# Patient Record
Sex: FEMALE | Race: WHITE | NOT HISPANIC OR LATINO | Employment: OTHER | URBAN - METROPOLITAN AREA
[De-identification: names, ages, dates, MRNs, and addresses within clinical notes are randomized per-mention and may not be internally consistent; named-entity substitution may affect disease eponyms.]

---

## 2023-08-09 ENCOUNTER — HOSPITAL ENCOUNTER (OUTPATIENT)
Dept: DATA CONVERSION | Facility: HOSPITAL | Age: 78
Discharge: HOME | End: 2023-08-09

## 2023-08-09 DIAGNOSIS — M35.3 POLYMYALGIA RHEUMATICA (MULTI): ICD-10-CM

## 2023-08-09 LAB
ALBUMIN SERPL-MCNC: 3.8 GM/DL (ref 3.5–5)
ALBUMIN/GLOB SERPL: 1.4 RATIO (ref 1.5–3)
ALP BLD-CCNC: 53 U/L (ref 35–125)
ALT SERPL-CCNC: 10 U/L (ref 5–40)
ANION GAP SERPL CALCULATED.3IONS-SCNC: 13 MMOL/L (ref 0–19)
AST SERPL-CCNC: 14 U/L (ref 5–40)
BASOPHILS # BLD AUTO: 0.05 K/UL (ref 0–0.22)
BASOPHILS NFR BLD AUTO: 0.7 % (ref 0–1)
BILIRUB SERPL-MCNC: 0.6 MG/DL (ref 0.1–1.2)
BUN SERPL-MCNC: 47 MG/DL (ref 8–25)
BUN/CREAT SERPL: 33.6 RATIO (ref 8–21)
CALCIUM SERPL-MCNC: 9.5 MG/DL (ref 8.5–10.4)
CHLORIDE SERPL-SCNC: 103 MMOL/L (ref 97–107)
CO2 SERPL-SCNC: 22 MMOL/L (ref 24–31)
CREAT SERPL-MCNC: 1.4 MG/DL (ref 0.4–1.6)
CRP SERPL-MCNC: 0.3 MG/DL (ref 0–2)
DEPRECATED RDW RBC AUTO: 50.5 FL (ref 37–54)
DIFFERENTIAL METHOD BLD: ABNORMAL
EOSINOPHIL # BLD AUTO: 0.13 K/UL (ref 0–0.45)
EOSINOPHIL NFR BLD: 1.8 % (ref 0–3)
ERYTHROCYTE [DISTWIDTH] IN BLOOD BY AUTOMATED COUNT: 13.6 % (ref 11.7–15)
ERYTHROCYTE [SEDIMENTATION RATE] IN BLOOD BY WESTERGREN METHOD: 21 MM/HR (ref 0–30)
GFR SERPL CREATININE-BSD FRML MDRD: 38 ML/MIN/1.73 M2
GLOBULIN SER-MCNC: 2.8 G/DL (ref 1.9–3.7)
GLUCOSE SERPL-MCNC: 104 MG/DL (ref 65–99)
HCT VFR BLD AUTO: 39.5 % (ref 36–44)
HGB BLD-MCNC: 12.9 GM/DL (ref 12–15)
IMM GRANULOCYTES # BLD AUTO: 0.02 K/UL (ref 0–0.1)
LYMPHOCYTES # BLD AUTO: 1.59 K/UL (ref 1.2–3.2)
LYMPHOCYTES NFR BLD MANUAL: 22.5 % (ref 20–40)
MCH RBC QN AUTO: 32.7 PG (ref 26–34)
MCHC RBC AUTO-ENTMCNC: 32.7 % (ref 31–37)
MCV RBC AUTO: 100 FL (ref 80–100)
MONOCYTES # BLD AUTO: 0.6 K/UL (ref 0–0.8)
MONOCYTES NFR BLD MANUAL: 8.5 % (ref 0–8)
NEUTROPHILS # BLD AUTO: 4.68 K/UL
NEUTROPHILS # BLD AUTO: 4.68 K/UL (ref 1.8–7.7)
NEUTROPHILS.IMMATURE NFR BLD: 0.3 % (ref 0–1)
NEUTS SEG NFR BLD: 66.2 % (ref 50–70)
NRBC BLD-RTO: 0 /100 WBC
PLATELET # BLD AUTO: 147 K/UL (ref 150–450)
PMV BLD AUTO: 10.6 CU (ref 7–12.6)
POTASSIUM SERPL-SCNC: 5.1 MMOL/L (ref 3.4–5.1)
PROT SERPL-MCNC: 6.6 G/DL (ref 5.9–7.9)
RBC # BLD AUTO: 3.95 M/UL (ref 4–4.9)
SODIUM SERPL-SCNC: 138 MMOL/L (ref 133–145)
WBC # BLD AUTO: 7.1 K/UL (ref 4.5–11)

## 2023-08-21 ENCOUNTER — HOSPITAL ENCOUNTER (OUTPATIENT)
Dept: DATA CONVERSION | Facility: HOSPITAL | Age: 78
End: 2023-08-21

## 2023-08-21 DIAGNOSIS — R91.1 SOLITARY PULMONARY NODULE: ICD-10-CM

## 2023-09-09 PROBLEM — N25.81 SECONDARY HYPERPARATHYROIDISM (MULTI): Status: ACTIVE | Noted: 2023-09-09

## 2023-09-09 PROBLEM — M40.204 UNSPECIFIED KYPHOSIS, THORACIC REGION: Status: ACTIVE | Noted: 2023-09-09

## 2023-09-09 PROBLEM — N18.30 STAGE 3 CHRONIC KIDNEY DISEASE (MULTI): Status: ACTIVE | Noted: 2023-09-09

## 2023-09-09 PROBLEM — I10 ESSENTIAL HYPERTENSION: Status: ACTIVE | Noted: 2020-11-04

## 2023-09-09 PROBLEM — D75.89 MACROCYTOSIS WITHOUT ANEMIA: Status: ACTIVE | Noted: 2023-09-09

## 2023-09-09 PROBLEM — E66.01 MORBID (SEVERE) OBESITY DUE TO EXCESS CALORIES (MULTI): Status: ACTIVE | Noted: 2023-09-09

## 2023-09-09 PROBLEM — F43.23 ADJUSTMENT REACTION WITH ANXIETY AND DEPRESSION: Status: ACTIVE | Noted: 2020-11-04

## 2023-09-09 PROBLEM — E53.8 VITAMIN B 12 DEFICIENCY: Status: ACTIVE | Noted: 2022-10-07

## 2023-09-09 PROBLEM — R01.1 HEART MURMUR: Status: ACTIVE | Noted: 2023-04-17

## 2023-09-09 PROBLEM — F32.A DEPRESSION: Status: ACTIVE | Noted: 2022-04-15

## 2023-09-09 PROBLEM — E87.5 HYPERKALEMIA: Status: ACTIVE | Noted: 2023-09-09

## 2023-09-09 PROBLEM — R60.0 EDEMA OF EXTREMITIES: Status: ACTIVE | Noted: 2020-05-18

## 2023-09-09 PROBLEM — M17.0 PRIMARY OSTEOARTHRITIS OF BOTH KNEES: Status: ACTIVE | Noted: 2023-09-09

## 2023-09-09 PROBLEM — M10.9 ACUTE GOUT OF MULTIPLE SITES: Status: ACTIVE | Noted: 2023-09-09

## 2023-09-09 PROBLEM — M18.0 PRIMARY OSTEOARTHRITIS OF BOTH FIRST CARPOMETACARPAL JOINTS: Status: ACTIVE | Noted: 2023-09-09

## 2023-09-09 PROBLEM — R26.2 DISABILITY OF WALKING: Status: ACTIVE | Noted: 2023-09-09

## 2023-09-09 PROBLEM — M85.80 OSTEOPENIA: Status: ACTIVE | Noted: 2023-09-09

## 2023-09-09 PROBLEM — D69.6 THROMBOCYTOPENIA (CMS-HCC): Status: ACTIVE | Noted: 2023-09-09

## 2023-09-09 PROBLEM — K21.9 CHRONIC GERD: Status: ACTIVE | Noted: 2019-10-17

## 2023-09-09 PROBLEM — F41.9 ANXIETY: Status: ACTIVE | Noted: 2023-04-17

## 2023-09-09 PROBLEM — D70.2 DRUG-INDUCED LEUKOPENIA (CMS-HCC): Status: ACTIVE | Noted: 2023-09-09

## 2023-09-09 PROBLEM — E78.2 HYPERLIPEMIA, MIXED: Status: ACTIVE | Noted: 2020-11-04

## 2023-09-09 PROBLEM — E03.9 HYPOTHYROID: Status: ACTIVE | Noted: 2022-04-15

## 2023-09-09 PROBLEM — E55.9 VITAMIN D DEFICIENCY: Status: ACTIVE | Noted: 2022-10-07

## 2023-09-09 RX ORDER — ESCITALOPRAM OXALATE 20 MG/1
20 TABLET ORAL DAILY
COMMUNITY
Start: 2023-04-17 | End: 2023-10-16 | Stop reason: SDUPTHER

## 2023-09-09 RX ORDER — ASCORBIC ACID 500 MG
TABLET ORAL DAILY
COMMUNITY

## 2023-09-09 RX ORDER — VITS A,C,E/LUTEIN/MINERALS 300MCG-200
TABLET ORAL
COMMUNITY
Start: 2021-10-22 | End: 2024-04-02 | Stop reason: WASHOUT

## 2023-09-09 RX ORDER — HYDROCODONE/ACETAMINOPHEN 5 MG-500MG
1 TABLET ORAL DAILY
COMMUNITY

## 2023-09-09 RX ORDER — OXYBUTYNIN CHLORIDE 15 MG/1
15 TABLET, EXTENDED RELEASE ORAL DAILY
COMMUNITY

## 2023-09-09 RX ORDER — LANOLIN ALCOHOL/MO/W.PET/CERES
100 CREAM (GRAM) TOPICAL DAILY
COMMUNITY
Start: 2020-09-08

## 2023-09-09 RX ORDER — THYROID 30 MG/1
30 TABLET ORAL
COMMUNITY
End: 2023-10-16 | Stop reason: SDUPTHER

## 2023-09-09 RX ORDER — ACETAMINOPHEN 500 MG
1 TABLET ORAL DAILY
COMMUNITY

## 2023-09-09 RX ORDER — TOCILIZUMAB 20 MG/ML
INJECTION, SOLUTION, CONCENTRATE INTRAVENOUS
COMMUNITY
Start: 2020-11-04 | End: 2024-04-02 | Stop reason: WASHOUT

## 2023-09-09 RX ORDER — VERAPAMIL HYDROCHLORIDE 240 MG/1
240 TABLET, FILM COATED, EXTENDED RELEASE ORAL DAILY
COMMUNITY
End: 2023-10-16 | Stop reason: SDUPTHER

## 2023-09-09 RX ORDER — GLUCOSAM/CHONDRO/HERB 149/HYAL 750-100 MG
1 TABLET ORAL DAILY
COMMUNITY

## 2023-09-09 RX ORDER — CAPTOPRIL 50 MG/1
50 TABLET ORAL DAILY
COMMUNITY
End: 2023-10-16 | Stop reason: SDUPTHER

## 2023-09-09 RX ORDER — TOCILIZUMAB 20 MG/ML
INJECTION, SOLUTION, CONCENTRATE INTRAVENOUS
COMMUNITY
Start: 2021-09-09

## 2023-09-09 RX ORDER — SIMVASTATIN 20 MG/1
20 TABLET, FILM COATED ORAL NIGHTLY
COMMUNITY
End: 2023-10-16 | Stop reason: SDUPTHER

## 2023-09-09 RX ORDER — LEVOTHYROXINE SODIUM 100 UG/1
100 TABLET ORAL DAILY
COMMUNITY
Start: 2023-04-17 | End: 2023-10-16 | Stop reason: SDUPTHER

## 2023-09-27 ENCOUNTER — HOSPITAL ENCOUNTER (OUTPATIENT)
Dept: DATA CONVERSION | Facility: HOSPITAL | Age: 78
Discharge: HOME | End: 2023-09-27
Payer: MEDICARE

## 2023-09-27 DIAGNOSIS — R91.1 SOLITARY PULMONARY NODULE: ICD-10-CM

## 2023-10-03 RX ORDER — CAPTOPRIL 50 MG/1
50 TABLET ORAL DAILY
Qty: 90 TABLET | Refills: 3 | OUTPATIENT
Start: 2023-10-03

## 2023-10-03 RX ORDER — SIMVASTATIN 20 MG/1
20 TABLET, FILM COATED ORAL NIGHTLY
Qty: 90 TABLET | Refills: 3 | OUTPATIENT
Start: 2023-10-03

## 2023-10-12 ENCOUNTER — OFFICE VISIT (OUTPATIENT)
Dept: RHEUMATOLOGY | Facility: CLINIC | Age: 78
End: 2023-10-12
Payer: MEDICARE

## 2023-10-12 VITALS
HEIGHT: 62 IN | BODY MASS INDEX: 34.04 KG/M2 | DIASTOLIC BLOOD PRESSURE: 66 MMHG | HEART RATE: 59 BPM | SYSTOLIC BLOOD PRESSURE: 136 MMHG | RESPIRATION RATE: 18 BRPM | OXYGEN SATURATION: 100 % | WEIGHT: 185 LBS | TEMPERATURE: 98.4 F

## 2023-10-12 DIAGNOSIS — M35.3 POLYMYALGIA RHEUMATICA (MULTI): Primary | ICD-10-CM

## 2023-10-12 PROBLEM — M15.0 PRIMARY GENERALIZED (OSTEO)ARTHRITIS: Status: ACTIVE | Noted: 2023-10-12

## 2023-10-12 PROBLEM — M54.50 LOW BACK PAIN, UNSPECIFIED: Status: ACTIVE | Noted: 2023-10-12

## 2023-10-12 PROCEDURE — 3078F DIAST BP <80 MM HG: CPT | Performed by: INTERNAL MEDICINE

## 2023-10-12 PROCEDURE — 99214 OFFICE O/P EST MOD 30 MIN: CPT | Performed by: INTERNAL MEDICINE

## 2023-10-12 PROCEDURE — 96413 CHEMO IV INFUSION 1 HR: CPT | Performed by: INTERNAL MEDICINE

## 2023-10-12 PROCEDURE — 1126F AMNT PAIN NOTED NONE PRSNT: CPT | Performed by: INTERNAL MEDICINE

## 2023-10-12 PROCEDURE — 3074F SYST BP LT 130 MM HG: CPT | Performed by: INTERNAL MEDICINE

## 2023-10-12 PROCEDURE — 2500000004 HC RX 250 GENERAL PHARMACY W/ HCPCS (ALT 636 FOR OP/ED): Performed by: INTERNAL MEDICINE

## 2023-10-12 PROCEDURE — 1159F MED LIST DOCD IN RCRD: CPT | Performed by: INTERNAL MEDICINE

## 2023-10-12 PROCEDURE — 3075F SYST BP GE 130 - 139MM HG: CPT | Performed by: INTERNAL MEDICINE

## 2023-10-12 RX ORDER — DIPHENHYDRAMINE HYDROCHLORIDE 50 MG/ML
50 INJECTION INTRAMUSCULAR; INTRAVENOUS AS NEEDED
Status: CANCELLED | OUTPATIENT
Start: 2023-11-09

## 2023-10-12 RX ORDER — CLOBETASOL PROPIONATE 0.5 MG/G
CREAM TOPICAL
COMMUNITY
Start: 2023-09-13 | End: 2024-04-02 | Stop reason: WASHOUT

## 2023-10-12 RX ORDER — METHYLPREDNISOLONE SODIUM SUCCINATE 40 MG/ML
40 INJECTION INTRAMUSCULAR; INTRAVENOUS AS NEEDED
Status: CANCELLED | OUTPATIENT
Start: 2023-11-09

## 2023-10-12 RX ORDER — METHYLPREDNISOLONE SODIUM SUCCINATE 40 MG/ML
40 INJECTION INTRAMUSCULAR; INTRAVENOUS AS NEEDED
Status: CANCELLED | OUTPATIENT
Start: 2023-10-12

## 2023-10-12 RX ORDER — EPINEPHRINE 0.3 MG/.3ML
0.3 INJECTION SUBCUTANEOUS EVERY 5 MIN PRN
Status: CANCELLED | OUTPATIENT
Start: 2023-11-09

## 2023-10-12 RX ORDER — ALBUTEROL SULFATE 0.83 MG/ML
3 SOLUTION RESPIRATORY (INHALATION) AS NEEDED
Status: CANCELLED | OUTPATIENT
Start: 2023-11-09

## 2023-10-12 RX ORDER — ALBUTEROL SULFATE 0.83 MG/ML
3 SOLUTION RESPIRATORY (INHALATION) AS NEEDED
Status: CANCELLED | OUTPATIENT
Start: 2023-10-12

## 2023-10-12 RX ORDER — FAMOTIDINE 10 MG/ML
20 INJECTION INTRAVENOUS ONCE AS NEEDED
Status: CANCELLED | OUTPATIENT
Start: 2023-11-09

## 2023-10-12 RX ORDER — FAMOTIDINE 10 MG/ML
20 INJECTION INTRAVENOUS ONCE AS NEEDED
Status: CANCELLED | OUTPATIENT
Start: 2023-10-12

## 2023-10-12 RX ORDER — EPINEPHRINE 0.3 MG/.3ML
0.3 INJECTION SUBCUTANEOUS EVERY 5 MIN PRN
Status: CANCELLED | OUTPATIENT
Start: 2023-10-12

## 2023-10-12 RX ORDER — DIPHENHYDRAMINE HYDROCHLORIDE 50 MG/ML
50 INJECTION INTRAMUSCULAR; INTRAVENOUS AS NEEDED
Status: CANCELLED | OUTPATIENT
Start: 2023-10-12

## 2023-10-12 RX ADMIN — TOCILIZUMAB 334 MG: 20 INJECTION, SOLUTION, CONCENTRATE INTRAVENOUS at 10:40

## 2023-10-12 ASSESSMENT — ENCOUNTER SYMPTOMS
SHORTNESS OF BREATH: 0
SLEEP DISTURBANCE: 0
NUMBNESS: 1
DEPRESSION: 0
EYES NEGATIVE: 1
FATIGUE: 1
NERVOUS/ANXIOUS: 0
LOSS OF SENSATION IN FEET: 0
DIFFICULTY URINATING: 0
ENDOCRINE NEGATIVE: 1
BACK PAIN: 1
OCCASIONAL FEELINGS OF UNSTEADINESS: 0
GASTROINTESTINAL NEGATIVE: 1

## 2023-10-12 ASSESSMENT — PATIENT HEALTH QUESTIONNAIRE - PHQ9
SUM OF ALL RESPONSES TO PHQ9 QUESTIONS 1 AND 2: 0
1. LITTLE INTEREST OR PLEASURE IN DOING THINGS: NOT AT ALL
2. FEELING DOWN, DEPRESSED OR HOPELESS: NOT AT ALL

## 2023-10-12 ASSESSMENT — PAIN SCALES - GENERAL: PAINLEVEL: 0-NO PAIN

## 2023-10-12 ASSESSMENT — JOINT PAIN
TOTAL NUMBER OF SWOLLEN JOINTS: 1
TOTAL NUMBER OF TENDER JOINTS: 1

## 2023-10-12 NOTE — PROGRESS NOTES
Anny Tesfaye     Chief Complaint:  This 78 y.o. year old female with polymyalgia rheumatica (PMR) presents for treatment with ACTEMRA    HPI  Subjective:  Prob. #1: polymyalgia rheumatica        The patient returns today for her 54th treatment of PMR with ACTEMRA.       The patient reports that she is feeling well, and has had no interval infections or illnesses requiring treatment.        The patient reports being diagnosed with early psoriasis at [facility]. She was prescribed CLOBETASOL 0.05% cream, and is no longer applying the treatment due to resolution of the plaques.         The patient denies any PMR-related symptoms, in particular bitemporal headache or pain, scalp tenderness, blurred vision, amaurosis, diplopia, jaw claudication, or polyarthritis.          The patient has previously determined that she will continue ACTEMRA treatment going forward in order to maintain her well-being and to prevent a recurrence of PMR. She is particularly satisfied with this treatment because it has resolved the PMR and has not caused any adverse drug reactions.    Review of Systems   Constitutional:  Positive for fatigue.   HENT: Negative.     Eyes: Negative.    Respiratory:  Negative for shortness of breath.    Cardiovascular:         Easy bruising   Gastrointestinal: Negative.    Endocrine: Negative.    Genitourinary:  Positive for urgency. Negative for difficulty urinating.        Bladder control difficulty w/urgency; nocturia x 1   Musculoskeletal:  Positive for back pain and gait problem.   Neurological:  Positive for numbness.        Variable numbness of fingertips   Psychiatric/Behavioral:  Negative for sleep disturbance. The patient is not nervous/anxious.        Objective:  Vital signs:  Vitals:    10/12/23 0929   BP: 122/68   Pulse: 59       Physical Exam  Vitals and nursing note reviewed.   Constitutional:       General: She is not in acute distress.     Appearance: Normal appearance. She is obese.  She is not ill-appearing.   HENT:      Head: Normocephalic and atraumatic.      Mouth/Throat:      Mouth: Mucous membranes are moist.   Eyes:      Extraocular Movements: Extraocular movements intact.      Conjunctiva/sclera: Conjunctivae normal.      Pupils: Pupils are equal, round, and reactive to light.   Cardiovascular:      Rate and Rhythm: Normal rate and regular rhythm.      Comments: Soft gr 1/6 ejection systolic murmur; normal S1 & S2; no gallop; Right carotid bruit is stable and unchanged. No indudration of temporal arteries; left pulse not appreciated where bx was performed. Right carotic bruit.dd  Pulmonary:      Effort: Pulmonary effort is normal.      Breath sounds: Normal breath sounds.      Comments: Lipoma of left posterior chest wall unchanged.   Musculoskeletal:         General: Normal range of motion.      Comments: Station: intact; gait: normal base, stable, steady; muscle testing: deferred; mild thoracic kyphosis of aging; Knees: right shows mild boggy synovitis w/o effusion; mild mediolateral instability; no Baker's cyst;   Skin:     General: Skin is warm.      Comments: Healing psoriatic lesions of legs   Neurological:      Mental Status: She is alert.   Psychiatric:         Mood and Affect: Mood normal.         Behavior: Behavior normal.     There is currently no information documented on the homunculus. Go to the Rheumatology activity and complete the homunculus joint exam.     Joint Exam 10/12/2023     No joint exam has been documented for this visit        Assessment:  Polymyalgia rheumatica - M35.3  Primary generalized osteoarthritis - M15.0  Long term drug therapy - Z79.899  Plan:  The patient can be treated with ACTEMRA today. Actemra 336 mg IV over 1 hour.  The rationale, objectives, risk/reward and potential side effects have been explained to the patient and the patient understands these facts.  The patient further understands that periodic laboratory studies must be performed to  monitor this drug and the patient will have these studies performed when the requisition is provided.  The patient is again educated to report to the office adverse effects including allergic reactions or infections.  The patient agreed to this plan. Review of prior laboratory data prior to infusion.      Milton Dubose MD

## 2023-10-12 NOTE — PROGRESS NOTES
Patient tolerated infusion well. IV removed intact, dressed with 2x2 gauze and bandage. Provided patient with print out of next appointments.

## 2023-10-16 ENCOUNTER — OFFICE VISIT (OUTPATIENT)
Dept: PRIMARY CARE | Facility: CLINIC | Age: 78
End: 2023-10-16
Payer: MEDICARE

## 2023-10-16 VITALS
HEIGHT: 66 IN | BODY MASS INDEX: 29.89 KG/M2 | DIASTOLIC BLOOD PRESSURE: 78 MMHG | WEIGHT: 186 LBS | SYSTOLIC BLOOD PRESSURE: 132 MMHG

## 2023-10-16 DIAGNOSIS — R73.09 ELEVATED GLUCOSE: ICD-10-CM

## 2023-10-16 DIAGNOSIS — I10 BENIGN ESSENTIAL HYPERTENSION: ICD-10-CM

## 2023-10-16 DIAGNOSIS — F41.9 ANXIETY: ICD-10-CM

## 2023-10-16 DIAGNOSIS — E78.2 HYPERLIPEMIA, MIXED: ICD-10-CM

## 2023-10-16 DIAGNOSIS — I10 ESSENTIAL HYPERTENSION: ICD-10-CM

## 2023-10-16 DIAGNOSIS — E03.9 ACQUIRED HYPOTHYROIDISM: ICD-10-CM

## 2023-10-16 DIAGNOSIS — E78.2 MIXED HYPERLIPIDEMIA: Primary | ICD-10-CM

## 2023-10-16 PROCEDURE — 3075F SYST BP GE 130 - 139MM HG: CPT | Performed by: FAMILY MEDICINE

## 2023-10-16 PROCEDURE — 1159F MED LIST DOCD IN RCRD: CPT | Performed by: FAMILY MEDICINE

## 2023-10-16 PROCEDURE — 1126F AMNT PAIN NOTED NONE PRSNT: CPT | Performed by: FAMILY MEDICINE

## 2023-10-16 PROCEDURE — 99214 OFFICE O/P EST MOD 30 MIN: CPT | Performed by: FAMILY MEDICINE

## 2023-10-16 PROCEDURE — 3078F DIAST BP <80 MM HG: CPT | Performed by: FAMILY MEDICINE

## 2023-10-16 RX ORDER — SIMVASTATIN 20 MG/1
20 TABLET, FILM COATED ORAL NIGHTLY
Qty: 90 TABLET | Refills: 1 | Status: SHIPPED | OUTPATIENT
Start: 2023-10-16 | End: 2024-05-14 | Stop reason: SDUPTHER

## 2023-10-16 RX ORDER — VERAPAMIL HYDROCHLORIDE 240 MG/1
240 TABLET, FILM COATED, EXTENDED RELEASE ORAL DAILY
Qty: 90 TABLET | Refills: 1 | Status: SHIPPED | OUTPATIENT
Start: 2023-10-16 | End: 2024-04-02 | Stop reason: SDUPTHER

## 2023-10-16 RX ORDER — LEVOTHYROXINE SODIUM 100 UG/1
100 TABLET ORAL DAILY
Qty: 90 TABLET | Refills: 1 | Status: SHIPPED | OUTPATIENT
Start: 2023-10-16 | End: 2024-05-14 | Stop reason: SDUPTHER

## 2023-10-16 RX ORDER — CAPTOPRIL 50 MG/1
50 TABLET ORAL DAILY
Qty: 90 TABLET | Refills: 1 | Status: SHIPPED | OUTPATIENT
Start: 2023-10-16 | End: 2024-04-02 | Stop reason: SDUPTHER

## 2023-10-16 RX ORDER — ESCITALOPRAM OXALATE 20 MG/1
20 TABLET ORAL DAILY
Qty: 90 TABLET | Refills: 1 | Status: SHIPPED | OUTPATIENT
Start: 2023-10-16 | End: 2024-04-02 | Stop reason: SDUPTHER

## 2023-10-16 RX ORDER — THYROID 30 MG/1
30 TABLET ORAL
Qty: 90 TABLET | Refills: 1 | Status: SHIPPED | OUTPATIENT
Start: 2023-10-16 | End: 2024-04-02 | Stop reason: SDUPTHER

## 2023-10-16 ASSESSMENT — ENCOUNTER SYMPTOMS
COUGH: 0
DEPRESSION: 0
AGITATION: 0
HYPERTENSION: 1
OCCASIONAL FEELINGS OF UNSTEADINESS: 1
ABDOMINAL PAIN: 0
HEADACHES: 0
LOSS OF SENSATION IN FEET: 0
UNEXPECTED WEIGHT CHANGE: 0
SHORTNESS OF BREATH: 0
CHEST TIGHTNESS: 0
ARTHRALGIAS: 0
WEAKNESS: 0

## 2023-10-16 ASSESSMENT — PATIENT HEALTH QUESTIONNAIRE - PHQ9
1. LITTLE INTEREST OR PLEASURE IN DOING THINGS: NOT AT ALL
2. FEELING DOWN, DEPRESSED OR HOPELESS: NOT AT ALL
SUM OF ALL RESPONSES TO PHQ9 QUESTIONS 1 AND 2: 0

## 2023-10-16 NOTE — PROGRESS NOTES
78 year old patient is here today for thyroid, cholesterol, anxiety and elevated glucose recheck.    She is taking Levothyroxine 100mcg daily six days per week, armour thyroid 30mg daily, Lexapro 20mg daily, captopril 50mg daily, Simvastatin 20mg daily and Verapamil 240mg daily. She needs them all refilled today.    She is seeing Dr. Dubose for osteoporosis, Dr. Lovell urology for urology management and Dr. Sandhu for pulmonary management.    Mammogram-  declined today  Pneumonia vaccine x 2 documented in the past  Tdap  11/2015  Colonoscopy-  declined  Flu vaccine-  declined today-

## 2023-10-16 NOTE — PROGRESS NOTES
Subjective   Patient ID: Anny Tesfaye is a 78 y.o. female who presents for Hypothyroidism, Hypertension, and Hyperlipidemia.  Hypertension  Pertinent negatives include no chest pain, headaches or shortness of breath.   Hyperlipidemia  Pertinent negatives include no chest pain or shortness of breath.     78 year old patient is here today for thyroid, cholesterol, anxiety and elevated glucose recheck.    She is taking Levothyroxine 100mcg daily six days per week, armour thyroid 30mg daily, Lexapro 20mg daily, captopril 50mg daily, Simvastatin 20mg daily and Verapamil 240mg daily. She needs them all refilled today.    She is seeing Dr. Dubose for osteoporosis, Dr. Lovell urology for urology management and Dr. Sandhu for pulmonary management.    Mammogram-  declined today  Pneumonia vaccine x 2 documented in the past  Tdap  11/2015  Colonoscopy-  declined  Flu vaccine-  declined today-     HPI reviewed/agree  Pt generally feels ok   Meds seem ok     no questions     has a new PCP  Review of Systems   Constitutional:  Negative for unexpected weight change.   Respiratory:  Negative for cough, chest tightness and shortness of breath.    Cardiovascular:  Negative for chest pain.   Gastrointestinal:  Negative for abdominal pain.   Musculoskeletal:  Negative for arthralgias.   Skin:  Negative for rash.   Neurological:  Negative for weakness and headaches.   Psychiatric/Behavioral:  Negative for agitation.        Objective   Physical Exam  Constitutional:       Appearance: Normal appearance.   HENT:      Head: Normocephalic and atraumatic.      Nose: Nose normal.      Mouth/Throat:      Pharynx: Oropharynx is clear.   Eyes:      Extraocular Movements: Extraocular movements intact.      Pupils: Pupils are equal, round, and reactive to light.   Cardiovascular:      Rate and Rhythm: Normal rate and regular rhythm.      Pulses: Normal pulses.      Heart sounds: Normal heart sounds. No murmur heard.  Pulmonary:      Effort:  Pulmonary effort is normal.      Breath sounds: Normal breath sounds.   Abdominal:      General: Abdomen is flat. Bowel sounds are normal.      Palpations: Abdomen is soft.      Tenderness: There is no abdominal tenderness.   Musculoskeletal:         General: Normal range of motion.   Skin:     General: Skin is warm and dry.   Neurological:      General: No focal deficit present.      Mental Status: She is alert and oriented to person, place, and time.   Psychiatric:         Mood and Affect: Mood normal.         Behavior: Behavior normal.         Assessment/Plan   Diagnoses and all orders for this visit:  Mixed hyperlipidemia  -     Comprehensive Metabolic Panel; Future  -     Lipid Panel; Future  Hyperlipemia, mixed  -     simvastatin (Zocor) 20 mg tablet; Take 1 tablet (20 mg) by mouth once daily at bedtime.  Acquired hypothyroidism  -     thyroid, pork, ([place] Thyroid) 30 mg tablet; Take 1 tablet (30 mg) by mouth once daily in the morning. Take before meals.  -     levothyroxine (Synthroid, Levoxyl) 100 mcg tablet; Take 1 tablet (100 mcg) by mouth once daily. Six days per week  -     Thyroid Stimulating Hormone; Future  -     Thyroxine, Free; Future  -     Triiodothyronine, Free; Future  Essential hypertension  -     captopril (Capoten) 50 mg tablet; Take 1 tablet (50 mg) by mouth once daily.  -     verapamil SR (Calan-SR) 240 mg ER tablet; Take 1 tablet (240 mg) by mouth once daily.  Anxiety  -     escitalopram (Lexapro) 20 mg tablet; Take 1 tablet (20 mg) by mouth once daily.  Benign essential hypertension  -     Comprehensive Metabolic Panel; Future  -     Magnesium; Future  Elevated glucose  -     Hemoglobin A1C; Future

## 2023-10-23 ENCOUNTER — LAB (OUTPATIENT)
Dept: LAB | Facility: LAB | Age: 78
End: 2023-10-23
Payer: MEDICARE

## 2023-10-23 DIAGNOSIS — I10 BENIGN ESSENTIAL HYPERTENSION: ICD-10-CM

## 2023-10-23 DIAGNOSIS — E03.9 ACQUIRED HYPOTHYROIDISM: ICD-10-CM

## 2023-10-23 DIAGNOSIS — M35.3 POLYMYALGIA RHEUMATICA (MULTI): ICD-10-CM

## 2023-10-23 DIAGNOSIS — R73.09 ELEVATED GLUCOSE: ICD-10-CM

## 2023-10-23 DIAGNOSIS — E78.2 MIXED HYPERLIPIDEMIA: ICD-10-CM

## 2023-10-23 LAB
ALBUMIN SERPL-MCNC: 4.2 G/DL (ref 3.5–5)
ALP BLD-CCNC: 50 U/L (ref 35–125)
ALT SERPL-CCNC: 15 U/L (ref 5–40)
ANION GAP SERPL CALC-SCNC: 12 MMOL/L
AST SERPL-CCNC: 17 U/L (ref 5–40)
BASOPHILS # BLD AUTO: 0.05 X10*3/UL (ref 0–0.1)
BASOPHILS NFR BLD AUTO: 0.9 %
BILIRUB SERPL-MCNC: 0.8 MG/DL (ref 0.1–1.2)
BUN SERPL-MCNC: 35 MG/DL (ref 8–25)
CALCIUM SERPL-MCNC: 9.7 MG/DL (ref 8.5–10.4)
CHLORIDE SERPL-SCNC: 105 MMOL/L (ref 97–107)
CHOLEST SERPL-MCNC: 180 MG/DL (ref 133–200)
CHOLEST/HDLC SERPL: 2 {RATIO}
CO2 SERPL-SCNC: 24 MMOL/L (ref 24–31)
CREAT SERPL-MCNC: 1.3 MG/DL (ref 0.4–1.6)
EOSINOPHIL # BLD AUTO: 0.15 X10*3/UL (ref 0–0.4)
EOSINOPHIL NFR BLD AUTO: 2.7 %
ERYTHROCYTE [DISTWIDTH] IN BLOOD BY AUTOMATED COUNT: 13.7 % (ref 11.5–14.5)
ERYTHROCYTE [DISTWIDTH] IN BLOOD BY AUTOMATED COUNT: 13.7 % (ref 11.5–14.5)
EST. AVERAGE GLUCOSE BLD GHB EST-MCNC: 94 MG/DL
GFR SERPL CREATININE-BSD FRML MDRD: 42 ML/MIN/1.73M*2
GLUCOSE SERPL-MCNC: 93 MG/DL (ref 65–99)
HAV IGM SER QL: NONREACTIVE
HBA1C MFR BLD: 4.9 %
HBV CORE IGM SER QL: NONREACTIVE
HBV SURFACE AG SERPL QL IA: NONREACTIVE
HCT VFR BLD AUTO: 39.7 % (ref 36–46)
HCT VFR BLD AUTO: 39.7 % (ref 36–46)
HCV AB SER QL: NONREACTIVE
HDLC SERPL-MCNC: 88 MG/DL
HGB BLD-MCNC: 12.9 G/DL (ref 12–16)
HGB BLD-MCNC: 12.9 G/DL (ref 12–16)
IMM GRANULOCYTES # BLD AUTO: 0.01 X10*3/UL (ref 0–0.5)
IMM GRANULOCYTES NFR BLD AUTO: 0.2 % (ref 0–0.9)
LDLC SERPL CALC-MCNC: 74 MG/DL (ref 65–130)
LYMPHOCYTES # BLD AUTO: 1.83 X10*3/UL (ref 0.8–3)
LYMPHOCYTES NFR BLD AUTO: 33.1 %
MAGNESIUM SERPL-MCNC: 2.1 MG/DL (ref 1.6–3.1)
MCH RBC QN AUTO: 32.3 PG (ref 26–34)
MCH RBC QN AUTO: 32.3 PG (ref 26–34)
MCHC RBC AUTO-ENTMCNC: 32.5 G/DL (ref 32–36)
MCHC RBC AUTO-ENTMCNC: 32.5 G/DL (ref 32–36)
MCV RBC AUTO: 99 FL (ref 80–100)
MCV RBC AUTO: 99 FL (ref 80–100)
MONOCYTES # BLD AUTO: 0.48 X10*3/UL (ref 0.05–0.8)
MONOCYTES NFR BLD AUTO: 8.7 %
NEUTROPHILS # BLD AUTO: 3.01 X10*3/UL (ref 1.6–5.5)
NEUTROPHILS NFR BLD AUTO: 54.4 %
NRBC BLD-RTO: 0 /100 WBCS (ref 0–0)
NRBC BLD-RTO: 0 /100 WBCS (ref 0–0)
PLATELET # BLD AUTO: 208 X10*3/UL (ref 150–450)
PLATELET # BLD AUTO: 208 X10*3/UL (ref 150–450)
PMV BLD AUTO: 10.2 FL (ref 7.5–11.5)
PMV BLD AUTO: 10.2 FL (ref 7.5–11.5)
POTASSIUM SERPL-SCNC: 5.6 MMOL/L (ref 3.4–5.1)
PROT SERPL-MCNC: 6.5 G/DL (ref 5.9–7.9)
RBC # BLD AUTO: 4 X10*6/UL (ref 4–5.2)
RBC # BLD AUTO: 4 X10*6/UL (ref 4–5.2)
SODIUM SERPL-SCNC: 141 MMOL/L (ref 133–145)
T3FREE SERPL-MCNC: 3.3 PG/ML (ref 2.3–4.2)
T4 FREE SERPL-MCNC: 1.3 NG/DL (ref 0.9–1.7)
TRIGL SERPL-MCNC: 90 MG/DL (ref 40–150)
TSH SERPL DL<=0.05 MIU/L-ACNC: 0.19 MIU/L (ref 0.27–4.2)
WBC # BLD AUTO: 5.5 X10*3/UL (ref 4.4–11.3)
WBC # BLD AUTO: 5.5 X10*3/UL (ref 4.4–11.3)

## 2023-10-23 PROCEDURE — 84439 ASSAY OF FREE THYROXINE: CPT

## 2023-10-23 PROCEDURE — 86481 TB AG RESPONSE T-CELL SUSP: CPT

## 2023-10-23 PROCEDURE — 80061 LIPID PANEL: CPT

## 2023-10-23 PROCEDURE — 80074 ACUTE HEPATITIS PANEL: CPT

## 2023-10-23 PROCEDURE — 84443 ASSAY THYROID STIM HORMONE: CPT

## 2023-10-23 PROCEDURE — 85025 COMPLETE CBC W/AUTO DIFF WBC: CPT

## 2023-10-23 PROCEDURE — 83036 HEMOGLOBIN GLYCOSYLATED A1C: CPT

## 2023-10-23 PROCEDURE — 84481 FREE ASSAY (FT-3): CPT

## 2023-10-23 PROCEDURE — 36415 COLL VENOUS BLD VENIPUNCTURE: CPT

## 2023-10-23 PROCEDURE — 80053 COMPREHEN METABOLIC PANEL: CPT

## 2023-10-23 PROCEDURE — 83735 ASSAY OF MAGNESIUM: CPT

## 2023-10-24 DIAGNOSIS — E87.5 HYPERKALEMIA: Primary | ICD-10-CM

## 2023-10-25 LAB
NIL(NEG) CONTROL SPOT COUNT: NORMAL
PANEL A SPOT COUNT: 1
PANEL B SPOT COUNT: 2
POS CONTROL SPOT COUNT: NORMAL
T-SPOT. TB INTERPRETATION: NEGATIVE

## 2023-10-30 ENCOUNTER — LAB (OUTPATIENT)
Dept: LAB | Facility: LAB | Age: 78
End: 2023-10-30
Payer: MEDICARE

## 2023-10-30 DIAGNOSIS — E87.5 HYPERKALEMIA: ICD-10-CM

## 2023-10-30 LAB — POTASSIUM SERPL-SCNC: 5.1 MMOL/L (ref 3.4–5.1)

## 2023-10-30 PROCEDURE — 84132 ASSAY OF SERUM POTASSIUM: CPT

## 2023-10-30 PROCEDURE — 36415 COLL VENOUS BLD VENIPUNCTURE: CPT

## 2023-11-09 ENCOUNTER — OFFICE VISIT (OUTPATIENT)
Dept: RHEUMATOLOGY | Facility: CLINIC | Age: 78
End: 2023-11-09
Payer: MEDICARE

## 2023-11-09 VITALS
WEIGHT: 187 LBS | BODY MASS INDEX: 34.41 KG/M2 | SYSTOLIC BLOOD PRESSURE: 138 MMHG | OXYGEN SATURATION: 93 % | HEIGHT: 62 IN | HEART RATE: 60 BPM | DIASTOLIC BLOOD PRESSURE: 70 MMHG

## 2023-11-09 DIAGNOSIS — M35.3 POLYMYALGIA RHEUMATICA (MULTI): Primary | ICD-10-CM

## 2023-11-09 DIAGNOSIS — M35.3 POLYMYALGIA RHEUMATICA (MULTI): ICD-10-CM

## 2023-11-09 DIAGNOSIS — M15.0 PRIMARY GENERALIZED (OSTEO)ARTHRITIS: ICD-10-CM

## 2023-11-09 PROCEDURE — 3078F DIAST BP <80 MM HG: CPT | Performed by: INTERNAL MEDICINE

## 2023-11-09 PROCEDURE — 1159F MED LIST DOCD IN RCRD: CPT | Performed by: INTERNAL MEDICINE

## 2023-11-09 PROCEDURE — 99213 OFFICE O/P EST LOW 20 MIN: CPT | Performed by: INTERNAL MEDICINE

## 2023-11-09 PROCEDURE — 96413 CHEMO IV INFUSION 1 HR: CPT | Performed by: INTERNAL MEDICINE

## 2023-11-09 PROCEDURE — 2500000004 HC RX 250 GENERAL PHARMACY W/ HCPCS (ALT 636 FOR OP/ED): Performed by: INTERNAL MEDICINE

## 2023-11-09 PROCEDURE — 3075F SYST BP GE 130 - 139MM HG: CPT | Performed by: INTERNAL MEDICINE

## 2023-11-09 PROCEDURE — 1125F AMNT PAIN NOTED PAIN PRSNT: CPT | Performed by: INTERNAL MEDICINE

## 2023-11-09 RX ORDER — FAMOTIDINE 10 MG/ML
20 INJECTION INTRAVENOUS ONCE AS NEEDED
Status: CANCELLED | OUTPATIENT
Start: 2023-12-07

## 2023-11-09 RX ORDER — DIPHENHYDRAMINE HYDROCHLORIDE 50 MG/ML
50 INJECTION INTRAMUSCULAR; INTRAVENOUS AS NEEDED
Status: CANCELLED | OUTPATIENT
Start: 2023-12-07

## 2023-11-09 RX ORDER — ALBUTEROL SULFATE 0.83 MG/ML
3 SOLUTION RESPIRATORY (INHALATION) AS NEEDED
Status: CANCELLED | OUTPATIENT
Start: 2023-12-07

## 2023-11-09 RX ORDER — EPINEPHRINE 0.3 MG/.3ML
0.3 INJECTION SUBCUTANEOUS EVERY 5 MIN PRN
Status: CANCELLED | OUTPATIENT
Start: 2023-12-07

## 2023-11-09 RX ADMIN — TOCILIZUMAB 336 MG: 20 INJECTION, SOLUTION, CONCENTRATE INTRAVENOUS at 10:59

## 2023-11-09 ASSESSMENT — ROUTINE ASSESSMENT OF PATIENT INDEX DATA (RAPID3)
FN_SCORE: 2.7
ON A SCALE OF ONE TO TEN, CONSIDERING ALL THE WAYS IN WHICH ILLNESS AND HEALTH CONDITIONS MAY AFFECT YOU AT THIS TIME, PLEASE INDICATE BELOW HOW YOU ARE DOING:: 2.5
GOOD_NIGHTS_SLEEP: WITHOUT ANY DIFFICULTY
IN_OUT_TRANSPORT: WITHOUT ANY DIFFICULTY
ON A SCALE OF ONE TO TEN, HOW MUCH PAIN HAVE YOU HAD BECAUSE OF YOUR CONDITION OVER THE PAST WEEK?: 0
IN_OUT_BED: WITHOUT ANY DIFFICULTY
DRESS_YOURSELF: WITHOUT ANY DIFFICULTY
TOTAL RAPID3 SCORE: 5.2
FEELINGS_ANXIETY_NERVOUS: WITH SOME DIFFICULTY
FEELINGS_DEPRESSION: WITH SOME DIFFICULTY
LIFT_CUP_TO_MOUTH: WITHOUT ANY DIFFICULTY
ON A SCALE OF ONE TO TEN, CONSIDERING ALL THE WAYS IN WHICH ILLNESS AND HEALTH CONDITIONS MAY AFFECT YOU AT THIS TIME, PLEASE INDICATE BELOW HOW YOU ARE DOING:: 2.5
WEIGHTED_TOTAL_SCORE: 1.73
SEVERITY_SCORE: 0
WASH_DRY_BODY: WITHOUT ANY DIFFICULTY
PICK_CLOTHES_OFF_FLOOR: WITHOUT ANY DIFFICULTY
ON A SCALE OF ONE TO TEN, HOW MUCH PAIN HAVE YOU HAD BECAUSE OF YOUR CONDITION OVER THE PAST WEEK?: 0
PARTIPATE_RECREATIONAL_ACTIVITIES: UNABLE TO DO
TURN_FAUCETS_OFF: WITHOUT ANY DIFFICULTY
WALK_KILOMETERS: UNABLE TO DO
SUM OF QUESTIONS A TO J: 8
WALK_FLAT_GROUND: WITH MUCH DIFFICULTY

## 2023-11-09 ASSESSMENT — ENCOUNTER SYMPTOMS
OCCASIONAL FEELINGS OF UNSTEADINESS: 0
LOSS OF SENSATION IN FEET: 0
DEPRESSION: 0

## 2023-11-09 ASSESSMENT — PAIN SCALES - GENERAL: PAINLEVEL: 2

## 2023-11-09 ASSESSMENT — JOINT PAIN
TOTAL NUMBER OF TENDER JOINTS: 1
TOTAL NUMBER OF SWOLLEN JOINTS: 0

## 2023-11-09 ASSESSMENT — PATIENT HEALTH QUESTIONNAIRE - PHQ9
1. LITTLE INTEREST OR PLEASURE IN DOING THINGS: NOT AT ALL
SUM OF ALL RESPONSES TO PHQ9 QUESTIONS 1 AND 2: 0
2. FEELING DOWN, DEPRESSED OR HOPELESS: NOT AT ALL

## 2023-11-09 NOTE — PROGRESS NOTES
"Anny Tesfaye     Chief Complaint:  This 78 y.o. year old female with polymyalgia rheumatica (PMR) presents for treatment with ACTEMRA.  HPI:  Subjective:  Prob. #1: PMR       The patient returns today for her 55th treatment of PMR with ACTEMRA.        The patient reports that she continues to feel well, and denies any interval infections or illnesses requiring treatment. In addition, the patient denies any recurrence of PMR symptoms, including headaches, scalp tenderness, amaurosis, visual blurring, jaw claudication, or polyarthritis.        The patient remarks that she has had no adverse drug effects whatsoever with ACTEMRA infusions.         As previously noted, the patient has considered our prior discussions regarding the potential for PMR exacerbation should the ACTEMRA be discontinued. That the patient has done so well on ACTEMRA treatment, she has decided not to change dosing and not to consider discontinuing this treatment regimen. She is \"very satisfied\" with her outcome, her well-being, and the absence of any adverse drug effects.      Review of Systems   All other systems reviewed and are negative.    Objective:  Vital signs:  Vitals:    11/09/23 1004   BP: 138/70   Pulse: 60   SpO2: 93%   Weight: 84.8 kg (187 lb)   Height: 1.575 m (5' 2\")   PainSc:   2   PainLoc: Knee     Physical Exam  Vitals and nursing note reviewed.   Constitutional:       General: She is not in acute distress.     Appearance: Normal appearance. She is obese.   HENT:      Head: Normocephalic.   Eyes:      Extraocular Movements: Extraocular movements intact.      Conjunctiva/sclera: Conjunctivae normal.      Pupils: Pupils are equal, round, and reactive to light.   Cardiovascular:      Rate and Rhythm: Normal rate and regular rhythm.      Pulses: Normal pulses.      Heart sounds: Normal heart sounds. No murmur heard.     No gallop.   Pulmonary:      Effort: Pulmonary effort is normal.      Breath sounds: Normal breath sounds. No " wheezing, rhonchi or rales.   Abdominal:      General: Abdomen is flat. Bowel sounds are normal.      Palpations: Abdomen is soft.   Musculoskeletal:         General: No swelling or tenderness.   Skin:     General: Skin is warm.      Capillary Refill: Capillary refill takes less than 2 seconds.      Findings: No rash.   Neurological:      Mental Status: She is alert.      No active synovitis    Assessment:  Polymyalgia rheumatica (PMR) - M35.3  Long term drug therapy - Z79.899  Plan:  PMR: Patient can be treated today with ACTEMRA. Actemra 336 mg (4 mg/kg) IV over 1 hour.  The rationale, objectives, risk/reward and potential side effects have been explained to the patient and the patient understands these facts.  The patient further understands that periodic laboratory studies must be performed to monitor this drug and the patient will have these studies performed when the requisition is provided.  The patient is again educated to report to the office adverse effects including allergic reactions or infections.  The patient agreed to this plan.     Milton Dubose MD

## 2023-11-30 ENCOUNTER — LAB (OUTPATIENT)
Dept: LAB | Facility: LAB | Age: 78
End: 2023-11-30
Payer: MEDICARE

## 2023-11-30 DIAGNOSIS — M35.3 POLYMYALGIA RHEUMATICA (MULTI): ICD-10-CM

## 2023-11-30 LAB
ALBUMIN SERPL-MCNC: 4.1 G/DL (ref 3.5–5)
ALP BLD-CCNC: 47 U/L (ref 35–125)
ALT SERPL-CCNC: 12 U/L (ref 5–40)
ANION GAP SERPL CALC-SCNC: 12 MMOL/L
AST SERPL-CCNC: 13 U/L (ref 5–40)
BASOPHILS # BLD AUTO: 0.05 X10*3/UL (ref 0–0.1)
BASOPHILS NFR BLD AUTO: 0.9 %
BILIRUB SERPL-MCNC: 0.5 MG/DL (ref 0.1–1.2)
BUN SERPL-MCNC: 31 MG/DL (ref 8–25)
CALCIUM SERPL-MCNC: 9.3 MG/DL (ref 8.5–10.4)
CHLORIDE SERPL-SCNC: 103 MMOL/L (ref 97–107)
CO2 SERPL-SCNC: 25 MMOL/L (ref 24–31)
CREAT SERPL-MCNC: 1.3 MG/DL (ref 0.4–1.6)
CRP SERPL-MCNC: <0.3 MG/DL (ref 0–2)
EOSINOPHIL # BLD AUTO: 0.16 X10*3/UL (ref 0–0.4)
EOSINOPHIL NFR BLD AUTO: 2.8 %
ERYTHROCYTE [DISTWIDTH] IN BLOOD BY AUTOMATED COUNT: 13.3 % (ref 11.5–14.5)
ERYTHROCYTE [SEDIMENTATION RATE] IN BLOOD BY WESTERGREN METHOD: 2 MM/H (ref 0–30)
GFR SERPL CREATININE-BSD FRML MDRD: 42 ML/MIN/1.73M*2
GLUCOSE SERPL-MCNC: 95 MG/DL (ref 65–99)
HCT VFR BLD AUTO: 40.4 % (ref 36–46)
HGB BLD-MCNC: 12.6 G/DL (ref 12–16)
IMM GRANULOCYTES # BLD AUTO: 0.01 X10*3/UL (ref 0–0.5)
IMM GRANULOCYTES NFR BLD AUTO: 0.2 % (ref 0–0.9)
LYMPHOCYTES # BLD AUTO: 1.74 X10*3/UL (ref 0.8–3)
LYMPHOCYTES NFR BLD AUTO: 29.9 %
MCH RBC QN AUTO: 31.9 PG (ref 26–34)
MCHC RBC AUTO-ENTMCNC: 31.2 G/DL (ref 32–36)
MCV RBC AUTO: 102 FL (ref 80–100)
MONOCYTES # BLD AUTO: 0.45 X10*3/UL (ref 0.05–0.8)
MONOCYTES NFR BLD AUTO: 7.7 %
NEUTROPHILS # BLD AUTO: 3.4 X10*3/UL (ref 1.6–5.5)
NEUTROPHILS NFR BLD AUTO: 58.5 %
NRBC BLD-RTO: 0 /100 WBCS (ref 0–0)
PLATELET # BLD AUTO: 152 X10*3/UL (ref 150–450)
POTASSIUM SERPL-SCNC: 4.6 MMOL/L (ref 3.4–5.1)
PROT SERPL-MCNC: 6.2 G/DL (ref 5.9–7.9)
RBC # BLD AUTO: 3.95 X10*6/UL (ref 4–5.2)
SODIUM SERPL-SCNC: 140 MMOL/L (ref 133–145)
WBC # BLD AUTO: 5.8 X10*3/UL (ref 4.4–11.3)

## 2023-11-30 PROCEDURE — 86140 C-REACTIVE PROTEIN: CPT

## 2023-11-30 PROCEDURE — 80053 COMPREHEN METABOLIC PANEL: CPT

## 2023-11-30 PROCEDURE — 36415 COLL VENOUS BLD VENIPUNCTURE: CPT

## 2023-11-30 PROCEDURE — 85652 RBC SED RATE AUTOMATED: CPT

## 2023-11-30 PROCEDURE — 85025 COMPLETE CBC W/AUTO DIFF WBC: CPT

## 2023-12-07 ENCOUNTER — OFFICE VISIT (OUTPATIENT)
Dept: RHEUMATOLOGY | Facility: CLINIC | Age: 78
End: 2023-12-07
Payer: MEDICARE

## 2023-12-07 VITALS
OXYGEN SATURATION: 94 % | HEART RATE: 58 BPM | WEIGHT: 188 LBS | TEMPERATURE: 98.4 F | DIASTOLIC BLOOD PRESSURE: 79 MMHG | HEIGHT: 62 IN | SYSTOLIC BLOOD PRESSURE: 149 MMHG | BODY MASS INDEX: 34.6 KG/M2

## 2023-12-07 DIAGNOSIS — M35.3 POLYMYALGIA RHEUMATICA SYNDROME (MULTI): Primary | ICD-10-CM

## 2023-12-07 DIAGNOSIS — M35.3 POLYMYALGIA RHEUMATICA (MULTI): ICD-10-CM

## 2023-12-07 PROCEDURE — 3074F SYST BP LT 130 MM HG: CPT | Performed by: INTERNAL MEDICINE

## 2023-12-07 PROCEDURE — 1159F MED LIST DOCD IN RCRD: CPT | Performed by: INTERNAL MEDICINE

## 2023-12-07 PROCEDURE — 99212 OFFICE O/P EST SF 10 MIN: CPT | Performed by: INTERNAL MEDICINE

## 2023-12-07 PROCEDURE — 3078F DIAST BP <80 MM HG: CPT | Performed by: INTERNAL MEDICINE

## 2023-12-07 PROCEDURE — 2500000004 HC RX 250 GENERAL PHARMACY W/ HCPCS (ALT 636 FOR OP/ED): Mod: JZ | Performed by: INTERNAL MEDICINE

## 2023-12-07 PROCEDURE — 1125F AMNT PAIN NOTED PAIN PRSNT: CPT | Performed by: INTERNAL MEDICINE

## 2023-12-07 RX ORDER — DIPHENHYDRAMINE HYDROCHLORIDE 50 MG/ML
50 INJECTION INTRAMUSCULAR; INTRAVENOUS AS NEEDED
Status: CANCELLED | OUTPATIENT
Start: 2024-01-04

## 2023-12-07 RX ORDER — EPINEPHRINE 0.3 MG/.3ML
0.3 INJECTION SUBCUTANEOUS EVERY 5 MIN PRN
Status: CANCELLED | OUTPATIENT
Start: 2024-01-04

## 2023-12-07 RX ORDER — ALBUTEROL SULFATE 0.83 MG/ML
3 SOLUTION RESPIRATORY (INHALATION) AS NEEDED
Status: CANCELLED | OUTPATIENT
Start: 2024-01-04

## 2023-12-07 RX ORDER — FAMOTIDINE 10 MG/ML
20 INJECTION INTRAVENOUS ONCE AS NEEDED
Status: CANCELLED | OUTPATIENT
Start: 2024-01-04

## 2023-12-07 RX ADMIN — TOCILIZUMAB 334 MG: 20 INJECTION, SOLUTION, CONCENTRATE INTRAVENOUS at 11:25

## 2023-12-07 ASSESSMENT — ROUTINE ASSESSMENT OF PATIENT INDEX DATA (RAPID3)
FEELINGS_ANXIETY_NERVOUS: WITH SOME DIFFICULTY
TURN_FAUCETS_OFF: WITHOUT ANY DIFFICULTY
DRESS_YOURSELF: WITHOUT ANY DIFFICULTY
PICK_CLOTHES_OFF_FLOOR: WITHOUT ANY DIFFICULTY
LIFT_CUP_TO_MOUTH: WITHOUT ANY DIFFICULTY
WASH_DRY_BODY: WITHOUT ANY DIFFICULTY
FN_SCORE: 3
WEIGHTED_TOTAL_SCORE: 1.83
ON A SCALE OF ONE TO TEN, CONSIDERING ALL THE WAYS IN WHICH ILLNESS AND HEALTH CONDITIONS MAY AFFECT YOU AT THIS TIME, PLEASE INDICATE BELOW HOW YOU ARE DOING:: 2.5
IN_OUT_BED: WITHOUT ANY DIFFICULTY
PARTIPATE_RECREATIONAL_ACTIVITIES: UNABLE TO DO
SUM OF QUESTIONS A TO J: 9
GOOD_NIGHTS_SLEEP: WITHOUT ANY DIFFICULTY
ON A SCALE OF ONE TO TEN, HOW MUCH PAIN HAVE YOU HAD BECAUSE OF YOUR CONDITION OVER THE PAST WEEK?: 0
WALK_KILOMETERS: UNABLE TO DO
TOTAL RAPID3 SCORE: 5.5
ON A SCALE OF ONE TO TEN, HOW MUCH PAIN HAVE YOU HAD BECAUSE OF YOUR CONDITION OVER THE PAST WEEK?: 0
IN_OUT_TRANSPORT: WITH SOME DIFFICULTY
WALK_FLAT_GROUND: WITH MUCH DIFFICULTY
ON A SCALE OF ONE TO TEN, CONSIDERING ALL THE WAYS IN WHICH ILLNESS AND HEALTH CONDITIONS MAY AFFECT YOU AT THIS TIME, PLEASE INDICATE BELOW HOW YOU ARE DOING:: 2.5
FEELINGS_DEPRESSION: WITH SOME DIFFICULTY
SEVERITY_SCORE: 0

## 2023-12-07 ASSESSMENT — PATIENT HEALTH QUESTIONNAIRE - PHQ9
SUM OF ALL RESPONSES TO PHQ9 QUESTIONS 1 AND 2: 0
2. FEELING DOWN, DEPRESSED OR HOPELESS: NOT AT ALL
1. LITTLE INTEREST OR PLEASURE IN DOING THINGS: NOT AT ALL

## 2023-12-07 ASSESSMENT — PAIN SCALES - GENERAL: PAINLEVEL: 2

## 2023-12-07 ASSESSMENT — ENCOUNTER SYMPTOMS
DEPRESSION: 0
OCCASIONAL FEELINGS OF UNSTEADINESS: 0
LOSS OF SENSATION IN FEET: 0

## 2023-12-07 NOTE — PROGRESS NOTES
"Anny Tesfaye     Chief Complaint:  This 78 y.o. year old female with polymyalgia rheumatica (PMR) presents for treatment with ACTEMRA.   HPI  Subjective:  Prob. #1:  PMR       The patient returns for ongoing evaluation and management of PMR.       The patient feels \"very well\", has had no interval infections or illnesses requiring treatment, and she offers no concerns or problems today.    Review of Systems   All other systems reviewed and are negative.    Objective:  Vital signs:  Vitals:    12/07/23 0952   BP: 128/78   Pulse: 59   SpO2: 94%   Weight: 85.3 kg (188 lb)   Height: 1.575 m (5' 2\")   PainSc:   2   PainLoc: Generalized       Physical Exam  Deferred.  Assessment:  Polymyalgia rheumatica - M35.3  Long term treatment with medication - Z79.899  Plan:  PMR: Patient can be treated with ACTEMRA today. All laboratory results have been reviewed and explained to patient today. Actemra 340 mg [4 mg/kg] IV over 1 hour.  The rationale, objectives, risk/reward and potential side effects have been explained to the patient and the patient understands these facts.  The patient further understands that periodic laboratory studies must be performed to monitor this drug and the patient will have these studies performed when the requisition is provided.  The patient is again educated to report to the office adverse effects including allergic reactions or infections.  The patient agreed to this plan.     Milton Dubose MD   "

## 2024-01-04 ENCOUNTER — OFFICE VISIT (OUTPATIENT)
Dept: RHEUMATOLOGY | Facility: CLINIC | Age: 79
End: 2024-01-04
Payer: MEDICARE

## 2024-01-04 VITALS
HEART RATE: 52 BPM | SYSTOLIC BLOOD PRESSURE: 128 MMHG | WEIGHT: 189 LBS | HEIGHT: 62 IN | BODY MASS INDEX: 34.78 KG/M2 | OXYGEN SATURATION: 92 % | DIASTOLIC BLOOD PRESSURE: 72 MMHG

## 2024-01-04 DIAGNOSIS — M35.3 POLYMYALGIA RHEUMATICA (MULTI): Primary | ICD-10-CM

## 2024-01-04 PROCEDURE — 3074F SYST BP LT 130 MM HG: CPT | Performed by: INTERNAL MEDICINE

## 2024-01-04 PROCEDURE — 99213 OFFICE O/P EST LOW 20 MIN: CPT | Performed by: INTERNAL MEDICINE

## 2024-01-04 PROCEDURE — 1159F MED LIST DOCD IN RCRD: CPT | Performed by: INTERNAL MEDICINE

## 2024-01-04 PROCEDURE — 1125F AMNT PAIN NOTED PAIN PRSNT: CPT | Performed by: INTERNAL MEDICINE

## 2024-01-04 PROCEDURE — 96413 CHEMO IV INFUSION 1 HR: CPT | Performed by: INTERNAL MEDICINE

## 2024-01-04 PROCEDURE — 3078F DIAST BP <80 MM HG: CPT | Performed by: INTERNAL MEDICINE

## 2024-01-04 PROCEDURE — 2500000004 HC RX 250 GENERAL PHARMACY W/ HCPCS (ALT 636 FOR OP/ED): Mod: JZ | Performed by: INTERNAL MEDICINE

## 2024-01-04 RX ORDER — OXYBUTYNIN CHLORIDE 5 MG/1
5 TABLET ORAL EVERY OTHER DAY
COMMUNITY

## 2024-01-04 RX ORDER — FAMOTIDINE 10 MG/ML
20 INJECTION INTRAVENOUS ONCE AS NEEDED
Status: CANCELLED | OUTPATIENT
Start: 2024-01-12

## 2024-01-04 RX ORDER — EPINEPHRINE 0.3 MG/.3ML
0.3 INJECTION SUBCUTANEOUS EVERY 5 MIN PRN
Status: CANCELLED | OUTPATIENT
Start: 2024-01-12

## 2024-01-04 RX ORDER — DIPHENHYDRAMINE HYDROCHLORIDE 50 MG/ML
50 INJECTION INTRAMUSCULAR; INTRAVENOUS AS NEEDED
Status: CANCELLED | OUTPATIENT
Start: 2024-01-12

## 2024-01-04 RX ORDER — ALBUTEROL SULFATE 0.83 MG/ML
3 SOLUTION RESPIRATORY (INHALATION) AS NEEDED
Status: CANCELLED | OUTPATIENT
Start: 2024-01-12

## 2024-01-04 RX ADMIN — TOCILIZUMAB 334 MG: 20 INJECTION, SOLUTION, CONCENTRATE INTRAVENOUS at 11:45

## 2024-01-04 ASSESSMENT — ENCOUNTER SYMPTOMS
OCCASIONAL FEELINGS OF UNSTEADINESS: 0
LOSS OF SENSATION IN FEET: 0
DEPRESSION: 0

## 2024-01-04 ASSESSMENT — ROUTINE ASSESSMENT OF PATIENT INDEX DATA (RAPID3)
WEIGHTED_TOTAL_SCORE: 2.57
IN_OUT_TRANSPORT: WITHOUT ANY DIFFICULTY
ON A SCALE OF ONE TO TEN, HOW MUCH PAIN HAVE YOU HAD BECAUSE OF YOUR CONDITION OVER THE PAST WEEK?: 2
SUM OF QUESTIONS A TO J: 8
WASH_DRY_BODY: WITHOUT ANY DIFFICULTY
LIFT_CUP_TO_MOUTH: WITHOUT ANY DIFFICULTY
GOOD_NIGHTS_SLEEP: WITHOUT ANY DIFFICULTY
ON A SCALE OF ONE TO TEN, HOW MUCH PAIN HAVE YOU HAD BECAUSE OF YOUR CONDITION OVER THE PAST WEEK?: 2
TOTAL RAPID3 SCORE: 7.7
PICK_CLOTHES_OFF_FLOOR: WITHOUT ANY DIFFICULTY
TURN_FAUCETS_OFF: WITHOUT ANY DIFFICULTY
PARTIPATE_RECREATIONAL_ACTIVITIES: UNABLE TO DO
WALK_KILOMETERS: UNABLE TO DO
DRESS_YOURSELF: WITHOUT ANY DIFFICULTY
IN_OUT_BED: WITHOUT ANY DIFFICULTY
FEELINGS_DEPRESSION: WITH SOME DIFFICULTY
ON A SCALE OF ONE TO TEN, CONSIDERING ALL THE WAYS IN WHICH ILLNESS AND HEALTH CONDITIONS MAY AFFECT YOU AT THIS TIME, PLEASE INDICATE BELOW HOW YOU ARE DOING:: 3
FN_SCORE: 2.7
WALK_FLAT_GROUND: WITH MUCH DIFFICULTY
SEVERITY_SCORE: 0
ON A SCALE OF ONE TO TEN, CONSIDERING ALL THE WAYS IN WHICH ILLNESS AND HEALTH CONDITIONS MAY AFFECT YOU AT THIS TIME, PLEASE INDICATE BELOW HOW YOU ARE DOING:: 3
FEELINGS_ANXIETY_NERVOUS: WITH SOME DIFFICULTY

## 2024-01-04 ASSESSMENT — JOINT PAIN
TOTAL NUMBER OF TENDER JOINTS: 0
TOTAL NUMBER OF SWOLLEN JOINTS: 0

## 2024-01-04 ASSESSMENT — PAIN SCALES - GENERAL: PAINLEVEL: 5

## 2024-01-04 ASSESSMENT — PATIENT HEALTH QUESTIONNAIRE - PHQ9
2. FEELING DOWN, DEPRESSED OR HOPELESS: NOT AT ALL
1. LITTLE INTEREST OR PLEASURE IN DOING THINGS: NOT AT ALL
SUM OF ALL RESPONSES TO PHQ9 QUESTIONS 1 AND 2: 0

## 2024-01-04 NOTE — PROGRESS NOTES
The patient completed her Actemra infusion without any adverse reaction.  IV site was flushed with 10ml NS, IV cath removed and site dressed with 2x2 gauze and bandaid.

## 2024-01-04 NOTE — PROGRESS NOTES
"Chief complaint:  This 78 year old female with polymyalgia rheumatica (PMR) presents for treatment with ACTEMRA.    Subjective   HPI  Prob. #1: PMR      The patient returns for her ongoing treatment of PMR with ACTEMRA.      The patient reiterates that she is very pleased with her treatment, her outcome, and her ability to have her laboratory tests done regularly to check on her PMR control as well as her kidney function.        The patient has said in the past--and reiterates today-- that she intends to continue ACTEMRA going forward because it has controlled the PMR and she is concerned that the PMR could recur should she go off treatment. And, not only has she had success with this treatment, but she feels \"in control\" and \"cared for\" because she is being seen regularly. The patient is not interested in home administration of KEVZARA.         The patient denies any interval infections or illnesses since her previous visit on 12/7/2023.     Review of Systems   All other systems reviewed and are negative.    /72 (BP Location: Left arm, Patient Position: Sitting)   Pulse 52   Ht 1.575 m (5' 2\")   Wt 85.7 kg (189 lb)   SpO2 92%   BMI 34.57 kg/m²    Objective   Physical Exam  Vitals and nursing note reviewed.   Constitutional:       Appearance: Normal appearance. She is normal weight.   HENT:      Head: Normocephalic.   Eyes:      Extraocular Movements: Extraocular movements intact.      Conjunctiva/sclera: Conjunctivae normal.      Pupils: Pupils are equal, round, and reactive to light.   Cardiovascular:      Rate and Rhythm: Normal rate and regular rhythm.      Pulses: Normal pulses.      Heart sounds: Murmur heard.      Comments: Gr2/6 ejection systolic murmur is unchanged. No clicks, rubs or diastolic murmurs. No peripheral edema  Pulmonary:      Effort: Pulmonary effort is normal.      Breath sounds: Normal breath sounds.   Abdominal:      General: There is no distension.      Palpations: Abdomen is " soft. There is no mass.      Tenderness: There is no abdominal tenderness. There is no guarding.      Hernia: No hernia is present.      Comments: Mild obesity   Neurological:      Mental Status: She is alert.        No active synovitis. Joints stable & unchanged.    Assessment:  Polymyalgia rheumatica - M35.3  Long term treatment with medication (ACTEMRA) - Z79.899    Plan:   Patient can be treated today. Actemra 340 mg [4 mg/kg] IV over 1 hour.  The rationale, objectives, risk/reward and potential side effects have been explained to the patient and the patient understands these facts.  The patient further understands that periodic laboratory studies must be performed to monitor this drug and the patient will have these studies performed when the requisition is provided.  The patient is again educated to report to the office adverse effects including allergic reactions or infections.  The patient agreed to this plan.   Return for infusion in 1 month.  All queries addressed.   Milton Dubose MD

## 2024-02-01 ENCOUNTER — OFFICE VISIT (OUTPATIENT)
Dept: RHEUMATOLOGY | Facility: CLINIC | Age: 79
End: 2024-02-01
Payer: MEDICARE

## 2024-02-01 VITALS
WEIGHT: 184 LBS | DIASTOLIC BLOOD PRESSURE: 68 MMHG | BODY MASS INDEX: 33.86 KG/M2 | HEART RATE: 58 BPM | OXYGEN SATURATION: 96 % | HEIGHT: 62 IN | SYSTOLIC BLOOD PRESSURE: 125 MMHG

## 2024-02-01 DIAGNOSIS — M35.3 POLYMYALGIA RHEUMATICA (MULTI): Primary | ICD-10-CM

## 2024-02-01 DIAGNOSIS — M81.0 OSTEOPOROSIS, UNSPECIFIED OSTEOPOROSIS TYPE, UNSPECIFIED PATHOLOGICAL FRACTURE PRESENCE: ICD-10-CM

## 2024-02-01 PROCEDURE — 3078F DIAST BP <80 MM HG: CPT | Performed by: INTERNAL MEDICINE

## 2024-02-01 PROCEDURE — 1157F ADVNC CARE PLAN IN RCRD: CPT | Performed by: INTERNAL MEDICINE

## 2024-02-01 PROCEDURE — 1125F AMNT PAIN NOTED PAIN PRSNT: CPT | Performed by: INTERNAL MEDICINE

## 2024-02-01 PROCEDURE — 96413 CHEMO IV INFUSION 1 HR: CPT | Performed by: INTERNAL MEDICINE

## 2024-02-01 PROCEDURE — 1159F MED LIST DOCD IN RCRD: CPT | Performed by: INTERNAL MEDICINE

## 2024-02-01 PROCEDURE — 99212 OFFICE O/P EST SF 10 MIN: CPT | Performed by: INTERNAL MEDICINE

## 2024-02-01 PROCEDURE — 3074F SYST BP LT 130 MM HG: CPT | Performed by: INTERNAL MEDICINE

## 2024-02-01 PROCEDURE — 2500000004 HC RX 250 GENERAL PHARMACY W/ HCPCS (ALT 636 FOR OP/ED): Mod: JZ | Performed by: INTERNAL MEDICINE

## 2024-02-01 PROCEDURE — 99212 OFFICE O/P EST SF 10 MIN: CPT | Mod: 27 | Performed by: INTERNAL MEDICINE

## 2024-02-01 RX ORDER — FAMOTIDINE 10 MG/ML
20 INJECTION INTRAVENOUS ONCE AS NEEDED
Status: CANCELLED | OUTPATIENT
Start: 2024-02-29

## 2024-02-01 RX ORDER — DIPHENHYDRAMINE HYDROCHLORIDE 50 MG/ML
50 INJECTION INTRAMUSCULAR; INTRAVENOUS AS NEEDED
Status: CANCELLED | OUTPATIENT
Start: 2024-02-29

## 2024-02-01 RX ORDER — ALBUTEROL SULFATE 0.83 MG/ML
3 SOLUTION RESPIRATORY (INHALATION) AS NEEDED
Status: CANCELLED | OUTPATIENT
Start: 2024-02-29

## 2024-02-01 RX ORDER — EPINEPHRINE 0.3 MG/.3ML
0.3 INJECTION SUBCUTANEOUS EVERY 5 MIN PRN
Status: CANCELLED | OUTPATIENT
Start: 2024-02-29

## 2024-02-01 RX ADMIN — TOCILIZUMAB 334 MG: 20 INJECTION, SOLUTION, CONCENTRATE INTRAVENOUS at 10:07

## 2024-02-01 ASSESSMENT — ROUTINE ASSESSMENT OF PATIENT INDEX DATA (RAPID3)
PICK_CLOTHES_OFF_FLOOR: WITHOUT ANY DIFFICULTY
IN_OUT_TRANSPORT: WITH SOME DIFFICULTY
WEIGHTED_TOTAL_SCORE: 2
IN_OUT_BED: WITHOUT ANY DIFFICULTY
WALK_FLAT_GROUND: WITH MUCH DIFFICULTY
FEELINGS_ANXIETY_NERVOUS: WITH SOME DIFFICULTY
WASH_DRY_BODY: WITHOUT ANY DIFFICULTY
ON A SCALE OF ONE TO TEN, HOW MUCH PAIN HAVE YOU HAD BECAUSE OF YOUR CONDITION OVER THE PAST WEEK?: 0
ON A SCALE OF ONE TO TEN, CONSIDERING ALL THE WAYS IN WHICH ILLNESS AND HEALTH CONDITIONS MAY AFFECT YOU AT THIS TIME, PLEASE INDICATE BELOW HOW YOU ARE DOING:: 3
SEVERITY_SCORE: 0
FN_SCORE: 3
DRESS_YOURSELF: WITHOUT ANY DIFFICULTY
TOTAL RAPID3 SCORE: 6
LIFT_CUP_TO_MOUTH: WITHOUT ANY DIFFICULTY
PARTIPATE_RECREATIONAL_ACTIVITIES: UNABLE TO DO
SUM OF QUESTIONS A TO J: 9
FEELINGS_DEPRESSION: WITH SOME DIFFICULTY
ON A SCALE OF ONE TO TEN, HOW MUCH PAIN HAVE YOU HAD BECAUSE OF YOUR CONDITION OVER THE PAST WEEK?: 0
GOOD_NIGHTS_SLEEP: WITHOUT ANY DIFFICULTY
ON A SCALE OF ONE TO TEN, CONSIDERING ALL THE WAYS IN WHICH ILLNESS AND HEALTH CONDITIONS MAY AFFECT YOU AT THIS TIME, PLEASE INDICATE BELOW HOW YOU ARE DOING:: 3
TURN_FAUCETS_OFF: WITHOUT ANY DIFFICULTY
WALK_KILOMETERS: UNABLE TO DO

## 2024-02-01 ASSESSMENT — ENCOUNTER SYMPTOMS
DEPRESSION: 0
LOSS OF SENSATION IN FEET: 0
OCCASIONAL FEELINGS OF UNSTEADINESS: 0

## 2024-02-01 ASSESSMENT — PAIN SCALES - GENERAL: PAINLEVEL: 3

## 2024-02-01 NOTE — PROGRESS NOTES
"Chief Complaint:  This 78 y.o. female with polymyhalgia rheumatica (PMR) presents for ongoing treatment with ACTEMRA.    Subjective:   HPI   Problem:  1: PMR   The patient returns for her monthly ACTEMRA treatment.   This month the patient has been well. She denies any interval infections or illnesses requiring treatment.   The patient reports that she is faring well on ACTEMRA to date. She denies any adverse drug effects.    Objective:   /78 (BP Location: Left arm, Patient Position: Sitting)   Pulse 68   Ht 1.575 m (5' 2\")   Wt 83.5 kg (184 lb)   SpO2 96%   BMI 33.65 kg/m²      Physical Exam     Examination is not performed today.   Assessment:   Polymyalgia rheumatica (PMR) - M35.3  Long term  treatment with medication - Z79.899  Osteopenia - M85.89    Plan:    Procedures : Actemra 334 mg [4 MG/KG] IV over 1 hour.  The rationale, objectives, risk/reward and potential side effects have been explained to the patient and the patient understands these facts.  The patient further understands that periodic laboratory studies must be performed to monitor this drug and the patient will have these studies performed when the requisition is provided.  The patient is again educated to report to the office adverse effects including allergic reactions or infections.  The patient agreed to this plan.   I recommend patient have a DXA scan. Patient in agreement.    Milton Dubose MD   "

## 2024-02-01 NOTE — PROGRESS NOTES
Patient denies currently taking antibiotics and has not received any vaccinations in last two weeks. Patient tolerated infusion without reaction, vitals stable. Next appointment made and provided to patient.

## 2024-02-23 ENCOUNTER — LAB (OUTPATIENT)
Dept: LAB | Facility: LAB | Age: 79
End: 2024-02-23
Payer: MEDICARE

## 2024-02-23 DIAGNOSIS — M35.3 POLYMYALGIA RHEUMATICA (MULTI): ICD-10-CM

## 2024-02-23 LAB
ALBUMIN SERPL-MCNC: 4.2 G/DL (ref 3.5–5)
ALP BLD-CCNC: 41 U/L (ref 35–125)
ALT SERPL-CCNC: 16 U/L (ref 5–40)
ANION GAP SERPL CALC-SCNC: 13 MMOL/L
AST SERPL-CCNC: 15 U/L (ref 5–40)
BASOPHILS # BLD AUTO: 0.05 X10*3/UL (ref 0–0.1)
BASOPHILS NFR BLD AUTO: 0.7 %
BILIRUB SERPL-MCNC: 0.8 MG/DL (ref 0.1–1.2)
BUN SERPL-MCNC: 45 MG/DL (ref 8–25)
CALCIUM SERPL-MCNC: 9.8 MG/DL (ref 8.5–10.4)
CHLORIDE SERPL-SCNC: 103 MMOL/L (ref 97–107)
CO2 SERPL-SCNC: 24 MMOL/L (ref 24–31)
CREAT SERPL-MCNC: 1.5 MG/DL (ref 0.4–1.6)
CRP SERPL-MCNC: <0.3 MG/DL (ref 0–2)
EGFRCR SERPLBLD CKD-EPI 2021: 35 ML/MIN/1.73M*2
EOSINOPHIL # BLD AUTO: 0.15 X10*3/UL (ref 0–0.4)
EOSINOPHIL NFR BLD AUTO: 2.1 %
ERYTHROCYTE [DISTWIDTH] IN BLOOD BY AUTOMATED COUNT: 13.1 % (ref 11.5–14.5)
ERYTHROCYTE [SEDIMENTATION RATE] IN BLOOD BY WESTERGREN METHOD: 5 MM/H (ref 0–30)
GLUCOSE SERPL-MCNC: 98 MG/DL (ref 65–99)
HCT VFR BLD AUTO: 40.9 % (ref 36–46)
HGB BLD-MCNC: 13.2 G/DL (ref 12–16)
IMM GRANULOCYTES # BLD AUTO: 0.02 X10*3/UL (ref 0–0.5)
IMM GRANULOCYTES NFR BLD AUTO: 0.3 % (ref 0–0.9)
LYMPHOCYTES # BLD AUTO: 1.86 X10*3/UL (ref 0.8–3)
LYMPHOCYTES NFR BLD AUTO: 26.4 %
MCH RBC QN AUTO: 32 PG (ref 26–34)
MCHC RBC AUTO-ENTMCNC: 32.3 G/DL (ref 32–36)
MCV RBC AUTO: 99 FL (ref 80–100)
MONOCYTES # BLD AUTO: 0.57 X10*3/UL (ref 0.05–0.8)
MONOCYTES NFR BLD AUTO: 8.1 %
NEUTROPHILS # BLD AUTO: 4.39 X10*3/UL (ref 1.6–5.5)
NEUTROPHILS NFR BLD AUTO: 62.4 %
NRBC BLD-RTO: 0 /100 WBCS (ref 0–0)
PLATELET # BLD AUTO: 148 X10*3/UL (ref 150–450)
POTASSIUM SERPL-SCNC: 4.6 MMOL/L (ref 3.4–5.1)
PROT SERPL-MCNC: 6.4 G/DL (ref 5.9–7.9)
RBC # BLD AUTO: 4.12 X10*6/UL (ref 4–5.2)
SODIUM SERPL-SCNC: 140 MMOL/L (ref 133–145)
WBC # BLD AUTO: 7 X10*3/UL (ref 4.4–11.3)

## 2024-02-23 PROCEDURE — 80053 COMPREHEN METABOLIC PANEL: CPT

## 2024-02-23 PROCEDURE — 36415 COLL VENOUS BLD VENIPUNCTURE: CPT

## 2024-02-23 PROCEDURE — 85025 COMPLETE CBC W/AUTO DIFF WBC: CPT

## 2024-02-23 PROCEDURE — 85652 RBC SED RATE AUTOMATED: CPT

## 2024-02-23 PROCEDURE — 86140 C-REACTIVE PROTEIN: CPT

## 2024-02-23 NOTE — RESULT ENCOUNTER NOTE
A result(s) is abnormal. Please call patient and inform that result is abnormal and further testing may be necessary. Check when next appointment & let me know.  Have you called patient yet?

## 2024-02-28 ENCOUNTER — DOCUMENTATION (OUTPATIENT)
Dept: INFUSION THERAPY | Facility: CLINIC | Age: 79
End: 2024-02-28
Payer: MEDICARE

## 2024-02-28 NOTE — PROGRESS NOTES
CLINICAL CLEARANCE:    Patient to be scheduled for Continuation of Actemra infusions.     For Diagnosis: Rheumatoid Arthritis     Dosing is weight based at: 4 mg / kg     Using Dosing weight of: 79.8 kg    For a Total dose of: 320 mg every 4 weeks.    Creat Clearance: 37.62 ml/min    Labs..  T Spot Drawn/Results:   Lab Results   Component Value Date    TBSIN Negative 10/23/2023    TBGRES NEGATIVE 05/25/2022    TSPOTR  04/11/2023     Negative  Reference range: Normal Value: Negative    A negative test result does not exclude the possibility of exposure  to   or infection with Mycobacterium tuberculosis (M. tuberculosis).    Patients with recent exposure to TB infected individuals exhibiting a   negative T-SPOT.TB result should be considered for retesting within 6   weeks or if other relevant clinical symptoms indicate.  Results from   T-SPOT.TB testing must be used in conjunction with each individual's   epidemiological history, current medical status, and results of other   diagnostic evaluations.  The T-SPOT.TB test is qualitative and  results   are reported as positive, borderline or negative, given that the test   controls perform as expected. In line with the Centers for Disease   Control and Prevention's 2010 recommendation to report quantitative   measurements alongside the qualitative result, the laboratory  provides   spot counts for informational purposes only.  The T-SPOT.TB test  should   not be interpreted as a quantitative test.        Hep B Drawn/Results:  Lab Results   Component Value Date    HEPBSAG Nonreactive 10/23/2023        Lipids drawn:   Lab Results   Component Value Date    CHOL 180 10/23/2023    CHOL 168 04/24/2023    CHOL 167 10/10/2022     Lab Results   Component Value Date    HDL 88.0 10/23/2023    HDL 78 04/24/2023    HDL 74 10/10/2022     Lab Results   Component Value Date    LDLCALC 74 10/23/2023    LDLCALC 78 04/24/2023    LDLCALC 70 10/10/2022     Lab Results   Component Value Date  "   TRIG 90 10/23/2023    TRIG 62 04/24/2023    TRIG 114 10/10/2022     No components found for: \"CHOLHDL\"     CBC w/ diff drawn:   Lab Results   Component Value Date    WBC 7.0 02/23/2024    HGB 13.2 02/23/2024    HCT 40.9 02/23/2024    MCV 99 02/23/2024     (L) 02/23/2024        WBC:   Lab Results   Component Value Date    WBC 7.0 02/23/2024        Plt:   Lab Results   Component Value Date     (L) 02/23/2024      (Initial: >=100,000. Hold/contact prescribing provider if: <=100,000)    ANC:   Lab Results   Component Value Date    NEUTROABS 4.39 02/23/2024      (Initial: >2000   Hold/contact prescribing provider if:  <1000)    ALT:   Lab Results   Component Value Date    ALT 16 02/23/2024      (Initial: <1.5X ULN (and/or)  Hold/contact prescribing provider if: >3X ULN)    AST:   Lab Results   Component Value Date    ALT 16 02/23/2024    AST 15 02/23/2024    ALKPHOS 41 02/23/2024    BILITOT 0.8 02/23/2024        Chemistry    Lab Results   Component Value Date/Time     02/23/2024 1152    K 4.6 02/23/2024 1152     02/23/2024 1152    CO2 24 02/23/2024 1152    BUN 45 (H) 02/23/2024 1152    CREATININE 1.50 02/23/2024 1152    Lab Results   Component Value Date/Time    CALCIUM 9.8 02/23/2024 1152    ALKPHOS 41 02/23/2024 1152    AST 15 02/23/2024 1152    ALT 16 02/23/2024 1152    BILITOT 0.8 02/23/2024 1152           (Initial: <1.5X ULN (and/or) Hold/contact prescribing provider if : >3X ULN)    Urine Hcg test ordered? No (If female pt <60 years of age and with reproductive capability)   (If urine Hcg test ordered please instruct pt upon scheduling to drink 32 ounces of water 1 hour before arrival so bladder is full for needed urine sample)    History of malignancy or GI perforation? No  Patient Active Problem List   Diagnosis    Hypothyroid    Acute gout of multiple sites    Adjustment reaction with anxiety and depression    Anxiety    Depression    Disability of walking    Drug-induced leukopenia " (CMS/HCC)    Edema of extremities    Essential hypertension    Chronic GERD    Heart murmur    Hyperkalemia    Macrocytosis without anemia    Hyperlipemia, mixed    Morbid (severe) obesity due to excess calories (CMS/HCC)    Osteopenia    Primary osteoarthritis of both first carpometacarpal joints    Primary osteoarthritis of both knees    Secondary hyperparathyroidism (CMS/HCC)    Stage 3 chronic kidney disease (CMS/HCC)    Thrombocytopenia (CMS/HCC)    Unspecified kyphosis, thoracic region    Vitamin B 12 deficiency    Vitamin D deficiency    Low back pain, unspecified    Primary generalized (osteo)arthritis    Polymyalgia rheumatica (CMS/HCC)      Past Medical History:   Diagnosis Date    Chronic low back pain     CKD (chronic kidney disease) stage 3, GFR 30-59 ml/min (CMS/HCC)     Esophagitis     Glaucoma     History of scarlet fever     Hypertension     Hypothyroidism     Morbid obesity (CMS/HCC)     Nephritis     OA (osteoarthritis) of knee     Bilateral    Polymyalgia rheumatica (CMS/HCC)     Psoriasis     Sciatica         Last infusion received:  (if continuation)   Due: SCHEDULED 2/29/2024 - PATIENT IS TRANSFERRING OVER FROM DR. IBANEZ AND DR. BANERJEE'S [facility]HE[facility] OFFICE.     These results meet criteria to treat.

## 2024-02-29 ENCOUNTER — APPOINTMENT (OUTPATIENT)
Dept: RHEUMATOLOGY | Facility: CLINIC | Age: 79
End: 2024-02-29
Payer: MEDICARE

## 2024-02-29 ENCOUNTER — INFUSION (OUTPATIENT)
Dept: INFUSION THERAPY | Facility: CLINIC | Age: 79
End: 2024-02-29
Payer: MEDICARE

## 2024-02-29 VITALS
HEART RATE: 60 BPM | RESPIRATION RATE: 18 BRPM | DIASTOLIC BLOOD PRESSURE: 62 MMHG | WEIGHT: 183 LBS | SYSTOLIC BLOOD PRESSURE: 137 MMHG | BODY MASS INDEX: 33.47 KG/M2 | TEMPERATURE: 97.9 F | OXYGEN SATURATION: 100 %

## 2024-02-29 DIAGNOSIS — M35.3 POLYMYALGIA RHEUMATICA (MULTI): ICD-10-CM

## 2024-02-29 PROCEDURE — 96365 THER/PROPH/DIAG IV INF INIT: CPT | Performed by: NURSE PRACTITIONER

## 2024-02-29 RX ORDER — ALBUTEROL SULFATE 0.83 MG/ML
3 SOLUTION RESPIRATORY (INHALATION) AS NEEDED
Status: CANCELLED | OUTPATIENT
Start: 2024-03-28

## 2024-02-29 RX ORDER — EPINEPHRINE 0.3 MG/.3ML
0.3 INJECTION SUBCUTANEOUS EVERY 5 MIN PRN
Status: CANCELLED | OUTPATIENT
Start: 2024-03-28

## 2024-02-29 RX ORDER — DIPHENHYDRAMINE HYDROCHLORIDE 50 MG/ML
50 INJECTION INTRAMUSCULAR; INTRAVENOUS AS NEEDED
Status: CANCELLED | OUTPATIENT
Start: 2024-03-28

## 2024-02-29 RX ORDER — FAMOTIDINE 10 MG/ML
20 INJECTION INTRAVENOUS ONCE AS NEEDED
Status: CANCELLED | OUTPATIENT
Start: 2024-03-28

## 2024-02-29 ASSESSMENT — PAIN SCALES - GENERAL: PAINLEVEL: 4

## 2024-02-29 NOTE — PROGRESS NOTES
[facility]   infusion Clinic Note   Date: 2024   Name: Anny Tesfaye  : 1945   MRN: [id number]         Reason for Visit: OP Infusion (Actemra)         Visit Type: INFUSION       Ordered By: Dr Dubose       Accompanied by:Self      Diagnosis: Polymyalgia rheumatica (CMS/HCC)       Allergies:   Allergies as of 2024    (No Known Allergies)         Current Medications has a current medication list which includes the following prescription(s): ascorbic acid, aspirin, calcium carbonate/vitamin d3, captopril, cholecalciferol, clobetasol, cyanocobalamin, multivitamin, escitalopram, levothyroxine, lutein, omega 3-dha-epa-fish oil, oxybutynin, oxybutynin xl, simvastatin, thyroid (pork), actemra, actemra, verapamil sr, and ocuvite with lutein, and the following Facility-Administered Medications: tocilizumab (Actemra) 330 mg in sodium chloride 0.9% 100 mL IV.       Vitals:   Vitals:    24 0703   BP: 166/74   Pulse: 68   Resp: 18   Temp: 36.4 °C (97.6 °F)   TempSrc: Temporal   SpO2: 99%   Weight: 83 kg (183 lb)   PainSc:   4   PainLoc: Back             Infusion Pre-procedure Checklist:   - Allergies reviewed: yes   - Medications reviewed: yes       - Previous reaction to current treatment: no      Assess patient for the concerns below. Document provider notification as appropriate.  - Active or recent infection with/without current antibiotic use: no  - Recent or planned invasive dental work: no  - Recent or planned surgeries: no  - Recently received or plans to receive vaccinations: no  - Has treatment related toxicities: no  - Is pregnant:  no      Pain: 4   - Is the pain different from normal: no   - Is the pain tolerable: yes   - Is your Doctor aware:  n/a      Labs: N/A         Fall Risk Screening: Win Fall Risk  History of Falling, Immediate or Within 3 Months: Yes  Secondary Diagnosis: No  Ambulatory Aid: Walks without aid/bedrest/nurse assist  Intravenous  Therapy/Heparin Lock: No  Gait/Transferring: Normal/bedrest/immobile  Mental Status: Oriented to own ability  Walden Fall Risk Score: 25       Review Of Systems:  Review of Systems   All other systems reviewed and are negative.        Infusion Readiness:   - Assessment Concerns Related to Infusion: No  - Provider notified: n/a      Document Below Only If Indicated:   New Patient Education:    N/A (returning patient for continuation of therapy. Ongoing education provided as needed.)        Treatment Conditions & Drug Specific Questions:    Tocilizumab  (ACTEMRA)   (Unless otherwise specified on patient specific therapy plan):     TREATMENT CONDITIONS:  Unless otherwise specified on patient specific therapy plan HOLD and notify provider prior to proceeding with treatment if:   o Platelets LESS than 100 x 10E9L  o ANC LESS than 2 x 10E9/L  o ALT GREATER than 1.5x ULN and/or AST GREATER than 1.5x ULN  o Positive T-spot  o Reactive Hepatitis B Surface Antigen    Lab Results   Component Value Date     (L) 02/23/2024      Lab Results   Component Value Date    NEUTROABS 4.39 02/23/2024        Chemistry    Lab Results   Component Value Date/Time     02/23/2024 1152    K 4.6 02/23/2024 1152     02/23/2024 1152    CO2 24 02/23/2024 1152    BUN 45 (H) 02/23/2024 1152    CREATININE 1.50 02/23/2024 1152    Lab Results   Component Value Date/Time    CALCIUM 9.8 02/23/2024 1152    ALKPHOS 41 02/23/2024 1152    AST 15 02/23/2024 1152    ALT 16 02/23/2024 1152    BILITOT 0.8 02/23/2024 1152        ,  Lab Results   Component Value Date    ALT 16 02/23/2024    AST 15 02/23/2024    ALKPHOS 41 02/23/2024    BILITOT 0.8 02/23/2024      Lab Results   Component Value Date    TBSIN Negative 10/23/2023    TBGRES NEGATIVE 05/25/2022    TSPOTR  04/11/2023     Negative  Reference range: Normal Value: Negative    A negative test result does not exclude the possibility of exposure  to   or infection with Mycobacterium  "tuberculosis (M. tuberculosis).    Patients with recent exposure to TB infected individuals exhibiting a   negative T-SPOT.TB result should be considered for retesting within 6   weeks or if other relevant clinical symptoms indicate.  Results from   T-SPOT.TB testing must be used in conjunction with each individual's   epidemiological history, current medical status, and results of other   diagnostic evaluations.  The T-SPOT.TB test is qualitative and  results   are reported as positive, borderline or negative, given that the test   controls perform as expected. In line with the Centers for Disease   Control and Prevention's 2010 recommendation to report quantitative   measurements alongside the qualitative result, the laboratory  provides   spot counts for informational purposes only.  The T-SPOT.TB test  should   not be interpreted as a quantitative test.      No results found for: \"NONUHFIRE\", \"NONUHSWGH\", \"NONUHFISH\", \"EXTHEPBSAG\"  Lab Results   Component Value Date    HEPBSAG Nonreactive 10/23/2023      Lab Results   Component Value Date    HEPAIGM Nonreactive 10/23/2023    HEPBCIGM Nonreactive 10/23/2023    HEPCAB Nonreactive 10/23/2023         Labs reviewed and patient meets treatment conditions? Yes    DRUG SPECIFIC QUESTIONS:  Any history of or new diagnosis of GI perforation? No  (Actemra may increase risk of bowel perforation)      REMINDERS:  PREGNANCY CATEGORY X DRUG. OBTAIN NEGTATIVE PREGNANCY TEST PRIOR TO FIRST INFUSION FOR WOMEN OF CHILDBEARING ABILITY   WEIGHT BASED DRUG     Recommended Vitals/Observation:  Vitals: Monitor patient's vital signs prior to infusion start, every 30 minutes during infusion and 30 minutes after infusion.   Observation: Patient may leave 30 minutes post infusion.        Weight Based Drug Calculations:    WEIGHT BASED DRUGS: Tocilizumab (ACTEMRA)   Patient's dosing weight (kg): 83.5     10% weight variance for prescribed treatment: 75.2 kg to 91.8  kg     Patient's weight " today:   Vitals:    02/29/24 0703   Weight: 83 kg (183 lb)         weight range for prescribed dose:     Patient weight today falls outside of 10% variance or  weight range: No     [facility] pharmacist informed of weight variance: Not applicable    Doses that are weight based have an acceptable variance rule within 10% of the prescribed   order and/or within  weight range. If patient weight on day of infusion falls   outside of the 10% variance, or weight range, infusion is administered and   pharmacy contacted regarding future dosing adjustments, per policy.         Initiated By: Amber Campoverde RN   Time: 7:40 AM     We administered tocilizumab (Actemra) 330 mg in sodium chloride 0.9% 100 mL IV.    0835-IV line flushed with 30ml Normal Saline. Patient tolerated infusion well. No signs or symptoms of reaction noted.  0857- 30 minutes post infusion vital signs stable. Patient tolerated well, no signs or symptoms of reaction.

## 2024-02-29 NOTE — PATIENT INSTRUCTIONS
Today :We administered tocilizumab (Actemra) 330 mg in sodium chloride 0.9% 100 mL IV.     For:   1. Polymyalgia rheumatica (CMS/[facility])         Your next appointment is due in:  28 days        Please read the  Medication Guide that was given to you and reviewed during todays visit.     (Tell all doctors including dentists that you are taking this medication)     Go to the emergency room or call 911 if:  -You have signs of allergic reaction:   -Rash, hives, itching.   -Swollen, blistered, peeling skin.   -Swelling of face, lips, mouth, tongue or throat.   -Tightness of chest, trouble breathing, swallowing or talking     Call your doctor:  - If IV / injection site gets red, warm, swollen, itchy or leaks fluid or pus.     (Leave dressing on your IV site for at least 2 hours and keep area clean and dry  - If you get sick or have symptoms of infection or are not feeling well for any reason.    (Wash your hands often, stay away from people who are sick)  - If you have side effects from your medication that do not go away or are bothersome.     (Refer to the teaching your nurse gave you for side effects to call your doctor about)    - Common side effects may include:  stuffy nose, headache, feeling tired, muscle aches, upset stomach  - Before receiving any vaccines     - [facility] at   If:  - You get sick, are on antibiotics, have had a recent vaccine, have surgery or dental work and your doctor wants your visit rescheduled.  - You need to cancel and reschedule your visit for any reason. Call at least 2 days before your visit if you need to cancel.   - Your insurance changes before your next visit.    (We will need to get approval from your new insurance. This can take up to two weeks.)     [facility] is opened Monday thru Friday. We are closed on weekends and holidays.   Voice mail will take your call if the center is closed. If you leave a message please allow 24 hours for a  call back during weekdays. If you leave a message on a weekend/holiday, we will call you back the next business day.

## 2024-03-20 ENCOUNTER — HOSPITAL ENCOUNTER (OUTPATIENT)
Dept: RADIOLOGY | Facility: HOSPITAL | Age: 79
Discharge: HOME | End: 2024-03-20
Payer: MEDICARE

## 2024-03-20 DIAGNOSIS — M81.0 OSTEOPOROSIS, UNSPECIFIED OSTEOPOROSIS TYPE, UNSPECIFIED PATHOLOGICAL FRACTURE PRESENCE: ICD-10-CM

## 2024-03-20 PROCEDURE — 77085 DXA BONE DENSITY AXL VRT FX: CPT

## 2024-03-20 PROCEDURE — 77085 DXA BONE DENSITY AXL VRT FX: CPT | Performed by: RADIOLOGY

## 2024-03-21 ENCOUNTER — OFFICE VISIT (OUTPATIENT)
Dept: RHEUMATOLOGY | Facility: CLINIC | Age: 79
End: 2024-03-21
Payer: MEDICARE

## 2024-03-21 VITALS
WEIGHT: 179 LBS | BODY MASS INDEX: 32.94 KG/M2 | OXYGEN SATURATION: 97 % | HEART RATE: 88 BPM | HEIGHT: 62 IN | SYSTOLIC BLOOD PRESSURE: 146 MMHG | DIASTOLIC BLOOD PRESSURE: 78 MMHG

## 2024-03-21 DIAGNOSIS — M35.3 POLYMYALGIA RHEUMATICA (MULTI): Primary | ICD-10-CM

## 2024-03-21 DIAGNOSIS — M85.89 OSTEOPENIA OF MULTIPLE SITES: ICD-10-CM

## 2024-03-21 PROCEDURE — 1159F MED LIST DOCD IN RCRD: CPT | Performed by: INTERNAL MEDICINE

## 2024-03-21 PROCEDURE — 1157F ADVNC CARE PLAN IN RCRD: CPT | Performed by: INTERNAL MEDICINE

## 2024-03-21 PROCEDURE — 1125F AMNT PAIN NOTED PAIN PRSNT: CPT | Performed by: INTERNAL MEDICINE

## 2024-03-21 PROCEDURE — 99213 OFFICE O/P EST LOW 20 MIN: CPT | Performed by: INTERNAL MEDICINE

## 2024-03-21 PROCEDURE — 3078F DIAST BP <80 MM HG: CPT | Performed by: INTERNAL MEDICINE

## 2024-03-21 PROCEDURE — 3077F SYST BP >= 140 MM HG: CPT | Performed by: INTERNAL MEDICINE

## 2024-03-21 ASSESSMENT — ROUTINE ASSESSMENT OF PATIENT INDEX DATA (RAPID3)
ON A SCALE OF ONE TO TEN, HOW MUCH PAIN HAVE YOU HAD BECAUSE OF YOUR CONDITION OVER THE PAST WEEK?: 0
SUM OF QUESTIONS A TO J: 8
TURN_FAUCETS_OFF: WITHOUT ANY DIFFICULTY
SEVERITY_SCORE: 0
ON A SCALE OF ONE TO TEN, CONSIDERING ALL THE WAYS IN WHICH ILLNESS AND HEALTH CONDITIONS MAY AFFECT YOU AT THIS TIME, PLEASE INDICATE BELOW HOW YOU ARE DOING:: 1
PARTIPATE_RECREATIONAL_ACTIVITIES: UNABLE TO DO
GOOD_NIGHTS_SLEEP: WITHOUT ANY DIFFICULTY
IN_OUT_BED: WITHOUT ANY DIFFICULTY
FEELINGS_ANXIETY_NERVOUS: WITH SOME DIFFICULTY
WASH_DRY_BODY: WITHOUT ANY DIFFICULTY
TOTAL RAPID3 SCORE: 3.7
DRESS_YOURSELF: WITHOUT ANY DIFFICULTY
WEIGHTED_TOTAL_SCORE: 1.23
WALK_KILOMETERS: UNABLE TO DO
FN_SCORE: 2.7
PICK_CLOTHES_OFF_FLOOR: WITHOUT ANY DIFFICULTY
WALK_FLAT_GROUND: WITH MUCH DIFFICULTY
FEELINGS_DEPRESSION: WITH SOME DIFFICULTY
ON A SCALE OF ONE TO TEN, CONSIDERING ALL THE WAYS IN WHICH ILLNESS AND HEALTH CONDITIONS MAY AFFECT YOU AT THIS TIME, PLEASE INDICATE BELOW HOW YOU ARE DOING:: 1
IN_OUT_TRANSPORT: WITHOUT ANY DIFFICULTY
LIFT_CUP_TO_MOUTH: WITHOUT ANY DIFFICULTY
ON A SCALE OF ONE TO TEN, HOW MUCH PAIN HAVE YOU HAD BECAUSE OF YOUR CONDITION OVER THE PAST WEEK?: 0

## 2024-03-21 ASSESSMENT — ENCOUNTER SYMPTOMS
LOSS OF SENSATION IN FEET: 0
DEPRESSION: 0
OCCASIONAL FEELINGS OF UNSTEADINESS: 0

## 2024-03-21 ASSESSMENT — PAIN SCALES - GENERAL: PAINLEVEL: 3

## 2024-03-21 NOTE — PROGRESS NOTES
"Chief Complaint:    This 79 y.o. female with polymyalgia rheumatica (PMR) presents for ongoing treatment with ACTEMRA.    Subjective:   HPI   Problem:  1: PMR       The patient returns for ongoing evaluation and treatment of PMR with ACTEMRA.       In agreement with her previous evaluations of her treatment, the patient states that she continues to be asymptomatic and to feel \"pretty good\" on ACTEMRA treatment.        The patient has been informed at her monthly visits of the results of laboratory testing of an inflammatory process. Testing has shown normal CRP and ESR levels.        Based on how she feels on a usual day and the results of tests, the patient made the decision to continue ACTEMRA going forward in order to control PMR disease activity.         Fortunately, the patient tolerates ACTEMRA, has had no adverse physical effects, and has been off corticosteroid therapy for several years now. She has not previously relapsed.        Today, the patient reports that she feels well and has had no interval infections or illnesses requiring treatment.        The patient is now having her infusions at [facility] infusion center.        In summary, the patient continues to fare well and is looking forward to her next infusion on 3/28/24.     Review of Systems   All other systems reviewed and are negative.    Objective:   /78 (BP Location: Left arm, Patient Position: Sitting)   Pulse 88   Ht 1.575 m (5' 2\")   Wt 81.2 kg (179 lb)   SpO2 97%   BMI 32.74 kg/m²      Physical Exam  Vitals and nursing note reviewed.   HENT:      Head: Normocephalic.   Eyes:      Extraocular Movements: Extraocular movements intact.      Conjunctiva/sclera: Conjunctivae normal.      Pupils: Pupils are equal, round, and reactive to light.   Neck:      Vascular: No carotid bruit.   Cardiovascular:      Rate and Rhythm: Normal rate and regular rhythm.      Pulses: Normal pulses.      Heart sounds: Normal heart sounds. No murmur " heard.  Pulmonary:      Effort: Pulmonary effort is normal. No respiratory distress.      Breath sounds: Normal breath sounds. No wheezing, rhonchi or rales.   Musculoskeletal:         General: No swelling, tenderness, deformity or signs of injury. Normal range of motion.      Cervical back: Normal range of motion. No rigidity or tenderness.   Lymphadenopathy:      Cervical: No cervical adenopathy.   Skin:     General: Skin is warm and dry.      Findings: No erythema or rash.   Neurological:      Mental Status: She is alert.          Assessment:   PMR - m35.3  Osteopenia of multiple sites - M85.89    Plan:    PMR: Patient continues to fare well. Recent laboratory results show a normal ESR and CRP. I recommend continuing ACTEMRA. Procedures : Actemra 324 mg [4 MG/KG] IV over 1 hour.  The rationale, objectives, risk/reward and potential side effects have been explained to the patient and the patient understands these facts.  The patient further understands that periodic laboratory studies must be performed to monitor this drug and the patient will have these studies performed when the requisition is provided.  The patient is again educated to report to the office adverse effects including allergic reactions or infections.  The patient agreed to this plan. No lab testing until May 2024.  Osteopenia: Review of the patient's DXA scan performed yesterday (3/20/24) revealed osteopenia of multiple sites. There is evidence of significant, continuing BMD loss ~15% in the hip/neck. This is despite being on long-term CALCIUM + VITAMIN D3. We discussed the idea of adding a BISPHOSPHONATE to slow the rate of BMD loss. I discussed both oral and IV formulations. Patient will give this thought. At her age, her exercise is limited; she does not walk for exercise. We will discuss this again at a future visit.     Milton Dubose MD

## 2024-03-28 ENCOUNTER — INFUSION (OUTPATIENT)
Dept: INFUSION THERAPY | Facility: CLINIC | Age: 79
End: 2024-03-28
Payer: MEDICARE

## 2024-03-28 VITALS
SYSTOLIC BLOOD PRESSURE: 102 MMHG | WEIGHT: 175.2 LBS | DIASTOLIC BLOOD PRESSURE: 88 MMHG | TEMPERATURE: 96.8 F | HEART RATE: 59 BPM | BODY MASS INDEX: 32.04 KG/M2 | RESPIRATION RATE: 16 BRPM | OXYGEN SATURATION: 98 %

## 2024-03-28 DIAGNOSIS — M35.3 POLYMYALGIA RHEUMATICA (MULTI): ICD-10-CM

## 2024-03-28 PROCEDURE — 96365 THER/PROPH/DIAG IV INF INIT: CPT | Performed by: NURSE PRACTITIONER

## 2024-03-28 RX ORDER — DIPHENHYDRAMINE HYDROCHLORIDE 50 MG/ML
50 INJECTION INTRAMUSCULAR; INTRAVENOUS AS NEEDED
Status: CANCELLED | OUTPATIENT
Start: 2024-04-13

## 2024-03-28 RX ORDER — ALBUTEROL SULFATE 0.83 MG/ML
3 SOLUTION RESPIRATORY (INHALATION) AS NEEDED
Status: CANCELLED | OUTPATIENT
Start: 2024-04-13

## 2024-03-28 RX ORDER — EPINEPHRINE 0.3 MG/.3ML
0.3 INJECTION SUBCUTANEOUS EVERY 5 MIN PRN
Status: CANCELLED | OUTPATIENT
Start: 2024-04-13

## 2024-03-28 RX ORDER — FAMOTIDINE 10 MG/ML
20 INJECTION INTRAVENOUS ONCE AS NEEDED
Status: CANCELLED | OUTPATIENT
Start: 2024-04-13

## 2024-03-28 ASSESSMENT — ENCOUNTER SYMPTOMS
BACK PAIN: 1
MYALGIAS: 1
ARTHRALGIAS: 1
APPETITE CHANGE: 1
FATIGUE: 1

## 2024-03-28 ASSESSMENT — PAIN SCALES - GENERAL: PAINLEVEL: 4

## 2024-03-28 NOTE — PROGRESS NOTES
[facility]   Infusion Clinic Note   Date: 2024   Name: Anny Tesfaye  : 1945   MRN: [id number]         Reason for Visit: OP Infusion (Actemra Infusion)         Visit Type: INFUSION- Maintenance dose every 28 days       Ordered By: Dr. Dubose      Accompanied by:Self      Diagnosis: Polymyalgia rheumatica (CMS/HCC)       Allergies:   Allergies as of 2024    (No Known Allergies)         Current Medications has a current medication list which includes the following prescription(s): ascorbic acid, aspirin, calcium carbonate/vitamin d3, captopril, cholecalciferol, clobetasol, cyanocobalamin, multivitamin, escitalopram, levothyroxine, lutein, omega 3-dha-epa-fish oil, oxybutynin, oxybutynin xl, simvastatin, thyroid (pork), actemra, actemra, verapamil sr, and ocuvite with lutein.       Vitals:   Vitals:    24 0743 24 0825 24 0903   BP: (!) 151/100 113/65 105/54   Pulse: 82 57 62   Resp: 18 17 17   Temp: 36.5 °C (97.7 °F) 36.6 °C (97.9 °F) 36.6 °C (97.8 °F)   SpO2: 100% 96% 97%   Weight: 79.5 kg (175 lb 3.2 oz)     PainSc:   4     PainLoc: Back               Infusion Pre-procedure Checklist:   - Allergies reviewed: yes   - Medications reviewed: yes       - Previous reaction to current treatment: no      Assess patient for the concerns below. Document provider notification as appropriate.  - Active or recent infection with/without current antibiotic use: no  - Recent or planned invasive dental work: no  - Recent or planned surgeries: no  - Recently received or plans to receive vaccinations: no  - Has treatment related toxicities: no  - Is pregnant:  n/a      Pain: 4   - Is the pain different from normal: no   - Is the pain tolerable: yes   - Is your Doctor aware:  yes      Labs:  reviewed         Fall Risk Screening: Win Fall Risk  History of Falling, Immediate or Within 3 Months: No  Secondary Diagnosis: Yes  Ambulatory Aid: Walks without  aid/bedrest/nurse assist  Intravenous Therapy/Heparin Lock: No  Gait/Transferring: Normal/bedrest/immobile  Mental Status: Oriented to own ability  Walden Fall Risk Score: 15       Review Of Systems:  Review of Systems   Constitutional:  Positive for appetite change and fatigue.   Musculoskeletal:  Positive for arthralgias, back pain, gait problem and myalgias.   Neurological:  Positive for gait problem.   All other systems reviewed and are negative.        Infusion Readiness:   - Assessment Concerns Related to Infusion: No  - Provider notified: no      Document Below Only If Indicated:   New Patient Education:    N/A (returning patient for continuation of therapy. Ongoing education provided as needed.)        Treatment Conditions & Drug Specific Questions:    Tocilizumab  (ACTEMRA)   (Unless otherwise specified on patient specific therapy plan):     TREATMENT CONDITIONS:  Unless otherwise specified on patient specific therapy plan HOLD and notify provider prior to proceeding with treatment if:   o Platelets LESS than 100 x 10E9L  o ANC LESS than 2 x 10E9/L  o ALT GREATER than 1.5x ULN and/or AST GREATER than 1.5x ULN  o Positive T-spot  o Reactive Hepatitis B Surface Antigen    Lab Results   Component Value Date     (L) 02/23/2024      Lab Results   Component Value Date    NEUTROABS 4.39 02/23/2024        Chemistry    Lab Results   Component Value Date/Time     02/23/2024 1152    K 4.6 02/23/2024 1152     02/23/2024 1152    CO2 24 02/23/2024 1152    BUN 45 (H) 02/23/2024 1152    CREATININE 1.50 02/23/2024 1152    Lab Results   Component Value Date/Time    CALCIUM 9.8 02/23/2024 1152    ALKPHOS 41 02/23/2024 1152    AST 15 02/23/2024 1152    ALT 16 02/23/2024 1152    BILITOT 0.8 02/23/2024 1152        ,  Lab Results   Component Value Date    ALT 16 02/23/2024    AST 15 02/23/2024    ALKPHOS 41 02/23/2024    BILITOT 0.8 02/23/2024      Lab Results   Component Value Date    TBSIN Negative 10/23/2023  "   TBGRES NEGATIVE 05/25/2022    TSPOTR  04/11/2023     Negative  Reference range: Normal Value: Negative    A negative test result does not exclude the possibility of exposure  to   or infection with Mycobacterium tuberculosis (M. tuberculosis).    Patients with recent exposure to TB infected individuals exhibiting a   negative T-SPOT.TB result should be considered for retesting within 6   weeks or if other relevant clinical symptoms indicate.  Results from   T-SPOT.TB testing must be used in conjunction with each individual's   epidemiological history, current medical status, and results of other   diagnostic evaluations.  The T-SPOT.TB test is qualitative and  results   are reported as positive, borderline or negative, given that the test   controls perform as expected. In line with the Centers for Disease   Control and Prevention's 2010 recommendation to report quantitative   measurements alongside the qualitative result, the laboratory  provides   spot counts for informational purposes only.  The T-SPOT.TB test  should   not be interpreted as a quantitative test.      No results found for: \"NONUHFIRE\", \"NONUHSWGH\", \"NONUHFISH\", \"EXTHEPBSAG\"  Lab Results   Component Value Date    HEPBSAG Nonreactive 10/23/2023      Lab Results   Component Value Date    HEPAIGM Nonreactive 10/23/2023    HEPBCIGM Nonreactive 10/23/2023    HEPCAB Nonreactive 10/23/2023         Labs reviewed and patient meets treatment conditions? Yes    DRUG SPECIFIC QUESTIONS:  Any history of or new diagnosis of GI perforation? No  (Actemra may increase risk of bowel perforation)      REMINDERS:  PREGNANCY CATEGORY X DRUG. OBTAIN NEGTATIVE PREGNANCY TEST PRIOR TO FIRST INFUSION FOR WOMEN OF CHILDBEARING ABILITY   WEIGHT BASED DRUG     Recommended Vitals/Observation:  Vitals: Monitor patient's vital signs prior to infusion start, every 30 minutes during infusion and 30 minutes after infusion.   Observation: Patient may leave 30 minutes post " infusion.        Weight Based Drug Calculations:    WEIGHT BASED DRUGS: Tocilizumab (ACTEMRA)   Patient's dosing weight (kg): 83.5     10% weight variance for prescribed treatment: 75.2 kg to 91.8 kg     Patient's weight today:   Vitals:    03/28/24 0743   Weight: 79.5 kg (175 lb 3.2 oz)         weight range for prescribed dose:     Patient weight today falls outside of 10% variance or  weight range: No    [facility] pharmacist informed of weight variance: No    Doses that are weight based have an acceptable variance rule within 10% of the prescribed   order and/or within  weight range. If patient weight on day of infusion falls   outside of the 10% variance, or weight range, infusion is administered and   pharmacy contacted regarding future dosing adjustments, per policy.         Initiated By: Kayla Shultz RN   Time: 9:32 AM     We administered tocilizumab (Actemra) 330 mg in sodium chloride 0.9% 100 mL IV.   0935 30 minute observation complete.  VSS. No s/s adverse reaction noted.  RTC on 4/25/2024.

## 2024-03-28 NOTE — PATIENT INSTRUCTIONS
Today :We administered tocilizumab (Actemra) 330 mg in sodium chloride 0.9% 100 mL IV.     For:   1. Polymyalgia rheumatica (CMS/[facility])         Your next appointment is due in:  4/25/2024        Please read the  Medication Guide that was given to you and reviewed during todays visit.     (Tell all doctors including dentists that you are taking this medication)     Go to the emergency room or call 911 if:  -You have signs of allergic reaction:   -Rash, hives, itching.   -Swollen, blistered, peeling skin.   -Swelling of face, lips, mouth, tongue or throat.   -Tightness of chest, trouble breathing, swallowing or talking     Call your doctor:  - If IV / injection site gets red, warm, swollen, itchy or leaks fluid or pus.     (Leave dressing on your IV site for at least 2 hours and keep area clean and dry  - If you get sick or have symptoms of infection or are not feeling well for any reason.    (Wash your hands often, stay away from people who are sick)  - If you have side effects from your medication that do not go away or are bothersome.     (Refer to the teaching your nurse gave you for side effects to call your doctor about)    - Common side effects may include:  stuffy nose, headache, feeling tired, muscle aches, upset stomach  - Before receiving any vaccines     - Call [facility] at   If:  - You get sick, are on antibiotics, have had a recent vaccine, have surgery or dental work and your doctor wants your visit rescheduled.  - You need to cancel and reschedule your visit for any reason. Call at least 2 days before your visit if you need to cancel.   - Your insurance changes before your next visit.    (We will need to get approval from your new insurance. This can take up to two weeks.)     [facility] is opened Monday thru Friday. We are closed on weekends and holidays.   Voice mail will take your call if the center is closed. If you leave a message please  allow 24 hours for a call back during weekdays. If you leave a message on a weekend/holiday, we will call you back the next business day.

## 2024-04-02 ENCOUNTER — OFFICE VISIT (OUTPATIENT)
Dept: PRIMARY CARE | Facility: CLINIC | Age: 79
End: 2024-04-02
Payer: MEDICARE

## 2024-04-02 VITALS
BODY MASS INDEX: 33.09 KG/M2 | SYSTOLIC BLOOD PRESSURE: 128 MMHG | WEIGHT: 179.8 LBS | DIASTOLIC BLOOD PRESSURE: 70 MMHG | TEMPERATURE: 96.5 F | OXYGEN SATURATION: 97 % | HEART RATE: 60 BPM | HEIGHT: 62 IN

## 2024-04-02 DIAGNOSIS — E03.9 ACQUIRED HYPOTHYROIDISM: ICD-10-CM

## 2024-04-02 DIAGNOSIS — N18.32 STAGE 3B CHRONIC KIDNEY DISEASE (MULTI): ICD-10-CM

## 2024-04-02 DIAGNOSIS — F41.9 ANXIETY: ICD-10-CM

## 2024-04-02 DIAGNOSIS — I10 ESSENTIAL HYPERTENSION: ICD-10-CM

## 2024-04-02 DIAGNOSIS — E66.01 MORBID (SEVERE) OBESITY DUE TO EXCESS CALORIES (MULTI): ICD-10-CM

## 2024-04-02 DIAGNOSIS — E78.2 HYPERLIPEMIA, MIXED: ICD-10-CM

## 2024-04-02 DIAGNOSIS — Z76.89 ENCOUNTER TO ESTABLISH CARE WITH NEW DOCTOR: Primary | ICD-10-CM

## 2024-04-02 PROCEDURE — 99214 OFFICE O/P EST MOD 30 MIN: CPT | Performed by: FAMILY MEDICINE

## 2024-04-02 PROCEDURE — 1157F ADVNC CARE PLAN IN RCRD: CPT | Performed by: FAMILY MEDICINE

## 2024-04-02 PROCEDURE — 1125F AMNT PAIN NOTED PAIN PRSNT: CPT | Performed by: FAMILY MEDICINE

## 2024-04-02 PROCEDURE — 3074F SYST BP LT 130 MM HG: CPT | Performed by: FAMILY MEDICINE

## 2024-04-02 PROCEDURE — 1159F MED LIST DOCD IN RCRD: CPT | Performed by: FAMILY MEDICINE

## 2024-04-02 PROCEDURE — 1160F RVW MEDS BY RX/DR IN RCRD: CPT | Performed by: FAMILY MEDICINE

## 2024-04-02 PROCEDURE — 3078F DIAST BP <80 MM HG: CPT | Performed by: FAMILY MEDICINE

## 2024-04-02 RX ORDER — DEXTROMETHORPHAN HYDROBROMIDE, GUAIFENESIN 5; 100 MG/5ML; MG/5ML
650 LIQUID ORAL EVERY 8 HOURS PRN
COMMUNITY

## 2024-04-02 RX ORDER — CAPTOPRIL 50 MG/1
50 TABLET ORAL DAILY
Qty: 90 TABLET | Refills: 1 | Status: SHIPPED | OUTPATIENT
Start: 2024-04-02

## 2024-04-02 RX ORDER — THYROID 30 MG/1
30 TABLET ORAL
Qty: 90 TABLET | Refills: 1 | Status: SHIPPED | OUTPATIENT
Start: 2024-04-02 | End: 2024-05-14 | Stop reason: SDUPTHER

## 2024-04-02 RX ORDER — ESCITALOPRAM OXALATE 20 MG/1
20 TABLET ORAL DAILY
Qty: 90 TABLET | Refills: 1 | Status: SHIPPED | OUTPATIENT
Start: 2024-04-02

## 2024-04-02 RX ORDER — VERAPAMIL HYDROCHLORIDE 240 MG/1
240 TABLET, FILM COATED, EXTENDED RELEASE ORAL DAILY
Qty: 90 TABLET | Refills: 1 | Status: SHIPPED | OUTPATIENT
Start: 2024-04-02

## 2024-04-02 RX ORDER — FAMOTIDINE 20 MG/1
20 TABLET, FILM COATED ORAL NIGHTLY PRN
COMMUNITY
Start: 2022-01-13

## 2024-04-02 RX ORDER — CALCIUM CARBONATE 500(1250)
1 TABLET,CHEWABLE ORAL DAILY
COMMUNITY

## 2024-04-02 ASSESSMENT — LIFESTYLE VARIABLES
AUDIT-C TOTAL SCORE: 4
HOW OFTEN DO YOU HAVE SIX OR MORE DRINKS ON ONE OCCASION: NEVER
SKIP TO QUESTIONS 9-10: 1
HOW OFTEN DO YOU HAVE A DRINK CONTAINING ALCOHOL: 4 OR MORE TIMES A WEEK
HOW MANY STANDARD DRINKS CONTAINING ALCOHOL DO YOU HAVE ON A TYPICAL DAY: 1 OR 2

## 2024-04-02 ASSESSMENT — PAIN SCALES - GENERAL: PAINLEVEL: 3

## 2024-04-02 ASSESSMENT — ENCOUNTER SYMPTOMS
DEPRESSION: 1
LOSS OF SENSATION IN FEET: 0
OCCASIONAL FEELINGS OF UNSTEADINESS: 1

## 2024-04-02 NOTE — PATIENT INSTRUCTIONS
Problem List Items Addressed This Visit             ICD-10-CM    Hypothyroid E03.9    Relevant Medications    thyroid, pork, ([place] Thyroid) 30 mg tablet    Other Relevant Orders    TSH    T3, free    T4, free    Anxiety F41.9    Relevant Medications    escitalopram (Lexapro) 20 mg tablet    Essential hypertension I10    Relevant Medications    captopril (Capoten) 50 mg tablet    verapamil SR (Calan-SR) 240 mg ER tablet    Other Relevant Orders    Lipid Panel    TSH    T3, free    T4, free    Hyperlipemia, mixed E78.2    Relevant Orders    Lipid Panel    TSH    T3, free    T4, free    Morbid (severe) obesity due to excess calories (CMS/[facility]) E66.01    Stage 3 chronic kidney disease (CMS/[facility]) N18.30    Relevant Orders    Referral to Nephrology     Other Visit Diagnoses         Codes    Encounter to establish care with new doctor    -  Primary Z76.89            Additional Visit Plans:  Will recheck thyroid levels and monitor your cholesterol levels to determine optimal dosing of your medications.    Continue to follow closely with Dr. Dubose on your treatments with him and monitoring her blood work/platelets.  Continue to see urology and consider nephrologist evaluation regarding chronic kidney disease. Dr. De Santiago, Dr. Mirza    I will send refills on the cholesterol and thyroid medicines once labs have been reviewed.     Doing well on your other medicines, follow up in 6 months for medicine checkup    Next Wellness Exam/Annual Physical Due  At your earliest convenience    Patient Care Team:  Filemon Montaño DO as PCP - General (Family Medicine)  Nury Rouse MD as PCP - [employer] PCP  Nury Rouse MD as Primary Care Provider    Filemon Montaño DO   04/02/24   9:57 AM

## 2024-04-02 NOTE — PROGRESS NOTES
Outpatient Visit Note    Chief Complaint   Patient presents with    Annual Exam     NP Est Care discuss medications       HPI:  Anny Tesfaye is a 79 y.o. female here to establish care and go over her chronic conditions and medications.    Previous PCP is Dr. Rouse, last seen October 2023.    She does follow closely with rheumatology, Dr. Dubose, for polymyalgia rheumatica and osteopenia/osteoporosis.    Sees Dr. Sandhu and Dr. Lovell.     She has hypothyroidism for which she is on 100 mcg of levothyroxine 6 days/week along with [place] Thyroid 30 mg daily.  Denies any heat or cold intolerance, dry skin, hair loss, diarrhea, constipation or unintentional weight changes.    She is on 20 mg of Lexapro for anxiety. Says this covers her well. No side effects. Has good support. No SI/HI.     She is on 20 mg of simvastatin for hyperlipidemia.  Denies any myalgia or chest pain.  No concerning side effects.    She is on verapamil and captopril for hypertension.  Does not measure blood pressure readings at home but denies any dizziness, headaches, blurry vision, nosebleeds, lower extremity edema or cough.  No side effects. Dr. Dubose check her Bps once monthly as well.     Had some blood work done recently.  CMP checked in February with reduced kidney function, GFR 35.  Normal hepatic function panel.  Normal electrolytes.  Rheumatology is keeping an eye on her platelets.  No anemia.  Due to have her lipid panel checked at this time along with thyroid level (has always maintained a TSH between 0.01 and 0.19 with previous PCP).     PHQ9/GAD7:         Past Medical History:   Diagnosis Date    Chronic low back pain     CKD (chronic kidney disease) stage 3, GFR 30-59 ml/min (CMS/HCC)     Esophagitis     Glaucoma     History of scarlet fever     Hypertension     Hypothyroidism     Morbid obesity (CMS/HCC)     Nephritis     OA (osteoarthritis) of knee     Bilateral    Polymyalgia rheumatica (CMS/HCC)     Psoriasis      Sciatica         Current Medications  Current Outpatient Medications   Medication Instructions    acetaminophen (TYLENOL 8 HOUR) 650 mg, oral, Every 8 hours PRN    ascorbic acid (Vitamin C) 500 mg tablet oral, Daily    aspirin 324 mg, oral, Daily    calcium carbonate 500 mg calcium (1,250 mg) chewable tablet 1 tablet, oral, Daily    captopril (CAPOTEN) 50 mg, oral, Daily    cholecalciferol (Vitamin D-3) 5,000 Units tablet 1 tablet, oral, Daily    cyanocobalamin (VITAMIN B-12) 100 mcg, oral, Daily    DAILY MULTI-VITAMIN ORAL 1 tablet, oral, Daily    escitalopram (LEXAPRO) 20 mg, oral, Daily    famotidine (PEPCID) 20 mg, oral, Nightly PRN    levothyroxine (SYNTHROID, LEVOXYL) 100 mcg, oral, Daily, Six days per week    lutein 6 mg capsule 1 capsule, oral, Daily    omega 3-dha-epa-fish oil (Fish OiL) 1,000 mg (120 mg-180 mg) capsule 1 capsule, oral, Daily    oxybutynin (DITROPAN) 5 mg, oral, Every other day    oxybutynin XL (DITROPAN-XL) 15 mg, oral, Daily    simvastatin (ZOCOR) 20 mg, oral, Nightly    thyroid (pork) (ARMOUR THYROID) 30 mg, oral, Daily before breakfast    tocilizumab (Actemra) 200 mg/10 mL (20 mg/mL) solution intravenous, as directed    verapamil SR (CALAN-SR) 240 mg, oral, Daily        Allergies  No Known Allergies     Past Surgical History:   Procedure Laterality Date    ADENOIDECTOMY      CATARACT EXTRACTION  06/2020    CATARACT EXTRACTION Right     CATARACT EXTRACTION Left 03/2021    CROWN  06/2020    DENTAL CROWN REPLACEMENT    CT ANGIO NECK  04/21/2014    CT NECK ANGIO W AND WO IV CONTRAST [facility]    DILATION AND CURETTAGE OF UTERUS      TONSILLECTOMY      WISDOM TOOTH EXTRACTION       Family History   Problem Relation Name Age of Onset    Breast cancer Mother      Osteoporosis Mother      Other (Abdominal Aortic Aneurysm) Mother      Heart failure Father      Hypertension Father      Macular degeneration Father      Heart disease Father      Cancer Sister          Sinus     Social  History     Tobacco Use    Smoking status: Every Day     Packs/day: 1.00     Years: 40.00     Additional pack years: 0.00     Total pack years: 40.00     Types: Cigarettes     Passive exposure: Current    Smokeless tobacco: Never    Tobacco comments:     Has tried to quit in the past.    Vaping Use    Vaping Use: Never used   Substance Use Topics    Alcohol use: Yes     Alcohol/week: 8.0 standard drinks of alcohol     Types: 7 Shots of liquor, 1 Standard drinks or equivalent per week     Comment: socailly    Drug use: Never     Tobacco Use: High Risk (4/2/2024)    Patient History     Smoking Tobacco Use: Every Day     Smokeless Tobacco Use: Never     Passive Exposure: Current        ROS  All pertinent positive symptoms are included in the history of present illness.  All other systems have been reviewed and are negative and noncontributory to this patient's current ailments.    VITAL SIGNS  Vitals:    04/02/24 0939   BP: 128/70   Pulse: 60   Temp: 35.8 °C (96.5 °F)   SpO2: 97%     Vitals:    04/02/24 0939   Weight: 81.6 kg (179 lb 12.8 oz)      Body mass index is 32.89 kg/m².     PHYSICAL EXAM  GENERAL APPEARANCE: well nourished, well developed, looks like stated age, in no acute distress, not ill or tired appearing, conversing well.   HEENT: no trauma, normocephalic.   NECK: no nodes, supple without rigidity, no neck mass was observed,  no thyromegaly.   HEART: regular rate and rhythm, S1 and S2 heard with no murmurs or skipped beats. No carotid bruits.  LUNGS: clear to auscultation bilaterally with no wheezes, crackles or rales.   EXTREMITIES: moving all extremities equally with no edema or deformities.   SKIN: normal skin color and pigmentation, normal skin turgor without rash, lesions, or nodules visualized.   NEUROLOGIC EXAM: CN II-XII grossly intact, normal gait, normal balance.   PSYCH: mood and affect appropriate; alert and oriented to time, place, person; no difficulty with speech or language.        Assessment/Plan   Problem List Items Addressed This Visit             ICD-10-CM    Hypothyroid E03.9    Relevant Medications    thyroid, pork, ([place] Thyroid) 30 mg tablet    Other Relevant Orders    TSH    T3, free    T4, free    Anxiety F41.9    Relevant Medications    escitalopram (Lexapro) 20 mg tablet    Essential hypertension I10    Relevant Medications    captopril (Capoten) 50 mg tablet    verapamil SR (Calan-SR) 240 mg ER tablet    Other Relevant Orders    Lipid Panel    TSH    T3, free    T4, free    Hyperlipemia, mixed E78.2    Relevant Orders    Lipid Panel    TSH    T3, free    T4, free    Morbid (severe) obesity due to excess calories (CMS/[facility]) E66.01    Stage 3 chronic kidney disease (CMS/[facility]) N18.30    Relevant Orders    Referral to Nephrology     Other Visit Diagnoses         Codes    Encounter to establish care with new doctor    -  Primary Z76.89            Additional Visit Plans:  Will recheck thyroid levels and monitor your cholesterol levels to determine optimal dosing of your medications.    Continue to follow closely with Dr. Dubose on your treatments with him and monitoring her blood work/platelets.  Continue to see urology and consider nephrologist evaluation regarding chronic kidney disease. Dr. De Santiago, Dr. Mirza    I will send refills on the cholesterol and thyroid medicines once labs have been reviewed.     Doing well on your other medicines, follow up in 6 months for medicine checkup    Next Wellness Exam/Annual Physical Due  At your earliest convenience    Patient Care Team:  Filemon Montaño DO as PCP - General (Family Medicine)  Nury Rouse MD as PCP - [employer] PCP  Nury Rouse MD as Primary Care Provider    Filemon Montaño DO   04/02/24   10:14 AM

## 2024-04-08 ENCOUNTER — LAB (OUTPATIENT)
Dept: LAB | Facility: LAB | Age: 79
End: 2024-04-08
Payer: MEDICARE

## 2024-04-08 DIAGNOSIS — E78.2 HYPERLIPEMIA, MIXED: ICD-10-CM

## 2024-04-08 DIAGNOSIS — E03.9 ACQUIRED HYPOTHYROIDISM: ICD-10-CM

## 2024-04-08 DIAGNOSIS — I10 ESSENTIAL HYPERTENSION: ICD-10-CM

## 2024-04-08 DIAGNOSIS — M35.3 POLYMYALGIA RHEUMATICA (MULTI): ICD-10-CM

## 2024-04-08 LAB
CHOLEST SERPL-MCNC: 168 MG/DL (ref 133–200)
CHOLEST/HDLC SERPL: 2.2 {RATIO}
HDLC SERPL-MCNC: 78 MG/DL
LDLC SERPL CALC-MCNC: 74 MG/DL (ref 65–130)
T3FREE SERPL-MCNC: 3.7 PG/ML (ref 2.3–4.2)
T4 FREE SERPL-MCNC: 1.6 NG/DL (ref 0.9–1.7)
TRIGL SERPL-MCNC: 80 MG/DL (ref 40–150)
TSH SERPL DL<=0.05 MIU/L-ACNC: 0.04 MIU/L (ref 0.27–4.2)

## 2024-04-08 PROCEDURE — 84439 ASSAY OF FREE THYROXINE: CPT

## 2024-04-08 PROCEDURE — 36415 COLL VENOUS BLD VENIPUNCTURE: CPT

## 2024-04-08 PROCEDURE — 84443 ASSAY THYROID STIM HORMONE: CPT

## 2024-04-08 PROCEDURE — 84481 FREE ASSAY (FT-3): CPT

## 2024-04-08 PROCEDURE — 80061 LIPID PANEL: CPT

## 2024-04-18 ENCOUNTER — OFFICE VISIT (OUTPATIENT)
Dept: RHEUMATOLOGY | Facility: CLINIC | Age: 79
End: 2024-04-18
Payer: MEDICARE

## 2024-04-18 VITALS
SYSTOLIC BLOOD PRESSURE: 128 MMHG | BODY MASS INDEX: 29.32 KG/M2 | WEIGHT: 176 LBS | DIASTOLIC BLOOD PRESSURE: 74 MMHG | OXYGEN SATURATION: 98 % | HEIGHT: 65 IN | HEART RATE: 74 BPM

## 2024-04-18 DIAGNOSIS — M85.89 OSTEOPENIA OF MULTIPLE SITES: ICD-10-CM

## 2024-04-18 DIAGNOSIS — M35.3 POLYMYALGIA RHEUMATICA (MULTI): Primary | ICD-10-CM

## 2024-04-18 PROCEDURE — 3078F DIAST BP <80 MM HG: CPT | Performed by: INTERNAL MEDICINE

## 2024-04-18 PROCEDURE — 99213 OFFICE O/P EST LOW 20 MIN: CPT | Performed by: INTERNAL MEDICINE

## 2024-04-18 PROCEDURE — 1125F AMNT PAIN NOTED PAIN PRSNT: CPT | Performed by: INTERNAL MEDICINE

## 2024-04-18 PROCEDURE — 1159F MED LIST DOCD IN RCRD: CPT | Performed by: INTERNAL MEDICINE

## 2024-04-18 PROCEDURE — 1157F ADVNC CARE PLAN IN RCRD: CPT | Performed by: INTERNAL MEDICINE

## 2024-04-18 PROCEDURE — 1160F RVW MEDS BY RX/DR IN RCRD: CPT | Performed by: INTERNAL MEDICINE

## 2024-04-18 PROCEDURE — 3074F SYST BP LT 130 MM HG: CPT | Performed by: INTERNAL MEDICINE

## 2024-04-18 ASSESSMENT — ROUTINE ASSESSMENT OF PATIENT INDEX DATA (RAPID3)
ON A SCALE OF ONE TO TEN, CONSIDERING ALL THE WAYS IN WHICH ILLNESS AND HEALTH CONDITIONS MAY AFFECT YOU AT THIS TIME, PLEASE INDICATE BELOW HOW YOU ARE DOING:: 3
ON A SCALE OF ONE TO TEN, CONSIDERING ALL THE WAYS IN WHICH ILLNESS AND HEALTH CONDITIONS MAY AFFECT YOU AT THIS TIME, PLEASE INDICATE BELOW HOW YOU ARE DOING:: 3
WALK_KILOMETERS: UNABLE TO DO
LIFT_CUP_TO_MOUTH: WITHOUT ANY DIFFICULTY
PICK_CLOTHES_OFF_FLOOR: WITHOUT ANY DIFFICULTY
PARTIPATE_RECREATIONAL_ACTIVITIES: UNABLE TO DO
FEELINGS_ANXIETY_NERVOUS: WITH SOME DIFFICULTY
TURN_FAUCETS_OFF: WITHOUT ANY DIFFICULTY
DRESS_YOURSELF: WITHOUT ANY DIFFICULTY
ON A SCALE OF ONE TO TEN, HOW MUCH PAIN HAVE YOU HAD BECAUSE OF YOUR CONDITION OVER THE PAST WEEK?: 0
GOOD_NIGHTS_SLEEP: WITHOUT ANY DIFFICULTY
FEELINGS_DEPRESSION: WITH SOME DIFFICULTY
ON A SCALE OF ONE TO TEN, HOW MUCH PAIN HAVE YOU HAD BECAUSE OF YOUR CONDITION OVER THE PAST WEEK?: 0
IN_OUT_TRANSPORT: WITH SOME DIFFICULTY
IN_OUT_BED: WITHOUT ANY DIFFICULTY
WALK_FLAT_GROUND: WITH MUCH DIFFICULTY

## 2024-04-18 ASSESSMENT — PATIENT HEALTH QUESTIONNAIRE - PHQ9
10. IF YOU CHECKED OFF ANY PROBLEMS, HOW DIFFICULT HAVE THESE PROBLEMS MADE IT FOR YOU TO DO YOUR WORK, TAKE CARE OF THINGS AT HOME, OR GET ALONG WITH OTHER PEOPLE: NOT DIFFICULT AT ALL
1. LITTLE INTEREST OR PLEASURE IN DOING THINGS: SEVERAL DAYS
2. FEELING DOWN, DEPRESSED OR HOPELESS: NOT AT ALL
SUM OF ALL RESPONSES TO PHQ9 QUESTIONS 1 AND 2: 1

## 2024-04-18 ASSESSMENT — ENCOUNTER SYMPTOMS
CONSTIPATION: 0
HEMATOLOGIC/LYMPHATIC NEGATIVE: 1
OCCASIONAL FEELINGS OF UNSTEADINESS: 1
LOSS OF SENSATION IN FEET: 0
DIZZINESS: 0
SINUS PAIN: 0
DEPRESSION: 0
FEVER: 0
ADENOPATHY: 0
FATIGUE: 0
ABDOMINAL PAIN: 0
DIFFICULTY URINATING: 0
HEADACHES: 0
DYSURIA: 0
RESPIRATORY NEGATIVE: 1
HEMATURIA: 0
SHORTNESS OF BREATH: 0
SORE THROAT: 0
EYE ITCHING: 1
BLOOD IN STOOL: 0
COUGH: 0
DIARRHEA: 0
ARTHRALGIAS: 0
CONSTITUTIONAL NEGATIVE: 1
MYALGIAS: 0
LIGHT-HEADEDNESS: 0
MUSCULOSKELETAL NEGATIVE: 1
FLANK PAIN: 0
ENDOCRINE NEGATIVE: 1
EYE REDNESS: 1
JOINT SWELLING: 0
FREQUENCY: 0
CARDIOVASCULAR NEGATIVE: 1
NEUROLOGICAL NEGATIVE: 1

## 2024-04-18 ASSESSMENT — PAIN - FUNCTIONAL ASSESSMENT: PAIN_FUNCTIONAL_ASSESSMENT: 0-10

## 2024-04-18 ASSESSMENT — PAIN SCALES - GENERAL: PAINLEVEL_OUTOF10: 3

## 2024-04-18 NOTE — PROGRESS NOTES
"Chief Complaint:  This 79 y.o. female with polymyalgia rheumatica (PMR) presents for ongoing treatment with ACTEMRA.      Subjective:   HPI   Problem:  1: PMR       The patient returns today for continuing management of PMR with long-standing ACTEMRA treatment.       Since her previous visit on 3/21/2024, the patient has remained asymptomatic for PMR.        In addition, the patient denies any interval infections or illnesses for which she required treatment. She has been safe at home, and has not sustained any falls or other injuries.       The patient denies any adverse effects of ACTEMRA treatment.       Today, the patient reports that she is well and prepared for her next ACTEMRA treatment.     Review of Systems   Constitutional: Negative.  Negative for fatigue and fever.   HENT:  Positive for dental problem. Negative for hearing loss, mouth sores, sinus pain, sore throat and tinnitus.         Dry eyes and dry mouth; dental disorders due to chronic dry mouth   Eyes:  Positive for redness and itching.        Minor itching and redness secondary to dryness.   Respiratory: Negative.  Negative for cough and shortness of breath.    Cardiovascular: Negative.  Negative for chest pain and leg swelling.   Gastrointestinal:  Negative for abdominal pain, blood in stool, constipation and diarrhea.   Endocrine: Negative.    Genitourinary: Negative.  Negative for difficulty urinating, dysuria, flank pain, frequency, hematuria and urgency.   Musculoskeletal: Negative.  Negative for arthralgias, gait problem, joint swelling and myalgias.   Skin:  Positive for rash.   Neurological: Negative.  Negative for dizziness, syncope, light-headedness and headaches.   Hematological: Negative.  Negative for adenopathy.     Objective:   /74   Pulse 74   Ht 1.645 m (5' 4.75\")   Wt 79.8 kg (176 lb)   SpO2 98%   BMI 29.51 kg/m²      Physical Exam  Vitals and nursing note reviewed.   Constitutional:       General: She is not in acute " distress.     Appearance: Normal appearance. She is obese. She is not ill-appearing.   HENT:      Head: Normocephalic.      Nose: Nose normal.   Eyes:      General:         Right eye: No discharge.         Left eye: No discharge.      Extraocular Movements: Extraocular movements intact.      Conjunctiva/sclera: Conjunctivae normal.      Pupils: Pupils are equal, round, and reactive to light.   Neck:      Vascular: No carotid bruit.      Comments: Right carotid pulse not obtained. This is a long-term finding.  Cardiovascular:      Rate and Rhythm: Normal rate and regular rhythm.      Heart sounds: Normal heart sounds. No murmur heard.     No gallop.   Pulmonary:      Effort: Pulmonary effort is normal. No respiratory distress.      Breath sounds: Normal breath sounds. No wheezing, rhonchi or rales.   Musculoskeletal:         General: No swelling, tenderness or deformity. Normal range of motion.      Cervical back: Rigidity present. No tenderness.      Right lower leg: No edema.      Left lower leg: No edema.   Lymphadenopathy:      Cervical: No cervical adenopathy.   Skin:     General: Skin is warm.      Capillary Refill: Capillary refill takes 2 to 3 seconds.      Findings: Rash present.      Comments: 3-flat, 2-3 mm lesions of upper posterior thorax.   Neurological:      Mental Status: She is alert and oriented to person, place, and time.   Psychiatric:         Behavior: Behavior normal.       Assessment:   PMR - M35.3  Osteopenia of multiple sites - M85.89  3.   Renal insufficiency, CKD3b - N18.3    Plan:    PMR: Patient is well and can have her ACTEMRA infusion.   2.    Procedures :Actemra 320 mg [4 mg/kg] IV over 1 hour.  The rationale, objectives, risk/reward and potential side effects have been explained to the patient and the patient understands these facts.  The patient further understands that periodic laboratory studies must be performed to monitor this drug and the patient will have these studies  performed when the requisition is provided.  The patient is again educated to report to the office adverse effects including allergic reactions or infections.  The patient agreed to this plan.   3.     Laboratory studies prior to next infusion visit.  4.     Patient to have referral by Dr. Montaño to nephrologist for CKD3.  5.     Return to office in one month.    Milton Dubose MD

## 2024-04-25 ENCOUNTER — INFUSION (OUTPATIENT)
Dept: INFUSION THERAPY | Facility: CLINIC | Age: 79
End: 2024-04-25
Payer: MEDICARE

## 2024-04-25 ENCOUNTER — TELEPHONE (OUTPATIENT)
Dept: RHEUMATOLOGY | Facility: CLINIC | Age: 79
End: 2024-04-25

## 2024-04-25 VITALS
WEIGHT: 176.4 LBS | RESPIRATION RATE: 18 BRPM | HEART RATE: 54 BPM | SYSTOLIC BLOOD PRESSURE: 138 MMHG | BODY MASS INDEX: 29.58 KG/M2 | OXYGEN SATURATION: 96 % | DIASTOLIC BLOOD PRESSURE: 79 MMHG | TEMPERATURE: 97.4 F

## 2024-04-25 DIAGNOSIS — M35.3 POLYMYALGIA RHEUMATICA (MULTI): ICD-10-CM

## 2024-04-25 PROCEDURE — 96365 THER/PROPH/DIAG IV INF INIT: CPT | Performed by: NURSE PRACTITIONER

## 2024-04-25 RX ORDER — ALBUTEROL SULFATE 0.83 MG/ML
3 SOLUTION RESPIRATORY (INHALATION) AS NEEDED
Status: CANCELLED | OUTPATIENT
Start: 2024-05-23

## 2024-04-25 RX ORDER — EPINEPHRINE 0.3 MG/.3ML
0.3 INJECTION SUBCUTANEOUS EVERY 5 MIN PRN
Status: CANCELLED | OUTPATIENT
Start: 2024-05-23

## 2024-04-25 RX ORDER — DIPHENHYDRAMINE HYDROCHLORIDE 50 MG/ML
50 INJECTION INTRAMUSCULAR; INTRAVENOUS AS NEEDED
Status: CANCELLED | OUTPATIENT
Start: 2024-05-23

## 2024-04-25 RX ORDER — FAMOTIDINE 10 MG/ML
20 INJECTION INTRAVENOUS ONCE AS NEEDED
Status: CANCELLED | OUTPATIENT
Start: 2024-05-23

## 2024-04-25 ASSESSMENT — PAIN SCALES - GENERAL: PAINLEVEL: 3

## 2024-04-25 NOTE — TELEPHONE ENCOUNTER
PT LEFT VM STATING SHE WAS TO LET YOU KNOW HER DECISION ABOUT GETTING ZOLEDRONIC ACID.(?) PT STATES SHE DOES NOT WANT THE INFUSION & WILL CONTINUE WITH PILLS.

## 2024-04-25 NOTE — PATIENT INSTRUCTIONS
Today :Anny MONROEKota Ledesmarenee had no medications administered during this visit.     For:   1. Polymyalgia rheumatica (Multi)         Your next appointment is due in:  28 days        Please read the  Medication Guide that was given to you and reviewed during todays visit.     (Tell all doctors including dentists that you are taking this medication)     Go to the emergency room or call 911 if:  -You have signs of allergic reaction:   -Rash, hives, itching.   -Swollen, blistered, peeling skin.   -Swelling of face, lips, mouth, tongue or throat.   -Tightness of chest, trouble breathing, swallowing or talking     Call your doctor:  - If IV / injection site gets red, warm, swollen, itchy or leaks fluid or pus.     (Leave dressing on your IV site for at least 2 hours and keep area clean and dry  - If you get sick or have symptoms of infection or are not feeling well for any reason.    (Wash your hands often, stay away from people who are sick)  - If you have side effects from your medication that do not go away or are bothersome.     (Refer to the teaching your nurse gave you for side effects to call your doctor about)    - Common side effects may include:  stuffy nose, headache, feeling tired, muscle aches, upset stomach  - Before receiving any vaccines     - Call the [facility] at   If:  - You get sick, are on antibiotics, have had a recent vaccine, have surgery or dental work and your doctor wants your visit rescheduled.  - You need to cancel and reschedule your visit for any reason. Call at least 2 days before your visit if you need to cancel.   - Your insurance changes before your next visit.    (We will need to get approval from your new insurance. This can take up to two weeks.)     The [facility] is opened Monday thru Friday. We are closed on weekends and holidays.   Voice mail will take your call if the center is closed. If you leave a message please allow 24 hours for a call back  during weekdays. If you leave a message on a weekend/holiday, we will call you back the next business day.

## 2024-04-25 NOTE — PROGRESS NOTES
[facility]   Infusion Clinic Note   Date: 2024   Name: Anny Tesfaye  : 1945   MRN: [id number]         Reason for Visit: OP Infusion         Visit Type: INFUSION       Ordered By: Dr Dubose      Accompanied by:Self      Diagnosis: Polymyalgia rheumatica (Multi)       Allergies:   Allergies as of 2024    (No Known Allergies)         Current Medications has a current medication list which includes the following prescription(s): acetaminophen, ascorbic acid, aspirin, calcium carbonate, captopril, cholecalciferol, cyanocobalamin, multivitamin, escitalopram, famotidine, levothyroxine, lutein, omega 3-dha-epa-fish oil, oxybutynin, oxybutynin xl, simvastatin, thyroid (pork), actemra, and verapamil sr.       Vitals:   Vitals:    24 0847 24 1010 24 1037   BP: 164/85 115/74 138/79   Pulse: 77 54 54   Resp: 18 16 18   Temp: 36.6 °C (97.9 °F) 36.3 °C (97.4 °F) 36.3 °C (97.4 °F)   SpO2: 96% 97% 96%   Weight: 80 kg (176 lb 6.4 oz)     PainSc:   3     PainLoc: Generalized  Comment: right shoulder and knees               Infusion Pre-procedure Checklist:   - Allergies reviewed: yes   - Medications reviewed: yes       - Previous reaction to current treatment: no      Assess patient for the concerns below. Document provider notification as appropriate.  - Active or recent infection with/without current antibiotic use: no  - Recent or planned invasive dental work: no  - Recent or planned surgeries: no  - Recently received or plans to receive vaccinations: no  - Has treatment related toxicities: no  - Is pregnant:  n/a      Pain: 3   - Is the pain different from normal: no   - Is the pain tolerable: yes   - Is your Doctor aware:  yes      Labs: N/A         Fall Risk Screening: Win Fall Risk  History of Falling, Immediate or Within 3 Months: No  Secondary Diagnosis: Yes  Ambulatory Aid: Walks without aid/bedrest/nurse assist  Intravenous Therapy/Heparin Lock:  No  Gait/Transferring: Normal/bedrest/immobile  Mental Status: Oriented to own ability  [place] Fall Risk Score: 15       Review Of Systems:  Review of Systems - Oncology      Infusion Readiness:   - Assessment Concerns Related to Infusion: No  - Provider notified: n/a      Document Below Only If Indicated:   New Patient Education:    N/A (returning patient for continuation of therapy. Ongoing education provided as needed.)        Treatment Conditions & Drug Specific Questions:    Tocilizumab  (ACTEMRA)   (Unless otherwise specified on patient specific therapy plan):     TREATMENT CONDITIONS:  Unless otherwise specified on patient specific therapy plan HOLD and notify provider prior to proceeding with treatment if:   o Platelets LESS than 100 x 10E9L  o ANC LESS than 2 x 10E9/L  o ALT GREATER than 1.5x ULN and/or AST GREATER than 1.5x ULN  o Positive T-spot  o Reactive Hepatitis B Surface Antigen    Lab Results   Component Value Date     (L) 02/23/2024      Lab Results   Component Value Date    NEUTROABS 4.39 02/23/2024        Chemistry    Lab Results   Component Value Date/Time     02/23/2024 1152    K 4.6 02/23/2024 1152     02/23/2024 1152    CO2 24 02/23/2024 1152    BUN 45 (H) 02/23/2024 1152    CREATININE 1.50 02/23/2024 1152    Lab Results   Component Value Date/Time    CALCIUM 9.8 02/23/2024 1152    ALKPHOS 41 02/23/2024 1152    AST 15 02/23/2024 1152    ALT 16 02/23/2024 1152    BILITOT 0.8 02/23/2024 1152        ,  Lab Results   Component Value Date    ALT 16 02/23/2024    AST 15 02/23/2024    ALKPHOS 41 02/23/2024    BILITOT 0.8 02/23/2024      Lab Results   Component Value Date    TBSIN Negative 10/23/2023    TBGRES NEGATIVE 05/25/2022    TSPOTR  04/11/2023     Negative  Reference range: Normal Value: Negative    A negative test result does not exclude the possibility of exposure  to   or infection with Mycobacterium tuberculosis (M. tuberculosis).    Patients with recent exposure to TB  "infected individuals exhibiting a   negative T-SPOT.TB result should be considered for retesting within 6   weeks or if other relevant clinical symptoms indicate.  Results from   T-SPOT.TB testing must be used in conjunction with each individual's   epidemiological history, current medical status, and results of other   diagnostic evaluations.  The T-SPOT.TB test is qualitative and  results   are reported as positive, borderline or negative, given that the test   controls perform as expected. In line with the Centers for Disease   Control and Prevention's 2010 recommendation to report quantitative   measurements alongside the qualitative result, the laboratory  provides   spot counts for informational purposes only.  The T-SPOT.TB test  should   not be interpreted as a quantitative test.      No results found for: \"NONUHFIRE\", \"NONUHSWGH\", \"NONUHFISH\", \"EXTHEPBSAG\"  Lab Results   Component Value Date    HEPBSAG Nonreactive 10/23/2023      Lab Results   Component Value Date    HEPAIGM Nonreactive 10/23/2023    HEPBCIGM Nonreactive 10/23/2023    HEPCAB Nonreactive 10/23/2023         Labs reviewed and patient meets treatment conditions? Yes    DRUG SPECIFIC QUESTIONS:  Any history of or new diagnosis of GI perforation? No  (Actemra may increase risk of bowel perforation)      REMINDERS:  PREGNANCY CATEGORY X DRUG. OBTAIN NEGTATIVE PREGNANCY TEST PRIOR TO FIRST INFUSION FOR WOMEN OF CHILDBEARING ABILITY   WEIGHT BASED DRUG     Recommended Vitals/Observation:  Vitals: Monitor patient's vital signs prior to infusion start, every 30 minutes during infusion and 30 minutes after infusion.   Observation: Patient may leave 30 minutes post infusion.        Weight Based Drug Calculations:    WEIGHT BASED DRUGS: Tocilizumab (ACTEMRA)   Patient's dosing weight (kg): 79.8kg     10% weight variance for prescribed treatment: 71.82 kg to 87.78 kg     Patient's weight today:   Vitals:    04/25/24 0847   Weight: 80 kg (176 lb 6.4 oz)    "      weight range for prescribed dose:     Patient weight today falls outside of 10% variance or  weight range: no     Home Care pharmacist informed of weight variance: Not applicable    Doses that are weight based have an acceptable variance rule within 10% of the prescribed   order and/or within  weight range. If patient weight on day of infusion falls   outside of the 10% variance, or weight range, infusion is administered and   pharmacy contacted regarding future dosing adjustments, per policy.         Initiated By: Mila Cramer RN   Time: 10:39 AM     We administered tocilizumab (Actemra) 320 mg in sodium chloride 0.9% 100 mL IV.

## 2024-05-14 ENCOUNTER — OFFICE VISIT (OUTPATIENT)
Dept: PRIMARY CARE | Facility: CLINIC | Age: 79
End: 2024-05-14
Payer: MEDICARE

## 2024-05-14 VITALS
BODY MASS INDEX: 29.18 KG/M2 | TEMPERATURE: 97.5 F | OXYGEN SATURATION: 95 % | WEIGHT: 174 LBS | HEART RATE: 79 BPM | RESPIRATION RATE: 18 BRPM | DIASTOLIC BLOOD PRESSURE: 86 MMHG | SYSTOLIC BLOOD PRESSURE: 132 MMHG

## 2024-05-14 DIAGNOSIS — E03.9 ACQUIRED HYPOTHYROIDISM: ICD-10-CM

## 2024-05-14 DIAGNOSIS — E78.2 HYPERLIPEMIA, MIXED: ICD-10-CM

## 2024-05-14 DIAGNOSIS — Z00.00 MEDICARE ANNUAL WELLNESS VISIT, SUBSEQUENT: Primary | ICD-10-CM

## 2024-05-14 DIAGNOSIS — Z12.11 COLON CANCER SCREENING: ICD-10-CM

## 2024-05-14 DIAGNOSIS — Z12.31 BREAST CANCER SCREENING BY MAMMOGRAM: ICD-10-CM

## 2024-05-14 PROCEDURE — 99497 ADVNCD CARE PLAN 30 MIN: CPT | Performed by: FAMILY MEDICINE

## 2024-05-14 PROCEDURE — 1123F ACP DISCUSS/DSCN MKR DOCD: CPT | Performed by: FAMILY MEDICINE

## 2024-05-14 PROCEDURE — 1158F ADVNC CARE PLAN TLK DOCD: CPT | Performed by: FAMILY MEDICINE

## 2024-05-14 PROCEDURE — 3079F DIAST BP 80-89 MM HG: CPT | Performed by: FAMILY MEDICINE

## 2024-05-14 PROCEDURE — 1159F MED LIST DOCD IN RCRD: CPT | Performed by: FAMILY MEDICINE

## 2024-05-14 PROCEDURE — 1125F AMNT PAIN NOTED PAIN PRSNT: CPT | Performed by: FAMILY MEDICINE

## 2024-05-14 PROCEDURE — 1157F ADVNC CARE PLAN IN RCRD: CPT | Performed by: FAMILY MEDICINE

## 2024-05-14 PROCEDURE — G0439 PPPS, SUBSEQ VISIT: HCPCS | Performed by: FAMILY MEDICINE

## 2024-05-14 PROCEDURE — 1160F RVW MEDS BY RX/DR IN RCRD: CPT | Performed by: FAMILY MEDICINE

## 2024-05-14 PROCEDURE — 99215 OFFICE O/P EST HI 40 MIN: CPT | Performed by: FAMILY MEDICINE

## 2024-05-14 PROCEDURE — 3075F SYST BP GE 130 - 139MM HG: CPT | Performed by: FAMILY MEDICINE

## 2024-05-14 RX ORDER — SIMVASTATIN 20 MG/1
20 TABLET, FILM COATED ORAL NIGHTLY
Qty: 90 TABLET | Refills: 1 | Status: SHIPPED | OUTPATIENT
Start: 2024-05-14

## 2024-05-14 RX ORDER — LEVOTHYROXINE SODIUM 100 UG/1
100 TABLET ORAL DAILY
Qty: 90 TABLET | Refills: 1 | Status: SHIPPED | OUTPATIENT
Start: 2024-05-14

## 2024-05-14 RX ORDER — THYROID 30 MG/1
30 TABLET ORAL
Qty: 90 TABLET | Refills: 1 | Status: SHIPPED | OUTPATIENT
Start: 2024-05-14

## 2024-05-14 ASSESSMENT — COLUMBIA-SUICIDE SEVERITY RATING SCALE - C-SSRS
2. HAVE YOU ACTUALLY HAD ANY THOUGHTS OF KILLING YOURSELF?: NO
6. HAVE YOU EVER DONE ANYTHING, STARTED TO DO ANYTHING, OR PREPARED TO DO ANYTHING TO END YOUR LIFE?: NO
1. IN THE PAST MONTH, HAVE YOU WISHED YOU WERE DEAD OR WISHED YOU COULD GO TO SLEEP AND NOT WAKE UP?: NO

## 2024-05-14 ASSESSMENT — PATIENT HEALTH QUESTIONNAIRE - PHQ9
SUM OF ALL RESPONSES TO PHQ9 QUESTIONS 1 & 2: 0
2. FEELING DOWN, DEPRESSED OR HOPELESS: NOT AT ALL
1. LITTLE INTEREST OR PLEASURE IN DOING THINGS: NOT AT ALL

## 2024-05-14 ASSESSMENT — ENCOUNTER SYMPTOMS
OCCASIONAL FEELINGS OF UNSTEADINESS: 1
DEPRESSION: 0
LOSS OF SENSATION IN FEET: 0

## 2024-05-14 ASSESSMENT — PAIN SCALES - GENERAL: PAINLEVEL: 5

## 2024-05-14 NOTE — PROGRESS NOTES
Outpatient Visit Note    Chief Complaint   Patient presents with    Annual Exam       HPI:  Anny Tesfaye is a 79 y.o. female here  for a Medicare Wellness Visit.    Health Maintenance  Immunizations: Tdap is due 2025. Shingrix completed. Pneumonia up to date.     Current smoker, had low dose CT scan with Dr Sandhu. Denies illicit drug use, drinks 2 alcoholic beverages a day. Patient reports routine vision checks and dental cleanings, and english snot get regular exercise    Screening colonoscopy not done before. No FHx colon cancer. Willing to do cologuard.  Screening pap N/A. Screening mammogram overdue. DEXA done 2024 with Dr. Dubose, plan to pursue treatment with him.      Otherwise denies fevers, chills, cold/flu symptoms, SOB, CP, N/V, abdominal pain, dysuria, hematuria, melena, diarrhea or LE edema     Advanced directives: in place, will get all copies in.       Living Will: Not Received    Healthcare Power of Atty: Not Received     Hearing screen: reports no difficulty with hearing and passes finger rub test bilaterally    Does the patient use opioid medications: No    How does the patient rate their health status today: good    Cognitive Screen:  AAAx3  to person, place and time: Yes  3 word recall: Apple, Car, Shoe - Immediate recall: Yes         - 5 minutes recall: Yes   Impression: No cognitive deficiency observed during screening or encounter today    Reviewed:   Past Medical History/Allergies:  Yes  Family History:  Yes  Social History:  Yes  Current Medications:  Yes  Vital Signs:  Yes  Advanced Directives:  discussed  Immunizations:  reviewed today  Home Safety:                    Up & Go test > 30 seconds?  No                   Home have rugs; lack grab bars in bathroom; lack handrail on stairs; have poor lighting?  No                   Hearing difficulties?  No  Geriatric Assessment                   ADL areas requiring assistance:  Does not need help with Dressing, Eating, Ambulating,  Toileting, Grooming, Hygiene.                    IADL areas requiring assistance:  Does not need help with Shopping, Housework, Accounting, Transportation, Driving.   Medications: reviewed  Current supplements:  Reviewed and recorded.   Other providers: Reviewed and recorded - Current providers and suppliers: Dr. Montaño, Dr. Dubose, Dr. Sandhu, Dr. Mack, Dr. Lovell, Dr. Jones          PHQ9/GAD7:         Patient Active Problem List    Diagnosis Date Noted    Low back pain, unspecified 10/12/2023    Primary generalized (osteo)arthritis 10/12/2023    Polymyalgia rheumatica (Multi) 10/12/2023    Acute gout of multiple sites 09/09/2023    Disability of walking 09/09/2023    Drug-induced leukopenia (CMS-HCC) 09/09/2023    Hyperkalemia 09/09/2023    Macrocytosis without anemia 09/09/2023    Morbid (severe) obesity due to excess calories (Multi) 09/09/2023    Osteopenia 09/09/2023    Primary osteoarthritis of both first carpometacarpal joints 09/09/2023    Primary osteoarthritis of both knees 09/09/2023    Secondary hyperparathyroidism (Multi) 09/09/2023    Stage 3 chronic kidney disease (Multi) 09/09/2023    Thrombocytopenia (CMS-HCC) 09/09/2023    Unspecified kyphosis, thoracic region 09/09/2023    Anxiety 04/17/2023    Heart murmur 04/17/2023    Vitamin B 12 deficiency 10/07/2022    Vitamin D deficiency 10/07/2022    Hypothyroid 04/15/2022    Depression 04/15/2022    Adjustment reaction with anxiety and depression 11/04/2020    Essential hypertension 11/04/2020    Hyperlipemia, mixed 11/04/2020    Edema of extremities 05/18/2020    Chronic GERD 10/17/2019        Past Medical History:   Diagnosis Date    Chronic low back pain     CKD (chronic kidney disease) stage 3, GFR 30-59 ml/min (Multi)     Esophagitis     Glaucoma     History of scarlet fever     Hypertension     Hypothyroidism     Morbid obesity (Multi)     Nephritis     OA (osteoarthritis) of knee     Bilateral    Polymyalgia rheumatica (Multi)     Psoriasis      Sciatica         Current Medications  Current Outpatient Medications   Medication Instructions    acetaminophen (TYLENOL 8 HOUR) 650 mg, oral, Every 8 hours PRN    ascorbic acid (Vitamin C) 500 mg tablet oral, Daily    aspirin 324 mg, oral, Daily    calcium carbonate 500 mg calcium (1,250 mg) chewable tablet 1 tablet, oral, Daily    captopril (CAPOTEN) 50 mg, oral, Daily    cholecalciferol (Vitamin D-3) 5,000 Units tablet 1 tablet, oral, Daily    cyanocobalamin (VITAMIN B-12) 100 mcg, oral, Daily    DAILY MULTI-VITAMIN ORAL 1 tablet, oral, Daily    escitalopram (LEXAPRO) 20 mg, oral, Daily    famotidine (PEPCID) 20 mg, oral, Nightly PRN    levothyroxine (SYNTHROID, LEVOXYL) 100 mcg, oral, Daily, Six days per week    lutein 6 mg capsule 1 capsule, oral, Daily    omega 3-dha-epa-fish oil (Fish OiL) 1,000 mg (120 mg-180 mg) capsule 1 capsule, oral, Daily    oxybutynin (DITROPAN) 5 mg, oral, Every other day    oxybutynin XL (DITROPAN-XL) 15 mg, oral, Daily    simvastatin (ZOCOR) 20 mg, oral, Nightly    thyroid (pork) (ARMOUR THYROID) 30 mg, oral, Daily before breakfast    tocilizumab (Actemra) 200 mg/10 mL (20 mg/mL) solution intravenous, as directed    verapamil SR (CALAN-SR) 240 mg, oral, Daily        Allergies  No Known Allergies     Immunizations  Immunization History   Administered Date(s) Administered    Flu vaccine, quadrivalent, high-dose, preservative free, age 65y+ (FLUZONE) 11/18/2020, 10/22/2021, 10/05/2022, 10/25/2023    Influenza Whole 10/07/2013    Influenza, High Dose Seasonal, Preservative Free 11/14/2017, 11/13/2018, 11/08/2019    Influenza, Unspecified 10/05/2022    Influenza, injectable, quadrivalent 09/28/2018    Influenza, seasonal, injectable 09/29/2014    Influenza, trivalent, adjuvanted 11/05/2019, 11/17/2022    Moderna COVID-19 vaccine, Fall 2023, 12 yeasrs and older (50mcg/0.5mL) 10/25/2023    Moderna SARS-CoV-2 Vaccination 03/11/2021, 04/07/2021, 11/20/2021, 11/26/2021    Pfizer COVID-19  vaccine, bivalent, age 12 years and older (30 mcg/0.3 mL) 11/03/2022    Pneumococcal conjugate vaccine, 13-valent (PREVNAR 13) 10/14/2015, 11/12/2015, 11/20/2015    Pneumococcal polysaccharide vaccine, 23-valent, age 2 years and older (PNEUMOVAX 23) 01/09/2013, 11/20/2013    Tdap vaccine, age 7 year and older (BOOSTRIX, ADACEL) 11/20/2015    Zoster vaccine, recombinant, adult (SHINGRIX) 12/28/2022, 03/07/2023        Past Surgical History:   Procedure Laterality Date    ADENOIDECTOMY      CATARACT EXTRACTION  06/2020    CATARACT EXTRACTION Right     CATARACT EXTRACTION Left 03/2021    CROWN  06/2020    DENTAL CROWN REPLACEMENT    CT ANGIO NECK  04/21/2014    CT NECK ANGIO W AND WO IV CONTRAST [facility]    DILATION AND CURETTAGE OF UTERUS      TONSILLECTOMY      WISDOM TOOTH EXTRACTION       Family History   Problem Relation Name Age of Onset    Breast cancer Mother      Osteoporosis Mother      Other (Abdominal Aortic Aneurysm) Mother      Heart failure Father      Hypertension Father      Macular degeneration Father      Heart disease Father      Cancer Sister          Sinus     Social History     Tobacco Use    Smoking status: Every Day     Current packs/day: 1.00     Average packs/day: 1 pack/day for 40.0 years (40.0 ttl pk-yrs)     Types: Cigarettes     Passive exposure: Current    Smokeless tobacco: Never    Tobacco comments:     Has tried to quit in the past.    Vaping Use    Vaping status: Never Used   Substance Use Topics    Alcohol use: Yes     Alcohol/week: 8.0 standard drinks of alcohol     Types: 7 Shots of liquor, 1 Standard drinks or equivalent per week     Comment: socailly    Drug use: Never     Tobacco Use: High Risk (5/14/2024)    Patient History     Smoking Tobacco Use: Every Day     Smokeless Tobacco Use: Never     Passive Exposure: Current        ROS  All pertinent positive symptoms are included in the history of present illness.  All other systems have been reviewed and are negative  and noncontributory to this patient's current ailments.    VITAL SIGNS  Vitals:    05/14/24 0921   BP: 132/86   Pulse: 79   Resp: 18   Temp: 36.4 °C (97.5 °F)   SpO2: 95%     Vitals:    05/14/24 0921   Weight: 78.9 kg (174 lb)      Body mass index is 29.18 kg/m².     PHYSICAL EXAM  GENERAL APPEARANCE: well nourished, well developed, looks like stated age, in no acute distress, not ill or tired appearing, conversing well.   HEENT: no trauma, normocephalic.   NECK: supple without rigidity, no neck mass was observed.   LUNGS: good chest wall expansion. In no acute respiratory distress.   EXTREMITIES: moving all extremities equally with no edema.   SKIN: normal skin color and pigmentation, without rash.   NEUROLOGIC EXAM: CN II-XII grossly intact, normal gait.   PSYCH: mood and affect appropriate; alert and oriented to time, place, person; no difficulty with speech or language.         Preventative Services reviewed with patient and copy printed for patient.    Assessment/Plan   Problem List Items Addressed This Visit    None  Visit Diagnoses         Codes    Medicare annual wellness visit, subsequent    -  Primary Z00.00    Relevant Orders    Cologuard® colon cancer screening    Colon cancer screening     Z12.11    Relevant Orders    Cologuard® colon cancer screening    Breast cancer screening by mammogram     Z12.31    Relevant Orders    BI mammo bilateral screening tomosynthesis            Additional Visit Plans:  Notes:  PREVENTATIVE HEALTH SCREENINGS INCLUDED:  - Blood pressure screen.  - Blood work may include a cholesterol and diabetes screen if risk factors exist (overweight, high blood pressure etc); screening for sexually transmitted infections; a one time HIV screen for all individuals, and Hepatitis C Virus screening  - I encourage you to eat a low-fat, moderate-carbohydrate, low-calorie diet to maintain a normal BMI (under 25) to reduce heart disease, and risk for diabetes  - Moderate intensity exercise for  30 minutes 5 days per week is recommended  - Helpful resources recommended by the American Academy of Family Practice can be found at www.familydoctor.org or www.choosemyplate.gov  - Can also consider enrolling in a program such as Weight Watchers or [facility] Szym[facility]. The most effective diet is going to be one you can follow long term and turn into a lifestyle. The CDC recommends losing 0.5-2 lbs a week if overweight or obese.  - I recommend a routine vision check and dental cleanings every 6 months    - Colon cancer screening for all ages 45-75 or 85 years old periodically depending on results. Never done before, plan to pursue cologuard  - Cervical cancer screening (pap test) in women between 21-65 years old, periodically depending on results. N/A  - Mammogram screening for breast cancer in women starting at 40-50 years and every 1-2 years. Overdue - ordered  - For men and women who have a 20 pack year smoking history and currently smoke or have quit in the past 15 years, screening for lung cancer with a yearly low dose CT scan is recommended starting at age 50 until age 77 years - Up to date  - Bone density screening (DEXA) for osteoporosis in women aged 65 years and older, once every two years if needed. Up to date    IMMUNIZATIONS:  - Flu shot annually.  - Tetanus booster every 10 years. Due 2025  - Two pneumococcal vaccinations after 65 years old. Completed  - Shingles vaccine for those 50 years or older. Completed    This was a shared decision making visit.    Next Wellness Exam Due  In 1 year from today      Filemon Montaño DO   05/17/24   5:29 AM

## 2024-05-14 NOTE — PATIENT INSTRUCTIONS
Problem List Items Addressed This Visit    None  Visit Diagnoses         Codes    Medicare annual wellness visit, subsequent    -  Primary Z00.00    Relevant Orders    Cologuard® colon cancer screening    Colon cancer screening     Z12.11    Relevant Orders    Cologuard® colon cancer screening    Breast cancer screening by mammogram     Z12.31    Relevant Orders    BI mammo bilateral screening tomosynthesis            Additional Visit Plans:  Notes:  PREVENTATIVE HEALTH SCREENINGS INCLUDED:  - Blood pressure screen.  - Blood work may include a cholesterol and diabetes screen if risk factors exist (overweight, high blood pressure etc); screening for sexually transmitted infections; a one time HIV screen for all individuals, and Hepatitis C Virus screening  - I encourage you to eat a low-fat, moderate-carbohydrate, low-calorie diet to maintain a normal BMI (under 25) to reduce heart disease, and risk for diabetes  - Moderate intensity exercise for 30 minutes 5 days per week is recommended  - Helpful resources recommended by the American Academy of Family Practice can be found at www.familydoctor.org or www.choosemyplate.gov  - Can also consider enrolling in a program such as Weight Watchers or Qing Byrnesig. The most effective diet is going to be one you can follow long term and turn into a lifestyle. The CDC recommends losing 0.5-2 lbs a week if overweight or obese.  - I recommend a routine vision check and dental cleanings every 6 months    - Colon cancer screening for all ages 45-75 or 85 years old periodically depending on results. Never done before, plan to pursue cologuard  - Cervical cancer screening (pap test) in women between 21-65 years old, periodically depending on results. N/A  - Mammogram screening for breast cancer in women starting at 40-50 years and every 1-2 years. Overdue - ordered  - For men and women who have a 20 pack year smoking history and currently smoke or have quit in the past 15 years,  screening for lung cancer with a yearly low dose CT scan is recommended starting at age 50 until age 77 years - Up to date  - Bone density screening (DEXA) for osteoporosis in women aged 65 years and older, once every two years if needed. Up to date    IMMUNIZATIONS:  - Flu shot annually.  - Tetanus booster every 10 years. Due 2025  - Two pneumococcal vaccinations after 65 years old. Completed  - Shingles vaccine for those 50 years or older. Completed    This was a shared decision making visit.    Next Wellness Exam Due  In 1 year from today      Filemon Montaño DO   05/14/24   10:03 AM

## 2024-05-16 ENCOUNTER — LAB (OUTPATIENT)
Dept: LAB | Facility: LAB | Age: 79
End: 2024-05-16
Payer: MEDICARE

## 2024-05-16 DIAGNOSIS — M35.3 POLYMYALGIA RHEUMATICA (MULTI): ICD-10-CM

## 2024-05-16 LAB
ALBUMIN SERPL-MCNC: 4.4 G/DL (ref 3.5–5)
ALP BLD-CCNC: 42 U/L (ref 35–125)
ALT SERPL-CCNC: 17 U/L (ref 5–40)
ANION GAP SERPL CALC-SCNC: 14 MMOL/L
AST SERPL-CCNC: 16 U/L (ref 5–40)
BASOPHILS # BLD AUTO: 0.05 X10*3/UL (ref 0–0.1)
BASOPHILS NFR BLD AUTO: 0.7 %
BILIRUB SERPL-MCNC: 0.6 MG/DL (ref 0.1–1.2)
BUN SERPL-MCNC: 38 MG/DL (ref 8–25)
CALCIUM SERPL-MCNC: 9.9 MG/DL (ref 8.5–10.4)
CHLORIDE SERPL-SCNC: 103 MMOL/L (ref 97–107)
CO2 SERPL-SCNC: 24 MMOL/L (ref 24–31)
CREAT SERPL-MCNC: 1.3 MG/DL (ref 0.4–1.6)
CRP SERPL-MCNC: <0.3 MG/DL (ref 0–2)
EGFRCR SERPLBLD CKD-EPI 2021: 42 ML/MIN/1.73M*2
EOSINOPHIL # BLD AUTO: 0.14 X10*3/UL (ref 0–0.4)
EOSINOPHIL NFR BLD AUTO: 2 %
ERYTHROCYTE [DISTWIDTH] IN BLOOD BY AUTOMATED COUNT: 13.5 % (ref 11.5–14.5)
ERYTHROCYTE [SEDIMENTATION RATE] IN BLOOD BY WESTERGREN METHOD: <1 MM/H (ref 0–30)
GLUCOSE SERPL-MCNC: 87 MG/DL (ref 65–99)
HCT VFR BLD AUTO: 41.8 % (ref 36–46)
HGB BLD-MCNC: 13.3 G/DL (ref 12–16)
IMM GRANULOCYTES # BLD AUTO: 0.02 X10*3/UL (ref 0–0.5)
IMM GRANULOCYTES NFR BLD AUTO: 0.3 % (ref 0–0.9)
LYMPHOCYTES # BLD AUTO: 2.23 X10*3/UL (ref 0.8–3)
LYMPHOCYTES NFR BLD AUTO: 32.5 %
MCH RBC QN AUTO: 32.4 PG (ref 26–34)
MCHC RBC AUTO-ENTMCNC: 31.8 G/DL (ref 32–36)
MCV RBC AUTO: 102 FL (ref 80–100)
MONOCYTES # BLD AUTO: 0.67 X10*3/UL (ref 0.05–0.8)
MONOCYTES NFR BLD AUTO: 9.8 %
NEUTROPHILS # BLD AUTO: 3.76 X10*3/UL (ref 1.6–5.5)
NEUTROPHILS NFR BLD AUTO: 54.7 %
NRBC BLD-RTO: 0 /100 WBCS (ref 0–0)
PLATELET # BLD AUTO: 165 X10*3/UL (ref 150–450)
POTASSIUM SERPL-SCNC: 5.5 MMOL/L (ref 3.4–5.1)
PROT SERPL-MCNC: 6.8 G/DL (ref 5.9–7.9)
RBC # BLD AUTO: 4.11 X10*6/UL (ref 4–5.2)
SODIUM SERPL-SCNC: 141 MMOL/L (ref 133–145)
WBC # BLD AUTO: 6.9 X10*3/UL (ref 4.4–11.3)

## 2024-05-16 PROCEDURE — 80053 COMPREHEN METABOLIC PANEL: CPT

## 2024-05-16 PROCEDURE — 85652 RBC SED RATE AUTOMATED: CPT

## 2024-05-16 PROCEDURE — 85025 COMPLETE CBC W/AUTO DIFF WBC: CPT

## 2024-05-16 PROCEDURE — 36415 COLL VENOUS BLD VENIPUNCTURE: CPT

## 2024-05-16 PROCEDURE — 86140 C-REACTIVE PROTEIN: CPT

## 2024-05-22 ENCOUNTER — OFFICE VISIT (OUTPATIENT)
Dept: RHEUMATOLOGY | Facility: CLINIC | Age: 79
End: 2024-05-22
Payer: MEDICARE

## 2024-05-22 VITALS
DIASTOLIC BLOOD PRESSURE: 82 MMHG | OXYGEN SATURATION: 97 % | HEIGHT: 65 IN | SYSTOLIC BLOOD PRESSURE: 128 MMHG | HEART RATE: 68 BPM | WEIGHT: 174 LBS | BODY MASS INDEX: 28.99 KG/M2

## 2024-05-22 DIAGNOSIS — M85.80 OSTEOPENIA OF THE ELDERLY: ICD-10-CM

## 2024-05-22 DIAGNOSIS — M85.89 OSTEOPENIA OF MULTIPLE SITES: Primary | ICD-10-CM

## 2024-05-22 PROCEDURE — 99213 OFFICE O/P EST LOW 20 MIN: CPT | Performed by: INTERNAL MEDICINE

## 2024-05-22 PROCEDURE — 1159F MED LIST DOCD IN RCRD: CPT | Performed by: INTERNAL MEDICINE

## 2024-05-22 PROCEDURE — 3079F DIAST BP 80-89 MM HG: CPT | Performed by: INTERNAL MEDICINE

## 2024-05-22 PROCEDURE — 1123F ACP DISCUSS/DSCN MKR DOCD: CPT | Performed by: INTERNAL MEDICINE

## 2024-05-22 PROCEDURE — 1160F RVW MEDS BY RX/DR IN RCRD: CPT | Performed by: INTERNAL MEDICINE

## 2024-05-22 PROCEDURE — 1125F AMNT PAIN NOTED PAIN PRSNT: CPT | Performed by: INTERNAL MEDICINE

## 2024-05-22 PROCEDURE — 1157F ADVNC CARE PLAN IN RCRD: CPT | Performed by: INTERNAL MEDICINE

## 2024-05-22 PROCEDURE — 3074F SYST BP LT 130 MM HG: CPT | Performed by: INTERNAL MEDICINE

## 2024-05-22 RX ORDER — ALENDRONATE SODIUM 70 MG/1
70 TABLET ORAL
Qty: 12 TABLET | Refills: 3 | Status: SHIPPED | OUTPATIENT
Start: 2024-05-22 | End: 2024-08-20

## 2024-05-22 ASSESSMENT — ROUTINE ASSESSMENT OF PATIENT INDEX DATA (RAPID3)
TOTAL RAPID3 SCORE: 6.2
GOOD_NIGHTS_SLEEP: WITHOUT ANY DIFFICULTY
ON A SCALE OF ONE TO TEN, HOW MUCH PAIN HAVE YOU HAD BECAUSE OF YOUR CONDITION OVER THE PAST WEEK?: 0
PARTIPATE_RECREATIONAL_ACTIVITIES: UNABLE TO DO
FEELINGS_DEPRESSION: WITH SOME DIFFICULTY
ON A SCALE OF ONE TO TEN, CONSIDERING ALL THE WAYS IN WHICH ILLNESS AND HEALTH CONDITIONS MAY AFFECT YOU AT THIS TIME, PLEASE INDICATE BELOW HOW YOU ARE DOING:: 3.5
ON A SCALE OF ONE TO TEN, CONSIDERING ALL THE WAYS IN WHICH ILLNESS AND HEALTH CONDITIONS MAY AFFECT YOU AT THIS TIME, PLEASE INDICATE BELOW HOW YOU ARE DOING:: 3.5
SEVERITY_SCORE: MODERATE SEVERITY (MS)
SEVERITY_SCORE: 0
SUM OF QUESTIONS A TO J: 8
LIFT_CUP_TO_MOUTH: WITHOUT ANY DIFFICULTY
TURN_FAUCETS_OFF: WITHOUT ANY DIFFICULTY
PICK_CLOTHES_OFF_FLOOR: WITHOUT ANY DIFFICULTY
IN_OUT_BED: WITHOUT ANY DIFFICULTY
WASH_DRY_BODY: WITHOUT ANY DIFFICULTY
ON A SCALE OF ONE TO TEN, HOW MUCH PAIN HAVE YOU HAD BECAUSE OF YOUR CONDITION OVER THE PAST WEEK?: 0
WALK_KILOMETERS: UNABLE TO DO
FEELINGS_ANXIETY_NERVOUS: WITH SOME DIFFICULTY
WALK_FLAT_GROUND: WITH MUCH DIFFICULTY
FN_SCORE: 2.7
IN_OUT_TRANSPORT: WITHOUT ANY DIFFICULTY
DRESS_YOURSELF: WITHOUT ANY DIFFICULTY
WEIGHTED_TOTAL_SCORE: 2.07

## 2024-05-22 ASSESSMENT — PAIN SCALES - GENERAL: PAINLEVEL: 3

## 2024-05-22 ASSESSMENT — ENCOUNTER SYMPTOMS
DEPRESSION: 0
LOSS OF SENSATION IN FEET: 0
OCCASIONAL FEELINGS OF UNSTEADINESS: 1

## 2024-05-22 NOTE — PROGRESS NOTES
"Chief Complaint:    This 79 y.o. female presents with the chief complaint of osteopenia. Follow-up     Subjective:   HPI   Problem:  1: Osteopenia (OP)       The patient was recently diagnosed with moderate osteopenia of the hip.        The patient remains entirely asymptomatic.       There is no h/o fractures over the age of 50.       The patient includes dairy products in her daily diet, including ice cream, cheeses, cottage cheese and yogurts.        The patient continues on VITAMIN D3 5000 units/day + CALCIUM 600 mg daily.    Review of Systems   All other systems reviewed and are negative.    Objective:   /82   Pulse 68   Ht 1.651 m (5' 5\")   Wt 78.9 kg (174 lb)   SpO2 97%   BMI 28.96 kg/m²      Physical Exam  Not performed today.    The bulk of the visit is spent in discussion about osteopenia; the results of the DXA scan; and recommendations for treatment of OP. The OP is limited to the hip; there is no OP involving the lumbar spine or left forearm.     The patient has determined that she would prefer an oral agent; she decided again IV ZOLEDRONIC ACID.      I reiterated the recommendation to use ALENDRONATE 70 mg  po weekly, taken on an empty stomach  with a glass of water and remaining in the upright position x 45 min. She is to hold any breakfast, including coffee, tea or orange juice, for 45 min. Subsequently, she can have breakfast. The rationale for this approach was reiterated.    Assessment:   Osteopenia - M85.80    Plan:    Start ALENDRONATE 70 mg weekly as detailed above.  2.    The DXA was reviewed with the patient, and treatment is again discussed. The rationale, risk/reward, and purpose of the treatment was again reviewed. All potential adverse effects were reiterated, and patient encouraged to report any suspicious or unusual symptoms. She understood and agrees to this plan of management. Duration of treatment is estimated to be ~ 5 yr. DXA will be repeated in 24 months.       Milton LOUIS" MD [place]

## 2024-05-23 ENCOUNTER — INFUSION (OUTPATIENT)
Dept: INFUSION THERAPY | Facility: CLINIC | Age: 79
End: 2024-05-23
Payer: MEDICARE

## 2024-05-23 VITALS
SYSTOLIC BLOOD PRESSURE: 120 MMHG | WEIGHT: 174.5 LBS | RESPIRATION RATE: 18 BRPM | BODY MASS INDEX: 29.04 KG/M2 | TEMPERATURE: 97.3 F | OXYGEN SATURATION: 99 % | DIASTOLIC BLOOD PRESSURE: 65 MMHG | HEART RATE: 62 BPM

## 2024-05-23 DIAGNOSIS — M35.3 POLYMYALGIA RHEUMATICA (MULTI): ICD-10-CM

## 2024-05-23 PROCEDURE — 96365 THER/PROPH/DIAG IV INF INIT: CPT | Performed by: NURSE PRACTITIONER

## 2024-05-23 RX ORDER — DIPHENHYDRAMINE HYDROCHLORIDE 50 MG/ML
50 INJECTION INTRAMUSCULAR; INTRAVENOUS AS NEEDED
OUTPATIENT
Start: 2024-06-20

## 2024-05-23 RX ORDER — FAMOTIDINE 10 MG/ML
20 INJECTION INTRAVENOUS ONCE AS NEEDED
OUTPATIENT
Start: 2024-06-20

## 2024-05-23 RX ORDER — ALBUTEROL SULFATE 0.83 MG/ML
3 SOLUTION RESPIRATORY (INHALATION) AS NEEDED
OUTPATIENT
Start: 2024-06-20

## 2024-05-23 RX ORDER — EPINEPHRINE 0.3 MG/.3ML
0.3 INJECTION SUBCUTANEOUS EVERY 5 MIN PRN
OUTPATIENT
Start: 2024-06-20

## 2024-05-23 ASSESSMENT — PAIN SCALES - GENERAL: PAINLEVEL: 2

## 2024-05-23 NOTE — PROGRESS NOTES
[facility]   Infusion Clinic Note   Date: May 23, 2024   Name: Anny Tesfaye  : 1945   MRN: [id number]         Reason for Visit: OP Infusion (Actemra)         Visit Type: INFUSION       Ordered By: Dr Dubose      Accompanied by:Self      Diagnosis: Polymyalgia rheumatica (Multi)       Allergies:   Allergies as of 2024    (No Known Allergies)         Current Medications has a current medication list which includes the following prescription(s): acetaminophen, alendronate, ascorbic acid, aspirin, calcium carbonate, captopril, cholecalciferol, cyanocobalamin, multivitamin, escitalopram, famotidine, levothyroxine, lutein, omega 3-dha-epa-fish oil, oxybutynin, oxybutynin xl, simvastatin, thyroid (pork), actemra, and verapamil sr.       Vitals:   Vitals:    24 0928   BP: 139/82   Pulse: 81   Resp: 18   Temp: 36.3 °C (97.3 °F)   SpO2: 99%   PainSc:   2   PainLoc: Comment: back             Infusion Pre-procedure Checklist:   - Allergies reviewed: yes   - Medications reviewed: yes       - Previous reaction to current treatment: no      Assess patient for the concerns below. Document provider notification as appropriate.  - Active or recent infection with/without current antibiotic use: no  - Recent or planned invasive dental work: no  - Recent or planned surgeries: no  - Recently received or plans to receive vaccinations: no  - Has treatment related toxicities: no  - Is pregnant:  n/a      Pain: 3   - Is the pain different from normal: no   - Is the pain tolerable: yes   - Is your Doctor aware:  yes      Labs: N/A         Fall Risk Screening: Walden Fall Risk  History of Falling, Immediate or Within 3 Months: No  Secondary Diagnosis: Yes  Ambulatory Aid: Walks without aid/bedrest/nurse assist  Intravenous Therapy/Heparin Lock: Yes  Gait/Transferring: Normal/bedrest/immobile  Mental Status: Oriented to own ability  Walden Fall Risk Score: 35       Review Of Systems:  Review of  Systems - Oncology      Infusion Readiness:   - Assessment Concerns Related to Infusion: No  - Provider notified: n/a      Document Below Only If Indicated:   New Patient Education:    N/A (returning patient for continuation of therapy. Ongoing education provided as needed.)        Treatment Conditions & Drug Specific Questions:    Tocilizumab  (ACTEMRA)   (Unless otherwise specified on patient specific therapy plan):     TREATMENT CONDITIONS:  Unless otherwise specified on patient specific therapy plan HOLD and notify provider prior to proceeding with treatment if:   o Platelets LESS than 100 x 10E9L  o ANC LESS than 2 x 10E9/L  o ALT GREATER than 1.5x ULN and/or AST GREATER than 1.5x ULN  o Positive T-spot  o Reactive Hepatitis B Surface Antigen    Lab Results   Component Value Date     05/16/2024      Lab Results   Component Value Date    NEUTROABS 3.76 05/16/2024        Chemistry    Lab Results   Component Value Date/Time     05/16/2024 0930    K 5.5 (H) 05/16/2024 0930     05/16/2024 0930    CO2 24 05/16/2024 0930    BUN 38 (H) 05/16/2024 0930    CREATININE 1.30 05/16/2024 0930    Lab Results   Component Value Date/Time    CALCIUM 9.9 05/16/2024 0930    ALKPHOS 42 05/16/2024 0930    AST 16 05/16/2024 0930    ALT 17 05/16/2024 0930    BILITOT 0.6 05/16/2024 0930        ,  Lab Results   Component Value Date    ALT 17 05/16/2024    AST 16 05/16/2024    ALKPHOS 42 05/16/2024    BILITOT 0.6 05/16/2024      Lab Results   Component Value Date    TBSIN Negative 10/23/2023    TBGRES NEGATIVE 05/25/2022    TSPOTR  04/11/2023     Negative  Reference range: Normal Value: Negative    A negative test result does not exclude the possibility of exposure  to   or infection with Mycobacterium tuberculosis (M. tuberculosis).    Patients with recent exposure to TB infected individuals exhibiting a   negative T-SPOT.TB result should be considered for retesting within 6   weeks or if other relevant clinical  "symptoms indicate.  Results from   T-SPOT.TB testing must be used in conjunction with each individual's   epidemiological history, current medical status, and results of other   diagnostic evaluations.  The T-SPOT.TB test is qualitative and  results   are reported as positive, borderline or negative, given that the test   controls perform as expected. In line with the Centers for Disease   Control and Prevention's 2010 recommendation to report quantitative   measurements alongside the qualitative result, the laboratory  provides   spot counts for informational purposes only.  The T-SPOT.TB test  should   not be interpreted as a quantitative test.      No results found for: \"NONUHFIRE\", \"NONUHSWGH\", \"NONUHFISH\", \"EXTHEPBSAG\"  Lab Results   Component Value Date    HEPBSAG Nonreactive 10/23/2023      Lab Results   Component Value Date    HEPAIGM Nonreactive 10/23/2023    HEPBCIGM Nonreactive 10/23/2023    HEPCAB Nonreactive 10/23/2023         Labs reviewed and patient meets treatment conditions? Yes    DRUG SPECIFIC QUESTIONS:  Any history of or new diagnosis of GI perforation? No  (Actemra may increase risk of bowel perforation)      REMINDERS:  PREGNANCY CATEGORY X DRUG. OBTAIN NEGTATIVE PREGNANCY TEST PRIOR TO FIRST INFUSION FOR WOMEN OF CHILDBEARING ABILITY   WEIGHT BASED DRUG     Recommended Vitals/Observation:  Vitals: Monitor patient's vital signs prior to infusion start, every 30 minutes during infusion and 30 minutes after infusion.   Observation: Patient may leave 30 minutes post infusion.        Weight Based Drug Calculations:    WEIGHT BASED DRUGS: Tocilizumab (ACTEMRA)   Patient's dosing weight (kg): 79.8kg     10% weight variance for prescribed treatment: 71.82 kg to 87.78 kg     Patient's weight today: 79.1kg          weight range for prescribed dose:     Patient weight today falls outside of 10% variance or  weight range: no     Home Care pharmacist informed of weight variance: " Not applicable    Doses that are weight based have an acceptable variance rule within 10% of the prescribed   order and/or within  weight range. If patient weight on day of infusion falls   outside of the 10% variance, or weight range, infusion is administered and   pharmacy contacted regarding future dosing adjustments, per policy.         Initiated By: Kaylee Hatch RN   Time: 11:38 AM     We administered tocilizumab (Actemra) 320 mg in sodium chloride 0.9% 100 mL IV.

## 2024-05-23 NOTE — PATIENT INSTRUCTIONS
Today :We administered tocilizumab (Actemra) 320 mg in sodium chloride 0.9% 100 mL IV.     For:   1. Polymyalgia rheumatica (Multi)         Your next appointment is due in:  6/20/2024       Please read the  Medication Guide that was given to you and reviewed during todays visit.     (Tell all doctors including dentists that you are taking this medication)     Go to the emergency room or call 911 if:  -You have signs of allergic reaction:   -Rash, hives, itching.   -Swollen, blistered, peeling skin.   -Swelling of face, lips, mouth, tongue or throat.   -Tightness of chest, trouble breathing, swallowing or talking     Call your doctor:  - If IV / injection site gets red, warm, swollen, itchy or leaks fluid or pus.     (Leave dressing on your IV site for at least 2 hours and keep area clean and dry  - If you get sick or have symptoms of infection or are not feeling well for any reason.    (Wash your hands often, stay away from people who are sick)  - If you have side effects from your medication that do not go away or are bothersome.     (Refer to the teaching your nurse gave you for side effects to call your doctor about)    - Common side effects may include:  stuffy nose, headache, feeling tired, muscle aches, upset stomach  - Before receiving any vaccines     - Call [facility] at   If:  - You get sick, are on antibiotics, have had a recent vaccine, have surgery or dental work and your doctor wants your visit rescheduled.  - You need to cancel and reschedule your visit for any reason. Call at least 2 days before your visit if you need to cancel.   - Your insurance changes before your next visit.    (We will need to get approval from your new insurance. This can take up to two weeks.)     [facility] is opened Monday thru Friday. We are closed on weekends and holidays.   Voice mail will take your call if the center is closed. If you leave a message please allow 24 hours for a  call back during weekdays. If you leave a message on a weekend/holiday, we will call you back the next business day.

## 2024-05-31 ENCOUNTER — HOSPITAL ENCOUNTER (OUTPATIENT)
Dept: RADIOLOGY | Facility: HOSPITAL | Age: 79
Discharge: HOME | End: 2024-05-31
Payer: MEDICARE

## 2024-05-31 VITALS — BODY MASS INDEX: 29.71 KG/M2 | WEIGHT: 174 LBS | HEIGHT: 64 IN

## 2024-05-31 DIAGNOSIS — Z12.31 BREAST CANCER SCREENING BY MAMMOGRAM: ICD-10-CM

## 2024-05-31 PROCEDURE — 77067 SCR MAMMO BI INCL CAD: CPT

## 2024-05-31 PROCEDURE — 77063 BREAST TOMOSYNTHESIS BI: CPT | Performed by: RADIOLOGY

## 2024-05-31 PROCEDURE — 77067 SCR MAMMO BI INCL CAD: CPT | Performed by: RADIOLOGY

## 2024-06-17 DIAGNOSIS — Z18.32 RETAINED TOOTH: Primary | ICD-10-CM

## 2024-06-17 DIAGNOSIS — E55.9 VITAMIN D DEFICIENCY, UNSPECIFIED: ICD-10-CM

## 2024-06-19 ENCOUNTER — OFFICE VISIT (OUTPATIENT)
Dept: RHEUMATOLOGY | Facility: CLINIC | Age: 79
End: 2024-06-19
Payer: MEDICARE

## 2024-06-19 ENCOUNTER — APPOINTMENT (OUTPATIENT)
Dept: RADIOLOGY | Facility: HOSPITAL | Age: 79
End: 2024-06-19
Payer: MEDICARE

## 2024-06-19 VITALS
WEIGHT: 170.4 LBS | HEART RATE: 69 BPM | BODY MASS INDEX: 28.39 KG/M2 | HEIGHT: 65 IN | DIASTOLIC BLOOD PRESSURE: 70 MMHG | SYSTOLIC BLOOD PRESSURE: 124 MMHG | OXYGEN SATURATION: 97 %

## 2024-06-19 DIAGNOSIS — M35.3 POLYMYALGIA RHEUMATICA (MULTI): Primary | ICD-10-CM

## 2024-06-19 DIAGNOSIS — M15.0 PRIMARY GENERALIZED (OSTEO)ARTHRITIS: ICD-10-CM

## 2024-06-19 PROCEDURE — 3078F DIAST BP <80 MM HG: CPT | Performed by: INTERNAL MEDICINE

## 2024-06-19 PROCEDURE — 1157F ADVNC CARE PLAN IN RCRD: CPT | Performed by: INTERNAL MEDICINE

## 2024-06-19 PROCEDURE — 1125F AMNT PAIN NOTED PAIN PRSNT: CPT | Performed by: INTERNAL MEDICINE

## 2024-06-19 PROCEDURE — 3074F SYST BP LT 130 MM HG: CPT | Performed by: INTERNAL MEDICINE

## 2024-06-19 PROCEDURE — 1123F ACP DISCUSS/DSCN MKR DOCD: CPT | Performed by: INTERNAL MEDICINE

## 2024-06-19 PROCEDURE — 99213 OFFICE O/P EST LOW 20 MIN: CPT | Performed by: INTERNAL MEDICINE

## 2024-06-19 ASSESSMENT — ROUTINE ASSESSMENT OF PATIENT INDEX DATA (RAPID3)
SUM OF QUESTIONS A TO J: 9
WASH_DRY_BODY: WITHOUT ANY DIFFICULTY
ON A SCALE OF ONE TO TEN, CONSIDERING ALL THE WAYS IN WHICH ILLNESS AND HEALTH CONDITIONS MAY AFFECT YOU AT THIS TIME, PLEASE INDICATE BELOW HOW YOU ARE DOING:: 2.5
SEVERITY_SCORE: LOW SEVERITY (LS)
TURN_FAUCETS_OFF: WITHOUT ANY DIFFICULTY
PICK_CLOTHES_OFF_FLOOR: WITHOUT ANY DIFFICULTY
SEVERITY_SCORE: 0
DRESS_YOURSELF: WITHOUT ANY DIFFICULTY
ON A SCALE OF ONE TO TEN, CONSIDERING ALL THE WAYS IN WHICH ILLNESS AND HEALTH CONDITIONS MAY AFFECT YOU AT THIS TIME, PLEASE INDICATE BELOW HOW YOU ARE DOING:: 2.5
TOTAL RAPID3 SCORE: 5.5
WEIGHTED_TOTAL_SCORE: 1.83
GOOD_NIGHTS_SLEEP: WITHOUT ANY DIFFICULTY
LIFT_CUP_TO_MOUTH: WITHOUT ANY DIFFICULTY
FN_SCORE: 3
WALK_FLAT_GROUND: WITH MUCH DIFFICULTY
PARTIPATE_RECREATIONAL_ACTIVITIES: UNABLE TO DO
ON A SCALE OF ONE TO TEN, HOW MUCH PAIN HAVE YOU HAD BECAUSE OF YOUR CONDITION OVER THE PAST WEEK?: 0
FEELINGS_DEPRESSION: WITH SOME DIFFICULTY
WALK_KILOMETERS: UNABLE TO DO
ON A SCALE OF ONE TO TEN, HOW MUCH PAIN HAVE YOU HAD BECAUSE OF YOUR CONDITION OVER THE PAST WEEK?: 0
IN_OUT_TRANSPORT: WITH SOME DIFFICULTY
FEELINGS_ANXIETY_NERVOUS: WITH SOME DIFFICULTY
IN_OUT_BED: WITHOUT ANY DIFFICULTY

## 2024-06-19 ASSESSMENT — JOINT PAIN
TOTAL NUMBER OF SWOLLEN JOINTS: 1
TOTAL NUMBER OF TENDER JOINTS: 1
TOTAL NUMBER OF TENDER JOINTS: 3

## 2024-06-19 ASSESSMENT — ENCOUNTER SYMPTOMS
DEPRESSION: 0
LOSS OF SENSATION IN FEET: 0
OCCASIONAL FEELINGS OF UNSTEADINESS: 1

## 2024-06-19 ASSESSMENT — PAIN SCALES - GENERAL: PAINLEVEL: 3

## 2024-06-19 NOTE — PROGRESS NOTES
"Chief Complaint:    This 79 y.o. female presents with the chief complaint of polymyalgia rheumatica (PMR).     Subjective:   HPI   Problem:  1: PMR       The patient returns for her pre-ACTEMRA visit and follow-up of PMR.       The patient reports no recurrence of PMR symptoms, including headaches, amaurosis, scalp tenderness, jaw claudication or acute polyarthritis.        The patient reiterates that she is faring well on ACTEMRA. The patient believes that ACTEMRA is not only controlling the PMR but also the right shoulder pain. The patient has a rotator cuff tendinopathy, which previously presented with shoulder pain, swelling and impaired mobility. Both Dr. Atwood, orthopedics, and I aspirated the effusion, and he administered an intra-articular steroid/anesthetic that quelled the process. She believes that ACTEMRA may have prevented the recurrence of the shoulder inflammation process. Notwithstanding, the patient remains \"happy\" with ACTEMRA treatment and again intends to continue going forward because it has controlled the PMR.    Review of Systems   All other systems reviewed and are negative.    Objective:   /70   Pulse 69   Ht 1.651 m (5' 5\")   Wt 77.3 kg (170 lb 6.4 oz)   SpO2 97%   BMI 28.36 kg/m²      Physical Exam  Vitals and nursing note reviewed.   Constitutional:       Appearance: Normal appearance.   Eyes:      Extraocular Movements: Extraocular movements intact.      Conjunctiva/sclera: Conjunctivae normal.      Pupils: Pupils are equal, round, and reactive to light.   Neck:      Vascular: No carotid bruit.   Cardiovascular:      Rate and Rhythm: Normal rate and regular rhythm.      Pulses:           Carotid pulses are 0 on the right side and 1+ on the left side.       Radial pulses are 2+ on the right side and 2+ on the left side.        Dorsalis pedis pulses are 1+ on the right side and 1+ on the left side.        Posterior tibial pulses are 1+ on the right side and 1+ on the left " side.      Heart sounds: Normal heart sounds. No murmur heard.  Pulmonary:      Effort: Pulmonary effort is normal. No respiratory distress.      Breath sounds: No wheezing, rhonchi or rales.   Abdominal:      General: There is no distension.      Palpations: Abdomen is soft. There is no mass.      Tenderness: There is no abdominal tenderness.   Musculoskeletal:      Cervical back: No rigidity or tenderness.   Lymphadenopathy:      Cervical: No cervical adenopathy.   Neurological:      Mental Status: She is alert.       Right knee: very mild synovitis w/o effusion     Assessment:   PMR - M35.3  Primary generalized osteoarthritis - M15.0  3.   Osteopenia of multiple sites - M85.89  Plan:    PMR - Actemra 309 mg [4 mg/kg] IV over 1 hour.  The rationale, objectives, risk/reward and potential side effects have been explained to the patient and the patient understands these facts.  The patient further understands that periodic laboratory studies must be performed to monitor this drug and the patient will have these studies performed when the requisition is provided.  The patient is again educated to report to the office adverse effects including allergic reactions or infections.  The patient agreed to this plan.   2.   OA-  At this time, the patient is satisfied observing the OA of the knee. I agree. I will continue to monitor the OA, which is present but mild in other joints.  3.   Lab studies will not be ordered at this time because Dr. Wilder De Santiago is ordering testing to evaluate the renal insufficiency. Patient understands and approves.  4.   Return to office in two months.       Milton Dubose MD

## 2024-06-20 ENCOUNTER — APPOINTMENT (OUTPATIENT)
Dept: INFUSION THERAPY | Facility: CLINIC | Age: 79
End: 2024-06-20
Payer: MEDICARE

## 2024-06-20 VITALS
DIASTOLIC BLOOD PRESSURE: 76 MMHG | SYSTOLIC BLOOD PRESSURE: 139 MMHG | OXYGEN SATURATION: 100 % | TEMPERATURE: 97 F | HEART RATE: 52 BPM | BODY MASS INDEX: 28.47 KG/M2 | RESPIRATION RATE: 18 BRPM | WEIGHT: 171.1 LBS

## 2024-06-20 DIAGNOSIS — M35.3 POLYMYALGIA RHEUMATICA (MULTI): ICD-10-CM

## 2024-06-20 PROCEDURE — 96365 THER/PROPH/DIAG IV INF INIT: CPT | Performed by: NURSE PRACTITIONER

## 2024-06-20 RX ORDER — EPINEPHRINE 0.3 MG/.3ML
0.3 INJECTION SUBCUTANEOUS EVERY 5 MIN PRN
OUTPATIENT
Start: 2024-07-14

## 2024-06-20 RX ORDER — FAMOTIDINE 10 MG/ML
20 INJECTION INTRAVENOUS ONCE AS NEEDED
OUTPATIENT
Start: 2024-07-14

## 2024-06-20 RX ORDER — DIPHENHYDRAMINE HYDROCHLORIDE 50 MG/ML
50 INJECTION INTRAMUSCULAR; INTRAVENOUS AS NEEDED
OUTPATIENT
Start: 2024-07-14

## 2024-06-20 RX ORDER — ALBUTEROL SULFATE 0.83 MG/ML
3 SOLUTION RESPIRATORY (INHALATION) AS NEEDED
OUTPATIENT
Start: 2024-07-14

## 2024-06-20 ASSESSMENT — ENCOUNTER SYMPTOMS: BACK PAIN: 1

## 2024-06-20 ASSESSMENT — PAIN SCALES - GENERAL: PAINLEVEL: 3

## 2024-06-20 NOTE — PROGRESS NOTES
[facility]   Infusion Clinic Note   Date: 2024   Name: Anny Tesfaye  : 1945   MRN: [id number]         Reason for Visit: OP Infusion (Actemra)         Today: We administered tocilizumab (Actemra) 309 mg in sodium chloride 0.9% 100 mL IV.       Visit Type: INFUSION       Ordered By: Dr Dubose      Accompanied by:Self      Diagnosis: Polymyalgia rheumatica (Multi)       Allergies:   Allergies as of 2024    (No Known Allergies)         Current Medications has a current medication list which includes the following prescription(s): acetaminophen, alendronate, ascorbic acid, aspirin, calcium carbonate, captopril, cholecalciferol, cyanocobalamin, multivitamin, escitalopram, famotidine, levothyroxine, lutein, omega 3-dha-epa-fish oil, oxybutynin, oxybutynin xl, simvastatin, thyroid (pork), actemra, and verapamil sr.       Vitals:   Vitals:    24 0848 24 1030 24 1055   BP: 121/67 122/59 139/76   Pulse: 74 53 52   Resp: 18 18 18   Temp: 36.6 °C (97.8 °F) 36.2 °C (97.2 °F) 36.1 °C (97 °F)   SpO2: 100% 100% 100%   Weight: 77.6 kg (171 lb 1.6 oz)     PainSc:   3     PainLoc: Back  Comment: chronic               Infusion Pre-procedure Checklist:   - Allergies reviewed: yes   - Medications reviewed: yes       - Previous reaction to current treatment: no      Assess patient for the concerns below. Document provider notification as appropriate.  - Active or recent infection with/without current antibiotic use: no  - Recent or planned invasive dental work: no  - Recent or planned surgeries: no  - Recently received or plans to receive vaccinations: no  - Has treatment related toxicities: no  - Is pregnant:  n/a      Pain: 3   - Is the pain different from normal: no   - Is the pain tolerable: yes   - Is your Doctor aware:  yes      Labs: N/A         Fall Risk Screening: Win Fall Risk  History of Falling, Immediate or Within 3 Months: No  Secondary Diagnosis:  Yes  Ambulatory Aid: Walks without aid/bedrest/nurse assist  Intravenous Therapy/Heparin Lock: Yes  Gait/Transferring: Normal/bedrest/immobile  Mental Status: Oriented to own ability  Walden Fall Risk Score: 35       Review Of Systems:  Review of Systems   Musculoskeletal:  Positive for back pain.        Chronic   All other systems reviewed and are negative.        Infusion Readiness:   - Assessment Concerns Related to Infusion: No  - Provider notified: n/a      Document Below Only If Indicated:   New Patient Education:    N/A (returning patient for continuation of therapy. Ongoing education provided as needed.)        Treatment Conditions & Drug Specific Questions:    Tocilizumab  (ACTEMRA)   (Unless otherwise specified on patient specific therapy plan):     TREATMENT CONDITIONS:  Unless otherwise specified on patient specific therapy plan HOLD and notify provider prior to proceeding with treatment if:   o Platelets LESS than 100 x 10E9L  o ANC LESS than 2 x 10E9/L  o ALT GREATER than 1.5x ULN and/or AST GREATER than 1.5x ULN  o Positive T-spot  o Reactive Hepatitis B Surface Antigen  -           Positive Pregnancy    Lab Results   Component Value Date     05/16/2024      Lab Results   Component Value Date    NEUTROABS 3.76 05/16/2024        Chemistry    Lab Results   Component Value Date/Time     05/16/2024 0930    K 5.5 (H) 05/16/2024 0930     05/16/2024 0930    CO2 24 05/16/2024 0930    BUN 38 (H) 05/16/2024 0930    CREATININE 1.30 05/16/2024 0930    Lab Results   Component Value Date/Time    CALCIUM 9.9 05/16/2024 0930    ALKPHOS 42 05/16/2024 0930    AST 16 05/16/2024 0930    ALT 17 05/16/2024 0930    BILITOT 0.6 05/16/2024 0930        ,  Lab Results   Component Value Date    ALT 17 05/16/2024    AST 16 05/16/2024    ALKPHOS 42 05/16/2024    BILITOT 0.6 05/16/2024      Lab Results   Component Value Date    TBSIN Negative 10/23/2023    TBGRES NEGATIVE 05/25/2022    TSPOTR  04/11/2023      "Negative  Reference range: Normal Value: Negative    A negative test result does not exclude the possibility of exposure  to   or infection with Mycobacterium tuberculosis (M. tuberculosis).    Patients with recent exposure to TB infected individuals exhibiting a   negative T-SPOT.TB result should be considered for retesting within 6   weeks or if other relevant clinical symptoms indicate.  Results from   T-SPOT.TB testing must be used in conjunction with each individual's   epidemiological history, current medical status, and results of other   diagnostic evaluations.  The T-SPOT.TB test is qualitative and  results   are reported as positive, borderline or negative, given that the test   controls perform as expected. In line with the Centers for Disease   Control and Prevention's 2010 recommendation to report quantitative   measurements alongside the qualitative result, the laboratory  provides   spot counts for informational purposes only.  The T-SPOT.TB test  should   not be interpreted as a quantitative test.      No results found for: \"NONUHFIRE\", \"NONUHSWGH\", \"NONUHFISH\", \"EXTHEPBSAG\"  Lab Results   Component Value Date    HEPBSAG Nonreactive 10/23/2023      Lab Results   Component Value Date    HEPAIGM Nonreactive 10/23/2023    HEPBCIGM Nonreactive 10/23/2023    HEPCAB Nonreactive 10/23/2023         Labs reviewed and patient meets treatment conditions? Yes    DRUG SPECIFIC QUESTIONS:  Any history of or new diagnosis of GI perforation? No  (Actemra may increase risk of bowel perforation)      REMINDERS:  PREGNANCY CATEGORY X DRUG. OBTAIN NEGTATIVE PREGNANCY TEST PRIOR TO FIRST INFUSION FOR WOMEN OF CHILDBEARING ABILITY   WEIGHT BASED DRUG     Recommended Vitals/Observation:  Vitals: Monitor patient's vital signs prior to infusion start, every 30 minutes during infusion and 30 minutes after infusion.   Observation: Patient may leave 30 minutes post infusion.        Weight Based Drug Calculations:    WEIGHT " BASED DRUGS: Tocilizumab (ACTEMRA)   Patient's dosing weight (kg): 77.3kg     10% weight variance for prescribed treatment: 69.57 kg to 85.03 kg     Patient's weight today:   Vitals:    06/20/24 0848   Weight: 77.6 kg (171 lb 1.6 oz)         weight range for prescribed dose:     Patient weight today falls outside of 10% variance or  weight range: No    [facility] pharmacist informed of weight variance: Not applicable    Doses that are weight based have an acceptable variance rule within 10% of the prescribed   order and/or within  weight range. If patient weight on day of infusion falls   outside of the 10% variance, or weight range, infusion is administered and   pharmacy contacted regarding future dosing adjustments, per policy.         Initiated By: Mila Cramer RN   1058-30 minutes post infusion vital signs stable. Patient tolerated well, no signs or symptoms of reaction.

## 2024-06-20 NOTE — PATIENT INSTRUCTIONS
Today :We administered tocilizumab (Actemra) 309 mg in sodium chloride 0.9% 100 mL IV.     For:   1. Polymyalgia rheumatica (Multi)         Your next appointment is due in:  28 days        Please read the  Medication Guide that was given to you and reviewed during todays visit.     (Tell all doctors including dentists that you are taking this medication)     Go to the emergency room or call 911 if:  -You have signs of allergic reaction:   -Rash, hives, itching.   -Swollen, blistered, peeling skin.   -Swelling of face, lips, mouth, tongue or throat.   -Tightness of chest, trouble breathing, swallowing or talking     Call your doctor:  - If IV / injection site gets red, warm, swollen, itchy or leaks fluid or pus.     (Leave dressing on your IV site for at least 2 hours and keep area clean and dry  - If you get sick or have symptoms of infection or are not feeling well for any reason.    (Wash your hands often, stay away from people who are sick)  - If you have side effects from your medication that do not go away or are bothersome.     (Refer to the teaching your nurse gave you for side effects to call your doctor about)    - Common side effects may include:  stuffy nose, headache, feeling tired, muscle aches, upset stomach  - Before receiving any vaccines     - Call the [facility] at   If:  - You get sick, are on antibiotics, have had a recent vaccine, have surgery or dental work and your doctor wants your visit rescheduled.  - You need to cancel and reschedule your visit for any reason. Call at least 2 days before your visit if you need to cancel.   - Your insurance changes before your next visit.    (We will need to get approval from your new insurance. This can take up to two weeks.)     The [facility] is opened Monday thru Friday. We are closed on weekends and holidays.   Voice mail will take your call if the center is closed. If you leave a message please allow 24 hours for a  call back during weekdays. If you leave a message on a weekend/holiday, we will call you back the next business day.

## 2024-06-21 LAB — NONINV COLON CA DNA+OCC BLD SCRN STL QL: POSITIVE

## 2024-06-25 DIAGNOSIS — R19.5 POSITIVE COLORECTAL CANCER SCREENING USING COLOGUARD TEST: Primary | ICD-10-CM

## 2024-06-27 ENCOUNTER — LAB (OUTPATIENT)
Dept: LAB | Facility: LAB | Age: 79
End: 2024-06-27
Payer: MEDICARE

## 2024-06-27 ENCOUNTER — HOSPITAL ENCOUNTER (OUTPATIENT)
Dept: RADIOLOGY | Facility: HOSPITAL | Age: 79
Discharge: HOME | End: 2024-06-27
Payer: MEDICARE

## 2024-06-27 DIAGNOSIS — Z18.32 RETAINED TOOTH: ICD-10-CM

## 2024-06-27 DIAGNOSIS — N18.32 CHRONIC KIDNEY DISEASE, STAGE 3B (MULTI): ICD-10-CM

## 2024-06-27 DIAGNOSIS — E55.9 VITAMIN D DEFICIENCY, UNSPECIFIED: ICD-10-CM

## 2024-06-27 LAB
25(OH)D3 SERPL-MCNC: 54 NG/ML (ref 31–100)
ALBUMIN SERPL-MCNC: 4.4 G/DL (ref 3.5–5)
ANION GAP SERPL CALC-SCNC: 13 MMOL/L
BUN SERPL-MCNC: 32 MG/DL (ref 8–25)
C3 SERPL-MCNC: 82 MG/DL (ref 87–200)
C4 SERPL-MCNC: 14 MG/DL (ref 10–50)
CALCIUM SERPL-MCNC: 10 MG/DL (ref 8.5–10.4)
CHLORIDE SERPL-SCNC: 101 MMOL/L (ref 97–107)
CO2 SERPL-SCNC: 25 MMOL/L (ref 24–31)
CREAT SERPL-MCNC: 1.4 MG/DL (ref 0.4–1.6)
EGFRCR SERPLBLD CKD-EPI 2021: 38 ML/MIN/1.73M*2
ERYTHROCYTE [DISTWIDTH] IN BLOOD BY AUTOMATED COUNT: 13.2 % (ref 11.5–14.5)
GLUCOSE SERPL-MCNC: 99 MG/DL (ref 65–99)
HCT VFR BLD AUTO: 41.6 % (ref 36–46)
HGB BLD-MCNC: 13.5 G/DL (ref 12–16)
MCH RBC QN AUTO: 32.7 PG (ref 26–34)
MCHC RBC AUTO-ENTMCNC: 32.5 G/DL (ref 32–36)
MCV RBC AUTO: 101 FL (ref 80–100)
NRBC BLD-RTO: 0 /100 WBCS (ref 0–0)
PHOSPHATE SERPL-MCNC: 4 MG/DL (ref 2.5–4.5)
PLATELET # BLD AUTO: 181 X10*3/UL (ref 150–450)
POTASSIUM SERPL-SCNC: 5.1 MMOL/L (ref 3.4–5.1)
PROT SERPL-MCNC: 6.6 G/DL (ref 6.4–8.2)
PROT UR-ACNC: 26 MG/DL (ref 5–25)
RBC # BLD AUTO: 4.13 X10*6/UL (ref 4–5.2)
SODIUM SERPL-SCNC: 139 MMOL/L (ref 133–145)
WBC # BLD AUTO: 5.9 X10*3/UL (ref 4.4–11.3)

## 2024-06-27 PROCEDURE — 84165 PROTEIN E-PHORESIS SERUM: CPT

## 2024-06-27 PROCEDURE — 83970 ASSAY OF PARATHORMONE: CPT

## 2024-06-27 PROCEDURE — 76770 US EXAM ABDO BACK WALL COMP: CPT | Performed by: RADIOLOGY

## 2024-06-27 PROCEDURE — 86160 COMPLEMENT ANTIGEN: CPT

## 2024-06-27 PROCEDURE — 86038 ANTINUCLEAR ANTIBODIES: CPT

## 2024-06-27 PROCEDURE — 85027 COMPLETE CBC AUTOMATED: CPT

## 2024-06-27 PROCEDURE — 84156 ASSAY OF PROTEIN URINE: CPT

## 2024-06-27 PROCEDURE — 82306 VITAMIN D 25 HYDROXY: CPT

## 2024-06-27 PROCEDURE — 76770 US EXAM ABDO BACK WALL COMP: CPT

## 2024-06-27 PROCEDURE — 80069 RENAL FUNCTION PANEL: CPT

## 2024-06-27 PROCEDURE — 84166 PROTEIN E-PHORESIS/URINE/CSF: CPT

## 2024-06-27 PROCEDURE — 86334 IMMUNOFIX E-PHORESIS SERUM: CPT

## 2024-06-27 PROCEDURE — 36415 COLL VENOUS BLD VENIPUNCTURE: CPT

## 2024-06-27 PROCEDURE — 84155 ASSAY OF PROTEIN SERUM: CPT

## 2024-06-28 LAB — PTH-INTACT SERPL-MCNC: 91.2 PG/ML (ref 18.5–88)

## 2024-06-30 LAB
ALBUMIN MFR UR ELPH: 72.4 %
ALPHA1 GLOB MFR UR ELPH: 3.8 %
ALPHA2 GLOB MFR UR ELPH: 6 %
B-GLOBULIN MFR UR ELPH: 8.9 %
GAMMA GLOB MFR UR ELPH: 8.9 %
PATH REVIEW-URINE PROTEIN ELECTROPHORESIS: NORMAL
URINE ELECTROPHORESIS COMMENT: NORMAL

## 2024-07-01 LAB
ALBUMIN: 4.2 G/DL (ref 3.4–5)
ALPHA 1 GLOBULIN: 0.3 G/DL (ref 0.2–0.6)
ALPHA 2 GLOBULIN: 0.5 G/DL (ref 0.4–1.1)
ANA PATTERN: ABNORMAL
ANA SER QL HEP2 SUBST: POSITIVE
ANA TITR SER IF: ABNORMAL {TITER}
BETA GLOBULIN: 0.7 G/DL (ref 0.5–1.2)
GAMMA GLOBULIN: 0.9 G/DL (ref 0.5–1.4)
IMMUNOFIXATION COMMENT: NORMAL
PATH REVIEW - SERUM IMMUNOFIXATION: NORMAL
PATH REVIEW-SERUM PROTEIN ELECTROPHORESIS: NORMAL
PROTEIN ELECTROPHORESIS COMMENT: NORMAL

## 2024-07-16 ENCOUNTER — APPOINTMENT (OUTPATIENT)
Dept: RHEUMATOLOGY | Facility: CLINIC | Age: 79
End: 2024-07-16
Payer: MEDICARE

## 2024-07-18 ENCOUNTER — APPOINTMENT (OUTPATIENT)
Dept: INFUSION THERAPY | Facility: CLINIC | Age: 79
End: 2024-07-18
Payer: MEDICARE

## 2024-07-19 ENCOUNTER — APPOINTMENT (OUTPATIENT)
Dept: INFUSION THERAPY | Facility: CLINIC | Age: 79
End: 2024-07-19
Payer: MEDICARE

## 2024-07-19 VITALS
RESPIRATION RATE: 17 BRPM | TEMPERATURE: 97.6 F | SYSTOLIC BLOOD PRESSURE: 123 MMHG | OXYGEN SATURATION: 98 % | WEIGHT: 170.4 LBS | HEART RATE: 57 BPM | DIASTOLIC BLOOD PRESSURE: 78 MMHG | BODY MASS INDEX: 28.36 KG/M2

## 2024-07-19 DIAGNOSIS — M35.3 POLYMYALGIA RHEUMATICA (MULTI): ICD-10-CM

## 2024-07-19 PROCEDURE — 96365 THER/PROPH/DIAG IV INF INIT: CPT | Performed by: NURSE PRACTITIONER

## 2024-07-19 RX ORDER — ALBUTEROL SULFATE 0.83 MG/ML
3 SOLUTION RESPIRATORY (INHALATION) AS NEEDED
OUTPATIENT
Start: 2024-08-15

## 2024-07-19 RX ORDER — EPINEPHRINE 0.3 MG/.3ML
0.3 INJECTION SUBCUTANEOUS EVERY 5 MIN PRN
OUTPATIENT
Start: 2024-08-15

## 2024-07-19 RX ORDER — FAMOTIDINE 10 MG/ML
20 INJECTION INTRAVENOUS ONCE AS NEEDED
OUTPATIENT
Start: 2024-08-15

## 2024-07-19 RX ORDER — DIPHENHYDRAMINE HYDROCHLORIDE 50 MG/ML
50 INJECTION INTRAMUSCULAR; INTRAVENOUS AS NEEDED
OUTPATIENT
Start: 2024-08-15

## 2024-07-19 ASSESSMENT — ENCOUNTER SYMPTOMS
ARTHRALGIAS: 1
FATIGUE: 1
MYALGIAS: 1

## 2024-07-19 ASSESSMENT — PAIN SCALES - GENERAL: PAINLEVEL: 6

## 2024-07-19 NOTE — PROGRESS NOTES
[facility]   Infusion Clinic Note   Date: 2024   Name: Anny Tesfaye  : 1945   MRN: [id number]         Reason for Visit: OP Infusion (Actemra)         Today: Anny had 309 mg of Actemra administered today.       Visit Type: INFUSION- Maintenance - Every 28 days       Ordered By: Dr. Dubose      Accompanied by:Self      Diagnosis: Polymyalgia rheumatica (Multi)       Allergies:   Allergies as of 2024    (No Known Allergies)         Current Medications has a current medication list which includes the following prescription(s): acetaminophen, alendronate, ascorbic acid, aspirin, calcium carbonate, captopril, cholecalciferol, cyanocobalamin, multivitamin, escitalopram, famotidine, levothyroxine, lutein, omega 3-dha-epa-fish oil, oxybutynin, oxybutynin xl, simvastatin, thyroid (pork), actemra, and verapamil sr, and the following Facility-Administered Medications: tocilizumab (Actemra) 309 mg in sodium chloride 0.9% 100 mL IV.       Vitals:   Vitals:    24 0850   BP: 151/82   Pulse: 55   Resp: 16   Temp: 36.7 °C (98 °F)   TempSrc: Temporal   SpO2: 98%   Weight: 77.3 kg (170 lb 6.4 oz)   PainSc:   6   PainLoc: Generalized             Infusion Pre-procedure Checklist:   - Allergies reviewed: yes   - Medications reviewed: yes       - Previous reaction to current treatment: no      Assess patient for the concerns below. Document provider notification as appropriate.  - Active or recent infection with/without current antibiotic use: no  - Recent or planned invasive dental work: no  - Recent or planned surgeries: no  - Recently received or plans to receive vaccinations: no  - Has treatment related toxicities: no  - Is pregnant:  n/a      Pain: 6   - Is the pain different from normal: no   - Is the pain tolerable: yes   - Is your Doctor aware:  yes      Labs:  reviewed- Patient informed Dr. Dubose has ordered Lipid panel-  Patient did not fast today - Instructed patient  to go to out patient lab prior to next infusion, Verbal understanding expressed.         Fall Risk Screening: Walden Fall Risk  History of Falling, Immediate or Within 3 Months: Yes  Secondary Diagnosis: Yes  Ambulatory Aid: Walks without aid/bedrest/nurse assist  Intravenous Therapy/Heparin Lock: No  Gait/Transferring: Weak  Mental Status: Oriented to own ability  Walden Fall Risk Score: 50       Review Of Systems:  Review of Systems   Constitutional:  Positive for fatigue.   Musculoskeletal:  Positive for arthralgias and myalgias.   All other systems reviewed and are negative.        Infusion Readiness:   - Assessment Concerns Related to Infusion: No  - Provider notified: no      Document Below Only If Indicated:   New Patient Education:    N/A (returning patient for continuation of therapy. Ongoing education provided as needed.)        Treatment Conditions & Drug Specific Questions:    Tocilizumab  (ACTEMRA)   (Unless otherwise specified on patient specific therapy plan):     TREATMENT CONDITIONS:  Unless otherwise specified on patient specific therapy plan HOLD and notify provider prior to proceeding with treatment if:   o Platelets LESS than 100 x 10E9L  o ANC LESS than 2 x 10E9/L  o ALT GREATER than 1.5x ULN and/or AST GREATER than 1.5x ULN  o Positive T-spot  o Reactive Hepatitis B Surface Antigen  -           Positive Pregnancy    Lab Results   Component Value Date     06/27/2024      Lab Results   Component Value Date    NEUTROABS 3.76 05/16/2024        Chemistry    Lab Results   Component Value Date/Time     06/27/2024 1041    K 5.1 06/27/2024 1041     06/27/2024 1041    CO2 25 06/27/2024 1041    BUN 32 (H) 06/27/2024 1041    CREATININE 1.40 06/27/2024 1041    Lab Results   Component Value Date/Time    CALCIUM 10.0 06/27/2024 1041    ALKPHOS 42 05/16/2024 0930    AST 16 05/16/2024 0930    ALT 17 05/16/2024 0930    BILITOT 0.6 05/16/2024 0930        ,  Lab Results   Component Value Date     "ALT 17 05/16/2024    AST 16 05/16/2024    ALKPHOS 42 05/16/2024    BILITOT 0.6 05/16/2024      Lab Results   Component Value Date    TBSIN Negative 10/23/2023    TBGRES NEGATIVE 05/25/2022    TSPOTR  04/11/2023     Negative  Reference range: Normal Value: Negative    A negative test result does not exclude the possibility of exposure  to   or infection with Mycobacterium tuberculosis (M. tuberculosis).    Patients with recent exposure to TB infected individuals exhibiting a   negative T-SPOT.TB result should be considered for retesting within 6   weeks or if other relevant clinical symptoms indicate.  Results from   T-SPOT.TB testing must be used in conjunction with each individual's   epidemiological history, current medical status, and results of other   diagnostic evaluations.  The T-SPOT.TB test is qualitative and  results   are reported as positive, borderline or negative, given that the test   controls perform as expected. In line with the Centers for Disease   Control and Prevention's 2010 recommendation to report quantitative   measurements alongside the qualitative result, the laboratory  provides   spot counts for informational purposes only.  The T-SPOT.TB test  should   not be interpreted as a quantitative test.      No results found for: \"NONUHFIRE\", \"NONUHSWGH\", \"NONUHFISH\", \"EXTHEPBSAG\"  Lab Results   Component Value Date    HEPBSAG Nonreactive 10/23/2023      Lab Results   Component Value Date    HEPAIGM Nonreactive 10/23/2023    HEPBCIGM Nonreactive 10/23/2023    HEPCAB Nonreactive 10/23/2023         Labs reviewed and patient meets treatment conditions? Yes    DRUG SPECIFIC QUESTIONS:  Any history of or new diagnosis of GI perforation? No  (Actemra may increase risk of bowel perforation)      REMINDERS:  PREGNANCY CATEGORY X DRUG. OBTAIN NEGTATIVE PREGNANCY TEST PRIOR TO FIRST INFUSION FOR WOMEN OF CHILDBEARING ABILITY   WEIGHT BASED DRUG     Recommended Vitals/Observation:  Vitals: Monitor " patient's vital signs prior to infusion start, every 30 minutes during infusion and 30 minutes after infusion.   Observation: Patient may leave 30 minutes post infusion.        Weight Based Drug Calculations:    WEIGHT BASED DRUGS: Tocilizumab (ACTEMRA)   Patient's dosing weight (kg): 77.3     10% weight variance for prescribed treatment: 69.57 kg to 85.03 kg     Patient's weight today:   Vitals:    07/19/24 0850   Weight: 77.3 kg (170 lb 6.4 oz)         weight range for prescribed dose:     Patient weight today falls outside of 10% variance or  weight range: No     [facility] pharmacist informed of weight variance: No    Doses that are weight based have an acceptable variance rule within 10% of the prescribed   order and/or within  weight range. If patient weight on day of infusion falls   outside of the 10% variance, or weight range, infusion is administered and   pharmacy contacted regarding future dosing adjustments, per policy.     1045 Patient tolerated infusion well.  30 minute post infusion observation complete.  No s/s adverse reaction noted.  VSS.  RTC on 8/16/2024.      Initiated By: Kayla Shultz RN.

## 2024-07-19 NOTE — PATIENT INSTRUCTIONS
Today :We administered tocilizumab (Actemra) 309 mg in sodium chloride 0.9% 100 mL IV.     For:   1. Polymyalgia rheumatica (Multi)         Your next appointment is due in:  8/16/2024        Please read the  Medication Guide that was given to you and reviewed during todays visit.     (Tell all doctors including dentists that you are taking this medication)     Go to the emergency room or call 911 if:  -You have signs of allergic reaction:   -Rash, hives, itching.   -Swollen, blistered, peeling skin.   -Swelling of face, lips, mouth, tongue or throat.   -Tightness of chest, trouble breathing, swallowing or talking     Call your doctor:  - If IV / injection site gets red, warm, swollen, itchy or leaks fluid or pus.     (Leave dressing on your IV site for at least 2 hours and keep area clean and dry  - If you get sick or have symptoms of infection or are not feeling well for any reason.    (Wash your hands often, stay away from people who are sick)  - If you have side effects from your medication that do not go away or are bothersome.     (Refer to the teaching your nurse gave you for side effects to call your doctor about)    - Common side effects may include:  stuffy nose, headache, feeling tired, muscle aches, upset stomach  - Before receiving any vaccines     - Call the [facility] at   If:  - You get sick, are on antibiotics, have had a recent vaccine, have surgery or dental work and your doctor wants your visit rescheduled.  - You need to cancel and reschedule your visit for any reason. Call at least 2 days before your visit if you need to cancel.   - Your insurance changes before your next visit.    (We will need to get approval from your new insurance. This can take up to two weeks.)     [facility] is opened Monday thru Friday. We are closed on weekends and holidays.   Voice mail will take your call if the center is closed. If you leave a message please allow 24 hours for a  call back during weekdays. If you leave a message on a weekend/holiday, we will call you back the next business day.

## 2024-08-01 ENCOUNTER — LAB (OUTPATIENT)
Dept: LAB | Facility: LAB | Age: 79
End: 2024-08-01
Payer: MEDICARE

## 2024-08-01 DIAGNOSIS — M35.3 POLYMYALGIA RHEUMATICA (MULTI): ICD-10-CM

## 2024-08-01 LAB
ALBUMIN SERPL-MCNC: 4.2 G/DL (ref 3.5–5)
ALP BLD-CCNC: 49 U/L (ref 35–125)
ALT SERPL-CCNC: 12 U/L (ref 5–40)
ANION GAP SERPL CALC-SCNC: 12 MMOL/L
AST SERPL-CCNC: 14 U/L (ref 5–40)
BILIRUB SERPL-MCNC: 0.7 MG/DL (ref 0.1–1.2)
BUN SERPL-MCNC: 33 MG/DL (ref 8–25)
CALCIUM SERPL-MCNC: 9.4 MG/DL (ref 8.5–10.4)
CHLORIDE SERPL-SCNC: 102 MMOL/L (ref 97–107)
CHOLEST SERPL-MCNC: 165 MG/DL (ref 133–200)
CHOLEST/HDLC SERPL: 2.2 {RATIO}
CO2 SERPL-SCNC: 25 MMOL/L (ref 24–31)
CREAT SERPL-MCNC: 1.4 MG/DL (ref 0.4–1.6)
EGFRCR SERPLBLD CKD-EPI 2021: 38 ML/MIN/1.73M*2
ERYTHROCYTE [DISTWIDTH] IN BLOOD BY AUTOMATED COUNT: 13 % (ref 11.5–14.5)
GLUCOSE SERPL-MCNC: 103 MG/DL (ref 65–99)
HCT VFR BLD AUTO: 39.6 % (ref 36–46)
HDLC SERPL-MCNC: 74 MG/DL
HGB BLD-MCNC: 12.7 G/DL (ref 12–16)
LDLC SERPL CALC-MCNC: 73 MG/DL (ref 65–130)
MCH RBC QN AUTO: 32.2 PG (ref 26–34)
MCHC RBC AUTO-ENTMCNC: 32.1 G/DL (ref 32–36)
MCV RBC AUTO: 100 FL (ref 80–100)
NRBC BLD-RTO: 0 /100 WBCS (ref 0–0)
PLATELET # BLD AUTO: 174 X10*3/UL (ref 150–450)
POTASSIUM SERPL-SCNC: 5.1 MMOL/L (ref 3.4–5.1)
PROT SERPL-MCNC: 6.7 G/DL (ref 5.9–7.9)
RBC # BLD AUTO: 3.95 X10*6/UL (ref 4–5.2)
SODIUM SERPL-SCNC: 139 MMOL/L (ref 133–145)
TRIGL SERPL-MCNC: 92 MG/DL (ref 40–150)
WBC # BLD AUTO: 5.8 X10*3/UL (ref 4.4–11.3)

## 2024-08-01 PROCEDURE — 36415 COLL VENOUS BLD VENIPUNCTURE: CPT

## 2024-08-15 ENCOUNTER — APPOINTMENT (OUTPATIENT)
Dept: RHEUMATOLOGY | Facility: CLINIC | Age: 79
End: 2024-08-15
Payer: MEDICARE

## 2024-08-16 ENCOUNTER — APPOINTMENT (OUTPATIENT)
Dept: INFUSION THERAPY | Facility: CLINIC | Age: 79
End: 2024-08-16
Payer: MEDICARE

## 2024-08-16 DIAGNOSIS — E03.9 ACQUIRED HYPOTHYROIDISM: ICD-10-CM

## 2024-08-16 DIAGNOSIS — E78.2 HYPERLIPEMIA, MIXED: ICD-10-CM

## 2024-08-20 RX ORDER — LEVOTHYROXINE SODIUM 100 UG/1
100 TABLET ORAL DAILY
Qty: 90 TABLET | Refills: 0 | Status: SHIPPED | OUTPATIENT
Start: 2024-08-20

## 2024-08-20 RX ORDER — SIMVASTATIN 20 MG/1
20 TABLET, FILM COATED ORAL NIGHTLY
Qty: 90 TABLET | Refills: 0 | Status: SHIPPED | OUTPATIENT
Start: 2024-08-20

## 2024-09-12 ENCOUNTER — APPOINTMENT (OUTPATIENT)
Dept: INFUSION THERAPY | Facility: CLINIC | Age: 79
End: 2024-09-12
Payer: MEDICARE

## 2024-09-13 ENCOUNTER — APPOINTMENT (OUTPATIENT)
Dept: INFUSION THERAPY | Facility: CLINIC | Age: 79
End: 2024-09-13
Payer: MEDICARE

## 2024-09-26 ENCOUNTER — OFFICE VISIT (OUTPATIENT)
Dept: RHEUMATOLOGY | Facility: CLINIC | Age: 79
End: 2024-09-26
Payer: MEDICARE

## 2024-09-26 VITALS
HEART RATE: 57 BPM | SYSTOLIC BLOOD PRESSURE: 121 MMHG | HEIGHT: 65 IN | WEIGHT: 164 LBS | OXYGEN SATURATION: 94 % | BODY MASS INDEX: 27.32 KG/M2 | DIASTOLIC BLOOD PRESSURE: 64 MMHG

## 2024-09-26 DIAGNOSIS — M15.0 PRIMARY GENERALIZED (OSTEO)ARTHRITIS: ICD-10-CM

## 2024-09-26 DIAGNOSIS — M35.3 POLYMYALGIA RHEUMATICA (MULTI): Primary | ICD-10-CM

## 2024-09-26 DIAGNOSIS — M85.89 OSTEOPENIA OF MULTIPLE SITES: ICD-10-CM

## 2024-09-26 DIAGNOSIS — D64.9 ANEMIA, UNSPECIFIED TYPE: ICD-10-CM

## 2024-09-26 DIAGNOSIS — I25.10 ASCVD (ARTERIOSCLEROTIC CARDIOVASCULAR DISEASE): ICD-10-CM

## 2024-09-26 DIAGNOSIS — D47.2 MONOCLONAL GAMMOPATHY OF UNDETERMINED SIGNIFICANCE: ICD-10-CM

## 2024-09-26 PROCEDURE — 99214 OFFICE O/P EST MOD 30 MIN: CPT | Performed by: INTERNAL MEDICINE

## 2024-09-26 PROCEDURE — 1125F AMNT PAIN NOTED PAIN PRSNT: CPT | Performed by: INTERNAL MEDICINE

## 2024-09-26 PROCEDURE — 1123F ACP DISCUSS/DSCN MKR DOCD: CPT | Performed by: INTERNAL MEDICINE

## 2024-09-26 PROCEDURE — 3078F DIAST BP <80 MM HG: CPT | Performed by: INTERNAL MEDICINE

## 2024-09-26 PROCEDURE — 1159F MED LIST DOCD IN RCRD: CPT | Performed by: INTERNAL MEDICINE

## 2024-09-26 PROCEDURE — 3074F SYST BP LT 130 MM HG: CPT | Performed by: INTERNAL MEDICINE

## 2024-09-26 ASSESSMENT — ENCOUNTER SYMPTOMS
LOSS OF SENSATION IN FEET: 0
SEIZURES: 0
ADENOPATHY: 0
CHILLS: 0
DIFFICULTY URINATING: 0
OCCASIONAL FEELINGS OF UNSTEADINESS: 1
BLOOD IN STOOL: 0
HEMATOLOGIC/LYMPHATIC NEGATIVE: 1
ENDOCRINE NEGATIVE: 1
NAUSEA: 0
CONFUSION: 0
DIARRHEA: 0
MYALGIAS: 0
HEADACHES: 0
LIGHT-HEADEDNESS: 0
SORE THROAT: 0
DIAPHORESIS: 0
FEVER: 0
RESPIRATORY NEGATIVE: 1
FATIGUE: 0
EYES NEGATIVE: 1
JOINT SWELLING: 0
NEUROLOGICAL NEGATIVE: 1
DIZZINESS: 0
BACK PAIN: 0
CONSTITUTIONAL NEGATIVE: 1
FREQUENCY: 0
NECK PAIN: 0
GASTROINTESTINAL NEGATIVE: 1
DYSURIA: 0
NERVOUS/ANXIOUS: 0
SHORTNESS OF BREATH: 0
ABDOMINAL PAIN: 0
WEAKNESS: 0
ARTHRALGIAS: 0
DEPRESSION: 0
AGITATION: 0
CONSTIPATION: 0
ACTIVITY CHANGE: 0
HEMATURIA: 0

## 2024-09-26 ASSESSMENT — ROUTINE ASSESSMENT OF PATIENT INDEX DATA (RAPID3)
IN_OUT_BED: WITHOUT ANY DIFFICULTY
SUM OF QUESTIONS A TO J: 8
PICK_CLOTHES_OFF_FLOOR: WITHOUT ANY DIFFICULTY
FEELINGS_ANXIETY_NERVOUS: WITHOUT ANY DIFFICULTY
TOTAL RAPID3 SCORE: 4.7
WEIGHTED_TOTAL_SCORE: 1.57
ON A SCALE OF ONE TO TEN, CONSIDERING ALL THE WAYS IN WHICH ILLNESS AND HEALTH CONDITIONS MAY AFFECT YOU AT THIS TIME, PLEASE INDICATE BELOW HOW YOU ARE DOING:: 2
ON A SCALE OF ONE TO TEN, HOW MUCH PAIN HAVE YOU HAD BECAUSE OF YOUR CONDITION OVER THE PAST WEEK?: 0
IN_OUT_TRANSPORT: WITHOUT ANY DIFFICULTY
FN_SCORE: 2.7
WALK_FLAT_GROUND: WITH MUCH DIFFICULTY
LIFT_CUP_TO_MOUTH: WITHOUT ANY DIFFICULTY
SEVERITY_SCORE: LOW SEVERITY (LS)
PARTIPATE_RECREATIONAL_ACTIVITIES: UNABLE TO DO
WALK_KILOMETERS: UNABLE TO DO
WASH_DRY_BODY: WITHOUT ANY DIFFICULTY
TURN_FAUCETS_OFF: WITHOUT ANY DIFFICULTY
SEVERITY_SCORE: 0
ON A SCALE OF ONE TO TEN, CONSIDERING ALL THE WAYS IN WHICH ILLNESS AND HEALTH CONDITIONS MAY AFFECT YOU AT THIS TIME, PLEASE INDICATE BELOW HOW YOU ARE DOING:: 2
FEELINGS_DEPRESSION: WITHOUT ANY DIFFICULTY
GOOD_NIGHTS_SLEEP: WITHOUT ANY DIFFICULTY
DRESS_YOURSELF: WITHOUT ANY DIFFICULTY
ON A SCALE OF ONE TO TEN, HOW MUCH PAIN HAVE YOU HAD BECAUSE OF YOUR CONDITION OVER THE PAST WEEK?: 0

## 2024-09-26 ASSESSMENT — PAIN SCALES - GENERAL: PAINLEVEL: 2

## 2024-09-26 NOTE — PROGRESS NOTES
"Chief Complaint:    This 79 y.o. female presents with the chief complaint of polymyalgia rheumatica (PMR).      Subjective:   HPI   Problem:  1: PMR       The patient returns for ongoing evaluation and management of PMR.       Since her previous visit on 6/19/24, the patient has remained asymptomatic despite not having had a TOCILIZUMAB infusion since 7/19/24.        The patient has had a detailed cardiovascular evaluation at [facility] for the recent identification of an aortic aneurysm. Her multiple testing assessments have precluded her having the infusions.         Fortunately, the patient reports being asymptomatic. Closer discussion reveals no interval history of headaches, no bitemporal region pain, no scalp tenderness, no blurred vision or amaurosis, no jaw claudication or polyarthritis. The patient states: \"Actually, I am feeling good despite what they say is wrong with me.\" The patient anticipates having to have a stent placed in the aortic aneurysm.    2. Right arm and hand soreness          The patient awakens with pain up and down the right arm and \"my hand is unusable\". The pain is characterized as moderate, achy, but not aggravated by use of the arm. The hand is unusable: she is unable to  a cup, and she has difficulty using an eating utensil. She has some difficulty brushing her hair. She can brush her teeth with some difficulty due to pain.            The pain tends to last about 3 days and slowly resolves. The patient has had this occurrence on two separate times.             There are no temperature changes to the arm/hand, no swelling or erythema of the arm/hand, and pain does not keep her up at night.            APAP provides modest benefit.    Review of Systems   Constitutional: Negative.  Negative for activity change, chills, diaphoresis, fatigue and fever.   HENT:  Positive for dental problem and tinnitus. Negative for ear pain, hearing loss, mouth sores and sore throat.    Eyes: Negative.  " "  Respiratory: Negative.  Negative for shortness of breath.    Cardiovascular:  Positive for chest pain. Negative for leg swelling.   Gastrointestinal: Negative.  Negative for abdominal pain, blood in stool, constipation, diarrhea and nausea.   Endocrine: Negative.    Genitourinary: Negative.  Negative for difficulty urinating, dysuria, frequency, hematuria and urgency.   Musculoskeletal:  Negative for arthralgias, back pain, gait problem, joint swelling, myalgias and neck pain.   Skin:  Positive for rash.   Neurological: Negative.  Negative for dizziness, seizures, syncope, weakness, light-headedness and headaches.   Hematological: Negative.  Negative for adenopathy.   Psychiatric/Behavioral:  Negative for agitation and confusion. The patient is not nervous/anxious.      Objective:   /64   Pulse 57   Ht 1.651 m (5' 5\")   Wt 74.4 kg (164 lb)   SpO2 94%   BMI 27.29 kg/m²      Physical Exam  Vitals and nursing note reviewed.   Constitutional:       General: She is not in acute distress.     Appearance: She is obese. She is not ill-appearing.   HENT:      Head: Normocephalic.      Nose: Nose normal.   Eyes:      Extraocular Movements: Extraocular movements intact.      Conjunctiva/sclera: Conjunctivae normal.      Pupils: Pupils are equal, round, and reactive to light.   Neck:      Vascular: No carotid bruit.   Cardiovascular:      Rate and Rhythm: Normal rate and regular rhythm.      Pulses:           Carotid pulses are 0 on the right side and 1+ on the left side.       Radial pulses are 1+ on the right side and 1+ on the left side.        Dorsalis pedis pulses are 0 on the right side and 0 on the left side.        Posterior tibial pulses are 0 on the right side and 0 on the left side.      Heart sounds: S1 normal and S2 normal. Heart sounds are distant. Murmur heard.      Systolic murmur is present with a grade of 2/6.      Comments: Extremities cool but adequately perfused.  Pulmonary:      Effort: " Pulmonary effort is normal. No respiratory distress.      Breath sounds: No wheezing, rhonchi or rales.      Comments: Breath sounds modestly reduced bilaterally.  Abdominal:      General: Bowel sounds are normal. There is no distension.      Palpations: Abdomen is soft. There is no mass.      Tenderness: There is no abdominal tenderness.      Hernia: No hernia is present.      Comments: Obese abdomen. No palpable masses, audible bruits, or organomegaly. Bowel loops palpated.   Musculoskeletal:         General: No swelling, tenderness or deformity. Normal range of motion.      Cervical back: Normal range of motion and neck supple. No rigidity or tenderness.      Right lower leg: No edema.      Left lower leg: No edema.   Lymphadenopathy:      Cervical: No cervical adenopathy.   Skin:     General: Skin is warm and dry.      Capillary Refill: Capillary refill takes 2 to 3 seconds.      Coloration: Skin is not jaundiced.      Findings: Bruising and rash present. No erythema or lesion.      Comments: Several 2-3 mm excoriated lesions of right>left upper extremities. No hand or distal arm lesions. Posterior thorax shows a healing 2 mm superficial lesion in upper right thorax.   Neurological:      Mental Status: She is alert and oriented to person, place, and time.       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 However, patient has not had infusions x 2 months and remains asymptomatic. Further assessment beyond lab studies is not required now. However, patient has been found by detailed cardiovascular testing to have ASCVD, aortic aneurysm, dilation of ascending aorta and possibly pulmonary hypertension with dilation of the pulmonary artery. Aneurysms are  known to accompany PMR; this should be kept in mind during this evaluation being done at [facility].  Return visit to be determined.  Education: Time spent educating patient regarding significance of PMR and aneurysmal formation vs ASCVD. Ongoing evaluation at [facility] will be monitored. All queries addressed.       Milton Dubose MD

## 2024-09-27 ENCOUNTER — LAB (OUTPATIENT)
Dept: LAB | Facility: LAB | Age: 79
End: 2024-09-27
Payer: MEDICARE

## 2024-09-27 DIAGNOSIS — D64.9 ANEMIA, UNSPECIFIED TYPE: ICD-10-CM

## 2024-09-27 DIAGNOSIS — I10 ESSENTIAL HYPERTENSION: ICD-10-CM

## 2024-09-27 DIAGNOSIS — D47.2 MONOCLONAL GAMMOPATHY OF UNDETERMINED SIGNIFICANCE: ICD-10-CM

## 2024-09-27 DIAGNOSIS — M35.3 POLYMYALGIA RHEUMATICA (MULTI): ICD-10-CM

## 2024-09-27 LAB
ALBUMIN SERPL BCP-MCNC: 3.6 G/DL (ref 3.4–5)
ALP SERPL-CCNC: 58 U/L (ref 33–136)
ALT SERPL W P-5'-P-CCNC: 7 U/L (ref 7–45)
ANION GAP SERPL CALCULATED.3IONS-SCNC: 13 MMOL/L (ref 10–20)
AST SERPL W P-5'-P-CCNC: 11 U/L (ref 9–39)
BASOPHILS # BLD AUTO: 0.04 X10*3/UL (ref 0–0.1)
BASOPHILS NFR BLD AUTO: 0.5 %
BILIRUB SERPL-MCNC: 0.5 MG/DL (ref 0–1.2)
BUN SERPL-MCNC: 31 MG/DL (ref 6–23)
CALCIUM SERPL-MCNC: 9.4 MG/DL (ref 8.6–10.3)
CHLORIDE SERPL-SCNC: 104 MMOL/L (ref 98–107)
CHOLEST SERPL-MCNC: 138 MG/DL (ref 0–199)
CHOLEST/HDLC SERPL: 2.1 {RATIO}
CO2 SERPL-SCNC: 27 MMOL/L (ref 21–32)
CREAT SERPL-MCNC: 1.16 MG/DL (ref 0.5–1.05)
CRP SERPL-MCNC: 1.76 MG/DL
EGFRCR SERPLBLD CKD-EPI 2021: 48 ML/MIN/1.73M*2
EOSINOPHIL # BLD AUTO: 0.12 X10*3/UL (ref 0–0.4)
EOSINOPHIL NFR BLD AUTO: 1.6 %
ERYTHROCYTE [DISTWIDTH] IN BLOOD BY AUTOMATED COUNT: 13 % (ref 11.5–14.5)
ERYTHROCYTE [SEDIMENTATION RATE] IN BLOOD BY WESTERGREN METHOD: 45 MM/H (ref 0–30)
GLUCOSE SERPL-MCNC: 106 MG/DL (ref 74–99)
HCT VFR BLD AUTO: 35.3 % (ref 36–46)
HDLC SERPL-MCNC: 65 MG/DL
HGB BLD-MCNC: 11 G/DL (ref 12–16)
IMM GRANULOCYTES # BLD AUTO: 0.1 X10*3/UL (ref 0–0.5)
IMM GRANULOCYTES NFR BLD AUTO: 1.3 % (ref 0–0.9)
LDLC SERPL CALC-MCNC: 56 MG/DL
LYMPHOCYTES # BLD AUTO: 1.13 X10*3/UL (ref 0.8–3)
LYMPHOCYTES NFR BLD AUTO: 15.2 %
MCH RBC QN AUTO: 31.3 PG (ref 26–34)
MCHC RBC AUTO-ENTMCNC: 31.2 G/DL (ref 32–36)
MCV RBC AUTO: 100 FL (ref 80–100)
MONOCYTES # BLD AUTO: 0.64 X10*3/UL (ref 0.05–0.8)
MONOCYTES NFR BLD AUTO: 8.6 %
NEUTROPHILS # BLD AUTO: 5.38 X10*3/UL (ref 1.6–5.5)
NEUTROPHILS NFR BLD AUTO: 72.8 %
NON HDL CHOLESTEROL: 73 MG/DL (ref 0–149)
NRBC BLD-RTO: 0 /100 WBCS (ref 0–0)
PLATELET # BLD AUTO: 294 X10*3/UL (ref 150–450)
POTASSIUM SERPL-SCNC: 5.3 MMOL/L (ref 3.5–5.3)
PROT SERPL-MCNC: 6.4 G/DL (ref 6.4–8.2)
PROT SERPL-MCNC: 6.5 G/DL (ref 6.4–8.2)
RBC # BLD AUTO: 3.52 X10*6/UL (ref 4–5.2)
SODIUM SERPL-SCNC: 139 MMOL/L (ref 136–145)
TRIGL SERPL-MCNC: 85 MG/DL (ref 0–149)
VLDL: 17 MG/DL (ref 0–40)
WBC # BLD AUTO: 7.4 X10*3/UL (ref 4.4–11.3)

## 2024-09-27 PROCEDURE — 85025 COMPLETE CBC W/AUTO DIFF WBC: CPT

## 2024-09-27 PROCEDURE — 84155 ASSAY OF PROTEIN SERUM: CPT

## 2024-09-27 PROCEDURE — 86334 IMMUNOFIX E-PHORESIS SERUM: CPT

## 2024-09-27 PROCEDURE — 83529 ASAY OF INTERLEUKIN-6 (IL-6): CPT

## 2024-09-27 PROCEDURE — 36415 COLL VENOUS BLD VENIPUNCTURE: CPT

## 2024-09-27 PROCEDURE — 86140 C-REACTIVE PROTEIN: CPT

## 2024-09-27 PROCEDURE — 84165 PROTEIN E-PHORESIS SERUM: CPT

## 2024-09-27 RX ORDER — CAPTOPRIL 50 MG/1
50 TABLET ORAL DAILY
Qty: 90 TABLET | Refills: 0 | Status: SHIPPED | OUTPATIENT
Start: 2024-09-27

## 2024-09-27 RX ORDER — VERAPAMIL HCL 240 MG
240 TABLET, EXTENDED RELEASE ORAL DAILY
Qty: 90 TABLET | Refills: 0 | Status: SHIPPED | OUTPATIENT
Start: 2024-09-27

## 2024-09-30 LAB — IL6 SERPL-MCNC: 35 PG/ML

## 2024-10-01 ENCOUNTER — OFFICE VISIT (OUTPATIENT)
Dept: PRIMARY CARE | Facility: CLINIC | Age: 79
End: 2024-10-01
Payer: MEDICARE

## 2024-10-01 VITALS
WEIGHT: 163 LBS | DIASTOLIC BLOOD PRESSURE: 74 MMHG | BODY MASS INDEX: 27.16 KG/M2 | SYSTOLIC BLOOD PRESSURE: 132 MMHG | TEMPERATURE: 94.6 F | HEIGHT: 65 IN

## 2024-10-01 DIAGNOSIS — E03.9 ACQUIRED HYPOTHYROIDISM: Primary | ICD-10-CM

## 2024-10-01 DIAGNOSIS — F41.9 ANXIETY: ICD-10-CM

## 2024-10-01 DIAGNOSIS — M35.3 POLYMYALGIA RHEUMATICA (MULTI): Primary | ICD-10-CM

## 2024-10-01 LAB
ALBUMIN: 3.3 G/DL (ref 3.4–5)
ALPHA 1 GLOBULIN: 0.5 G/DL (ref 0.2–0.6)
ALPHA 2 GLOBULIN: 0.8 G/DL (ref 0.4–1.1)
BETA GLOBULIN: 0.9 G/DL (ref 0.5–1.2)
GAMMA GLOBULIN: 1 G/DL (ref 0.5–1.4)
IMMUNOFIXATION COMMENT: ABNORMAL
PATH REVIEW - SERUM IMMUNOFIXATION: ABNORMAL
PATH REVIEW-SERUM PROTEIN ELECTROPHORESIS: ABNORMAL
PROTEIN ELECTROPHORESIS COMMENT: ABNORMAL
T4 FREE SERPL-MCNC: 1.14 NG/DL (ref 0.61–1.12)
TSH SERPL-ACNC: 0.02 MIU/L (ref 0.44–3.98)

## 2024-10-01 PROCEDURE — 1123F ACP DISCUSS/DSCN MKR DOCD: CPT | Performed by: FAMILY MEDICINE

## 2024-10-01 PROCEDURE — 3078F DIAST BP <80 MM HG: CPT | Performed by: FAMILY MEDICINE

## 2024-10-01 PROCEDURE — 1160F RVW MEDS BY RX/DR IN RCRD: CPT | Performed by: FAMILY MEDICINE

## 2024-10-01 PROCEDURE — 1125F AMNT PAIN NOTED PAIN PRSNT: CPT | Performed by: FAMILY MEDICINE

## 2024-10-01 PROCEDURE — 3075F SYST BP GE 130 - 139MM HG: CPT | Performed by: FAMILY MEDICINE

## 2024-10-01 PROCEDURE — 99214 OFFICE O/P EST MOD 30 MIN: CPT | Performed by: FAMILY MEDICINE

## 2024-10-01 PROCEDURE — 1159F MED LIST DOCD IN RCRD: CPT | Performed by: FAMILY MEDICINE

## 2024-10-01 RX ORDER — ESCITALOPRAM OXALATE 20 MG/1
20 TABLET ORAL DAILY
Qty: 90 TABLET | Refills: 1 | Status: SHIPPED | OUTPATIENT
Start: 2024-10-01

## 2024-10-01 ASSESSMENT — PAIN SCALES - GENERAL: PAINLEVEL: 4

## 2024-10-01 ASSESSMENT — PATIENT HEALTH QUESTIONNAIRE - PHQ9
SUM OF ALL RESPONSES TO PHQ9 QUESTIONS 1 AND 2: 2
2. FEELING DOWN, DEPRESSED OR HOPELESS: SEVERAL DAYS
10. IF YOU CHECKED OFF ANY PROBLEMS, HOW DIFFICULT HAVE THESE PROBLEMS MADE IT FOR YOU TO DO YOUR WORK, TAKE CARE OF THINGS AT HOME, OR GET ALONG WITH OTHER PEOPLE: NOT DIFFICULT AT ALL
1. LITTLE INTEREST OR PLEASURE IN DOING THINGS: SEVERAL DAYS

## 2024-10-01 NOTE — PROGRESS NOTES
Outpatient Visit Note    Chief Complaint   Patient presents with    Follow-up     6 month f/u       HPI:  Anny Tesfaye is a 79 y.o. female here for her 6-month medication follow-up.    She does follow closely with rheumatology, Dr. Dubose, for polymyalgia rheumatica and osteopenia/osteoporosis.     Sees Dr. Sandhu and Dr. Lovell.      She has hypothyroidism for which she is on 100 mcg of levothyroxine 6 days/week along with [place] Thyroid 30 mg daily.  Denies any heat or cold intolerance, dry skin, hair loss, diarrhea, constipation or unintentional weight changes. thyroid level (has always maintained a TSH between 0.01 and 0.19 with previous PCP).  Will see if we can add TSH level on to recent lab draw.      She is on 20 mg of Lexapro for anxiety. Says this covers her well. No side effects. Has good support. No SI/HI.      She is on 20 mg of simvastatin for hyperlipidemia.  Denies any myalgia or chest pain.  No concerning side effects.     She is on verapamil and captopril for hypertension.  Does not measure blood pressure readings at home but denies any dizziness, headaches, blurry vision, nosebleeds, lower extremity edema or cough.  No side effects. Dr. Dubose check her Bps once monthly as well.      Has had reduced kidney function, I recommended she see nephrology.  Today she states that she saw Dr. De Santiago. Has stage 3 CKD. He checked labs, no change to medicines. Continue to monitor with him every 6 months.     Tells me she does have an aortic aneurysm. Has moderate leakage of aortic valve and moderate dilation of the ascending aorta on recent echo. Might need surgery.         PHQ9/GAD7:         Patient Active Problem List    Diagnosis Date Noted    Low back pain, unspecified 10/12/2023    Primary generalized (osteo)arthritis 10/12/2023    Polymyalgia rheumatica (Multi) 10/12/2023    Acute gout of multiple sites 09/09/2023    Disability of walking 09/09/2023    Drug-induced leukopenia (CMS-HCC)  09/09/2023    Hyperkalemia 09/09/2023    Macrocytosis without anemia 09/09/2023    Morbid (severe) obesity due to excess calories (Multi) 09/09/2023    Osteopenia 09/09/2023    Primary osteoarthritis of both first carpometacarpal joints 09/09/2023    Primary osteoarthritis of both knees 09/09/2023    Secondary hyperparathyroidism (Multi) 09/09/2023    Stage 3 chronic kidney disease (Multi) 09/09/2023    Thrombocytopenia (CMS-[facility]) 09/09/2023    Unspecified kyphosis, thoracic region 09/09/2023    Anxiety 04/17/2023    Heart murmur 04/17/2023    Vitamin B 12 deficiency 10/07/2022    Vitamin D deficiency 10/07/2022    Hypothyroid 04/15/2022    Depression 04/15/2022    Adjustment reaction with anxiety and depression 11/04/2020    Essential hypertension 11/04/2020    Hyperlipemia, mixed 11/04/2020    Edema of extremities 05/18/2020    Chronic GERD 10/17/2019        Past Medical History:   Diagnosis Date    Aortic aneurysm (CMS-HCC)     Chronic low back pain     CKD (chronic kidney disease) stage 3, GFR 30-59 ml/min (Multi)     Esophagitis     Glaucoma     History of scarlet fever     Hypertension     Hypothyroidism     Morbid obesity (Multi)     Nephritis     OA (osteoarthritis) of knee     Bilateral    Polymyalgia rheumatica (Multi)     Psoriasis     Sciatica         Current Medications  Current Outpatient Medications   Medication Instructions    acetaminophen (TYLENOL 8 HOUR) 650 mg, oral, Every 8 hours PRN    alendronate (FOSAMAX) 70 mg, oral, Every 7 days, Take on an empty stomach. Do not lie down or eat for 1/2 hour after taking.    ascorbic acid (Vitamin C) 500 mg tablet oral, Daily    aspirin 324 mg, oral, Daily    calcium carbonate 500 mg calcium (1,250 mg) chewable tablet 1 tablet, oral, Daily    captopril (CAPOTEN) 50 mg, oral, Daily    cholecalciferol (Vitamin D-3) 5,000 Units tablet 1 tablet, oral, Daily    cyanocobalamin (VITAMIN B-12) 100 mcg, oral, Daily    DAILY MULTI-VITAMIN ORAL 1 tablet, oral, Daily     escitalopram (LEXAPRO) 20 mg, oral, Daily    famotidine (PEPCID) 20 mg, oral, Nightly PRN    levothyroxine (SYNTHROID, LEVOXYL) 100 mcg, oral, Daily, Six days per week    lutein 6 mg capsule 1 capsule, oral, Daily    omega 3-dha-epa-fish oil (Fish OiL) 1,000 mg (120 mg-180 mg) capsule 1 capsule, oral, Daily    oxybutynin (DITROPAN) 5 mg, oral, Every other day    oxybutynin XL (DITROPAN-XL) 15 mg, oral, Daily    simvastatin (ZOCOR) 20 mg, oral, Nightly    thyroid (pork) (ARMOUR THYROID) 30 mg, oral, Daily before breakfast    tocilizumab (Actemra) 200 mg/10 mL (20 mg/mL) solution intravenous, Every 28 days, as directed    verapamil SR (CALAN-SR) 240 mg, oral, Daily        Allergies  No Known Allergies     Past Surgical History:   Procedure Laterality Date    ADENOIDECTOMY      CATARACT EXTRACTION  06/2020    CATARACT EXTRACTION Right     CATARACT EXTRACTION Left 03/2021    CROWN  06/2020    DENTAL CROWN REPLACEMENT    CT ANGIO NECK  04/21/2014    CT NECK ANGIO W AND WO IV CONTRAST [facility]    DILATION AND CURETTAGE OF UTERUS      TONSILLECTOMY      WISDOM TOOTH EXTRACTION       Family History   Problem Relation Name Age of Onset    Breast cancer Mother      Osteoporosis Mother      Other (Abdominal Aortic Aneurysm) Mother      Heart failure Father      Hypertension Father      Macular degeneration Father      Heart disease Father      Cancer Sister          Sinus    Breast cancer Maternal Grandmother       Social History     Tobacco Use    Smoking status: Every Day     Current packs/day: 1.00     Average packs/day: 1 pack/day for 40.0 years (40.0 ttl pk-yrs)     Types: Cigarettes     Passive exposure: Current    Smokeless tobacco: Never    Tobacco comments:     Has tried to quit in the past.    Vaping Use    Vaping status: Never Used   Substance Use Topics    Alcohol use: Yes     Alcohol/week: 8.0 standard drinks of alcohol     Types: 7 Shots of liquor, 1 Standard drinks or equivalent per week     Comment:  socailly    Drug use: Never     Tobacco Use: High Risk (10/1/2024)    Patient History     Smoking Tobacco Use: Every Day     Smokeless Tobacco Use: Never     Passive Exposure: Current        ROS  All pertinent positive symptoms are included in the history of present illness.  All other systems have been reviewed and are negative and noncontributory to this patient's current ailments.    VITAL SIGNS  Vitals:    10/01/24 0951   BP: 132/74   Temp: 34.8 °C (94.6 °F)     Vitals:    10/01/24 0951   Weight: 73.9 kg (163 lb)      Body mass index is 27.12 kg/m².     PHYSICAL EXAM  GENERAL APPEARANCE: well nourished, well developed, looks like stated age, in no acute distress, not ill or tired appearing, conversing well.   HEENT: no trauma, normocephalic.   NECK: supple without rigidity, no neck mass was observed.   LUNGS: good chest wall expansion. In no acute respiratory distress.   EXTREMITIES: moving all extremities equally with no edema.   SKIN: normal skin color and pigmentation, without rash.   NEUROLOGIC EXAM: CN II-XII grossly intact, normal gait.   PSYCH: mood and affect appropriate; alert and oriented to time, place, person; no difficulty with speech or language.     Counseling:       Medication education:           Education:  The patient is counseled regarding potential side-effects of all new medications          Understanding:  Patient expressed understanding of information conveyed today          Adherence:  No barriers to adherence identified     Assessment/Plan   Problem List Items Addressed This Visit             ICD-10-CM    Hypothyroid - Primary E03.9    Relevant Orders    Tsh With Reflex To Free T4 If Abnormal    Anxiety F41.9    Relevant Medications    escitalopram (Lexapro) 20 mg tablet       Additional Visit Plans:  I will send refills on your thyroid medicine once I have seen the test result.     Doing well on your other medicines. Talked about heart valve anatomy. Looked at pictures. Keep seeing  your specialists. Blood pressure is good - keep taking your medicine.     Follow up in 6 months or sooner if needed with me.       Patient Care Team:  Filemon Montaño DO as PCP - General (Family Medicine)  Nury Rouse MD as PCP - [employer] PCP  Nury Rouse MD as Primary Care Provider    Filemon Montaño DO   10/01/24   10:34 AM

## 2024-10-01 NOTE — PATIENT INSTRUCTIONS
Problem List Items Addressed This Visit             ICD-10-CM    Hypothyroid - Primary E03.9    Relevant Orders    Tsh With Reflex To Free T4 If Abnormal    Anxiety F41.9    Relevant Medications    escitalopram (Lexapro) 20 mg tablet       Additional Visit Plans:  I will send refills on your thyroid medicine once I have seen the test result.     Doing well on your other medicines. Talked about heart valve anatomy. Looked at pictures. Keep seeing your specialists. Blood pressure is good - keep taking your medicine.     Follow up in 6 months or sooner if needed with me.       Patient Care Team:  Filemon Montaño DO as PCP - General (Family Medicine)  Nury Rouse MD as PCP - [employer] PCP  Nury Rouse MD as Primary Care Provider    Filemon Montaño DO   10/01/24   10:34 AM

## 2024-10-02 ENCOUNTER — HOSPITAL ENCOUNTER (OUTPATIENT)
Dept: RADIOLOGY | Facility: HOSPITAL | Age: 79
Discharge: HOME | End: 2024-10-02
Payer: MEDICARE

## 2024-10-02 DIAGNOSIS — R91.1 SOLITARY PULMONARY NODULE: ICD-10-CM

## 2024-10-02 PROCEDURE — 71250 CT THORAX DX C-: CPT

## 2024-10-04 ENCOUNTER — TELEPHONE (OUTPATIENT)
Dept: PRIMARY CARE | Facility: CLINIC | Age: 79
End: 2024-10-04
Payer: MEDICARE

## 2024-10-04 NOTE — TELEPHONE ENCOUNTER
Anny called as Pre op told her they had concerns about her TSH levels. She was told to call her PCP to review and discuss changing her medication dose.

## 2024-10-07 ENCOUNTER — APPOINTMENT (OUTPATIENT)
Dept: INFUSION THERAPY | Facility: CLINIC | Age: 79
End: 2024-10-07
Payer: MEDICARE

## 2024-10-24 ENCOUNTER — TELEPHONE (OUTPATIENT)
Dept: RHEUMATOLOGY | Facility: CLINIC | Age: 79
End: 2024-10-24
Payer: MEDICARE

## 2024-10-24 NOTE — TELEPHONE ENCOUNTER
Telephone message:     Called patient, no response. I left a message about her elevated inflammatory markers, consistent with the possibility of an eventual flare of PMR in the absence of treatment. I recommend patient follow-up with Dr. Silver, a rheum colleague. I emphasized there is concern for a PMR flare. Patient is also dealing with an aortic aneurysm, and may be hospitalized or seeing another physician. Finally, I recommended patient call the office for appointment. Dr. Dubose

## 2024-10-31 ENCOUNTER — TRANSCRIBE ORDERS (OUTPATIENT)
Dept: INFUSION THERAPY | Facility: CLINIC | Age: 79
End: 2024-10-31
Payer: MEDICARE

## 2024-10-31 DIAGNOSIS — M35.3 POLYMYALGIA RHEUMATICA (MULTI): Primary | ICD-10-CM

## 2024-11-02 ENCOUNTER — HOSPITAL ENCOUNTER (INPATIENT)
Facility: HOSPITAL | Age: 79
End: 2024-11-02
Attending: STUDENT IN AN ORGANIZED HEALTH CARE EDUCATION/TRAINING PROGRAM | Admitting: INTERNAL MEDICINE
Payer: MEDICARE

## 2024-11-02 ENCOUNTER — APPOINTMENT (OUTPATIENT)
Dept: RADIOLOGY | Facility: HOSPITAL | Age: 79
DRG: 286 | End: 2024-11-02
Payer: MEDICARE

## 2024-11-02 ENCOUNTER — HOSPITAL ENCOUNTER (OUTPATIENT)
Dept: CARDIOLOGY | Facility: HOSPITAL | Age: 79
Discharge: HOME | End: 2024-11-02
Payer: MEDICARE

## 2024-11-02 DIAGNOSIS — I48.91 ATRIAL FIBRILLATION, UNSPECIFIED TYPE (MULTI): ICD-10-CM

## 2024-11-02 DIAGNOSIS — I50.9 ACUTE CONGESTIVE HEART FAILURE, UNSPECIFIED HEART FAILURE TYPE: ICD-10-CM

## 2024-11-02 DIAGNOSIS — I50.21 ACUTE SYSTOLIC HEART FAILURE: ICD-10-CM

## 2024-11-02 DIAGNOSIS — R65.20 SEPSIS WITH ACUTE ORGAN DYSFUNCTION, DUE TO UNSPECIFIED ORGANISM, UNSPECIFIED ORGAN DYSFUNCTION TYPE, UNSPECIFIED WHETHER SEPTIC SHOCK PRESENT (MULTI): Primary | ICD-10-CM

## 2024-11-02 DIAGNOSIS — I48.19 ATRIAL FIBRILLATION, PERSISTENT (MULTI): ICD-10-CM

## 2024-11-02 DIAGNOSIS — I50.31 ACUTE DIASTOLIC CHF (CONGESTIVE HEART FAILURE): ICD-10-CM

## 2024-11-02 DIAGNOSIS — R06.09 OTHER FORMS OF DYSPNEA: ICD-10-CM

## 2024-11-02 DIAGNOSIS — R47.1 DYSARTHRIA: ICD-10-CM

## 2024-11-02 DIAGNOSIS — A41.9 SEPSIS WITH ACUTE ORGAN DYSFUNCTION, DUE TO UNSPECIFIED ORGANISM, UNSPECIFIED ORGAN DYSFUNCTION TYPE, UNSPECIFIED WHETHER SEPTIC SHOCK PRESENT (MULTI): Primary | ICD-10-CM

## 2024-11-02 DIAGNOSIS — I50.41 HEART FAILURE, SYSTOLIC AND DIASTOLIC, ACUTE: ICD-10-CM

## 2024-11-02 DIAGNOSIS — R06.02 SOB (SHORTNESS OF BREATH): ICD-10-CM

## 2024-11-02 LAB
ALBUMIN SERPL BCP-MCNC: 2.6 G/DL (ref 3.4–5)
ALP SERPL-CCNC: 66 U/L (ref 33–136)
ALT SERPL W P-5'-P-CCNC: 6 U/L (ref 7–45)
ANION GAP SERPL CALC-SCNC: 10 MMOL/L (ref 10–20)
APPEARANCE UR: CLEAR
AST SERPL W P-5'-P-CCNC: 11 U/L (ref 9–39)
BACTERIA #/AREA URNS AUTO: ABNORMAL /HPF
BASE EXCESS BLDV CALC-SCNC: -6.4 MMOL/L (ref -2–3)
BASOPHILS # BLD AUTO: 0.04 X10*3/UL (ref 0–0.1)
BASOPHILS NFR BLD AUTO: 0.2 %
BILIRUB SERPL-MCNC: 0.5 MG/DL (ref 0–1.2)
BILIRUB UR STRIP.AUTO-MCNC: NEGATIVE MG/DL
BNP SERPL-MCNC: 1021 PG/ML (ref 0–99)
BODY TEMPERATURE: 37 DEGREES CELSIUS
BUN SERPL-MCNC: 26 MG/DL (ref 6–23)
CALCIUM SERPL-MCNC: 7.9 MG/DL (ref 8.6–10.3)
CARDIAC TROPONIN I PNL SERPL HS: 23 NG/L (ref 0–13)
CARDIAC TROPONIN I PNL SERPL HS: 23 NG/L (ref 0–13)
CHLORIDE SERPL-SCNC: 113 MMOL/L (ref 98–107)
CO2 SERPL-SCNC: 23 MMOL/L (ref 21–32)
COLOR UR: ABNORMAL
CREAT SERPL-MCNC: 1.75 MG/DL (ref 0.5–1.05)
EGFRCR SERPLBLD CKD-EPI 2021: 29 ML/MIN/1.73M*2
EOSINOPHIL # BLD AUTO: 0 X10*3/UL (ref 0–0.4)
EOSINOPHIL NFR BLD AUTO: 0 %
ERYTHROCYTE [DISTWIDTH] IN BLOOD BY AUTOMATED COUNT: 17 % (ref 11.5–14.5)
GLUCOSE SERPL-MCNC: 84 MG/DL (ref 74–99)
GLUCOSE UR STRIP.AUTO-MCNC: NORMAL MG/DL
HCO3 BLDV-SCNC: 21 MMOL/L (ref 22–26)
HCT VFR BLD AUTO: 28.1 % (ref 36–46)
HGB BLD-MCNC: 8.5 G/DL (ref 12–16)
HOLD SPECIMEN: NORMAL
IMM GRANULOCYTES # BLD AUTO: 0.19 X10*3/UL (ref 0–0.5)
IMM GRANULOCYTES NFR BLD AUTO: 1 % (ref 0–0.9)
INHALED O2 CONCENTRATION: 21 %
KETONES UR STRIP.AUTO-MCNC: NEGATIVE MG/DL
LACTATE SERPL-SCNC: 0.9 MMOL/L (ref 0.4–2)
LEUKOCYTE ESTERASE UR QL STRIP.AUTO: NEGATIVE
LYMPHOCYTES # BLD AUTO: 1.13 X10*3/UL (ref 0.8–3)
LYMPHOCYTES NFR BLD AUTO: 5.7 %
MAGNESIUM SERPL-MCNC: 1.53 MG/DL (ref 1.6–2.4)
MCH RBC QN AUTO: 28.7 PG (ref 26–34)
MCHC RBC AUTO-ENTMCNC: 30.2 G/DL (ref 32–36)
MCV RBC AUTO: 95 FL (ref 80–100)
MONOCYTES # BLD AUTO: 0.7 X10*3/UL (ref 0.05–0.8)
MONOCYTES NFR BLD AUTO: 3.6 %
NEUTROPHILS # BLD AUTO: 17.64 X10*3/UL (ref 1.6–5.5)
NEUTROPHILS NFR BLD AUTO: 89.5 %
NITRITE UR QL STRIP.AUTO: ABNORMAL
NRBC BLD-RTO: 0 /100 WBCS (ref 0–0)
OXYHGB MFR BLDV: 59.7 % (ref 45–75)
PCO2 BLDV: 48 MM HG (ref 41–51)
PH BLDV: 7.25 PH (ref 7.33–7.43)
PH UR STRIP.AUTO: 5.5 [PH]
PLATELET # BLD AUTO: 289 X10*3/UL (ref 150–450)
PO2 BLDV: 35 MM HG (ref 35–45)
POTASSIUM SERPL-SCNC: 4.4 MMOL/L (ref 3.5–5.3)
PROT SERPL-MCNC: 5.3 G/DL (ref 6.4–8.2)
PROT UR STRIP.AUTO-MCNC: ABNORMAL MG/DL
RBC # BLD AUTO: 2.96 X10*6/UL (ref 4–5.2)
RBC # UR STRIP.AUTO: NEGATIVE /UL
RBC #/AREA URNS AUTO: ABNORMAL /HPF
SAO2 % BLDV: 61 % (ref 45–75)
SODIUM SERPL-SCNC: 142 MMOL/L (ref 136–145)
SP GR UR STRIP.AUTO: 1.03
SQUAMOUS #/AREA URNS AUTO: ABNORMAL /HPF
UROBILINOGEN UR STRIP.AUTO-MCNC: NORMAL MG/DL
WBC # BLD AUTO: 19.7 X10*3/UL (ref 4.4–11.3)
WBC #/AREA URNS AUTO: ABNORMAL /HPF

## 2024-11-02 PROCEDURE — 71275 CT ANGIOGRAPHY CHEST: CPT

## 2024-11-02 PROCEDURE — 87040 BLOOD CULTURE FOR BACTERIA: CPT | Mod: AHULAB | Performed by: STUDENT IN AN ORGANIZED HEALTH CARE EDUCATION/TRAINING PROGRAM

## 2024-11-02 PROCEDURE — 84075 ASSAY ALKALINE PHOSPHATASE: CPT | Performed by: STUDENT IN AN ORGANIZED HEALTH CARE EDUCATION/TRAINING PROGRAM

## 2024-11-02 PROCEDURE — 96366 THER/PROPH/DIAG IV INF ADDON: CPT

## 2024-11-02 PROCEDURE — 36415 COLL VENOUS BLD VENIPUNCTURE: CPT | Performed by: STUDENT IN AN ORGANIZED HEALTH CARE EDUCATION/TRAINING PROGRAM

## 2024-11-02 PROCEDURE — 71045 X-RAY EXAM CHEST 1 VIEW: CPT | Performed by: RADIOLOGY

## 2024-11-02 PROCEDURE — 1100000001 HC PRIVATE ROOM DAILY

## 2024-11-02 PROCEDURE — 96365 THER/PROPH/DIAG IV INF INIT: CPT

## 2024-11-02 PROCEDURE — 71045 X-RAY EXAM CHEST 1 VIEW: CPT

## 2024-11-02 PROCEDURE — 85025 COMPLETE CBC W/AUTO DIFF WBC: CPT | Performed by: STUDENT IN AN ORGANIZED HEALTH CARE EDUCATION/TRAINING PROGRAM

## 2024-11-02 PROCEDURE — 83880 ASSAY OF NATRIURETIC PEPTIDE: CPT | Performed by: STUDENT IN AN ORGANIZED HEALTH CARE EDUCATION/TRAINING PROGRAM

## 2024-11-02 PROCEDURE — 99222 1ST HOSP IP/OBS MODERATE 55: CPT | Performed by: INTERNAL MEDICINE

## 2024-11-02 PROCEDURE — 96367 TX/PROPH/DG ADDL SEQ IV INF: CPT

## 2024-11-02 PROCEDURE — 2500000005 HC RX 250 GENERAL PHARMACY W/O HCPCS: Performed by: INTERNAL MEDICINE

## 2024-11-02 PROCEDURE — 84484 ASSAY OF TROPONIN QUANT: CPT | Performed by: STUDENT IN AN ORGANIZED HEALTH CARE EDUCATION/TRAINING PROGRAM

## 2024-11-02 PROCEDURE — 99285 EMERGENCY DEPT VISIT HI MDM: CPT | Mod: 25

## 2024-11-02 PROCEDURE — 2550000001 HC RX 255 CONTRASTS: Performed by: STUDENT IN AN ORGANIZED HEALTH CARE EDUCATION/TRAINING PROGRAM

## 2024-11-02 PROCEDURE — 81003 URINALYSIS AUTO W/O SCOPE: CPT | Performed by: STUDENT IN AN ORGANIZED HEALTH CARE EDUCATION/TRAINING PROGRAM

## 2024-11-02 PROCEDURE — 87186 SC STD MICRODIL/AGAR DIL: CPT | Mod: AHULAB | Performed by: STUDENT IN AN ORGANIZED HEALTH CARE EDUCATION/TRAINING PROGRAM

## 2024-11-02 PROCEDURE — 2500000004 HC RX 250 GENERAL PHARMACY W/ HCPCS (ALT 636 FOR OP/ED): Performed by: STUDENT IN AN ORGANIZED HEALTH CARE EDUCATION/TRAINING PROGRAM

## 2024-11-02 PROCEDURE — 93005 ELECTROCARDIOGRAM TRACING: CPT

## 2024-11-02 PROCEDURE — 83605 ASSAY OF LACTIC ACID: CPT | Performed by: STUDENT IN AN ORGANIZED HEALTH CARE EDUCATION/TRAINING PROGRAM

## 2024-11-02 PROCEDURE — 70450 CT HEAD/BRAIN W/O DYE: CPT | Performed by: RADIOLOGY

## 2024-11-02 PROCEDURE — 70450 CT HEAD/BRAIN W/O DYE: CPT

## 2024-11-02 PROCEDURE — 82805 BLOOD GASES W/O2 SATURATION: CPT | Performed by: STUDENT IN AN ORGANIZED HEALTH CARE EDUCATION/TRAINING PROGRAM

## 2024-11-02 PROCEDURE — 83735 ASSAY OF MAGNESIUM: CPT | Performed by: STUDENT IN AN ORGANIZED HEALTH CARE EDUCATION/TRAINING PROGRAM

## 2024-11-02 RX ORDER — AMIODARONE HYDROCHLORIDE 200 MG/1
200 TABLET ORAL EVERY MORNING
COMMUNITY

## 2024-11-02 RX ORDER — ALPRAZOLAM 0.25 MG/1
0.25 TABLET ORAL 2 TIMES DAILY PRN
Status: DISCONTINUED | OUTPATIENT
Start: 2024-11-02 | End: 2024-11-12 | Stop reason: HOSPADM

## 2024-11-02 RX ORDER — DOCUSATE SODIUM 100 MG/1
100 CAPSULE, LIQUID FILLED ORAL 2 TIMES DAILY
COMMUNITY

## 2024-11-02 RX ORDER — SENNOSIDES 8.6 MG/1
1 TABLET ORAL 2 TIMES DAILY
COMMUNITY

## 2024-11-02 RX ORDER — OXYCODONE HYDROCHLORIDE 5 MG/1
5 TABLET ORAL EVERY 6 HOURS PRN
COMMUNITY

## 2024-11-02 RX ORDER — VANCOMYCIN 2 GRAM/500 ML IN 0.9 % SODIUM CHLORIDE INTRAVENOUS
2 ONCE
Status: COMPLETED | OUTPATIENT
Start: 2024-11-02 | End: 2024-11-02

## 2024-11-02 RX ORDER — MENTHOL AND ZINC OXIDE .44; 20.625 G/100G; G/100G
1 OINTMENT TOPICAL 2 TIMES DAILY
COMMUNITY

## 2024-11-02 RX ORDER — IBUPROFEN 200 MG
1 TABLET ORAL EVERY 24 HOURS
COMMUNITY

## 2024-11-02 RX ORDER — LANOLIN ALCOHOL/MO/W.PET/CERES
400 CREAM (GRAM) TOPICAL ONCE
Status: DISCONTINUED | OUTPATIENT
Start: 2024-11-02 | End: 2024-11-12 | Stop reason: HOSPADM

## 2024-11-02 RX ORDER — PANTOPRAZOLE SODIUM 40 MG/1
40 TABLET, DELAYED RELEASE ORAL DAILY
COMMUNITY

## 2024-11-02 RX ORDER — ATORVASTATIN CALCIUM 10 MG/1
10 TABLET, FILM COATED ORAL NIGHTLY
COMMUNITY

## 2024-11-02 RX ORDER — POLYETHYLENE GLYCOL 3350 17 G/17G
17 POWDER, FOR SOLUTION ORAL 2 TIMES DAILY PRN
COMMUNITY

## 2024-11-02 RX ORDER — BISACODYL 10 MG/1
10 SUPPOSITORY RECTAL DAILY PRN
COMMUNITY

## 2024-11-02 RX ORDER — ADHESIVE BANDAGE
30 BANDAGE TOPICAL DAILY PRN
COMMUNITY

## 2024-11-02 RX ADMIN — Medication 2 G: at 11:21

## 2024-11-02 RX ADMIN — Medication 5 L/MIN: at 15:14

## 2024-11-02 RX ADMIN — PIPERACILLIN SODIUM AND TAZOBACTAM SODIUM 4.5 G: 4; .5 INJECTION, SOLUTION INTRAVENOUS at 08:15

## 2024-11-02 RX ADMIN — IOHEXOL 75 ML: 350 INJECTION, SOLUTION INTRAVENOUS at 10:57

## 2024-11-02 SDOH — SOCIAL STABILITY: SOCIAL INSECURITY: DO YOU FEEL UNSAFE GOING BACK TO THE PLACE WHERE YOU ARE LIVING?: UNABLE TO ASSESS

## 2024-11-02 SDOH — SOCIAL STABILITY: SOCIAL INSECURITY: HAVE YOU HAD ANY THOUGHTS OF HARMING ANYONE ELSE?: UNABLE TO ASSESS

## 2024-11-02 SDOH — SOCIAL STABILITY: SOCIAL INSECURITY: HAS ANYONE EVER THREATENED TO HURT YOUR FAMILY OR YOUR PETS?: UNABLE TO ASSESS

## 2024-11-02 SDOH — SOCIAL STABILITY: SOCIAL INSECURITY: ARE THERE ANY APPARENT SIGNS OF INJURIES/BEHAVIORS THAT COULD BE RELATED TO ABUSE/NEGLECT?: UNABLE TO ASSESS

## 2024-11-02 SDOH — SOCIAL STABILITY: SOCIAL INSECURITY: DO YOU FEEL ANYONE HAS EXPLOITED OR TAKEN ADVANTAGE OF YOU FINANCIALLY OR OF YOUR PERSONAL PROPERTY?: UNABLE TO ASSESS

## 2024-11-02 SDOH — SOCIAL STABILITY: SOCIAL INSECURITY: ARE YOU OR HAVE YOU BEEN THREATENED OR ABUSED PHYSICALLY, EMOTIONALLY, OR SEXUALLY BY ANYONE?: UNABLE TO ASSESS

## 2024-11-02 SDOH — SOCIAL STABILITY: SOCIAL INSECURITY: ABUSE: ADULT

## 2024-11-02 SDOH — SOCIAL STABILITY: SOCIAL INSECURITY: WERE YOU ABLE TO COMPLETE ALL THE BEHAVIORAL HEALTH SCREENINGS?: NO

## 2024-11-02 SDOH — SOCIAL STABILITY: SOCIAL INSECURITY: HAVE YOU HAD THOUGHTS OF HARMING ANYONE ELSE?: NO

## 2024-11-02 SDOH — SOCIAL STABILITY: SOCIAL INSECURITY: DOES ANYONE TRY TO KEEP YOU FROM HAVING/CONTACTING OTHER FRIENDS OR DOING THINGS OUTSIDE YOUR HOME?: UNABLE TO ASSESS

## 2024-11-02 ASSESSMENT — ACTIVITIES OF DAILY LIVING (ADL)
BATHING: NEEDS ASSISTANCE
WALKS IN HOME: NEEDS ASSISTANCE
TOILETING: NEEDS ASSISTANCE
JUDGMENT_ADEQUATE_SAFELY_COMPLETE_DAILY_ACTIVITIES: YES
GROOMING: NEEDS ASSISTANCE
FEEDING YOURSELF: NEEDS ASSISTANCE
TOILETING: NEEDS ASSISTANCE
HEARING - LEFT EAR: FUNCTIONAL
HEARING - RIGHT EAR: FUNCTIONAL
GROOMING: NEEDS ASSISTANCE
DRESSING YOURSELF: NEEDS ASSISTANCE
HEARING - RIGHT EAR: FUNCTIONAL
WALKS IN HOME: NEEDS ASSISTANCE
JUDGMENT_ADEQUATE_SAFELY_COMPLETE_DAILY_ACTIVITIES: YES
ADEQUATE_TO_COMPLETE_ADL: YES
BATHING: NEEDS ASSISTANCE
PATIENT'S MEMORY ADEQUATE TO SAFELY COMPLETE DAILY ACTIVITIES?: NO
PATIENT'S MEMORY ADEQUATE TO SAFELY COMPLETE DAILY ACTIVITIES?: YES
HEARING - LEFT EAR: FUNCTIONAL
DRESSING YOURSELF: NEEDS ASSISTANCE
FEEDING YOURSELF: NEEDS ASSISTANCE

## 2024-11-02 ASSESSMENT — LIFESTYLE VARIABLES
HOW OFTEN DO YOU HAVE A DRINK CONTAINING ALCOHOL: 4 OR MORE TIMES A WEEK
AUDIT TOTAL SCORE: 9
SKIP TO QUESTIONS 9-10: 0
PRESCIPTION_ABUSE_PAST_12_MONTHS: NO
AUDIT-C TOTAL SCORE: 8
HOW OFTEN DO YOU HAVE 6 OR MORE DRINKS ON ONE OCCASION: NEVER
HOW MANY STANDARD DRINKS CONTAINING ALCOHOL DO YOU HAVE ON A TYPICAL DAY: 10 OR MORE
HAS A RELATIVE, FRIEND, DOCTOR, OR ANOTHER HEALTH PROFESSIONAL EXPRESSED CONCERN ABOUT YOUR DRINKING OR SUGGESTED YOU CUT DOWN: NO
HOW OFTEN DURING THE LAST YEAR HAVE YOU FAILED TO DO WHAT WAS NORMALLY EXPECTED FROM YOU BECAUSE OF DRINKING: NEVER
SUBSTANCE_ABUSE_PAST_12_MONTHS: NO
HOW OFTEN DURING THE LAST YEAR HAVE YOU HAD A FEELING OF GUILT OR REMORSE AFTER DRINKING: NEVER
AUDIT-C TOTAL SCORE: 8
HAVE YOU OR SOMEONE ELSE BEEN INJURED AS A RESULT OF YOUR DRINKING: NO
HOW OFTEN DURING THE LAST YEAR HAVE YOU FOUND THAT YOU WERE NOT ABLE TO STOP DRINKING ONCE YOU HAD STARTED: LESS THAN MONTHLY
HOW OFTEN DURING THE LAST YEAR HAVE YOU BEEN UNABLE TO REMEMBER WHAT HAPPENED THE NIGHT BEFORE BECAUSE YOU HAD BEEN DRINKING: NEVER
HOW OFTEN DURING THE LAST YEAR HAVE YOU NEEDED AN ALCOHOLIC DRINK FIRST THING IN THE MORNING TO GET YOURSELF GOING AFTER A NIGHT OF HEAVY DRINKING: NEVER
AUDIT TOTAL SCORE: 1

## 2024-11-02 ASSESSMENT — PAIN SCALES - GENERAL
PAINLEVEL_OUTOF10: 0 - NO PAIN

## 2024-11-02 ASSESSMENT — COLUMBIA-SUICIDE SEVERITY RATING SCALE - C-SSRS
2. HAVE YOU ACTUALLY HAD ANY THOUGHTS OF KILLING YOURSELF?: NO
1. IN THE PAST MONTH, HAVE YOU WISHED YOU WERE DEAD OR WISHED YOU COULD GO TO SLEEP AND NOT WAKE UP?: NO
6. HAVE YOU EVER DONE ANYTHING, STARTED TO DO ANYTHING, OR PREPARED TO DO ANYTHING TO END YOUR LIFE?: NO

## 2024-11-02 ASSESSMENT — COGNITIVE AND FUNCTIONAL STATUS - GENERAL: PATIENT BASELINE BEDBOUND: YES

## 2024-11-02 ASSESSMENT — PAIN - FUNCTIONAL ASSESSMENT: PAIN_FUNCTIONAL_ASSESSMENT: 0-10

## 2024-11-02 ASSESSMENT — ENCOUNTER SYMPTOMS: ALTERED MENTAL STATUS: 1

## 2024-11-02 NOTE — ED TRIAGE NOTES
Pt from facility for change in mental status. Facility unaware  of normal mental status. Pt had recent abdominal surgery and has colostomy. Unaware of any other medial history. Pt on nonrebreather upon arrival. Per niece now at bedside, pt had recent triple a 3 weeks ago and they were concerned about infection, new diagnosis of afib and pt was having trouble breathing. Pt aox4 upon assessment

## 2024-11-02 NOTE — Clinical Note
Patient Clipped and Prepped: left groin and right brachial. Prepped with Betadine and draped in sterile fashion.

## 2024-11-02 NOTE — H&P
Anny Tesfaye is a 79 y.o. female   Altered Mental Status     Patient with a past medical history of diverticular disease/ischemic colitis complicated by transmittals of peritoneal inflammation/necrosis s/p colectomy, hypothyroidism dyslipidemia hypertension history of atrial fibrillation malnutrition sent in from nursing facility with increasing confusion shortness of breath and weakness  Workup in the emergency room suggests pneumonia along with fluid overload    Past Medical History  Past Medical History:   Diagnosis Date    Aortic aneurysm (CMS-HCC)     Chronic low back pain     CKD (chronic kidney disease) stage 3, GFR 30-59 ml/min (Multi)     Esophagitis     Glaucoma     History of scarlet fever     Hypertension     Hypothyroidism     Morbid obesity (Multi)     Nephritis     OA (osteoarthritis) of knee     Bilateral    Polymyalgia rheumatica (Multi)     Psoriasis     Sciatica        Surgical History  Past Surgical History:   Procedure Laterality Date    ADENOIDECTOMY      CATARACT EXTRACTION  06/2020    CATARACT EXTRACTION Right     CATARACT EXTRACTION Left 03/2021    CROWN  06/2020    DENTAL CROWN REPLACEMENT    CT ANGIO NECK  04/21/2014    CT NECK ANGIO W AND WO IV CONTRAST [facility]    DILATION AND CURETTAGE OF UTERUS      TONSILLECTOMY      WISDOM TOOTH EXTRACTION          Social History  She reports that she has been smoking cigarettes. She has a 40 pack-year smoking history. She has been exposed to tobacco smoke. She has never used smokeless tobacco. She reports current alcohol use of about 8.0 standard drinks of alcohol per week. She reports that she does not use drugs.    Family History  Family History   Problem Relation Name Age of Onset    Breast cancer Mother      Osteoporosis Mother      Other (Abdominal Aortic Aneurysm) Mother      Heart failure Father      Hypertension Father      Macular degeneration Father      Heart disease Father      Cancer Sister          Sinus    Breast  cancer Maternal Grandmother          Allergies  Patient has no known allergies.    Review of Systems     Constitutional: Feeling poorly  Eyes: no blurred vision and no diplopia.   ENT: no hearing loss, no tinnitus, no earache, no sore throat, no hoarseness and no swollen glands in the neck.   Cardiovascular: no chest pain, no tightness or heavy pressure,  Respiratory: Shortness of breath  Gastrointestinal: no change in bowel habits, no diarrhea, no constipation, no bloody stools, no nausea, no vomiting, no abdominal pain, no signs and symptoms of ulcer disease, no vikas colored stools and no intolerance to fatty foods.   Genitourinary: no urinary frequency, no dysuria, no hematuria, no burning sensation during urination, urinary stream is not smaller and urinary stream does not start and stop.   Musculoskeletal: no arthralgias, no joint stiffness, no muscle weakness, no back pain and no difficulty walking.   Skin: no rashes, no change in skin color and pigmentation, no skin lesions and no skin lumps.   Neurological: no headaches, no dizziness, no seizures, no tingling, no numbness, no signs and symptoms of stroke and no limb weakness.   Psychiatric: Confusion  Endocrine: no goiter, no thyroid disorder, no diabetes mellitus, no excessive thirst, no dry skin, no cold intolerance, no heat intolerance and no increased urinary frequency.   Hematologic/Lymphatic: is not slow to heal, does not bleed easily, does not bruise easily, no thrombophlebitis, no anemia and no history of blood transfusion.   All other systems have been reviewed and are negative for complaint.     Vitals:    11/02/24 1514   BP:    Pulse:    Resp:    Temp:    SpO2: 96%        Scheduled medications  magnesium oxide, 400 mg, oral, Once  oxygen, , inhalation, Continuous - Inhalation      Continuous medications     PRN medications      Results from last 7 days   Lab Units 11/02/24  0811   WBC AUTO x10*3/uL 19.7*   HEMOGLOBIN g/dL 8.5*   HEMATOCRIT % 28.1*    PLATELETS AUTO x10*3/uL 289     Results from last 7 days   Lab Units 11/02/24  0944   SODIUM mmol/L 142   POTASSIUM mmol/L 4.4   CHLORIDE mmol/L 113*   CO2 mmol/L 23   BUN mg/dL 26*   CREATININE mg/dL 1.75*   CALCIUM mg/dL 7.9*   PROTEIN TOTAL g/dL 5.3*   BILIRUBIN TOTAL mg/dL 0.5   ALK PHOS U/L 66   ALT U/L 6*   AST U/L 11   GLUCOSE mg/dL 84     Results from last 7 days   Lab Units 11/02/24  0944 11/02/24  0811   TROPHS ng/L 23* 23*        CT head wo IV contrast   Final Result   1. Age related changes without acute intracranial process.   2. Sinus disease. Also nonspecific fluid within left mastoid cells   and the left middle ear cavity; correlate clinically.        Signed by: Francisco Carlson 11/2/2024 11:47 AM   Dictation workstation:   [id number]      CT angio chest abdomen pelvis   Final Result   CHEST:   1.  4.4 x 4.2 cm ascending aortic aneurysm. No dissection.   2. No pulmonary embolism.   3. Moderate size right and small left pleural effusions.   Infiltrates or compressive atelectasis of the right upper middle and   lower lobes and left upper and lower lobes. The volume loss is most   marked in the right lower lobe.   4. Anasarca/hypoproteinemia.        ABDOMEN AND PELVIS:   1.  4.9 x 4.7 cm infrarenal abdominal aortic aneurysm. There is a   distal abdominal aortic aneurysm intravascular bi-iliac artery stent.   There is a femoral-femoral bypass graft with flow   2. 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   3. Anasarca/hypoproteinemia. In particular, there is induration of   the left lateral abdominal wall and left side of the back which could   represent more focal edema or contusion. It is not known if the   patient fell.   4. Possible appendectomy.   5. Presacral edema.   6. Sludge in an otherwise unremarkable gallbladder.        MACRO:   None        Signed by: Georgia Sanchez 11/2/2024 11:51 AM   Dictation workstation:   [id number]      XR chest 1  view   Final Result   Right basilar pneumonia. Small right pleural effusion.        MACRO:   None        Signed by: Georgiahailey Sanchez 11/2/2024 10:03 AM   Dictation workstation:   [id number]          Physical Exam      Constitutional   General appearance: Alert   Eyes   Inspection of eyes: Sclera and conjunctiva were normal.    Pupil exam: Pupils were equal in size. Extraocular movements were intact.   Pulmonary   Respiratory assessment: No respiratory distress, normal respiratory rhythm and effort.    Auscultation of Lungs: Decreased breath sounds  Cardiovascular   Auscultation of heart: Irregularly irregular rhythm  Exam for edema: Edema  Abdomen   Abdominal Exam: No bruits, normal bowel sounds, soft, non-tender, no abdominal mass palpated.    Liver and Spleen exam: No hepato-splenomegaly.   Musculoskeletal   Examination of gait: Normal.    Inspection of digits and nails: No clubbing or cyanosis of the fingernails.    Inspection/palpation of joints, bones and muscles: No joint swelling. Normal movement of all extremities.   Skin   Skin inspection: Normal skin color and pigmentation, normal skin turgor and no visible rash.   Neurologic   Cranial nerves: Nerves 2-12 were intact, no focal neuro defects.   Psychiatric   Orientation: Oriented to person, place, and time.    Mood and affect: Normal.      Assessment/Plan      #Community-acquired pneumonia # sepsis with leukocytosis  #Bilateral pleural effusions  #Hypoxia  Started IV antibiotics  DuoNebs  Check procalcitonin  Repeat x-rays on Monday and if effusion persists might need a thoracentesis    #Congestive heart failure  Will start IV Lasix  Monitor daily weights and electrolytes    #Paroxysmal fibrillation  Continue amiodarone and DOAC

## 2024-11-02 NOTE — ED NOTES
Pt from facility for change in mental status. Facility unaware  of normal mental status. Pt had recent abdominal surgery and has colostomy. Unaware of any other medial history. Pt on nonrebreather upon arrival. Per niece now at bedside, pt had recent triple a 3 weeks ago and they were concerned about infection, new diagnosis of afib and pt was having trouble breathing. Pt aox4 upon assessment     Tatiana Boyer RN  11/02/24 0732       Tatiana Boyer RN  11/02/24 0744

## 2024-11-02 NOTE — PROGRESS NOTES
Pharmacy Medication History Review    The following updates were made to the Prior to Admission medication list:     Source of Information:     Medications ADDED:   Calmoseptine  Nicotine  L-methylfolate  Miralax  Amiodarone  Pantoprazole  Senna  Colace  Eliquis  Oxycodone  Atorvastatin   Medications CHANGED:  Oxybutinin- normally is xl 15mg and xl 5mg alternating days but facility is doing xl 10mg daily  Aspirin is 81mg daily not 325mg  Medications REMOVED:   D3  Famotidine  Fish oil  Medications NOT TAKING:   The facility med list doesn't have the following on it:  Fosamax  Captopril   Simvastatin ( facility using lipitor )   Verapamil     Allergy reviewed : N/A    Meds 2 Beds : N/A    Comments: Per med list from facility      Anny Tesfaye is a 79 y.o. female admitted for No Principal Problem: There is no principal problem currently on the Problem List. Please update the Problem List and refresh.. Pharmacy reviewed the patient's [id number] medications and allergies for accuracy.    The list below reflectives the updated PTA list. Please review each medication in order reconciliation for additional clarification and justification.       The list below reflectives the updated allergy list. Please review each documented allergy for additional clarification and justification.  Allergies  Reviewed by Filemon Montaño DO on 10/1/2024   No Known Allergies         Below are additional concerns with the patient's PTA list.  Prior to Admission Medications   Prescriptions Last Dose Informant   DAILY MULTI-VITAMIN ORAL     Sig: Take 1 tablet by mouth once daily.   acetaminophen (Tylenol 8 HOUR) 650 mg ER tablet     Sig: Take 1 tablet (650 mg) by mouth every 8 hours if needed.           amiodarone (Pacerone) 200 mg tablet     Sig: Take 1 tablet (200 mg) by mouth once daily in the morning.   apixaban (Eliquis) 2.5 mg tablet     Sig: Take 1 tablet (2.5 mg) by mouth 2 times a day.   ascorbic acid (Vitamin C) 500 mg  tablet     Sig: Take 1 tablet (500 mg) by mouth once daily.   aspirin 81 mg EC tablet     Sig: Take 1 tablet (81 mg) by mouth once daily.   atorvastatin (Lipitor) 10 mg tablet     Sig: Take 1 tablet (10 mg) by mouth once daily at bedtime.   bisacodyl (Dulcolax, bisacodyl,) 10 mg suppository     Sig: Insert 1 suppository (10 mg) into the rectum once daily as needed for constipation.   calcium carbonate 500 mg calcium (1,250 mg) chewable tablet     Sig: Chew 1 tablet (1,250 mg) once daily.           cyanocobalamin, vitamin B-12, 5,000 mcg capsule     Sig: Take 5,000 mcg by mouth once daily.   docusate sodium (Colace) 100 mg capsule     Sig: Take 1 capsule (100 mg) by mouth 2 times a day.   escitalopram (Lexapro) 20 mg tablet     Sig: Take 1 tablet (20 mg) by mouth once daily.   levomefolate calcium (L-METHYLFOLATE ORAL)     Sig: Take 1,000 mcg by mouth once daily in the morning.   levothyroxine (Synthroid, Levoxyl) 100 mcg tablet     Sig: TAKE 1 TABLET (100 MCG) BY MOUTH ONCE DAILY. SIX DAYS PER WEEK   lutein 6 mg capsule     Sig: Take 1 capsule by mouth once daily.   magnesium hydroxide (Milk of Magnesia) 400 mg/5 mL suspension     Sig: Take 30 mL by mouth once daily as needed for constipation (if no BM in 72 hours).   menthol-zinc oxide (Calmoseptine) 0.44-20.6 % ointment     Sig: Apply 1 Application topically 2 times a day.   nicotine (Nicoderm CQ) 21 mg/24 hr patch     Sig: Place 1 patch on the skin once every 24 hours.   oxyCODONE (Roxicodone) 5 mg immediate release tablet     Sig: Take 1 tablet (5 mg) by mouth every 6 hours if needed for severe pain (7 - 10).   oxybutynin XL (Ditropan-XL) 10 mg 24 hr tablet     Sig: Take 1 tablet (10 mg) by mouth once daily.   pantoprazole (ProtoNix) 40 mg EC tablet     Sig: Take 1 tablet (40 mg) by mouth once daily. Do not crush, chew, or split.   polyethylene glycol (Glycolax, Miralax) 17 gram packet     Sig: Take 17 g by mouth 2 times a day as needed (for firm or low ostomy  output).   sennosides (Senokot) 8.6 mg tablet     Sig: Take 1 tablet (8.6 mg) by mouth 2 times a day.           sodium phosphates (Fleets) 19-7 gram/118 mL enema enema     Sig: Insert 133 mL (1 enema) into the rectum once daily as needed for constipation.   thyroid, pork, ([place] Thyroid) 30 mg tablet     Sig: Take 1 tablet (30 mg) by mouth once daily in the morning. Take before meals.                      Facility-Administered Medications: None        Ceci Lincoln

## 2024-11-02 NOTE — Clinical Note
S/p right thoracentesis, 700ml removed. Patient tolerated well. VSS, no complaints of pain or SOB at this time. Cxray ordered post procedure. Report called to bedside RN

## 2024-11-02 NOTE — PROGRESS NOTES
Brief Progress Note    The patient Anny Tesfaye is critically ill and requires cross-sectional imaging with intravenous contrast. The potential risk of clinically significant kidney injury as a consequence of contrast, rather than their underlying etiology, is outweighed by the very real potential benefit of discovering an actionable abnormality. According to recent literature co-authored by the American College of Radiology and the National Kid[employer] Foundation (PMID: 32628217), the risk of BALBIR in patients with baseline eGFR of <30mL/min/1.73m2 is 0-17%, and less than 2% in patients with higher eGFR. My clinical judgment suggests that Anny Tesfaye is at higher risk of harm from delaying her diagnosis and subsequent therapy.     Modern ISOosmolar CT contrast media, used in the ED, is not thought to cause any change in creatinine or kidney injury at this time. Benefits of accurate and timely diagnosis of a potentially debilitating medical condition outweigh the theoretical risks of transient rise in creatinine. Transient rises in creatinine have not been causally linked to administration of modern intravenous contrast media, nor has administration of said contrast been shown to be tied to a patient-centered outcome such as need for dialysis in any known literature      Quang Erazo MD, MD  [unfilled]  10:43 AM

## 2024-11-03 VITALS
HEART RATE: 110 BPM | SYSTOLIC BLOOD PRESSURE: 88 MMHG | DIASTOLIC BLOOD PRESSURE: 56 MMHG | OXYGEN SATURATION: 95 % | RESPIRATION RATE: 17 BRPM | TEMPERATURE: 98.1 F

## 2024-11-03 LAB
BACTERIA BLD CULT: NORMAL
BACTERIA BLD CULT: NORMAL
HOLD SPECIMEN: NORMAL

## 2024-11-03 PROCEDURE — 1100000001 HC PRIVATE ROOM DAILY

## 2024-11-03 PROCEDURE — 2500000001 HC RX 250 WO HCPCS SELF ADMINISTERED DRUGS (ALT 637 FOR MEDICARE OP): Performed by: INTERNAL MEDICINE

## 2024-11-03 PROCEDURE — 2500000005 HC RX 250 GENERAL PHARMACY W/O HCPCS: Performed by: INTERNAL MEDICINE

## 2024-11-03 PROCEDURE — 99232 SBSQ HOSP IP/OBS MODERATE 35: CPT | Performed by: INTERNAL MEDICINE

## 2024-11-03 RX ORDER — ALUMINUM HYDROXIDE, MAGNESIUM HYDROXIDE, AND SIMETHICONE 1200; 120; 1200 MG/30ML; MG/30ML; MG/30ML
20 SUSPENSION ORAL 4 TIMES DAILY PRN
Status: DISCONTINUED | OUTPATIENT
Start: 2024-11-03 | End: 2024-11-12 | Stop reason: HOSPADM

## 2024-11-03 RX ORDER — TALC
3 POWDER (GRAM) TOPICAL NIGHTLY PRN
Status: DISCONTINUED | OUTPATIENT
Start: 2024-11-03 | End: 2024-11-12 | Stop reason: HOSPADM

## 2024-11-03 RX ORDER — PANTOPRAZOLE SODIUM 40 MG/1
40 TABLET, DELAYED RELEASE ORAL
Status: DISCONTINUED | OUTPATIENT
Start: 2024-11-03 | End: 2024-11-04 | Stop reason: SDUPTHER

## 2024-11-03 RX ORDER — TRAMADOL HYDROCHLORIDE 50 MG/1
50 TABLET ORAL EVERY 6 HOURS PRN
Status: DISCONTINUED | OUTPATIENT
Start: 2024-11-03 | End: 2024-11-12 | Stop reason: HOSPADM

## 2024-11-03 RX ORDER — ACETAMINOPHEN 325 MG/1
650 TABLET ORAL EVERY 6 HOURS PRN
Status: DISCONTINUED | OUTPATIENT
Start: 2024-11-03 | End: 2024-11-04 | Stop reason: SDUPTHER

## 2024-11-03 RX ORDER — ONDANSETRON HYDROCHLORIDE 2 MG/ML
4 INJECTION, SOLUTION INTRAVENOUS EVERY 6 HOURS PRN
Status: DISCONTINUED | OUTPATIENT
Start: 2024-11-03 | End: 2024-11-04 | Stop reason: SDUPTHER

## 2024-11-03 RX ADMIN — ACETAMINOPHEN 650 MG: 325 TABLET, FILM COATED ORAL at 23:18

## 2024-11-03 RX ADMIN — PANTOPRAZOLE SODIUM 40 MG: 40 TABLET, DELAYED RELEASE ORAL at 08:22

## 2024-11-03 RX ADMIN — Medication 4 L/MIN: at 08:28

## 2024-11-03 RX ADMIN — ALPRAZOLAM 0.25 MG: 0.25 TABLET ORAL at 01:20

## 2024-11-03 ASSESSMENT — COGNITIVE AND FUNCTIONAL STATUS - GENERAL
TOILETING: TOTAL
MOVING TO AND FROM BED TO CHAIR: TOTAL
TURNING FROM BACK TO SIDE WHILE IN FLAT BAD: A LOT
WALKING IN HOSPITAL ROOM: TOTAL
PERSONAL GROOMING: A LOT
MOVING FROM LYING ON BACK TO SITTING ON SIDE OF FLAT BED WITH BEDRAILS: A LOT
EATING MEALS: A LOT
CLIMB 3 TO 5 STEPS WITH RAILING: TOTAL
DAILY ACTIVITIY SCORE: 11
DRESSING REGULAR LOWER BODY CLOTHING: A LOT
DRESSING REGULAR UPPER BODY CLOTHING: A LOT
STANDING UP FROM CHAIR USING ARMS: TOTAL
HELP NEEDED FOR BATHING: A LOT
MOBILITY SCORE: 8

## 2024-11-03 ASSESSMENT — PAIN - FUNCTIONAL ASSESSMENT: PAIN_FUNCTIONAL_ASSESSMENT: 0-10

## 2024-11-03 ASSESSMENT — PAIN SCALES - GENERAL
PAINLEVEL_OUTOF10: 0 - NO PAIN
PAINLEVEL_OUTOF10: 0 - NO PAIN
PAINLEVEL_OUTOF10: 4

## 2024-11-03 ASSESSMENT — PAIN DESCRIPTION - DESCRIPTORS: DESCRIPTORS: ACHING

## 2024-11-03 NOTE — PROGRESS NOTES
Anny Tesfaye is a 79 y.o. female     Patient feels a bit better today  Denies any nausea shortness of breath or cough    Review of Systems     Constitutional: no fever, no chills, not feeling poorly, not feeling tired   Cardiovascular: no chest pain   Respiratory: no cough, wheezing or shortness of breath a  Gastrointestinal: no abdominal pain, no constipation, no melena, no nausea, no diarrhea, no vomiting and no blood in stools.   Neurological: no headache,   All other systems have been reviewed and are negative for complaint.       Vitals:    11/03/24 1207   BP: 101/63   Pulse: 71   Resp: 16   Temp: 36.4 °C (97.6 °F)   SpO2: 98%        Scheduled medications  magnesium oxide, 400 mg, oral, Once  oxygen, , inhalation, Continuous - Inhalation  pantoprazole, 40 mg, oral, Daily before breakfast      Continuous medications     PRN medications  PRN medications: acetaminophen, ALPRAZolam, alum-mag hydroxide-simeth, melatonin, ondansetron, traMADol    Lab Review   Results from last 7 days   Lab Units 11/02/24  0811   WBC AUTO x10*3/uL 19.7*   HEMOGLOBIN g/dL 8.5*   HEMATOCRIT % 28.1*   PLATELETS AUTO x10*3/uL 289     Results from last 7 days   Lab Units 11/02/24  0944   SODIUM mmol/L 142   POTASSIUM mmol/L 4.4   CHLORIDE mmol/L 113*   CO2 mmol/L 23   BUN mg/dL 26*   CREATININE mg/dL 1.75*   CALCIUM mg/dL 7.9*   PROTEIN TOTAL g/dL 5.3*   BILIRUBIN TOTAL mg/dL 0.5   ALK PHOS U/L 66   ALT U/L 6*   AST U/L 11   GLUCOSE mg/dL 84     Results from last 7 days   Lab Units 11/02/24  0944 11/02/24  0811   TROPHS ng/L 23* 23*        CT head wo IV contrast   Final Result   1. Age related changes without acute intracranial process.   2. Sinus disease. Also nonspecific fluid within left mastoid cells   and the left middle ear cavity; correlate clinically.        Signed by: Francisco Carlson 11/2/2024 11:47 AM   Dictation workstation:   [id number]      CT angio chest abdomen pelvis   Final Result   CHEST:   1.  4.4 x 4.2 cm  ascending aortic aneurysm. No dissection.   2. No pulmonary embolism.   3. Moderate size right and small left pleural effusions.   Infiltrates or compressive atelectasis of the right upper middle and   lower lobes and left upper and lower lobes. The volume loss is most   marked in the right lower lobe.   4. Anasarca/hypoproteinemia.        ABDOMEN AND PELVIS:   1.  4.9 x 4.7 cm infrarenal abdominal aortic aneurysm. There is a   distal abdominal aortic aneurysm intravascular bi-iliac artery stent.   There is a femoral-femoral bypass graft with flow   2. 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   3. Anasarca/hypoproteinemia. In particular, there is induration of   the left lateral abdominal wall and left side of the back which could   represent more focal edema or contusion. It is not known if the   patient fell.   4. Possible appendectomy.   5. Presacral edema.   6. Sludge in an otherwise unremarkable gallbladder.        MACRO:   None        Signed by: Georgia Sanchez 11/2/2024 11:51 AM   Dictation workstation:   [id number]      XR chest 1 view   Final Result   Right basilar pneumonia. Small right pleural effusion.        MACRO:   None        Signed by: Georgia Sanchez 11/2/2024 10:03 AM   Dictation workstation:   [id number]            Physical Exam    Constitutional   General appearance: Alert    Pulmonary   Respiratory assessment: No respiratory distress, normal respiratory rhythm and effort.    Auscultation of Lungs: Decreased breath sounds in the bases  Cardiovascular   Auscultation of heart: Irregularly irregular rhythm  Exam for edema: Plus edema  Abdomen   Abdominal Exam: No bruits, normal bowel sounds, soft, non-tender, no abdominal mass palpated.    Liver and Spleen exam: No hepato-splenomegaly.   Musculoskeletal     Inspection of digits and nails: No clubbing or cyanosis of the fingernails.    Inspection/palpation of joints, bones and muscles: No joint  swelling. Normal movement of all extremities.   Neurologic   Cranial nerves: Nerves 2-12 were intact, no focal neuro defects.         Assessment/Plan      #Community-acquired pneumonia # sepsis with leukocytosis  #Bilateral pleural effusions  #Hypoxia  Continue IV antibiotics  DuoNebs  Check procalcitonin  Repeat x-rays on Monday and if effusion persists might need a thoracentesis     #Congestive heart failure  Continue IV Lasix  We will check an echocardiogram  Monitor daily weights and electrolytes     #Paroxysmal fibrillation  Continue amiodarone and DOAC

## 2024-11-04 ENCOUNTER — APPOINTMENT (OUTPATIENT)
Dept: RADIOLOGY | Facility: HOSPITAL | Age: 79
DRG: 286 | End: 2024-11-04
Payer: MEDICARE

## 2024-11-04 ENCOUNTER — APPOINTMENT (OUTPATIENT)
Dept: CARDIOLOGY | Facility: HOSPITAL | Age: 79
End: 2024-11-04
Payer: MEDICARE

## 2024-11-04 LAB
ANION GAP SERPL CALC-SCNC: 10 MMOL/L (ref 10–20)
AORTIC VALVE MEAN GRADIENT: 5 MMHG
AORTIC VALVE PEAK VELOCITY: 1.47 M/S
APPEARANCE UR: CLEAR
AV PEAK GRADIENT: 9 MMHG
AVA (PEAK VEL): 2.7 CM2
AVA (VTI): 2.63 CM2
BASOPHILS NFR FLD MANUAL: 0 %
BILIRUB UR STRIP.AUTO-MCNC: NEGATIVE MG/DL
BLASTS NFR FLD MANUAL: 0 %
BUN SERPL-MCNC: 33 MG/DL (ref 6–23)
CALCIUM SERPL-MCNC: 8.2 MG/DL (ref 8.6–10.3)
CHLORIDE SERPL-SCNC: 113 MMOL/L (ref 98–107)
CLARITY FLD: ABNORMAL
CO2 SERPL-SCNC: 23 MMOL/L (ref 21–32)
COLOR FLD: ABNORMAL
COLOR UR: YELLOW
CREAT SERPL-MCNC: 2.05 MG/DL (ref 0.5–1.05)
EGFRCR SERPLBLD CKD-EPI 2021: 24 ML/MIN/1.73M*2
EJECTION FRACTION APICAL 4 CHAMBER: 45.4
EJECTION FRACTION: 33 %
EOSINOPHIL NFR FLD MANUAL: 0 %
ERYTHROCYTE [DISTWIDTH] IN BLOOD BY AUTOMATED COUNT: 16.9 % (ref 11.5–14.5)
FERRITIN SERPL-MCNC: 287 NG/ML (ref 8–150)
FOLATE SERPL-MCNC: 6.3 NG/ML
GLUCOSE SERPL-MCNC: 91 MG/DL (ref 74–99)
GLUCOSE UR STRIP.AUTO-MCNC: NORMAL MG/DL
HCT VFR BLD AUTO: 25.2 % (ref 36–46)
HGB BLD-MCNC: 7.3 G/DL (ref 12–16)
IMMATURE GRANULOCYTES IN FLUID: 0 %
INR PPP: 1.3 (ref 0.9–1.1)
IRON SATN MFR SERPL: 16 % (ref 25–45)
IRON SERPL-MCNC: 25 UG/DL (ref 35–150)
KETONES UR STRIP.AUTO-MCNC: NEGATIVE MG/DL
LDH SERPL L TO P-CCNC: 122 U/L (ref 84–246)
LEFT ATRIUM VOLUME AREA LENGTH INDEX BSA: 48 ML/M2
LEFT VENTRICLE INTERNAL DIMENSION DIASTOLE: 4.93 CM (ref 3.5–6)
LEFT VENTRICULAR OUTFLOW TRACT DIAMETER: 2.18 CM
LEUKOCYTE ESTERASE UR QL STRIP.AUTO: NEGATIVE
LYMPHOCYTES NFR FLD MANUAL: 5 %
MCH RBC QN AUTO: 29.9 PG (ref 26–34)
MCHC RBC AUTO-ENTMCNC: 29 G/DL (ref 32–36)
MCV RBC AUTO: 103 FL (ref 80–100)
MONOS+MACROS NFR FLD MANUAL: 83 %
MUCOUS THREADS #/AREA URNS AUTO: ABNORMAL /LPF
NEUTROPHILS NFR FLD MANUAL: 12 %
NITRITE UR QL STRIP.AUTO: ABNORMAL
NRBC BLD-RTO: 0 /100 WBCS (ref 0–0)
OTHER CELLS NFR FLD MANUAL: 0 %
PH UR STRIP.AUTO: 5.5 [PH]
PLASMA CELLS NFR FLD MANUAL: 0 %
PLATELET # BLD AUTO: 216 X10*3/UL (ref 150–450)
POTASSIUM SERPL-SCNC: 4.9 MMOL/L (ref 3.5–5.3)
PROCALCITONIN SERPL-MCNC: 0.22 NG/ML
PROT SERPL-MCNC: 5.9 G/DL (ref 6.4–8.2)
PROT UR STRIP.AUTO-MCNC: ABNORMAL MG/DL
PROTHROMBIN TIME: 14.1 SECONDS (ref 9.8–12.8)
RBC # BLD AUTO: 2.44 X10*6/UL (ref 4–5.2)
RBC # FLD AUTO: 4000 /UL
RBC # UR STRIP.AUTO: NEGATIVE /UL
RBC #/AREA URNS AUTO: ABNORMAL /HPF
RIGHT VENTRICLE FREE WALL PEAK S': 11 CM/S
RIGHT VENTRICLE PEAK SYSTOLIC PRESSURE: 43 MMHG
SODIUM SERPL-SCNC: 141 MMOL/L (ref 136–145)
SP GR UR STRIP.AUTO: 1.03
SQUAMOUS #/AREA URNS AUTO: ABNORMAL /HPF
TIBC SERPL-MCNC: 160 UG/DL (ref 240–445)
TOTAL CELLS COUNTED FLD: 100
TRICUSPID ANNULAR PLANE SYSTOLIC EXCURSION: 1.5 CM
UIBC SERPL-MCNC: 135 UG/DL (ref 110–370)
UROBILINOGEN UR STRIP.AUTO-MCNC: NORMAL MG/DL
VIT B12 SERPL-MCNC: >2000 PG/ML (ref 211–911)
WBC # BLD AUTO: 7 X10*3/UL (ref 4.4–11.3)
WBC # FLD AUTO: 18 /UL
WBC #/AREA URNS AUTO: ABNORMAL /HPF
YEAST BUDDING #/AREA UR COMP ASSIST: PRESENT /HPF

## 2024-11-04 PROCEDURE — 36415 COLL VENOUS BLD VENIPUNCTURE: CPT | Performed by: NURSE PRACTITIONER

## 2024-11-04 PROCEDURE — 2500000004 HC RX 250 GENERAL PHARMACY W/ HCPCS (ALT 636 FOR OP/ED): Performed by: NURSE PRACTITIONER

## 2024-11-04 PROCEDURE — 82746 ASSAY OF FOLIC ACID SERUM: CPT | Mod: AHULAB | Performed by: NURSE PRACTITIONER

## 2024-11-04 PROCEDURE — 84155 ASSAY OF PROTEIN SERUM: CPT | Performed by: NURSE PRACTITIONER

## 2024-11-04 PROCEDURE — 87899 AGENT NOS ASSAY W/OPTIC: CPT | Mod: AHULAB | Performed by: INTERNAL MEDICINE

## 2024-11-04 PROCEDURE — 93306 TTE W/DOPPLER COMPLETE: CPT | Performed by: INTERNAL MEDICINE

## 2024-11-04 PROCEDURE — 84145 PROCALCITONIN (PCT): CPT | Mod: AHULAB | Performed by: NURSE PRACTITIONER

## 2024-11-04 PROCEDURE — 0W993ZZ DRAINAGE OF RIGHT PLEURAL CAVITY, PERCUTANEOUS APPROACH: ICD-10-PCS

## 2024-11-04 PROCEDURE — 83540 ASSAY OF IRON: CPT | Performed by: NURSE PRACTITIONER

## 2024-11-04 PROCEDURE — 87102 FUNGUS ISOLATION CULTURE: CPT | Mod: AHULAB | Performed by: NURSE PRACTITIONER

## 2024-11-04 PROCEDURE — 2500000004 HC RX 250 GENERAL PHARMACY W/ HCPCS (ALT 636 FOR OP/ED)

## 2024-11-04 PROCEDURE — 83986 ASSAY PH BODY FLUID NOS: CPT | Mod: AHULAB | Performed by: NURSE PRACTITIONER

## 2024-11-04 PROCEDURE — 32555 ASPIRATE PLEURA W/ IMAGING: CPT

## 2024-11-04 PROCEDURE — 87205 SMEAR GRAM STAIN: CPT | Mod: AHULAB | Performed by: NURSE PRACTITIONER

## 2024-11-04 PROCEDURE — 80048 BASIC METABOLIC PNL TOTAL CA: CPT | Performed by: INTERNAL MEDICINE

## 2024-11-04 PROCEDURE — 1200000002 HC GENERAL ROOM WITH TELEMETRY DAILY

## 2024-11-04 PROCEDURE — 2500000004 HC RX 250 GENERAL PHARMACY W/ HCPCS (ALT 636 FOR OP/ED): Performed by: INTERNAL MEDICINE

## 2024-11-04 PROCEDURE — 88112 CYTOPATH CELL ENHANCE TECH: CPT | Performed by: PATHOLOGY

## 2024-11-04 PROCEDURE — 99223 1ST HOSP IP/OBS HIGH 75: CPT | Performed by: INTERNAL MEDICINE

## 2024-11-04 PROCEDURE — 71045 X-RAY EXAM CHEST 1 VIEW: CPT

## 2024-11-04 PROCEDURE — 87116 MYCOBACTERIA CULTURE: CPT | Mod: AHULAB | Performed by: NURSE PRACTITIONER

## 2024-11-04 PROCEDURE — 85610 PROTHROMBIN TIME: CPT | Performed by: NURSE PRACTITIONER

## 2024-11-04 PROCEDURE — 99232 SBSQ HOSP IP/OBS MODERATE 35: CPT | Performed by: INTERNAL MEDICINE

## 2024-11-04 PROCEDURE — 87449 NOS EACH ORGANISM AG IA: CPT | Mod: AHULAB | Performed by: INTERNAL MEDICINE

## 2024-11-04 PROCEDURE — 71045 X-RAY EXAM CHEST 1 VIEW: CPT | Performed by: RADIOLOGY

## 2024-11-04 PROCEDURE — 83615 LACTATE (LD) (LDH) ENZYME: CPT | Performed by: NURSE PRACTITIONER

## 2024-11-04 PROCEDURE — 88305 TISSUE EXAM BY PATHOLOGIST: CPT | Performed by: PATHOLOGY

## 2024-11-04 PROCEDURE — 81001 URINALYSIS AUTO W/SCOPE: CPT | Performed by: INTERNAL MEDICINE

## 2024-11-04 PROCEDURE — 85027 COMPLETE CBC AUTOMATED: CPT | Performed by: INTERNAL MEDICINE

## 2024-11-04 PROCEDURE — 2500000001 HC RX 250 WO HCPCS SELF ADMINISTERED DRUGS (ALT 637 FOR MEDICARE OP): Performed by: INTERNAL MEDICINE

## 2024-11-04 PROCEDURE — 93306 TTE W/DOPPLER COMPLETE: CPT

## 2024-11-04 PROCEDURE — 83615 LACTATE (LD) (LDH) ENZYME: CPT | Mod: AHULAB | Performed by: NURSE PRACTITIONER

## 2024-11-04 PROCEDURE — 36415 COLL VENOUS BLD VENIPUNCTURE: CPT | Performed by: INTERNAL MEDICINE

## 2024-11-04 PROCEDURE — 84157 ASSAY OF PROTEIN OTHER: CPT | Mod: AHULAB | Performed by: NURSE PRACTITIONER

## 2024-11-04 PROCEDURE — 2500000002 HC RX 250 W HCPCS SELF ADMINISTERED DRUGS (ALT 637 FOR MEDICARE OP, ALT 636 FOR OP/ED): Performed by: INTERNAL MEDICINE

## 2024-11-04 PROCEDURE — 88112 CYTOPATH CELL ENHANCE TECH: CPT | Mod: TC | Performed by: NURSE PRACTITIONER

## 2024-11-04 PROCEDURE — 82607 VITAMIN B-12: CPT | Mod: AHULAB | Performed by: NURSE PRACTITIONER

## 2024-11-04 PROCEDURE — 82728 ASSAY OF FERRITIN: CPT | Performed by: NURSE PRACTITIONER

## 2024-11-04 PROCEDURE — 89051 BODY FLUID CELL COUNT: CPT | Performed by: NURSE PRACTITIONER

## 2024-11-04 RX ORDER — ONDANSETRON 4 MG/1
4 TABLET, FILM COATED ORAL EVERY 8 HOURS PRN
Status: DISCONTINUED | OUTPATIENT
Start: 2024-11-04 | End: 2024-11-12 | Stop reason: HOSPADM

## 2024-11-04 RX ORDER — AMIODARONE HYDROCHLORIDE 200 MG/1
200 TABLET ORAL EVERY MORNING
Status: DISCONTINUED | OUTPATIENT
Start: 2024-11-04 | End: 2024-11-12 | Stop reason: HOSPADM

## 2024-11-04 RX ORDER — HYDROCHLOROTHIAZIDE 25 MG/1
240 TABLET ORAL DAILY
Status: DISCONTINUED | OUTPATIENT
Start: 2024-11-04 | End: 2024-11-04 | Stop reason: ALTCHOICE

## 2024-11-04 RX ORDER — PANTOPRAZOLE SODIUM 40 MG/1
40 TABLET, DELAYED RELEASE ORAL
Status: DISCONTINUED | OUTPATIENT
Start: 2024-11-04 | End: 2024-11-12 | Stop reason: HOSPADM

## 2024-11-04 RX ORDER — AZITHROMYCIN 500 MG/1
500 TABLET, FILM COATED ORAL EVERY 24 HOURS
Status: DISCONTINUED | OUTPATIENT
Start: 2024-11-05 | End: 2024-11-06

## 2024-11-04 RX ORDER — ACETAMINOPHEN 650 MG/1
650 SUPPOSITORY RECTAL EVERY 4 HOURS PRN
Status: DISCONTINUED | OUTPATIENT
Start: 2024-11-04 | End: 2024-11-12 | Stop reason: HOSPADM

## 2024-11-04 RX ORDER — LIDOCAINE HYDROCHLORIDE 10 MG/ML
INJECTION, SOLUTION EPIDURAL; INFILTRATION; INTRACAUDAL; PERINEURAL
Status: COMPLETED | OUTPATIENT
Start: 2024-11-04 | End: 2024-11-04

## 2024-11-04 RX ORDER — GUAIFENESIN 600 MG/1
600 TABLET, EXTENDED RELEASE ORAL EVERY 12 HOURS PRN
Status: DISCONTINUED | OUTPATIENT
Start: 2024-11-04 | End: 2024-11-12 | Stop reason: HOSPADM

## 2024-11-04 RX ORDER — MIDODRINE HYDROCHLORIDE 5 MG/1
2.5 TABLET ORAL
Status: DISCONTINUED | OUTPATIENT
Start: 2024-11-04 | End: 2024-11-04

## 2024-11-04 RX ORDER — CEFTRIAXONE 1 G/50ML
1 INJECTION, SOLUTION INTRAVENOUS DAILY
Status: DISCONTINUED | OUTPATIENT
Start: 2024-11-04 | End: 2024-11-04

## 2024-11-04 RX ORDER — TALC
3 POWDER (GRAM) TOPICAL NIGHTLY PRN
Status: DISCONTINUED | OUTPATIENT
Start: 2024-11-04 | End: 2024-11-04 | Stop reason: SDUPTHER

## 2024-11-04 RX ORDER — POLYETHYLENE GLYCOL 3350 17 G/17G
17 POWDER, FOR SOLUTION ORAL DAILY PRN
Status: DISCONTINUED | OUTPATIENT
Start: 2024-11-04 | End: 2024-11-12 | Stop reason: HOSPADM

## 2024-11-04 RX ORDER — FUROSEMIDE 10 MG/ML
40 INJECTION INTRAMUSCULAR; INTRAVENOUS EVERY 12 HOURS
Status: DISCONTINUED | OUTPATIENT
Start: 2024-11-04 | End: 2024-11-05

## 2024-11-04 RX ORDER — FUROSEMIDE 10 MG/ML
40 INJECTION INTRAMUSCULAR; INTRAVENOUS EVERY 24 HOURS
Status: DISCONTINUED | OUTPATIENT
Start: 2024-11-04 | End: 2024-11-04

## 2024-11-04 RX ORDER — PANTOPRAZOLE SODIUM 40 MG/10ML
40 INJECTION, POWDER, LYOPHILIZED, FOR SOLUTION INTRAVENOUS
Status: DISCONTINUED | OUTPATIENT
Start: 2024-11-04 | End: 2024-11-12 | Stop reason: HOSPADM

## 2024-11-04 RX ORDER — THYROID 30 MG/1
30 TABLET ORAL
Status: DISCONTINUED | OUTPATIENT
Start: 2024-11-04 | End: 2024-11-12 | Stop reason: HOSPADM

## 2024-11-04 RX ORDER — ONDANSETRON HYDROCHLORIDE 2 MG/ML
4 INJECTION, SOLUTION INTRAVENOUS EVERY 8 HOURS PRN
Status: DISCONTINUED | OUTPATIENT
Start: 2024-11-04 | End: 2024-11-12 | Stop reason: HOSPADM

## 2024-11-04 RX ORDER — ATORVASTATIN CALCIUM 10 MG/1
10 TABLET, FILM COATED ORAL NIGHTLY
Status: DISCONTINUED | OUTPATIENT
Start: 2024-11-04 | End: 2024-11-12 | Stop reason: HOSPADM

## 2024-11-04 RX ORDER — ACETAMINOPHEN 160 MG/5ML
650 SOLUTION ORAL EVERY 4 HOURS PRN
Status: DISCONTINUED | OUTPATIENT
Start: 2024-11-04 | End: 2024-11-12 | Stop reason: HOSPADM

## 2024-11-04 RX ORDER — SIMVASTATIN 20 MG/1
20 TABLET, FILM COATED ORAL NIGHTLY
Status: DISCONTINUED | OUTPATIENT
Start: 2024-11-04 | End: 2024-11-04 | Stop reason: ALTCHOICE

## 2024-11-04 RX ORDER — ASPIRIN 81 MG/1
81 TABLET ORAL DAILY
Status: DISCONTINUED | OUTPATIENT
Start: 2024-11-04 | End: 2024-11-12 | Stop reason: HOSPADM

## 2024-11-04 RX ORDER — ESCITALOPRAM OXALATE 20 MG/1
20 TABLET ORAL DAILY
Status: DISCONTINUED | OUTPATIENT
Start: 2024-11-04 | End: 2024-11-12 | Stop reason: HOSPADM

## 2024-11-04 RX ORDER — LEVOTHYROXINE SODIUM 100 UG/1
100 TABLET ORAL DAILY
Status: DISCONTINUED | OUTPATIENT
Start: 2024-11-04 | End: 2024-11-12 | Stop reason: HOSPADM

## 2024-11-04 RX ORDER — ACETAMINOPHEN 325 MG/1
650 TABLET ORAL EVERY 4 HOURS PRN
Status: DISCONTINUED | OUTPATIENT
Start: 2024-11-04 | End: 2024-11-12 | Stop reason: HOSPADM

## 2024-11-04 RX ADMIN — PANTOPRAZOLE SODIUM 40 MG: 40 TABLET, DELAYED RELEASE ORAL at 06:54

## 2024-11-04 RX ADMIN — PANTOPRAZOLE SODIUM 40 MG: 40 TABLET, DELAYED RELEASE ORAL at 08:58

## 2024-11-04 RX ADMIN — LEVOTHYROXINE SODIUM 100 MCG: 0.1 TABLET ORAL at 08:58

## 2024-11-04 RX ADMIN — FUROSEMIDE 40 MG: 10 INJECTION, SOLUTION INTRAMUSCULAR; INTRAVENOUS at 10:28

## 2024-11-04 RX ADMIN — LEVOTHYROXINE, LIOTHYRONINE 30 MG: 19; 4.5 TABLET ORAL at 09:52

## 2024-11-04 RX ADMIN — CEFTRIAXONE SODIUM 1 G: 1 INJECTION, SOLUTION INTRAVENOUS at 11:00

## 2024-11-04 RX ADMIN — APIXABAN 2.5 MG: 2.5 TABLET, FILM COATED ORAL at 08:58

## 2024-11-04 RX ADMIN — AMIODARONE HYDROCHLORIDE 200 MG: 200 TABLET ORAL at 08:58

## 2024-11-04 RX ADMIN — AZITHROMYCIN DIHYDRATE 500 MG: 500 INJECTION, POWDER, LYOPHILIZED, FOR SOLUTION INTRAVENOUS at 09:52

## 2024-11-04 RX ADMIN — APIXABAN 2.5 MG: 2.5 TABLET, FILM COATED ORAL at 20:56

## 2024-11-04 RX ADMIN — ATORVASTATIN CALCIUM 10 MG: 10 TABLET, FILM COATED ORAL at 20:56

## 2024-11-04 RX ADMIN — ASPIRIN 81 MG: 81 TABLET, COATED ORAL at 08:58

## 2024-11-04 RX ADMIN — ESCITALOPRAM OXALATE 20 MG: 20 TABLET ORAL at 08:58

## 2024-11-04 RX ADMIN — FUROSEMIDE 40 MG: 10 INJECTION, SOLUTION INTRAMUSCULAR; INTRAVENOUS at 22:16

## 2024-11-04 RX ADMIN — PERFLUTREN 1 ML OF DILUTION: 6.52 INJECTION, SUSPENSION INTRAVENOUS at 11:48

## 2024-11-04 RX ADMIN — LIDOCAINE HYDROCHLORIDE 10 ML: 10 INJECTION, SOLUTION EPIDURAL; INFILTRATION; INTRACAUDAL; PERINEURAL at 16:19

## 2024-11-04 SDOH — HEALTH STABILITY: PHYSICAL HEALTH
HOW OFTEN DO YOU NEED TO HAVE SOMEONE HELP YOU WHEN YOU READ INSTRUCTIONS, PAMPHLETS, OR OTHER WRITTEN MATERIAL FROM YOUR DOCTOR OR PHARMACY?: SOMETIMES

## 2024-11-04 SDOH — SOCIAL STABILITY: SOCIAL INSECURITY: WITHIN THE LAST YEAR, HAVE YOU BEEN AFRAID OF YOUR PARTNER OR EX-PARTNER?: NO

## 2024-11-04 SDOH — HEALTH STABILITY: MENTAL HEALTH: HOW OFTEN DO YOU HAVE SIX OR MORE DRINKS ON ONE OCCASION?: NEVER

## 2024-11-04 SDOH — SOCIAL STABILITY: SOCIAL INSECURITY
WITHIN THE LAST YEAR, HAVE YOU BEEN KICKED, HIT, SLAPPED, OR OTHERWISE PHYSICALLY HURT BY YOUR PARTNER OR EX-PARTNER?: NO

## 2024-11-04 SDOH — ECONOMIC STABILITY: HOUSING INSECURITY: IN THE LAST 12 MONTHS, WAS THERE A TIME WHEN YOU WERE NOT ABLE TO PAY THE MORTGAGE OR RENT ON TIME?: NO

## 2024-11-04 SDOH — HEALTH STABILITY: PHYSICAL HEALTH: ON AVERAGE, HOW MANY MINUTES DO YOU ENGAGE IN EXERCISE AT THIS LEVEL?: 0 MIN

## 2024-11-04 SDOH — ECONOMIC STABILITY: TRANSPORTATION INSECURITY: IN THE PAST 12 MONTHS, HAS LACK OF TRANSPORTATION KEPT YOU FROM MEDICAL APPOINTMENTS OR FROM GETTING MEDICATIONS?: NO

## 2024-11-04 SDOH — ECONOMIC STABILITY: HOUSING INSECURITY: AT ANY TIME IN THE PAST 12 MONTHS, WERE YOU HOMELESS OR LIVING IN A SHELTER (INCLUDING NOW)?: NO

## 2024-11-04 SDOH — ECONOMIC STABILITY: FOOD INSECURITY: WITHIN THE PAST 12 MONTHS, YOU WORRIED THAT YOUR FOOD WOULD RUN OUT BEFORE YOU GOT THE MONEY TO BUY MORE.: NEVER TRUE

## 2024-11-04 SDOH — SOCIAL STABILITY: SOCIAL INSECURITY
WITHIN THE LAST YEAR, HAVE YOU BEEN RAPED OR FORCED TO HAVE ANY KIND OF SEXUAL ACTIVITY BY YOUR PARTNER OR EX-PARTNER?: NO

## 2024-11-04 SDOH — HEALTH STABILITY: MENTAL HEALTH: HOW OFTEN DO YOU HAVE A DRINK CONTAINING ALCOHOL?: MONTHLY OR LESS

## 2024-11-04 SDOH — HEALTH STABILITY: MENTAL HEALTH: HOW MANY DRINKS CONTAINING ALCOHOL DO YOU HAVE ON A TYPICAL DAY WHEN YOU ARE DRINKING?: 3 OR 4

## 2024-11-04 SDOH — SOCIAL STABILITY: SOCIAL NETWORK: IN A TYPICAL WEEK, HOW MANY TIMES DO YOU TALK ON THE PHONE WITH FAMILY, FRIENDS, OR NEIGHBORS?: THREE TIMES A WEEK

## 2024-11-04 SDOH — ECONOMIC STABILITY: FOOD INSECURITY: WITHIN THE PAST 12 MONTHS, THE FOOD YOU BOUGHT JUST DIDN'T LAST AND YOU DIDN'T HAVE MONEY TO GET MORE.: NEVER TRUE

## 2024-11-04 SDOH — SOCIAL STABILITY: SOCIAL INSECURITY: WITHIN THE LAST YEAR, HAVE YOU BEEN HUMILIATED OR EMOTIONALLY ABUSED IN OTHER WAYS BY YOUR PARTNER OR EX-PARTNER?: NO

## 2024-11-04 SDOH — SOCIAL STABILITY: SOCIAL NETWORK: HOW OFTEN DO YOU GET TOGETHER WITH FRIENDS OR RELATIVES?: ONCE A WEEK

## 2024-11-04 SDOH — SOCIAL STABILITY: SOCIAL NETWORK
DO YOU BELONG TO ANY CLUBS OR ORGANIZATIONS SUCH AS CHURCH GROUPS, UNIONS, FRATERNAL OR ATHLETIC GROUPS, OR SCHOOL GROUPS?: YES

## 2024-11-04 SDOH — SOCIAL STABILITY: SOCIAL INSECURITY: ARE YOU MARRIED, WIDOWED, DIVORCED, SEPARATED, NEVER MARRIED, OR LIVING WITH A PARTNER?: WIDOWED

## 2024-11-04 SDOH — HEALTH STABILITY: MENTAL HEALTH
DO YOU FEEL STRESS - TENSE, RESTLESS, NERVOUS, OR ANXIOUS, OR UNABLE TO SLEEP AT NIGHT BECAUSE YOUR MIND IS TROUBLED ALL THE TIME - THESE DAYS?: NOT AT ALL

## 2024-11-04 SDOH — ECONOMIC STABILITY: FOOD INSECURITY: HOW HARD IS IT FOR YOU TO PAY FOR THE VERY BASICS LIKE FOOD, HOUSING, MEDICAL CARE, AND HEATING?: NOT VERY HARD

## 2024-11-04 SDOH — ECONOMIC STABILITY: INCOME INSECURITY: IN THE PAST 12 MONTHS HAS THE ELECTRIC, GAS, OIL, OR WATER COMPANY THREATENED TO SHUT OFF SERVICES IN YOUR HOME?: NO

## 2024-11-04 SDOH — HEALTH STABILITY: PHYSICAL HEALTH: ON AVERAGE, HOW MANY DAYS PER WEEK DO YOU ENGAGE IN MODERATE TO STRENUOUS EXERCISE (LIKE A BRISK WALK)?: 0 DAYS

## 2024-11-04 SDOH — ECONOMIC STABILITY: HOUSING INSECURITY: IN THE PAST 12 MONTHS, HOW MANY TIMES HAVE YOU MOVED WHERE YOU WERE LIVING?: 0

## 2024-11-04 SDOH — SOCIAL STABILITY: SOCIAL NETWORK: HOW OFTEN DO YOU ATTEND CHURCH OR RELIGIOUS SERVICES?: NEVER

## 2024-11-04 SDOH — SOCIAL STABILITY: SOCIAL NETWORK: HOW OFTEN DO YOU ATTEND MEETINGS OF THE CLUBS OR ORGANIZATIONS YOU BELONG TO?: 1 TO 4 TIMES PER YEAR

## 2024-11-04 ASSESSMENT — COGNITIVE AND FUNCTIONAL STATUS - GENERAL
DRESSING REGULAR LOWER BODY CLOTHING: TOTAL
CLIMB 3 TO 5 STEPS WITH RAILING: TOTAL
MOVING TO AND FROM BED TO CHAIR: TOTAL
TOILETING: TOTAL
MOVING TO AND FROM BED TO CHAIR: TOTAL
DAILY ACTIVITIY SCORE: 6
TURNING FROM BACK TO SIDE WHILE IN FLAT BAD: TOTAL
TOILETING: TOTAL
MOBILITY SCORE: 6
STANDING UP FROM CHAIR USING ARMS: TOTAL
MOBILITY SCORE: 6
DAILY ACTIVITIY SCORE: 6
HELP NEEDED FOR BATHING: TOTAL
DRESSING REGULAR LOWER BODY CLOTHING: TOTAL
DRESSING REGULAR UPPER BODY CLOTHING: TOTAL
DRESSING REGULAR UPPER BODY CLOTHING: TOTAL
TURNING FROM BACK TO SIDE WHILE IN FLAT BAD: TOTAL
MOVING FROM LYING ON BACK TO SITTING ON SIDE OF FLAT BED WITH BEDRAILS: TOTAL
EATING MEALS: TOTAL
WALKING IN HOSPITAL ROOM: TOTAL
WALKING IN HOSPITAL ROOM: TOTAL
STANDING UP FROM CHAIR USING ARMS: TOTAL
CLIMB 3 TO 5 STEPS WITH RAILING: TOTAL
EATING MEALS: TOTAL
PERSONAL GROOMING: TOTAL
MOVING FROM LYING ON BACK TO SITTING ON SIDE OF FLAT BED WITH BEDRAILS: TOTAL
PERSONAL GROOMING: TOTAL
HELP NEEDED FOR BATHING: TOTAL

## 2024-11-04 ASSESSMENT — PAIN SCALES - GENERAL
PAINLEVEL_OUTOF10: 0 - NO PAIN

## 2024-11-04 ASSESSMENT — LIFESTYLE VARIABLES
SKIP TO QUESTIONS 9-10: 0
AUDIT-C TOTAL SCORE: 2

## 2024-11-04 ASSESSMENT — ACTIVITIES OF DAILY LIVING (ADL)
LACK_OF_TRANSPORTATION: NO
LACK_OF_TRANSPORTATION: NO

## 2024-11-04 ASSESSMENT — PAIN SCALES - WONG BAKER: WONGBAKER_NUMERICALRESPONSE: NO HURT

## 2024-11-04 NOTE — PROGRESS NOTES
11/04/24 1230   ACS Disability Status   Are you deaf or do you have serious difficulty hearing? N   Are you blind or do you have serious difficulty seeing, even when wearing glasses? N   Because of a physical, mental, or emotional condition, do you have serious difficulty concentrating, remembering, or making decisions? (5 years old or older) N   Do you have serious difficulty walking or climbing stairs? Y   Do you have serious difficulty dressing or bathing? N   Because of a physical, mental, or emotional condition, do you have serious difficulty doing errands alone such as visiting the doctor? N

## 2024-11-04 NOTE — PROGRESS NOTES
Anny Tesfaye is a 79 y.o. female     More alert but remains significantly overloaded    Review of Systems     Constitutional: no fever, no chills, not feeling poorly, not feeling tired   Cardiovascular: no chest pain   Respiratory: no cough, wheezing   Gastrointestinal: no abdominal pain, no constipation, no melena, no nausea, no diarrhea, no vomiting and no blood in stools.   Neurological: no headache,   All other systems have been reviewed and are negative for complaint.       Vitals:    11/04/24 1300   BP:    Pulse: 104   Resp: (!) 29   Temp:    SpO2:         Scheduled medications  amiodarone, 200 mg, oral, q AM  apixaban, 2.5 mg, oral, BID  aspirin, 81 mg, oral, Daily  atorvastatin, 10 mg, oral, Nightly  azithromycin, 500 mg, intravenous, Daily  cefTRIAXone, 1 g, intravenous, Daily  escitalopram, 20 mg, oral, Daily  furosemide, 40 mg, intravenous, q12h  levothyroxine, 100 mcg, oral, Daily  magnesium oxide, 400 mg, oral, Once  pantoprazole, 40 mg, oral, Daily before breakfast   Or  pantoprazole, 40 mg, intravenous, Daily before breakfast  perflutren protein A microsphere, 0.5 mL, intravenous, Once in imaging  sulfur hexafluoride microsphr, 2 mL, intravenous, Once in imaging  thyroid (pork), 30 mg, oral, Daily before breakfast      Continuous medications     PRN medications  PRN medications: acetaminophen **OR** acetaminophen **OR** acetaminophen, ALPRAZolam, alum-mag hydroxide-simeth, guaiFENesin, melatonin, ondansetron **OR** ondansetron, oxygen, polyethylene glycol, traMADol    Lab Review   Results from last 7 days   Lab Units 11/04/24  0702 11/02/24  0811   WBC AUTO x10*3/uL 7.0 19.7*   HEMOGLOBIN g/dL 7.3* 8.5*   HEMATOCRIT % 25.2* 28.1*   PLATELETS AUTO x10*3/uL 216 289     Results from last 7 days   Lab Units 11/04/24  0702 11/02/24  0944   SODIUM mmol/L 141 142   POTASSIUM mmol/L 4.9 4.4   CHLORIDE mmol/L 113* 113*   CO2 mmol/L 23 23   BUN mg/dL 33* 26*   CREATININE mg/dL 2.05* 1.75*   CALCIUM mg/dL  8.2* 7.9*   PROTEIN TOTAL g/dL  --  5.3*   BILIRUBIN TOTAL mg/dL  --  0.5   ALK PHOS U/L  --  66   ALT U/L  --  6*   AST U/L  --  11   GLUCOSE mg/dL 91 84     Results from last 7 days   Lab Units 11/02/24  0944 11/02/24  0811   TROPHS ng/L 23* 23*        Transthoracic Echo (TTE) Complete         CT head wo IV contrast   Final Result   1. Age related changes without acute intracranial process.   2. Sinus disease. Also nonspecific fluid within left mastoid cells   and the left middle ear cavity; correlate clinically.        Signed by: Francisco Carlson 11/2/2024 11:47 AM   Dictation workstation:   [id number]      CT angio chest abdomen pelvis   Final Result   CHEST:   1.  4.4 x 4.2 cm ascending aortic aneurysm. No dissection.   2. No pulmonary embolism.   3. Moderate size right and small left pleural effusions.   Infiltrates or compressive atelectasis of the right upper middle and   lower lobes and left upper and lower lobes. The volume loss is most   marked in the right lower lobe.   4. Anasarca/hypoproteinemia.        ABDOMEN AND PELVIS:   1.  4.9 x 4.7 cm infrarenal abdominal aortic aneurysm. There is a   distal abdominal aortic aneurysm intravascular bi-iliac artery stent.   There is a femoral-femoral bypass graft with flow   2. 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   3. Anasarca/hypoproteinemia. In particular, there is induration of   the left lateral abdominal wall and left side of the back which could   represent more focal edema or contusion. It is not known if the   patient fell.   4. Possible appendectomy.   5. Presacral edema.   6. Sludge in an otherwise unremarkable gallbladder.        MACRO:   None        Signed by: Georgia Sanchez 11/2/2024 11:51 AM   Dictation workstation:   [id number]      XR chest 1 view   Final Result   Right basilar pneumonia. Small right pleural effusion.        MACRO:   None        Signed by: Georgia Sanchez 11/2/2024 10:03 AM    Dictation workstation:   [id number]      US thoracentesis    (Results Pending)         Physical Exam    Constitutional   General appearance: Alert    Pulmonary   Respiratory assessment: No respiratory distress, normal respiratory rhythm and effort.    Auscultation of Lungs: Decreased breath sounds in the bases  Cardiovascular   Auscultation of heart: Irregularly irregular rhythm  Exam for edema: Plus edema  Abdomen   Abdominal Exam: No bruits, normal bowel sounds, soft, non-tender, no abdominal mass palpated.    Liver and Spleen exam: No hepato-splenomegaly.   Musculoskeletal     Inspection of digits and nails: No clubbing or cyanosis of the fingernails.    Inspection/palpation of joints, bones and muscles: No joint swelling. Normal movement of all extremities.   Neurologic   Cranial nerves: Nerves 2-12 were intact, no focal neuro defects.         Assessment/Plan      #Community-acquired pneumonia # sepsis with leukocytosis  #Bilateral pleural effusions  #Hypoxia  Continue IV antibiotics  DuoNebs  Will repeat chest x-ray to assess thoracentesis need     # Acute on chronic combined systolic and diastolic congestive heart failure  Increase IV Lasix to twice a day  Imaging fraction shows low EF at 30%     #Paroxysmal fibrillation  Continue amiodarone and DOAC

## 2024-11-04 NOTE — CONSULTS
Inpatient consult to Cardiology  Consult performed by: Hyacinth Soriano, YVONNE-CNP  Consult ordered by: YVONNE Enamorado-CNP  Reason for consult: HF        HPI:  Anny Tesfaye is a 79 y.o. female, with a PMH of HTN, HLD, heavy tobacco use, severe PAD, AAA s/p EVAR 10/10/24 at [facility], lumbar stenosis, hypothyroidism, CKD III, polymyalgia rheumatica, and OA, who presented to [facility] on 11/2/2024 for change in mental status. Patient reports recent prolonged hospital course at [facility] s/p EVAR on 10/10/24 c/b postoperative AF, respiratory insufficiency, volume overload, BALBIR on CKD, and ischemic colitis s/p hemicolectomy w/ colostomy. She was started on Amiodarone without conversion to NSR and Eliquis for the atrial fibrillation. Home antihypertensive medications were held at discharge givens stable BP. She was discharged to SNF 10/25/24. She now presents to [facility] ED 11/2/24 for altered mental status, SOB, and weakness, admitted for PNA and fluid overload. Cardiology is consulted for HF. Patient reports no prior hx of HF, states that edema has been an issue since her recent hospitalization.     Home CV Medications:  Amiodarone 200mg daily  Eliquis 2.5mg BID  ASA 81mg daily  Statin  -Previously on Captopril 50mg, HCTZ 25mg, and Verapamil 240mg CR      Patient History   Past Medical History:  She has a past medical history of Aortic aneurysm (CMS-HCC), Chronic low back pain, CKD (chronic kidney disease) stage 3, GFR 30-59 ml/min (Multi), Esophagitis, Glaucoma, History of scarlet fever, Hypertension, Hypothyroidism, Morbid obesity (Multi), Nephritis, OA (osteoarthritis) of knee, Polymyalgia rheumatica (Multi), Psoriasis, and Sciatica.  Past Surgical History:  She has a past surgical history that includes CT angio neck (04/21/2014); Tonsillectomy; Adenoidectomy; Dilation and curettage of uterus; [place] tooth extraction; Cataract extraction (06/2020); Crown (06/2020); Cataract extraction (Right); and Cataract  extraction (Left, 03/2021).  Social History:  She reports that she has been smoking cigarettes. She has a 40 pack-year smoking history. She has been exposed to tobacco smoke. She has never used smokeless tobacco. She reports current alcohol use of about 8.0 standard drinks of alcohol per week. She reports that she does not use drugs.  Family History: family history includes Abdominal Aortic Aneurysm in her mother; Breast cancer in her maternal grandmother and mother; Cancer in her sister; Heart disease in her father; Heart failure in her father; Hypertension in her father; Macular degeneration in her father; Osteoporosis in her mother.     Allergies: Patient has no known allergies.    ROS: 10-point review of systems was performed and is otherwise negative except as noted in HPI.     Outpatient Medications:  Current Outpatient Medications   Medication Instructions    acetaminophen (TYLENOL 8 HOUR) 650 mg, oral, Every 8 hours PRN    alendronate (FOSAMAX) 70 mg, oral, Every 7 days, Take on an empty stomach. Do not lie down or eat for 1/2 hour after taking.    amiodarone (PACERONE) 200 mg, oral, Every morning    apixaban (ELIQUIS) 2.5 mg, oral, 2 times daily    ascorbic acid (VITAMIN C) 500 mg, oral, Daily    aspirin 81 mg, oral, Daily    atorvastatin (LIPITOR) 10 mg, oral, Nightly    bisacodyl (DULCOLAX (BISACODYL)) 10 mg, rectal, Daily PRN    calcium carbonate 500 mg calcium (1,250 mg) chewable tablet 1 tablet, oral, Daily    captopril (CAPOTEN) 50 mg, oral, Daily    cyanocobalamin (vitamin B-12) 5,000 mcg, oral, Daily    DAILY MULTI-VITAMIN ORAL 1 tablet, oral, Daily    docusate sodium (COLACE) 100 mg, oral, 2 times daily    escitalopram (LEXAPRO) 20 mg, oral, Daily    levomefolate calcium (L-METHYLFOLATE ORAL) 1,000 mcg, oral, Every morning    levothyroxine (SYNTHROID, LEVOXYL) 100 mcg, oral, Daily, Six days per week    lutein 6 mg capsule 1 capsule, oral, Daily    magnesium hydroxide (Milk of Magnesia) 400 mg/5 mL  suspension 30 mL, oral, Daily PRN    menthol-zinc oxide (Calmoseptine) 0.44-20.6 % ointment 1 Application, Topical, 2 times daily    nicotine (Nicoderm CQ) 21 mg/24 hr patch 1 patch, transdermal, Every 24 hours    oxybutynin XL (DITROPAN-XL) 10 mg, oral, Daily    oxyCODONE (ROXICODONE) 5 mg, oral, Every 6 hours PRN    pantoprazole (PROTONIX) 40 mg, oral, Daily, Do not crush, chew, or split.    polyethylene glycol (GLYCOLAX, MIRALAX) 17 g, oral, 2 times daily PRN    sennosides (Senokot) 8.6 mg tablet 1 tablet, oral, 2 times daily    simvastatin (ZOCOR) 20 mg, oral, Nightly    sodium phosphates (Fleets) 19-7 gram/118 mL enema enema 1 enema, rectal, Daily PRN    thyroid (pork) (ARMOUR THYROID) 30 mg, oral, Daily before breakfast    tocilizumab (Actemra) 200 mg/10 mL (20 mg/mL) solution intravenous, Every 28 days, as directed    verapamil SR (CALAN-SR) 240 mg, oral, Daily     Cardiovascular Testing:  EKG:   ECG 12 Lead 11/4/24      ECG 12 Lead 10/17/24 [facility]      Echo:  Transthoracic Echocardiogram 9/2024 [facility]  - The left ventricle is normal in size. There is mild septal left ventricular   hypertrophy. Left ventricular systolic function is normal. EF = 59 ± 5% (2D biplane) Left ventricular diastolic function was not evaluated due to >2+ AI.   - The right ventricle is normal in size. Right ventricular systolic function is normal.   - The left atrial cavity is severely dilated.   - The visualized aorta is dilated with a maximal dimension of 4.6 cm.   - There is moderate (2+ - 3+) aortic valve regurgitation    Heart Catheterizations:  [facility] 8/26/24:  LEFT ANTERIOR DESCENDING: No Stenosis     DIAGONAL #1: No Stenosis   DIAGONAL #2: No Stenosis   DIAGONAL #3: No Stenosis     LEFT CIRCUMFLEX: No Stenosis     MARGINAL #1: No Stenosis   MARGINAL #2: No Stenosis     DOMINANT: NO  RIGHT CORONARY: No Stenosis     DOMINANT: YES  POSTERIOR DESCENDING: No Stenosis   POSTERIOR LATERAL: No Stenosis     Stress Test:  Nuclear Stress Test  2024  1. SPECT Perfusion Study: Abnormal.   2. No evidence of scarred myocardium.   3. There is mild (<10%) ischemia in the territory of the RCA.   4. Left ventricle is normal in size.   5. This is an intermediate risk scan.     Gated Stress FBP   LVEF % 60     Cardiac Imaging: No results found for the last 3 years.    Diagnostic Results   Labs  CBC- 2024:  7:02 AM  7.0 7.3 216    25.2      BMP- 2024:  7:02 AM  141 33 113 91   4.9 2.05 23    CrCl cannot be calculated (Unknown ideal weight.).     CA: 8.2 PROTIEN: 5.3 ALT: 6 Total Bili: 0.5 M.53   PHOS: 4.0 ALBUMIN: 2.6 AST: 11   Alk Phos: 66      COAGS- No results in last year.  _   _ _     CV Labs  Troponin I, High Sensitivity   Date/Time Value Ref Range Status   2024 09:44 AM 23 (H) 0 - 13 ng/L Final   2024 08:11 AM 23 (H) 0 - 13 ng/L Final     BNP   Date/Time Value Ref Range Status   2024 08:11 AM 1,021 (H) 0 - 99 pg/mL Final     Hemoglobin A1C   Date/Time Value Ref Range Status   10/23/2023 10:25 AM 4.9 See below % Final   10/22/2021 11:20 AM 5.1 4.0 - 6.0 % Final     Comment:     Hemoglobin A1C levels are related to mean blood glucose during the   preceding 2-3 months. The relationship table below may be used as a   general guide. Each 1% increase in HGB A1C is a reflection of an   increase in mean glucose of approximately 30 mg/dl.   Reference: Diabetes Care, volume 29, supplement 1 2006                        HGB A1C ................. Approx. Mean Glucose   _______________________________________________   6%   ...............................  120 mg/dl   7%   ...............................  150 mg/dl   8%   ...............................  180 mg/dl   9%   ...............................  210 mg/dl   10%  ...............................  240 mg/dl  Performed at [address] [address]     2021 11:55 AM 5.2 4.0 - 6.0 % Final     Comment:     Hemoglobin A1C levels are related to mean blood  glucose during the   preceding 2-3 months. The relationship table below may be used as a   general guide. Each 1% increase in HGB A1C is a reflection of an   increase in mean glucose of approximately 30 mg/dl.   Reference: Diabetes Care, volume 29, supplement 1 Jan. 2006                        HGB A1C ................. Approx. Mean Glucose   _______________________________________________   6%   ...............................  120 mg/dl   7%   ...............................  150 mg/dl   8%   ...............................  180 mg/dl   9%   ...............................  210 mg/dl   10%  ...............................  240 mg/dl  Performed at [address] [address]       LDL Calculated   Date/Time Value Ref Range Status   09/27/2024 10:58 AM 56 <=99 mg/dL Final     Comment:                                 Near   Borderline      AGE      Desirable  Optimal    High     High     Very High     0-19 Y     0 - 109     ---    110-129   >/= 130     ----    20-24 Y     0 - 119     ---    120-159   >/= 160     ----      >24 Y     0 -  99   100-129  130-159   160-189     >/=190     08/01/2024 09:43 AM 73 65 - 130 mg/dL Final   04/08/2024 09:55 AM 74 65 - 130 mg/dL Final     VLDL   Date/Time Value Ref Range Status   09/27/2024 10:58 AM 17 0 - 40 mg/dL Final       Pertinent Imaging  XR chest 1 view 11/02/2024  Right basilar pneumonia. Small right pleural effusion.    CT angio chest abdomen pelvis 11/02/2024  1.  4.4 x 4.2 cm ascending aortic aneurysm. No dissection.  2. No pulmonary embolism.  3. Moderate size right and small left pleural effusions. Infiltrates or compressive atelectasis of the right upper middle and  lower lobes and left upper and lower lobes. The volume loss is most marked in the right lower lobe.  4. Anasarca/hypoproteinemia.    1.  4.9 x 4.7 cm infrarenal abdominal aortic aneurysm. There is a distal abdominal aortic aneurysm intravascular bi-iliac artery stent.  There is a  femoral-femoral bypass graft with flow   2. There is occlusion in the proximal right common iliac artery which reconstitutes distally.  3. Anasarca/hypoproteinemia. In particular, there is induration of the left lateral abdominal wall and left side of the back which could represent more focal edema or contusion. It is not known if the patient fell.  4. Possible appendectomy.  5. Presacral edema.  6. Sludge in an otherwise unremarkable gallbladder.       Last Recorded Vitals:  Vitals:    11/04/24 0743 11/04/24 0800 11/04/24 0900 11/04/24 1105   BP: 97/58   83/57   BP Location: Left arm   Right arm   Patient Position: Lying   Lying   Pulse: 63 85 102    Resp: 16 18 21    Temp: 36.6 °C (97.8 °F)   36.3 °C (97.3 °F)   TempSrc: Temporal   Axillary   SpO2: 98%   98%     Temp:  [36.3 °C (97.3 °F)-36.9 °C (98.5 °F)] 36.3 °C (97.3 °F)  Heart Rate:  [] 102  Resp:  [11-24] 21  BP: ()/(56-67) 83/57     Last I/O:  I/O last 3 completed shifts:  In: 630 [P.O.:630]  Out: 350 [Urine:350]    Physical Exam:   Vitals and nursing notes reviewed.  BP 83/57 (BP Location: Right arm, Patient Position: Lying)   Pulse 102   Temp 36.3 °C (97.3 °F) (Axillary)   Resp 21   SpO2 98%   GENERAL: Alert and awake, cooperative; in no acute distress; Elderly female, ill-appearing  SKIN: Warm and dry, cap refill <2  HEENT: Normocephalic, PEERL, mucous membranes pink and moist  NECK: +JVD  CARDIAC: Regular rate and rhythm, S1S2, no murmurs or abnormal heart sounds  CHEST: Normal respiratory effort, no abnormal breath sounds  ABDOMEN: Soft, non-distended, non-tender with palpation, +abd wall edema  EXTREMITIES: 3-4+ pitting BLE edema to hips , normal pulses all 4 extremities  NEURO: Alert and oriented, mental status at baseline, no focal deficits  PSYCH: Behavior and affect as expected     Tele: Rate Controlled AF 100s  Code Status: Full Code    Assessment/Plan   Anny Tesfaye is a 79 y.o. female, with a PMH of HTN, HLD, heavy tobacco  use, severe PAD, AAA s/p EVAR 10/10/24 at [facility], lumbar stenosis, hypothyroidism, CKD III, polymyalgia rheumatica, and OA, who presented to [facility] in [place] on 11/2/2024 for change in mental status. Patient reports recent prolonged hospital course at [facility] s/p EVAR on 10/10/24 c/b postoperative AF, respiratory insufficiency, volume overload, BALBIR on CKD, and ischemic colitis s/p hemicolectomy w/ colostomy. She was started on Amiodarone without conversion to NSR and Eliquis for the atrial fibrillation. Home antihypertensive medications were held at discharge givens stable BP. She was discharged to SNF 10/25/24. She now presents to [facility] ED 11/2/24 for altered mental status, SOB, and weakness, admitted for PNA and fluid overload. Cardiology is consulted for HF. Patient reports no prior hx of HF, states that edema has been an issue since her recent hospitalization.     Home CV Medications: Amiodarone 200mg daily, Eliquis 2.5mg BID, ASA 81mg daily, statin  -Previously on Captopril 50mg, HCTZ 25mg, and Verapamil 240mg CR    #Acute HF, undetermined type- ++Hypervolemia with diffuse anasarca likely in the setting of judicious postoperative fluid resuscitation and malnutrition, EF appears to be newly decreased on TTE [awaiting final read] but no room to start GDMT with current renal function and BP  -Diuresis as tolerated  #Postoperative AF- Currently rate controlled 80-100s, c/w Amiodarone  -On Eliquis for OAC, has been on anticoagulation since 10/23 (Heparin gtt then transitioned to Eliquis)  #HTN- BP low, home antihypertensives held during prior hospitalization  #Recent EVAR- c/w ASA and statin      RECOMMENDATIONS:  -We will obtain a transthoracic echocardiogram for structural evaluation, measurement of ejection fraction, assessment of regional wall motion abnormalities or valvular disease, and further evaluation of hemodynamics   -Will attempt diuresis with IV Lasix 40mg BID as BP allows  -c/w Amiodarone and  Eliquis  -Nutrition consult for malnutrition recommendations  -Patient would benefit from thoracentesis  -Continuous telemetry monitoring while admitted  -Monitor electrolytes, replete for K <4 and Mg <2  -Daily wts, strict I&Os, low sodium diet  -Low threshold to transfer to ICU if decompensates  -Possible UMESH + DCCV later this week pending clinical course      YVONNE Gonzalez-CNP  Advanced Practice Provider  Cardiology  [facility]  11/04/24 11:22 AM       STAFF ADDENDUM:    Both the MARIAMA and I have had a face to face encounter with the patient today. I have examined the patient and edited the documented physical examination as necessary.  I personally reviewed the patient's vital signs, telemetry, recent labs, medications, orders, EKGs, and pertinent cardiac imaging/ echocardiography.  I have reviewed the MARIAMA's encounter note, approve the MARIAMA's documentation and have edited the note to reflect my diagnostic and therapeutic plan.      79-year-old female with history of HTN, tobacco use, PAD with AAA repair with EVAR 10/10/2024 and femorofemoral bypass postop complicated by ischemic colitis requiring hemicolectomy and colostomy as well as atrial fibrillation.  She also has history of CKD, PMR, osteoarthritis who was admitted now with altered mental status, shortness of breath and weakness from subacute rehab.    Her hospitalization at [facility] she was just discharged 10/25/2024.  She had persistent atrial fibrillation during that hospitalization despite amiodarone.  She was not initiated on anticoagulation until the day before discharge.  On arrival here she is deconditioned, frail appearing.  CT chest abdomen pelvis with bilateral pleural effusions, suggestive of pneumonia, no acute abdominal process but did show some anasarca.    Echocardiogram being completed in the room at my time of exam and visually appears to be 30-35%.  Will await formal read.  EKG reviewed showing persistent atrial  fibrillation with controlled rate.    On exam alert and oriented x 3.  On oxygen.  No apparent distress.  Heart irregular without murmur rub or gallop.  Lungs diminished bilaterally.    Impression:  1.  Persistent atrial fibrillation  2.  Acute on chronic systolic and diastolic heart failure-suspect arrhythmia related to atrial fibrillation postoperatively  3.  Severe protein calorie malnutrition-likely contributing to her fluid overload and anasarca status  4.  Bilateral pleural effusions and possible pneumonia  5.  Status post EVAR and femorofemoral bypass    She remains fluid overloaded but now getting hypotensive after IV diuresis.  Probably some intravascular depletion and renal insufficiency.  May need to hold further diuresis today.  Suggest thoracentesis and reevaluation afterwards.  She benefits from cardioversion, but has only been on anticoagulation for 1 week thus will need UMESH.  Perhaps we can do this in 2 to 3 days depending on how she proceeds.  Low clinical threshold to transfer to ICU if decompensates clinically.    Rashad Ramirez, DO

## 2024-11-04 NOTE — PROGRESS NOTES
11/04/24 1227   Physical Activity   On average, how many days per week do you engage in moderate to strenuous exercise (like a brisk walk)? 0 days   On average, how many minutes do you engage in exercise at this level? 0 min   Financial Resource Strain   How hard is it for you to pay for the very basics like food, housing, medical care, and heating? Not very   Housing Stability   In the last 12 months, was there a time when you were not able to pay the mortgage or rent on time? N   In the past 12 months, how many times have you moved where you were living? 0   At any time in the past 12 months, were you homeless or living in a shelter (including now)? N   Transportation Needs   In the past 12 months, has lack of transportation kept you from medical appointments or from getting medications? no   In the past 12 months, has lack of transportation kept you from meetings, work, or from getting things needed for daily living? No   Food Insecurity   Within the past 12 months, you worried that your food would run out before you got the money to buy more. Never true   Within the past 12 months, the food you bought just didn't last and you didn't have money to get more. Never true   Stress   Do you feel stress - tense, restless, nervous, or anxious, or unable to sleep at night because your mind is troubled all the time - these days? Not at all   Social Connections   In a typical week, how many times do you talk on the phone with family, friends, or neighbors? Three   How often do you get together with friends or relatives? Once   How often do you attend Religion or Synagogue services? Never   Do you belong to any clubs or organizations such as Religion groups, unions, fraternal or athletic groups, or school groups? Yes   How often do you attend meetings of the clubs or organizations you belong to? 1 to 4   Are you , , , , never , or living with a partner?    Intimate Partner  Violence   Within the last year, have you been afraid of your partner or ex-partner? No   Within the last year, have you been humiliated or emotionally abused in other ways by your partner or ex-partner? No   Within the last year, have you been kicked, hit, slapped, or otherwise physically hurt by your partner or ex-partner? No   Within the last year, have you been raped or forced to have any kind of sexual activity by your partner or ex-partner? No   Alcohol Use   Q1: How often do you have a drink containing alcohol? Monthly or l   Q2: How many drinks containing alcohol do you have on a typical day when you are drinking? 3 or 4   Q3: How often do you have six or more drinks on one occasion? Never   Utilities   In the past 12 months has the electric, gas, oil, or water company threatened to shut off services in your home? No   Health Literacy   How often do you need to have someone help you when you read instructions, pamphlets, or other written material from your doctor or pharmacy? Sometimes

## 2024-11-04 NOTE — POST-PROCEDURE NOTE
Interventional Radiology Brief Postprocedure Note    Attending: Stefan Mao CNP    Assistant: none    Diagnosis: right sided pleural effusion    Description of procedure: Ultrasound guided thoracentesis was preformed on the right.  700 mL of blood-tinged fluid was drained.        Anesthesia:  Local    Complications: None    Estimated Blood Loss: none    Medications  As of 11/04/24 1626      piperacillin-tazobactam (Zosyn) 4.5 g in dextrose (iso)  mL (g) Total dose:  4.5 g      Date/Time Rate/Dose/Volume Action       11/02/24  0815 4.5 g (over 30 min) New Bag      0918  (over 30 min) Stopped               vancomycin (Vancocin) 2 g in sodium chloride 0.9%  mL (mL/hr) Total volume:  Not documented*   *Total volume has not been documented. View each administration to see the amount administered.     Date/Time Rate/Dose/Volume Action       11/02/24  1121 2 g - 250 mL/hr (over 120 min) New Bag      1334  (over 120 min) Stopped               iohexol (OMNIPaque) 350 mg iodine/mL solution 75 mL (mL) Total volume:  75 mL      Date/Time Rate/Dose/Volume Action       11/02/24  1057 75 mL Given               magnesium oxide (Mag-Ox) tablet 400 mg (mg) Total dose:  0 mg*   *Administration not included in total     Date/Time Rate/Dose/Volume Action       11/02/24  1424 *400 mg Missed               amiodarone (Pacerone) tablet 200 mg (mg) Total dose:  200 mg      Date/Time Rate/Dose/Volume Action       11/04/24  0858 200 mg Given               apixaban (Eliquis) tablet 2.5 mg (mg) Total dose:  2.5 mg      Date/Time Rate/Dose/Volume Action       11/04/24  0858 2.5 mg Given               aspirin EC tablet 81 mg (mg) Total dose:  81 mg      Date/Time Rate/Dose/Volume Action       11/04/24  0858 81 mg Given               escitalopram (Lexapro) tablet 20 mg (mg) Total dose:  20 mg      Date/Time Rate/Dose/Volume Action       11/04/24  0858 20 mg Given               levothyroxine (Synthroid, Levoxyl) tablet 100 mcg (mcg)  Total dose:  100 mcg      Date/Time Rate/Dose/Volume Action       11/04/24  0858 100 mcg Given               thyroid (pork) ([place]) tablet 30 mg (mg) Total dose:  30 mg      Date/Time Rate/Dose/Volume Action       11/04/24  0952 30 mg Given               pantoprazole (ProtoNix) EC tablet 40 mg (mg) Total dose:  120 mg      Date/Time Rate/Dose/Volume Action       11/03/24  0822 40 mg Given     11/04/24  0654 40 mg Given      0858 40 mg Given               pantoprazole (ProtoNix) injection 40 mg (mg) Total dose:  Cannot be calculated*   *Administration dose not documented     Date/Time Rate/Dose/Volume Action       11/04/24  0858 *Not included in total See Alternative               cefTRIAXone (Rocephin) 1 g in dextrose (iso) IV 50 mL (mL/hr) Total volume:  Not documented*   *Total volume has not been documented. View each administration to see the amount administered.     Date/Time Rate/Dose/Volume Action       11/04/24  1100 1 g - 100 mL/hr (over 30 min) New Bag      1202  (over 30 min) Stopped               azithromycin 500 mg in dextrose 5% 250 mL IV (mL/hr) Total volume:  Not documented*   *Total volume has not been documented. View each administration to see the amount administered.     Date/Time Rate/Dose/Volume Action       11/04/24  0952 500 mg - 250 mL/hr (over 60 min) New Bag      1100  (over 60 min) Stopped               furosemide (Lasix) injection 40 mg (mg) Total dose:  Cannot be calculated*   *Administration dose not documented     Date/Time Rate/Dose/Volume Action       11/04/24  0845 *Not included in total Held by provider      0854 *40 mg Missed      1003 *Not included in total Unheld by provider               furosemide (Lasix) injection 40 mg (mg) Total dose:  40 mg      Date/Time Rate/Dose/Volume Action       11/04/24  1028 40 mg Given               oxygen (O2) therapy (L/min) Total volume:  Not documented*   *Total volume has not been documented. View each administration to see the amount  administered.     Date/Time Rate/Dose/Volume Action       11/02/24  1514 5 L/min Rate Verify Medical Gas     11/03/24  0828 4 L/min Start               ALPRAZolam (Xanax) tablet 0.25 mg (mg) Total dose:  0.25 mg      Date/Time Rate/Dose/Volume Action       11/03/24  0120 0.25 mg Given               acetaminophen (Tylenol) tablet 650 mg (mg) Total dose:  650 mg      Date/Time Rate/Dose/Volume Action       11/03/24  2318 650 mg Given               perflutren lipid microspheres (Definity) injection 0.5-10 mL of dilution (mL of dilution) Total dose:  1 mL of dilution      Date/Time Rate/Dose/Volume Action       11/04/24  1148 1 mL of dilution Given               lidocaine PF (Xylocaine) 10 mg/mL (1 %) injection (mL) Total volume:  10 mL      Date/Time Rate/Dose/Volume Action       11/04/24  1619 10 mL Given                   Specimens collected      See detailed result report with images in PACS.    The patient tolerated the procedure well without incident or complication and is in stable condition.

## 2024-11-04 NOTE — H&P (VIEW-ONLY)
Inpatient consult to Cardiology  Consult performed by: Hyacinth Soriano, YVONNE-CNP  Consult ordered by: YVONNE Enamorado-CNP  Reason for consult: HF        HPI:  Anny Tesfaye is a 79 y.o. female, with a PMH of HTN, HLD, heavy tobacco use, severe PAD, AAA s/p EVAR 10/10/24 at [facility], lumbar stenosis, hypothyroidism, CKD III, polymyalgia rheumatica, and OA, who presented to [facility] on 11/2/2024 for change in mental status. Patient reports recent prolonged hospital course at [facility] s/p EVAR on 10/10/24 c/b postoperative AF, respiratory insufficiency, volume overload, BALBIR on CKD, and ischemic colitis s/p hemicolectomy w/ colostomy. She was started on Amiodarone without conversion to NSR and Eliquis for the atrial fibrillation. Home antihypertensive medications were held at discharge givens stable BP. She was discharged to SNF 10/25/24. She now presents to [facility] ED 11/2/24 for altered mental status, SOB, and weakness, admitted for PNA and fluid overload. Cardiology is consulted for HF. Patient reports no prior hx of HF, states that edema has been an issue since her recent hospitalization.     Home CV Medications:  Amiodarone 200mg daily  Eliquis 2.5mg BID  ASA 81mg daily  Statin  -Previously on Captopril 50mg, HCTZ 25mg, and Verapamil 240mg CR      Patient History   Past Medical History:  She has a past medical history of Aortic aneurysm (CMS-HCC), Chronic low back pain, CKD (chronic kidney disease) stage 3, GFR 30-59 ml/min (Multi), Esophagitis, Glaucoma, History of scarlet fever, Hypertension, Hypothyroidism, Morbid obesity (Multi), Nephritis, OA (osteoarthritis) of knee, Polymyalgia rheumatica (Multi), Psoriasis, and Sciatica.  Past Surgical History:  She has a past surgical history that includes CT angio neck (04/21/2014); Tonsillectomy; Adenoidectomy; Dilation and curettage of uterus; [place] tooth extraction; Cataract extraction (06/2020); Crown (06/2020); Cataract extraction (Right); and Cataract  extraction (Left, 03/2021).  Social History:  She reports that she has been smoking cigarettes. She has a 40 pack-year smoking history. She has been exposed to tobacco smoke. She has never used smokeless tobacco. She reports current alcohol use of about 8.0 standard drinks of alcohol per week. She reports that she does not use drugs.  Family History: family history includes Abdominal Aortic Aneurysm in her mother; Breast cancer in her maternal grandmother and mother; Cancer in her sister; Heart disease in her father; Heart failure in her father; Hypertension in her father; Macular degeneration in her father; Osteoporosis in her mother.     Allergies: Patient has no known allergies.    ROS: 10-point review of systems was performed and is otherwise negative except as noted in HPI.     Outpatient Medications:  Current Outpatient Medications   Medication Instructions    acetaminophen (TYLENOL 8 HOUR) 650 mg, oral, Every 8 hours PRN    alendronate (FOSAMAX) 70 mg, oral, Every 7 days, Take on an empty stomach. Do not lie down or eat for 1/2 hour after taking.    amiodarone (PACERONE) 200 mg, oral, Every morning    apixaban (ELIQUIS) 2.5 mg, oral, 2 times daily    ascorbic acid (VITAMIN C) 500 mg, oral, Daily    aspirin 81 mg, oral, Daily    atorvastatin (LIPITOR) 10 mg, oral, Nightly    bisacodyl (DULCOLAX (BISACODYL)) 10 mg, rectal, Daily PRN    calcium carbonate 500 mg calcium (1,250 mg) chewable tablet 1 tablet, oral, Daily    captopril (CAPOTEN) 50 mg, oral, Daily    cyanocobalamin (vitamin B-12) 5,000 mcg, oral, Daily    DAILY MULTI-VITAMIN ORAL 1 tablet, oral, Daily    docusate sodium (COLACE) 100 mg, oral, 2 times daily    escitalopram (LEXAPRO) 20 mg, oral, Daily    levomefolate calcium (L-METHYLFOLATE ORAL) 1,000 mcg, oral, Every morning    levothyroxine (SYNTHROID, LEVOXYL) 100 mcg, oral, Daily, Six days per week    lutein 6 mg capsule 1 capsule, oral, Daily    magnesium hydroxide (Milk of Magnesia) 400 mg/5 mL  suspension 30 mL, oral, Daily PRN    menthol-zinc oxide (Calmoseptine) 0.44-20.6 % ointment 1 Application, Topical, 2 times daily    nicotine (Nicoderm CQ) 21 mg/24 hr patch 1 patch, transdermal, Every 24 hours    oxybutynin XL (DITROPAN-XL) 10 mg, oral, Daily    oxyCODONE (ROXICODONE) 5 mg, oral, Every 6 hours PRN    pantoprazole (PROTONIX) 40 mg, oral, Daily, Do not crush, chew, or split.    polyethylene glycol (GLYCOLAX, MIRALAX) 17 g, oral, 2 times daily PRN    sennosides (Senokot) 8.6 mg tablet 1 tablet, oral, 2 times daily    simvastatin (ZOCOR) 20 mg, oral, Nightly    sodium phosphates (Fleets) 19-7 gram/118 mL enema enema 1 enema, rectal, Daily PRN    thyroid (pork) (ARMOUR THYROID) 30 mg, oral, Daily before breakfast    tocilizumab (Actemra) 200 mg/10 mL (20 mg/mL) solution intravenous, Every 28 days, as directed    verapamil SR (CALAN-SR) 240 mg, oral, Daily     Cardiovascular Testing:  EKG:   ECG 12 Lead 11/4/24      ECG 12 Lead 10/17/24 [facility]      Echo:  Transthoracic Echocardiogram 9/2024 [facility]  - The left ventricle is normal in size. There is mild septal left ventricular   hypertrophy. Left ventricular systolic function is normal. EF = 59 ± 5% (2D biplane) Left ventricular diastolic function was not evaluated due to >2+ AI.   - The right ventricle is normal in size. Right ventricular systolic function is normal.   - The left atrial cavity is severely dilated.   - The visualized aorta is dilated with a maximal dimension of 4.6 cm.   - There is moderate (2+ - 3+) aortic valve regurgitation    Heart Catheterizations:  [facility] 8/26/24:  LEFT ANTERIOR DESCENDING: No Stenosis     DIAGONAL #1: No Stenosis   DIAGONAL #2: No Stenosis   DIAGONAL #3: No Stenosis     LEFT CIRCUMFLEX: No Stenosis     MARGINAL #1: No Stenosis   MARGINAL #2: No Stenosis     DOMINANT: NO  RIGHT CORONARY: No Stenosis     DOMINANT: YES  POSTERIOR DESCENDING: No Stenosis   POSTERIOR LATERAL: No Stenosis     Stress Test:  Nuclear Stress Test  2024  1. SPECT Perfusion Study: Abnormal.   2. No evidence of scarred myocardium.   3. There is mild (<10%) ischemia in the territory of the RCA.   4. Left ventricle is normal in size.   5. This is an intermediate risk scan.     Gated Stress FBP   LVEF % 60     Cardiac Imaging: No results found for the last 3 years.    Diagnostic Results   Labs  CBC- 2024:  7:02 AM  7.0 7.3 216    25.2      BMP- 2024:  7:02 AM  141 33 113 91   4.9 2.05 23    CrCl cannot be calculated (Unknown ideal weight.).     CA: 8.2 PROTIEN: 5.3 ALT: 6 Total Bili: 0.5 M.53   PHOS: 4.0 ALBUMIN: 2.6 AST: 11   Alk Phos: 66      COAGS- No results in last year.  _   _ _     CV Labs  Troponin I, High Sensitivity   Date/Time Value Ref Range Status   2024 09:44 AM 23 (H) 0 - 13 ng/L Final   2024 08:11 AM 23 (H) 0 - 13 ng/L Final     BNP   Date/Time Value Ref Range Status   2024 08:11 AM 1,021 (H) 0 - 99 pg/mL Final     Hemoglobin A1C   Date/Time Value Ref Range Status   10/23/2023 10:25 AM 4.9 See below % Final   10/22/2021 11:20 AM 5.1 4.0 - 6.0 % Final     Comment:     Hemoglobin A1C levels are related to mean blood glucose during the   preceding 2-3 months. The relationship table below may be used as a   general guide. Each 1% increase in HGB A1C is a reflection of an   increase in mean glucose of approximately 30 mg/dl.   Reference: Diabetes Care, volume 29, supplement 1 2006                        HGB A1C ................. Approx. Mean Glucose   _______________________________________________   6%   ...............................  120 mg/dl   7%   ...............................  150 mg/dl   8%   ...............................  180 mg/dl   9%   ...............................  210 mg/dl   10%  ...............................  240 mg/dl  Performed at [address] [address]     2021 11:55 AM 5.2 4.0 - 6.0 % Final     Comment:     Hemoglobin A1C levels are related to mean blood  glucose during the   preceding 2-3 months. The relationship table below may be used as a   general guide. Each 1% increase in HGB A1C is a reflection of an   increase in mean glucose of approximately 30 mg/dl.   Reference: Diabetes Care, volume 29, supplement 1 Jan. 2006                        HGB A1C ................. Approx. Mean Glucose   _______________________________________________   6%   ...............................  120 mg/dl   7%   ...............................  150 mg/dl   8%   ...............................  180 mg/dl   9%   ...............................  210 mg/dl   10%  ...............................  240 mg/dl  Performed at [address] [address]       LDL Calculated   Date/Time Value Ref Range Status   09/27/2024 10:58 AM 56 <=99 mg/dL Final     Comment:                                 Near   Borderline      AGE      Desirable  Optimal    High     High     Very High     0-19 Y     0 - 109     ---    110-129   >/= 130     ----    20-24 Y     0 - 119     ---    120-159   >/= 160     ----      >24 Y     0 -  99   100-129  130-159   160-189     >/=190     08/01/2024 09:43 AM 73 65 - 130 mg/dL Final   04/08/2024 09:55 AM 74 65 - 130 mg/dL Final     VLDL   Date/Time Value Ref Range Status   09/27/2024 10:58 AM 17 0 - 40 mg/dL Final       Pertinent Imaging  XR chest 1 view 11/02/2024  Right basilar pneumonia. Small right pleural effusion.    CT angio chest abdomen pelvis 11/02/2024  1.  4.4 x 4.2 cm ascending aortic aneurysm. No dissection.  2. No pulmonary embolism.  3. Moderate size right and small left pleural effusions. Infiltrates or compressive atelectasis of the right upper middle and  lower lobes and left upper and lower lobes. The volume loss is most marked in the right lower lobe.  4. Anasarca/hypoproteinemia.    1.  4.9 x 4.7 cm infrarenal abdominal aortic aneurysm. There is a distal abdominal aortic aneurysm intravascular bi-iliac artery stent.  There is a  femoral-femoral bypass graft with flow   2. There is occlusion in the proximal right common iliac artery which reconstitutes distally.  3. Anasarca/hypoproteinemia. In particular, there is induration of the left lateral abdominal wall and left side of the back which could represent more focal edema or contusion. It is not known if the patient fell.  4. Possible appendectomy.  5. Presacral edema.  6. Sludge in an otherwise unremarkable gallbladder.       Last Recorded Vitals:  Vitals:    11/04/24 0743 11/04/24 0800 11/04/24 0900 11/04/24 1105   BP: 97/58   83/57   BP Location: Left arm   Right arm   Patient Position: Lying   Lying   Pulse: 63 85 102    Resp: 16 18 21    Temp: 36.6 °C (97.8 °F)   36.3 °C (97.3 °F)   TempSrc: Temporal   Axillary   SpO2: 98%   98%     Temp:  [36.3 °C (97.3 °F)-36.9 °C (98.5 °F)] 36.3 °C (97.3 °F)  Heart Rate:  [] 102  Resp:  [11-24] 21  BP: ()/(56-67) 83/57     Last I/O:  I/O last 3 completed shifts:  In: 630 [P.O.:630]  Out: 350 [Urine:350]    Physical Exam:   Vitals and nursing notes reviewed.  BP 83/57 (BP Location: Right arm, Patient Position: Lying)   Pulse 102   Temp 36.3 °C (97.3 °F) (Axillary)   Resp 21   SpO2 98%   GENERAL: Alert and awake, cooperative; in no acute distress; Elderly female, ill-appearing  SKIN: Warm and dry, cap refill <2  HEENT: Normocephalic, PEERL, mucous membranes pink and moist  NECK: +JVD  CARDIAC: Regular rate and rhythm, S1S2, no murmurs or abnormal heart sounds  CHEST: Normal respiratory effort, no abnormal breath sounds  ABDOMEN: Soft, non-distended, non-tender with palpation, +abd wall edema  EXTREMITIES: 3-4+ pitting BLE edema to hips , normal pulses all 4 extremities  NEURO: Alert and oriented, mental status at baseline, no focal deficits  PSYCH: Behavior and affect as expected     Tele: Rate Controlled AF 100s  Code Status: Full Code    Assessment/Plan   Anny Tesfaye is a 79 y.o. female, with a PMH of HTN, HLD, heavy tobacco  use, severe PAD, AAA s/p EVAR 10/10/24 at [facility], lumbar stenosis, hypothyroidism, CKD III, polymyalgia rheumatica, and OA, [facility] for change in mental status. Patient reports recent prolonged hospital course at [facility] s/p EVAR on 10/10/24 c/b postoperative AF, respiratory insufficiency, volume overload, BALBIR on CKD, and ischemic colitis s/p hemicolectomy w/ colostomy. She was started on Amiodarone without conversion to NSR and Eliquis for the atrial fibrillation. Home antihypertensive medications were held at discharge givens stable BP. She was discharged to SNF 10/25/24. She now presents to [facility] ED 11/2/24 for altered mental status, SOB, and weakness, admitted for PNA and fluid overload. Cardiology is consulted for HF. Patient reports no prior hx of HF, states that edema has been an issue since her recent hospitalization.     Home CV Medications: Amiodarone 200mg daily, Eliquis 2.5mg BID, ASA 81mg daily, statin  -Previously on Captopril 50mg, HCTZ 25mg, and Verapamil 240mg CR    #Acute HF, undetermined type- ++Hypervolemia with diffuse anasarca likely in the setting of judicious postoperative fluid resuscitation and malnutrition, EF appears to be newly decreased on TTE [awaiting final read] but no room to start GDMT with current renal function and BP  -Diuresis as tolerated  #Postoperative AF- Currently rate controlled 80-100s, c/w Amiodarone  -On Eliquis for OAC, has been on anticoagulation since 10/23 (Heparin gtt then transitioned to Eliquis)  #HTN- BP low, home antihypertensives held during prior hospitalization  #Recent EVAR- c/w ASA and statin      RECOMMENDATIONS:  -We will obtain a transthoracic echocardiogram for structural evaluation, measurement of ejection fraction, assessment of regional wall motion abnormalities or valvular disease, and further evaluation of hemodynamics   -Will attempt diuresis with IV Lasix 40mg BID as BP allows  -c/w Amiodarone and  Eliquis  -Nutrition consult for malnutrition recommendations  -Patient would benefit from thoracentesis  -Continuous telemetry monitoring while admitted  -Monitor electrolytes, replete for K <4 and Mg <2  -Daily wts, strict I&Os, low sodium diet  -Low threshold to transfer to ICU if decompensates  -Possible UMESH + DCCV later this week pending clinical course      YVONNE Gonzalez-CNP  Advanced Practice Provider  Cardiology  [facility]  11/04/24 11:22 AM       STAFF ADDENDUM:    Both the MARIAMA and I have had a face to face encounter with the patient today. I have examined the patient and edited the documented physical examination as necessary.  I personally reviewed the patient's vital signs, telemetry, recent labs, medications, orders, EKGs, and pertinent cardiac imaging/ echocardiography.  I have reviewed the MARIAMA's encounter note, approve the MARIAMA's documentation and have edited the note to reflect my diagnostic and therapeutic plan.      79-year-old female with history of HTN, tobacco use, PAD with AAA repair with EVAR 10/10/2024 and femorofemoral bypass postop complicated by ischemic colitis requiring hemicolectomy and colostomy as well as atrial fibrillation.  She also has history of CKD, PMR, osteoarthritis who was admitted now with altered mental status, shortness of breath and weakness from subacute rehab.    Her hospitalization at [facility] she was just discharged 10/25/2024.  She had persistent atrial fibrillation during that hospitalization despite amiodarone.  She was not initiated on anticoagulation until the day before discharge.  On arrival here she is deconditioned, frail appearing.  CT chest abdomen pelvis with bilateral pleural effusions, suggestive of pneumonia, no acute abdominal process but did show some anasarca.    Echocardiogram being completed in the room at my time of exam and visually appears to be 30-35%.  Will await formal read.  EKG reviewed showing persistent atrial  fibrillation with controlled rate.    On exam alert and oriented x 3.  On oxygen.  No apparent distress.  Heart irregular without murmur rub or gallop.  Lungs diminished bilaterally.    Impression:  1.  Persistent atrial fibrillation  2.  Acute on chronic systolic and diastolic heart failure-suspect arrhythmia related to atrial fibrillation postoperatively  3.  Severe protein calorie malnutrition-likely contributing to her fluid overload and anasarca status  4.  Bilateral pleural effusions and possible pneumonia  5.  Status post EVAR and femorofemoral bypass    She remains fluid overloaded but now getting hypotensive after IV diuresis.  Probably some intravascular depletion and renal insufficiency.  May need to hold further diuresis today.  Suggest thoracentesis and reevaluation afterwards.  She benefits from cardioversion, but has only been on anticoagulation for 1 week thus will need UMESH.  Perhaps we can do this in 2 to 3 days depending on how she proceeds.  Low clinical threshold to transfer to ICU if decompensates clinically.    Rashad Ramirez, DO

## 2024-11-04 NOTE — PROGRESS NOTES
11/04/24 1235   Discharge Planning   Living Arrangements Other (Comment)  (SNF;)   Support Systems Family members  (Karie Spencer ( niece)/[phone])   Assistance Needed PTA; Asst w/ADL, use a walker, dependent w/ostomy, currently on oxygen, 4.5/5 liters, on oxygen at facility   Type of Residence Private residence  (demo correct)   Home or Post Acute Services Post acute facilities (Rehab/SNF/etc)   Type of Post Acute Facility Services Skilled nursing   Expected Discharge Disposition SNF  (return referral [facility]-will need auth to return)   Does the patient need discharge transport arranged? Yes   RoundTrip coordination needed? Yes   Has discharge transport been arranged? No   Financial Resource Strain   How hard is it for you to pay for the very basics like food, housing, medical care, and heating? Not hard   Housing Stability   In the last 12 months, was there a time when you were not able to pay the mortgage or rent on time? N   In the past 12 months, how many times have you moved where you were living? 0   At any time in the past 12 months, were you homeless or living in a shelter (including now)? N   Transportation Needs   In the past 12 months, has lack of transportation kept you from medical appointments or from getting medications? no  (PCP listed, last office visit one month ago, drive self to appointments; Pharm; [facility])   In the past 12 months, has lack of transportation kept you from meetings, work, or from getting things needed for daily living? No   Patient Choice   Patient / Family choosing to utilize agency / facility established prior to hospitalization Yes  (patient and POA agreeable to return back to [facility] at [place])     Care Coordinator Note:  Met patient and her niece at bedside to discuss discharge planning, explained my role as care coordinator  Plan: Cardiology consulted; IV abx ,repeat x-rays, IV lasix, labs, respiratory cx  Status: Inpatient  d/t Sepsis with acute organ dysfunction  Payor: [employer]  Disposition: [facility] [place]-will need auth to return, need PT/OT andrew BRUSHN, RN [facility]

## 2024-11-05 LAB
ANION GAP SERPL CALC-SCNC: 9 MMOL/L (ref 10–20)
BACTERIA UR CULT: ABNORMAL
BUN SERPL-MCNC: 33 MG/DL (ref 6–23)
CALCIUM SERPL-MCNC: 8 MG/DL (ref 8.6–10.3)
CHLORIDE SERPL-SCNC: 110 MMOL/L (ref 98–107)
CO2 SERPL-SCNC: 25 MMOL/L (ref 21–32)
CREAT SERPL-MCNC: 2.21 MG/DL (ref 0.5–1.05)
EGFRCR SERPLBLD CKD-EPI 2021: 22 ML/MIN/1.73M*2
ERYTHROCYTE [DISTWIDTH] IN BLOOD BY AUTOMATED COUNT: 16.7 % (ref 11.5–14.5)
GLUCOSE SERPL-MCNC: 91 MG/DL (ref 74–99)
HCT VFR BLD AUTO: 26.3 % (ref 36–46)
HGB BLD-MCNC: 7.7 G/DL (ref 12–16)
HOLD SPECIMEN: NORMAL
LDH FLD L TO P-CCNC: 89 U/L
LEGIONELLA AG UR QL: NEGATIVE
MCH RBC QN AUTO: 29.1 PG (ref 26–34)
MCHC RBC AUTO-ENTMCNC: 29.3 G/DL (ref 32–36)
MCV RBC AUTO: 99 FL (ref 80–100)
NRBC BLD-RTO: 0 /100 WBCS (ref 0–0)
PH FLD: 7.97 [PH]
PLATELET # BLD AUTO: 204 X10*3/UL (ref 150–450)
POTASSIUM SERPL-SCNC: 4.5 MMOL/L (ref 3.5–5.3)
PROT FLD-MCNC: 1.6 G/DL
RBC # BLD AUTO: 2.65 X10*6/UL (ref 4–5.2)
S PNEUM AG UR QL: NEGATIVE
SODIUM SERPL-SCNC: 139 MMOL/L (ref 136–145)
WBC # BLD AUTO: 5.8 X10*3/UL (ref 4.4–11.3)

## 2024-11-05 PROCEDURE — 2500000004 HC RX 250 GENERAL PHARMACY W/ HCPCS (ALT 636 FOR OP/ED): Performed by: NURSE PRACTITIONER

## 2024-11-05 PROCEDURE — 85027 COMPLETE CBC AUTOMATED: CPT | Performed by: INTERNAL MEDICINE

## 2024-11-05 PROCEDURE — 2500000002 HC RX 250 W HCPCS SELF ADMINISTERED DRUGS (ALT 637 FOR MEDICARE OP, ALT 636 FOR OP/ED): Performed by: INTERNAL MEDICINE

## 2024-11-05 PROCEDURE — 2500000004 HC RX 250 GENERAL PHARMACY W/ HCPCS (ALT 636 FOR OP/ED): Performed by: INTERNAL MEDICINE

## 2024-11-05 PROCEDURE — 2500000004 HC RX 250 GENERAL PHARMACY W/ HCPCS (ALT 636 FOR OP/ED): Performed by: PHARMACIST

## 2024-11-05 PROCEDURE — 1200000002 HC GENERAL ROOM WITH TELEMETRY DAILY

## 2024-11-05 PROCEDURE — 36415 COLL VENOUS BLD VENIPUNCTURE: CPT | Performed by: INTERNAL MEDICINE

## 2024-11-05 PROCEDURE — 99232 SBSQ HOSP IP/OBS MODERATE 35: CPT | Performed by: NURSE PRACTITIONER

## 2024-11-05 PROCEDURE — 80048 BASIC METABOLIC PNL TOTAL CA: CPT | Performed by: INTERNAL MEDICINE

## 2024-11-05 PROCEDURE — 2500000001 HC RX 250 WO HCPCS SELF ADMINISTERED DRUGS (ALT 637 FOR MEDICARE OP): Performed by: PHARMACIST

## 2024-11-05 PROCEDURE — 99232 SBSQ HOSP IP/OBS MODERATE 35: CPT | Performed by: INTERNAL MEDICINE

## 2024-11-05 PROCEDURE — 2500000001 HC RX 250 WO HCPCS SELF ADMINISTERED DRUGS (ALT 637 FOR MEDICARE OP): Performed by: INTERNAL MEDICINE

## 2024-11-05 RX ORDER — FUROSEMIDE 10 MG/ML
40 INJECTION INTRAMUSCULAR; INTRAVENOUS EVERY 8 HOURS SCHEDULED
Status: DISCONTINUED | OUTPATIENT
Start: 2024-11-05 | End: 2024-11-09

## 2024-11-05 RX ADMIN — PANTOPRAZOLE SODIUM 40 MG: 40 TABLET, DELAYED RELEASE ORAL at 06:34

## 2024-11-05 RX ADMIN — CEFTRIAXONE 2 G: 2 INJECTION, POWDER, FOR SOLUTION INTRAMUSCULAR; INTRAVENOUS at 09:43

## 2024-11-05 RX ADMIN — FUROSEMIDE 40 MG: 10 INJECTION, SOLUTION INTRAMUSCULAR; INTRAVENOUS at 10:13

## 2024-11-05 RX ADMIN — APIXABAN 2.5 MG: 2.5 TABLET, FILM COATED ORAL at 09:43

## 2024-11-05 RX ADMIN — ESCITALOPRAM OXALATE 20 MG: 20 TABLET ORAL at 09:44

## 2024-11-05 RX ADMIN — APIXABAN 2.5 MG: 2.5 TABLET, FILM COATED ORAL at 20:09

## 2024-11-05 RX ADMIN — ATORVASTATIN CALCIUM 10 MG: 10 TABLET, FILM COATED ORAL at 20:09

## 2024-11-05 RX ADMIN — FUROSEMIDE 40 MG: 10 INJECTION, SOLUTION INTRAMUSCULAR; INTRAVENOUS at 16:13

## 2024-11-05 RX ADMIN — AMIODARONE HYDROCHLORIDE 200 MG: 200 TABLET ORAL at 09:43

## 2024-11-05 RX ADMIN — LEVOTHYROXINE SODIUM 100 MCG: 0.1 TABLET ORAL at 09:43

## 2024-11-05 RX ADMIN — ASPIRIN 81 MG: 81 TABLET, COATED ORAL at 09:43

## 2024-11-05 RX ADMIN — LEVOTHYROXINE, LIOTHYRONINE 30 MG: 19; 4.5 TABLET ORAL at 06:34

## 2024-11-05 RX ADMIN — FUROSEMIDE 40 MG: 10 INJECTION, SOLUTION INTRAMUSCULAR; INTRAVENOUS at 20:09

## 2024-11-05 RX ADMIN — CEFEPIME 1 G: 1 INJECTION, POWDER, FOR SOLUTION INTRAMUSCULAR; INTRAVENOUS at 16:05

## 2024-11-05 RX ADMIN — AZITHROMYCIN DIHYDRATE 500 MG: 500 TABLET ORAL at 09:43

## 2024-11-05 ASSESSMENT — COGNITIVE AND FUNCTIONAL STATUS - GENERAL
MOBILITY SCORE: 6
DRESSING REGULAR LOWER BODY CLOTHING: TOTAL
TURNING FROM BACK TO SIDE WHILE IN FLAT BAD: TOTAL
TURNING FROM BACK TO SIDE WHILE IN FLAT BAD: TOTAL
TOILETING: TOTAL
HELP NEEDED FOR BATHING: TOTAL
MOBILITY SCORE: 6
DAILY ACTIVITIY SCORE: 6
CLIMB 3 TO 5 STEPS WITH RAILING: TOTAL
WALKING IN HOSPITAL ROOM: TOTAL
TURNING FROM BACK TO SIDE WHILE IN FLAT BAD: TOTAL
MOBILITY SCORE: 6
EATING MEALS: TOTAL
EATING MEALS: TOTAL
CLIMB 3 TO 5 STEPS WITH RAILING: TOTAL
MOVING FROM LYING ON BACK TO SITTING ON SIDE OF FLAT BED WITH BEDRAILS: TOTAL
STANDING UP FROM CHAIR USING ARMS: TOTAL
STANDING UP FROM CHAIR USING ARMS: TOTAL
EATING MEALS: TOTAL
TOILETING: TOTAL
MOVING TO AND FROM BED TO CHAIR: TOTAL
HELP NEEDED FOR BATHING: TOTAL
TURNING FROM BACK TO SIDE WHILE IN FLAT BAD: TOTAL
PERSONAL GROOMING: TOTAL
MOVING TO AND FROM BED TO CHAIR: TOTAL
DRESSING REGULAR UPPER BODY CLOTHING: TOTAL
STANDING UP FROM CHAIR USING ARMS: TOTAL
PERSONAL GROOMING: TOTAL
DAILY ACTIVITIY SCORE: 6
MOVING FROM LYING ON BACK TO SITTING ON SIDE OF FLAT BED WITH BEDRAILS: TOTAL
DRESSING REGULAR UPPER BODY CLOTHING: TOTAL
STANDING UP FROM CHAIR USING ARMS: TOTAL
PERSONAL GROOMING: TOTAL
MOVING TO AND FROM BED TO CHAIR: TOTAL
EATING MEALS: TOTAL
CLIMB 3 TO 5 STEPS WITH RAILING: TOTAL
DRESSING REGULAR LOWER BODY CLOTHING: TOTAL
DRESSING REGULAR LOWER BODY CLOTHING: TOTAL
DRESSING REGULAR UPPER BODY CLOTHING: TOTAL
DRESSING REGULAR UPPER BODY CLOTHING: TOTAL
DRESSING REGULAR LOWER BODY CLOTHING: TOTAL
DAILY ACTIVITIY SCORE: 6
PERSONAL GROOMING: TOTAL
HELP NEEDED FOR BATHING: TOTAL
WALKING IN HOSPITAL ROOM: TOTAL
MOVING FROM LYING ON BACK TO SITTING ON SIDE OF FLAT BED WITH BEDRAILS: TOTAL
DAILY ACTIVITIY SCORE: 6
MOBILITY SCORE: 6
TOILETING: TOTAL
TOILETING: TOTAL
CLIMB 3 TO 5 STEPS WITH RAILING: TOTAL
MOVING FROM LYING ON BACK TO SITTING ON SIDE OF FLAT BED WITH BEDRAILS: TOTAL
MOVING TO AND FROM BED TO CHAIR: TOTAL
WALKING IN HOSPITAL ROOM: TOTAL
HELP NEEDED FOR BATHING: TOTAL
WALKING IN HOSPITAL ROOM: TOTAL

## 2024-11-05 ASSESSMENT — PAIN SCALES - GENERAL: PAINLEVEL_OUTOF10: 0 - NO PAIN

## 2024-11-05 ASSESSMENT — PAIN - FUNCTIONAL ASSESSMENT: PAIN_FUNCTIONAL_ASSESSMENT: 0-10

## 2024-11-05 NOTE — PROGRESS NOTES
Anny Tesfaye is a 79 y.o. female     Feels okay  Blood pressure stabilizing  Remains overloaded    Review of Systems     Constitutional: no fever, no chills, not feeling poorly, not feeling tired   Cardiovascular: no chest pain   Respiratory: no cough, wheezing   Gastrointestinal: no abdominal pain, no constipation, no melena, no nausea, no diarrhea, no vomiting and no blood in stools.   Neurological: no headache,   All other systems have been reviewed and are negative for complaint.       Vitals:    11/05/24 0700   BP: 101/66   Pulse: 104   Resp: (!) 99   Temp: 36.6 °C (97.9 °F)   SpO2: 100%        Scheduled medications  amiodarone, 200 mg, oral, q AM  apixaban, 2.5 mg, oral, BID  aspirin, 81 mg, oral, Daily  atorvastatin, 10 mg, oral, Nightly  azithromycin, 500 mg, oral, q24h  cefTRIAXone, 2 g, intravenous, Daily  escitalopram, 20 mg, oral, Daily  furosemide, 40 mg, intravenous, q8h EDIN  levothyroxine, 100 mcg, oral, Daily  magnesium oxide, 400 mg, oral, Once  pantoprazole, 40 mg, oral, Daily before breakfast   Or  pantoprazole, 40 mg, intravenous, Daily before breakfast  perflutren protein A microsphere, 0.5 mL, intravenous, Once in imaging  sulfur hexafluoride microsphr, 2 mL, intravenous, Once in imaging  thyroid (pork), 30 mg, oral, Daily before breakfast      Continuous medications     PRN medications  PRN medications: acetaminophen **OR** acetaminophen **OR** acetaminophen, ALPRAZolam, alum-mag hydroxide-simeth, guaiFENesin, melatonin, ondansetron **OR** ondansetron, oxygen, polyethylene glycol, traMADol    Lab Review   Results from last 7 days   Lab Units 11/05/24  0648 11/04/24  0702 11/02/24  0811   WBC AUTO x10*3/uL 5.8 7.0 19.7*   HEMOGLOBIN g/dL 7.7* 7.3* 8.5*   HEMATOCRIT % 26.3* 25.2* 28.1*   PLATELETS AUTO x10*3/uL 204 216 289     Results from last 7 days   Lab Units 11/05/24  0649 11/04/24  1402 11/04/24  0702 11/02/24  0944   SODIUM mmol/L 139  --  141 142   POTASSIUM mmol/L 4.5  --  4.9 4.4    CHLORIDE mmol/L 110*  --  113* 113*   CO2 mmol/L 25  --  23 23   BUN mg/dL 33*  --  33* 26*   CREATININE mg/dL 2.21*  --  2.05* 1.75*   CALCIUM mg/dL 8.0*  --  8.2* 7.9*   PROTEIN TOTAL g/dL  --  5.9*  --  5.3*   BILIRUBIN TOTAL mg/dL  --   --   --  0.5   ALK PHOS U/L  --   --   --  66   ALT U/L  --   --   --  6*   AST U/L  --   --   --  11   GLUCOSE mg/dL 91  --  91 84     Results from last 7 days   Lab Units 11/02/24  0944 11/02/24  0811   TROPHS ng/L 23* 23*        XR chest 1 view   Final Result   Small bilateral pleural effusions and enlarged cardiac silhouette.        Signed by: Anh Hansen 11/4/2024 7:04 PM   Dictation workstation:   [id number]      Transthoracic Echo (TTE) Complete   Final Result      CT head wo IV contrast   Final Result   1. Age related changes without acute intracranial process.   2. Sinus disease. Also nonspecific fluid within left mastoid cells   and the left middle ear cavity; correlate clinically.        Signed by: Francisco Carlson 11/2/2024 11:47 AM   Dictation workstation:   [id number]      CT angio chest abdomen pelvis   Final Result   CHEST:   1.  4.4 x 4.2 cm ascending aortic aneurysm. No dissection.   2. No pulmonary embolism.   3. Moderate size right and small left pleural effusions.   Infiltrates or compressive atelectasis of the right upper middle and   lower lobes and left upper and lower lobes. The volume loss is most   marked in the right lower lobe.   4. Anasarca/hypoproteinemia.        ABDOMEN AND PELVIS:   1.  4.9 x 4.7 cm infrarenal abdominal aortic aneurysm. There is a   distal abdominal aortic aneurysm intravascular bi-iliac artery stent.   There is a femoral-femoral bypass graft with flow   2. 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   3. Anasarca/hypoproteinemia. In particular, there is induration of   the left lateral abdominal wall and left side of the back which could   represent more focal edema or  contusion. It is not known if the   patient fell.   4. Possible appendectomy.   5. Presacral edema.   6. Sludge in an otherwise unremarkable gallbladder.        MACRO:   None        Signed by: Georgia Sanchez 11/2/2024 11:51 AM   Dictation workstation:   [id number]      XR chest 1 view   Final Result   Right basilar pneumonia. Small right pleural effusion.        MACRO:   None        Signed by: Georgia Sanchez 11/2/2024 10:03 AM   Dictation workstation:   [id number]      US thoracentesis    (Results Pending)         Physical Exam    Constitutional   General appearance: Alert    Pulmonary   Respiratory assessment: No respiratory distress, normal respiratory rhythm and effort.    Auscultation of Lungs: Decreased breath sounds in the bases  Cardiovascular   Auscultation of heart: Irregularly irregular rhythm  Exam for edema: Plus edema  Abdomen   Abdominal Exam: No bruits, normal bowel sounds, soft, non-tender, no abdominal mass palpated.    Liver and Spleen exam: No hepato-splenomegaly.   Musculoskeletal     Inspection of digits and nails: No clubbing or cyanosis of the fingernails.    Inspection/palpation of joints, bones and muscles: No joint swelling. Normal movement of all extremities.   Neurologic   Cranial nerves: Nerves 2-12 were intact, no focal neuro defects.         Assessment/Plan      #Community-acquired pneumonia # sepsis with leukocytosis  #Bilateral pleural effusions  #Hypoxia  Continue IV antibiotics  Procalcitonin noted on the low end of normal  However with her improved mentation will recommend continuing antibiotics  S/p thoracentesis with blood-tinged fluid removed of 700 ml  LDH points towards transudate     # Acute on chronic combined systolic and diastolic congestive heart failure  Continue IV Lasix to twice a day  Imaging fraction shows low EF at 30%     #Paroxysmal fibrillation  Continue amiodarone and DOAC

## 2024-11-05 NOTE — CARE PLAN
Problem: Pain - Adult  Goal: Verbalizes/displays adequate comfort level or baseline comfort level  Outcome: Progressing     Problem: Safety - Adult  Goal: Free from fall injury  Outcome: Progressing     Problem: Discharge Planning  Goal: Discharge to home or other facility with appropriate resources  Outcome: Progressing     Problem: Chronic Conditions and Co-morbidities  Goal: Patient's chronic conditions and co-morbidity symptoms are monitored and maintained or improved  Outcome: Progressing     Problem: Safety - Medical Restraint  Goal: Remains free of injury from restraints (Restraint for Interference with Medical Device)  Outcome: Progressing  Goal: Free from restraint(s) (Restraint for Interference with Medical Device)  Outcome: Progressing     Problem: Skin  Goal: Decreased wound size/increased tissue granulation at next dressing change  Outcome: Progressing  Goal: Participates in plan/prevention/treatment measures  Outcome: Progressing  Goal: Prevent/manage excess moisture  Outcome: Progressing  Goal: Prevent/minimize sheer/friction injuries  Outcome: Progressing  Flowsheets (Taken 11/5/2024 1825)  Prevent/minimize sheer/friction injuries:   Turn/reposition every 2 hours/use positioning/transfer devices   HOB 30 degrees or less  Goal: Promote/optimize nutrition  Outcome: Progressing  Goal: Promote skin healing  Outcome: Progressing   The patient's goals for the shift include      The clinical goals for the shift include pt will maintain oxygen saturations on supplemental oxygen    Over the shift, the patient did not make progress toward the following goals. Barriers to progression include . Recommendations to address these barriers include .  To promote good nutrition

## 2024-11-05 NOTE — PROGRESS NOTES
Medicine NP follow up note    Subjective:  Seen earlier.   No specific complaints, breathing improved, denies dizziness.   NPO @ [facility] for possible right heart catheterization.   Remains on 4 L NC (none at baseline).   PSDA on Ucx, pt denies symptoms.     Vitals (Last 24 Hours):  Heart Rate:  []   Temp:  [36.5 °C (97.7 °F)-37 °C (98.6 °F)]   Resp:  [17-99]   BP: ()/(54-92)   SpO2:  [76 %-100 %]     I have reviewed all imaging reports and labs pertinent to this visit / presenting problem    PHYSICAL EXAM:  Constitutional: NAD, alert and cooperative  Eyes: no icterus  ENMT: mucous membranes moist, no lesions  Head/Neck: +JVD  Respiratory/Thorax: diminished bilaterally, non-labored breathing, no cough, on 4 L NC  Cardiovascular: irregular, no murmurs heard  Gastrointestinal: midline abd incision intact with steri strips, ostomy with brown stool, ND/S/NT  : no Avendano, no SP/flank discomfort  Musculoskeletal: no joint swelling, ROM intact  Extremities: 2-3+ BLE edema   Neurological: non-focal  Skin: warm and dry  Psych: calm, stable mood     MEDS:  Scheduled meds  amiodarone, 200 mg, oral, q AM  apixaban, 2.5 mg, oral, BID  aspirin, 81 mg, oral, Daily  atorvastatin, 10 mg, oral, Nightly  azithromycin, 500 mg, oral, q24h  cefepime, 1 g, intravenous, q24h  escitalopram, 20 mg, oral, Daily  furosemide, 40 mg, intravenous, q8h EDIN  levothyroxine, 100 mcg, oral, Daily  magnesium oxide, 400 mg, oral, Once  pantoprazole, 40 mg, oral, Daily before breakfast   Or  pantoprazole, 40 mg, intravenous, Daily before breakfast  thyroid (pork), 30 mg, oral, Daily before breakfast    PRN meds  PRN medications: acetaminophen **OR** acetaminophen **OR** acetaminophen, ALPRAZolam, alum-mag hydroxide-simeth, guaiFENesin, melatonin, ondansetron **OR** ondansetron, oxygen, polyethylene glycol, traMADol    ASSESSMENT/PLAN:  79 y.o. woman with h/o HTN, HLD, tobacco use, severe PAD, AAA s/p EVAR 10/10/24 at [facility] c/b postoperative Afib -  started on eliquis, respiratory insufficiency, volume overload, BALBIR on CKD, and ischemic colitis s/p hemicolectomy w/ colostomy, lumbar stenosis, hypothyroidism, CKD III, polymyalgia rheumatica, OA, presented with change in mental status, SOB and weakness, admitted for PNA and acute HF.     Acute hypoxic respiratory failure   Acute HFrEF, hypotension   Possible HAP  Pleural effusions s/p R thoracentesis (exudate by LDH)  BALBIR (worsening), suspected cardiorenal syndrome   -IV Lasix as BP allows  -NPO for possible right heart catheterization tomorrow   -continue abx (switched rocephin to cefepime 2/2 concomitant PSDA on Ucx)   -FU pleural cx and cytology   -wean O2 as tolerated     Afib   -c/w Amiodarone, Eliquis  -cardio considering DCCV    AAA s/p EVAR 10/10/24  -c/w ASA and statin    Anemia  -progressive since surgery + eliquis initiation   -no overt bleeding, pt has never had GI scopes   -monitor, transfuse as needed    PSDA on urine cx   -pt denies urinary symptoms, cefepime for now as above     VTE / GI prophylaxis   -on eliquis, PPI, bowel regimen in place     Discharge planning  -PT/OT, will return to SNF on DC     Discussed with Dr. Tony and the interdisciplinary team     YVONNE Enamorado-CNP

## 2024-11-05 NOTE — DISCHARGE INSTRUCTIONS
Wound care consult/recommendations:     Right groin incision    Irrigate with normal saline or wound cleanser, Pat dry.  Apply no sting skin barrier (cavilon) to lucio wound   Apply cristy collagen to wound bed (will dissolve)    Pack with Aquacel  Cover with dry dressing.   Change every day and as needed.       Left groin fold incision- mostly approximated   Irrigate with normal saline or wound cleanser, Pat dry.  Apply no sting skin barrier (cavilon) to lucio wound   Leave open to air     Abdominal fold-Intertriginous dermatitis   Cleanse with bath wipes or soap and water. Rinse well and pat dry.   Place Interdry AG to skin folds.  Allow 1 to 2 inches to extend outside fold to wick off moisture. Remove daily for hygiene. Change every 5 days and as needed if soiled.    Gluteal cleft & buttocks-MASD/Irritant contact dermatitis due to fecal, urinary or dual incontinent.   Cleanse with bath wipes or soap and water. Rinse well and pat dry.   Apply no sting skin barrier (cavilon)   Apply Critic-aid clear  ointment to gluteal fold/groin three times a day and as needed.       While in bed patient should only be on one fitted sheet, and one chux. Please, do not use brief while patient is resting in bed. Elevate heels off the bed surface at all times. Turn and reposition at least every 2 hours.            CARDIAC CATHETERIZATION DISCHARGE INSTRUCTIONS     FOR SUDDEN AND SEVERE CHEST PAIN, SHORTNESS OF BREATH, EXCESSIVE BLEEDING, SIGNS OF STROKE, OR CHANGES IN MENTAL STATUS YOU SHOULD CALL 911 IMMEDIATELY.     If your provider has prescribed aspirin and/or clopidogrel (Plavix), or prasugrel (Effient), or ticagrelor (Brilinta), DO NOT STOP THESE MEDICATIONS for any reason without talking to your cardiologist first. If any of these were prescribed, you must take them every day without missing a single dose. If you are getting low on these medications, contact your provider immediately for a refill.     FOR NEXT 24 HOURS  - Upon  discharge, you should return home and rest for the remainder of the day and evening. You do not have to stay on bed rest but should not be very active.  It is recommended a responsible adult be with you for the first 24 hours after the procedure.    - No driving for 24 hours after procedure. Please arrange for someone to drive you home from the hospital today.     - Do not drive, operate machinery, or use power tools for 24 hours after your procedure.     - Do not make any legal decisions for 24 hours after your procedure.     - Do not drink alcoholic beverages for 24 hours after your procedure.    WOUND CARE   *FOR FEMORAL (LEG) ACCESS*  ·      Avoid heavy lifting (over 10 pounds) for 7 days, squatting or excessive bending for 2 days, and strenuous exercise for 7 days.  ·      No submerged bathing, swimming, or hot tubs for the next 7 days, or until fully healed.  ·      Avoid sexual activity for 3-4 days until any groin discomfort has ceased.     *FOR RADIAL (WRIST) ACCESS*  ·      No lifting more than 5 pounds or excessive use of the wrist for 24 hours - for example, treat your wrist as if it is sprained.  ·      Do not engage in vigorous activities (tennis, golf, bowling, weights) for at least 48 hours after the procedure.  ·      Do not submerge the wrist for 7 days after the procedure.  ·      You should expect mild tingling in your hand and tenderness at the puncture site for up to 3 days.    - The transparent dressing should be removed from the site 24 hours after the procedure.  Wash the site gently with soap and water. Rinse well and pat dry. Keep the area clean and dry. You may apply a Band-Aid to the site. Avoid lotions, ointments, or powders until fully healed.     - You may shower the day after your procedure.      - It is normal to notice a small bruise around the puncture site and/or a small grape sized or smaller lump. Any large bruising or large lump warrants a call to the office.     - If bleeding  should occur, lay down and apply pressure to the affected area for 10 minutes.  If the bleeding stops notify your physician.  If there is a large amount of bleeding or spurting of blood CALL 911 immediately.  DO NOT drive yourself to the hospital.    - You may experience some tenderness, bruising or minimal inflammation.  If you have any concerns, you may contact the Cath Lab or if any of these symptoms become excessive, contact your cardiologist or go to the emergency room.     OTHER INSTRUCTIONS  - You may take acetaminophen (Tylenol) as directed for discomfort.  If pain is not relieved with acetaminophen (Tylenol), contact your doctor.    - If you notice or experience any of the following, you should notify your doctor or seek medical attention  Chest pain or discomfort  Change in mental status or weakness in extremities.  Dizziness, light headedness, or feeling faint.  Change in the site where the procedure was performed, such as bleeding or an increased area of bruising or swelling.  Tingling, numbness, pain, or coolness in the leg/arm beyond the site where the procedure was performed.  Signs of infection (i.e. shaking chills, temperature > 100 degrees Fahrenheit, warmth, redness) in the leg/arm area where the procedure was performed.  Changes in urination   Bloody or black stools  Vomiting blood  Severe nose bleeds  Any excessive bleeding    - If you DO NOT have an appointment with your cardiologist within 2-4 weeks following your procedure, please contact their office.

## 2024-11-05 NOTE — PROGRESS NOTES
"SUBJECTIVE DATA:  BP was able to tolerate diuresis  Patient remains HDS, still ++hypervolemia  Clinically unchanged    Inaccurate I&Os, no weight documented since admission    Telemetry reviewed, atrial fibrillation 80-100s    Last Recorded Vitals:  /66   Pulse 104   Temp 36.6 °C (97.9 °F) (Temporal)   Resp (!) 99   Ht 1.651 m (5' 5\")   Wt 73.9 kg (163 lb)   SpO2 100%   BMI 27.12 kg/m²     Temp:  [36.4 °C (97.6 °F)-36.7 °C (98.1 °F)] 36.6 °C (97.9 °F)  Heart Rate:  [] 104  Resp:  [15-99] 99  BP: ()/(48-92) 101/66    Last I/O:  I/O last 3 completed shifts:  In: 510 (6.9 mL/kg) [P.O.:510]  Out: 950 (12.8 mL/kg) [Urine:950 (0.4 mL/kg/hr)]  Weight: 73.9 kg   Vitals:    11/04/24 1105   Weight: 73.9 kg (163 lb)       Cardiovascular Testing:  EKG:   ECG 12 Lead ECG 12 Lead 11/4/24       ECG 12 Lead 10/17/24 [facility]       Echo:  Transthoracic Echocardiogram 11/3/24   1. Left ventricular ejection fraction is moderately decreased, by visual estimate at 30-35%.   2. There is global hypokinesis of the left ventricle with minor regional variations.   3. Left ventricular diastolic filling cannot be determined, due to atrial fibrillation/flutter.   4. There is moderately reduced right ventricular systolic function.   5. Moderately enlarged right ventricle.   6. The left atrium is severely dilated.   7. There is moderate mitral annular calcification.   8. Mildly elevated right ventricular systolic pressure.   9. Mild aortic valve regurgitation.  10. Aortic root moderatly enlarged at 4.8 cm. Ascending aorta 4.4 cm.  11. The patient is in atrial fibrillation which may influence the estimate of left ventricular function and transvalvular flows.    Transthoracic Echocardiogram 9/2024 [facility]  - The left ventricle is normal in size. There is mild septal left ventricular   hypertrophy. Left ventricular systolic function is normal. EF = 59 ± 5% (2D biplane) Left ventricular diastolic function was not evaluated due to >2+ " AI.   - The right ventricle is normal in size. Right ventricular systolic function is normal.   - The left atrial cavity is severely dilated.   - The visualized aorta is dilated with a maximal dimension of 4.6 cm.   - There is moderate (2+ - 3+) aortic valve regurgitation     Heart Catheterizations:  [facility] 8/26/24:  LEFT ANTERIOR DESCENDING: No Stenosis     DIAGONAL #1: No Stenosis   DIAGONAL #2: No Stenosis   DIAGONAL #3: No Stenosis     LEFT CIRCUMFLEX: No Stenosis     MARGINAL #1: No Stenosis   MARGINAL #2: No Stenosis     DOMINANT: NO  RIGHT CORONARY: No Stenosis     DOMINANT: YES  POSTERIOR DESCENDING: No Stenosis   POSTERIOR LATERAL: No Stenosis      Stress Test:  Nuclear Stress Test 8/12/2024  1. SPECT Perfusion Study: Abnormal.   2. No evidence of scarred myocardium.   3. There is mild (<10%) ischemia in the territory of the RCA.   4. Left ventricle is normal in size.   5. This is an intermediate risk scan.     Gated Stress FBP   LVEF % 60      Cardiac Imaging: No results found for the last 3 years.    CV Labs  Troponin I, High Sensitivity   Date/Time Value Ref Range Status   11/02/2024 09:44 AM 23 (H) 0 - 13 ng/L Final   11/02/2024 08:11 AM 23 (H) 0 - 13 ng/L Final     BNP   Date/Time Value Ref Range Status   11/02/2024 08:11 AM 1,021 (H) 0 - 99 pg/mL Final     Hemoglobin A1C   Date/Time Value Ref Range Status   10/23/2023 10:25 AM 4.9 See below % Final   10/22/2021 11:20 AM 5.1 4.0 - 6.0 % Final   04/23/2021 11:55 AM 5.2 4.0 - 6.0 % Final     LDL Calculated   Date/Time Value Ref Range Status   09/27/2024 10:58 AM 56 <=99 mg/dL Final   08/01/2024 09:43 AM 73 65 - 130 mg/dL Final   04/08/2024 09:55 AM 74 65 - 130 mg/dL Final       Diagnostic Results   Labs  CBC- 11/5/2024:  6:48 AM  5.8 7.7 204    26.3      BMP- 11/5/2024:  6:49 AM  139 33 110 91   4.5 2.21 25    Estimated Creatinine Clearance: 20.4 mL/min (A) (by C-G formula based on SCr of 2.21 mg/dL (H)).     CA: 8.0 PROTIEN: 5.9 ALT: 6 Total Bili: 0.5 Mg:  1.53   PHOS: 4.0 ALBUMIN: 2.6 AST: 11 Alk Phos: 66      COAGS- 11/4/2024:  2:02 PM  1.3   14.1 _     Pertinent Imaging  XR chest 1 view 11/04/2024  Small bilateral pleural effusions and enlarged cardiac silhouette.     CT abdomen pelvis wo IV contrast 10/19/2024  Trace pneumoperitoneum and small volume ascites adjacent to the colon, which is favored expected noting recent surgery.  New  colostomy left lower quadrant.    Some mildly prominent small bowel which is favored related to ileus and noting the oral contrast has passed to the cecum.    Asymmetric body wall edema, more prominent left gluteal region and right breast region, correlation for other pathologies such as cellulitis.    CT angio chest abdomen pelvis 11/02/2024  1.  4.4 x 4.2 cm ascending aortic aneurysm. No dissection.  2. No pulmonary embolism.  3. Moderate size right and small left pleural effusions.  Infiltrates or compressive atelectasis of the right upper middle and lower lobes and left upper and lower lobes. The volume loss is most marked in the right lower lobe.  4. Anasarca/hypoproteinemia.    ABDOMEN AND PELVIS:  1.  4.9 x 4.7 cm infrarenal abdominal aortic aneurysm. There is a distal abdominal aortic aneurysm intravascular bi-iliac artery stent.  There is a femoral-femoral bypass graft with flow   2. There is occlusion in the proximal right common iliac artery which reconstitutes distally.  3. Anasarca/hypoproteinemia. In particular, there is induration of the left lateral abdominal wall and left side of the back which could represent more focal edema or contusion. It is not known if the patient fell.  4. Possible appendectomy.  5. Presacral edema.  6. Sludge in an otherwise unremarkable gallbladder.    CT lung screening follow up CT chest wo IV contrast 10/02/2024  1.  Few small bilateral noncalcified pulmonary nodules measuring up to 6 mm, likely benign. Continued screening with low-dose non-contrast chest CT in 12 months (from current  "date) is recommended.  2. Estimated coronary artery calcium score is * which correlates with at least  percentile rank as compared to matched CUMMINGS-study  subjects(https://[email or web address]-nhlbi.org/Calcium/input.aspx).    CT head wo IV contrast 11/02/2024  1. Age related changes without acute intracranial process.  2. Sinus disease. Also nonspecific fluid within left mastoid cells and the left middle ear cavity; correlate clinically.       Inpatient Medications:  Scheduled medications   Medication Dose Route Frequency    amiodarone  200 mg oral q AM    apixaban  2.5 mg oral BID    aspirin  81 mg oral Daily    atorvastatin  10 mg oral Nightly    azithromycin  500 mg oral q24h    cefTRIAXone  2 g intravenous Daily    escitalopram  20 mg oral Daily    furosemide  40 mg intravenous q8h EDIN    levothyroxine  100 mcg oral Daily    magnesium oxide  400 mg oral Once    pantoprazole  40 mg oral Daily before breakfast    Or    pantoprazole  40 mg intravenous Daily before breakfast    perflutren protein A microsphere  0.5 mL intravenous Once in imaging    sulfur hexafluoride microsphr  2 mL intravenous Once in imaging    thyroid (pork)  30 mg oral Daily before breakfast     PRN medications: acetaminophen **OR** acetaminophen **OR** acetaminophen, ALPRAZolam, alum-mag hydroxide-simeth, guaiFENesin, melatonin, ondansetron **OR** ondansetron, oxygen, polyethylene glycol, traMADol       Physical Exam:   Vitals and nursing notes reviewed.  /66   Pulse 104   Temp 36.6 °C (97.9 °F) (Temporal)   Resp (!) 99   Ht 1.651 m (5' 5\")   Wt 73.9 kg (163 lb)   SpO2 100%   BMI 27.12 kg/m²   GENERAL: Alert and awake, cooperative; in no acute distress; Elderly female, ill-appearing  SKIN: Warm and dry, cap refill <2  HEENT: Normocephalic, PEERL, mucous membranes pink and moist  NECK: +JVD  CARDIAC: Irregular rate and rhythm, S1S2, no murmurs or abnormal heart sounds  CHEST: Normal respiratory effort, no abnormal breath sounds  ABDOMEN: Soft, " non-distended, non-tender with palpation, +abd wall edema  EXTREMITIES: 3-4+ pitting BLE edema to hips , normal pulses all 4 extremities  NEURO: Alert and oriented, mental status at baseline, no focal deficits  PSYCH: Behavior and affect as expected     Code Status: Full Code    Assessment/Plan   Anny Tesfaye is a 79 y.o. female, with a PMH of HTN, HLD, heavy tobacco use, severe PAD, AAA s/p EVAR 10/10/24 at [facility], lumbar stenosis, hypothyroidism, CKD III, polymyalgia rheumatica, and OA, who presented to [facility] on 11/2/2024 for change in mental status. Patient reports recent prolonged hospital course at [facility] s/p EVAR on 10/10/24 c/b postoperative AF, respiratory insufficiency, volume overload, BALBIR on CKD, and ischemic colitis s/p hemicolectomy w/ colostomy. She was started on Amiodarone without conversion to NSR and Eliquis for the atrial fibrillation. Home antihypertensive medications were held at discharge givens stable BP. She was discharged to SNF 10/25/24. She now presents to [facility] ED 11/2/24 for altered mental status, SOB, and weakness, admitted for PNA and fluid overload. Cardiology is consulted for HF. Patient reports no prior hx of HF, states that edema has been an issue since her recent hospitalization.      Home CV Medications: Amiodarone 200mg daily, Eliquis 2.5mg BID, ASA 81mg daily, statin  -Previously on Captopril 50mg, HCTZ 25mg, and Verapamil 240mg CR    Transthoracic Echocardiogram 11/3/24   1. Left ventricular ejection fraction is moderately decreased, by visual estimate at 30-35%.   2. There is global hypokinesis of the left ventricle with minor regional variations.   3. Left ventricular diastolic filling cannot be determined, due to atrial fibrillation/flutter.   4. There is moderately reduced right ventricular systolic function.   5. Moderately enlarged right ventricle.   6. The left atrium is severely dilated.   7. There is moderate mitral annular calcification.   8. Mildly  elevated right ventricular systolic pressure.   9. Mild aortic valve regurgitation.  10. Aortic root moderatly enlarged at 4.8 cm. Ascending aorta 4.4 cm.  11. The patient is in atrial fibrillation which may influence the estimate of left ventricular function and transvalvular flows.     #Acute Systolic HF- ++Hypervolemia with diffuse anasarca likely in the setting of judicious postoperative fluid resuscitation and malnutrition, EF newly decreased on TTE [likely tachycardia-mediated] but no room to start GDMT with current renal function and BP  -Diuresis as tolerated  #Postoperative AF- Currently rate controlled 80-100s, c/w Amiodarone  -On Eliquis for OAC, has been on anticoagulation since 10/23 (Heparin gtt then transitioned to Eliquis)  #HTN- BP low, home antihypertensives held during prior hospitalization  #Recent EVAR- c/w ASA and statin  #BALBIR- Slight worsening of renal status, suspect cardiorenal, will attempt to increase diuresis        RECOMMENDATIONS:  -Increase diuresis to IV Lasix 40mg TID  -c/w Amiodarone and Eliquis  -Patient would benefit from nutrition consult for malnutrition recommendations  -Continuous telemetry monitoring while admitted  -Monitor electrolytes, replete for K <4 and Mg <2  -Daily wts, strict I&Os, low sodium diet  -Low threshold to transfer to ICU if decompensates  -Possible UMESH + DCCV later this week pending clinical course  -Supportive care for BALBIR and possible PNA per primary team  -Recommend RHC tomorrow if renal fxn continues to worsen, will make NPO at 0000      YVONNE Gonzalez-CNP  Advanced Practice Provider   Cardiology  [facility]  11/05/24 11:19 AM

## 2024-11-05 NOTE — PROGRESS NOTES
11/05/24 9681   Discharge Planning   Assistance Needed Cardiology following;-Increase diuresis to IV Lasix 40mg TID  -c/w Amiodarone and Eliquis  -Patient would benefit from nutrition consult for malnutrition recommendations  -Continuous telemetry monitoring while admitted  -Monitor electrolytes, replete for K <4 and Mg <2  -Daily wts, strict I&Os, low sodium diet  -Low threshold to transfer to ICU if decompensates  -Possible UMESH + DCCV later this week pending clinical course  -Supportive care for BALBIR and possible PNA per primary team  -Recommend RHC tomorrow if renal fxn continues to worsen, will make NPO at 0000, continue IV abx; PT/OT evals pending   Expected Discharge Disposition SNF  ([facility])   Does the patient need discharge transport arranged? Yes   RoundTrip coordination needed? Yes   Has discharge transport been arranged? No

## 2024-11-05 NOTE — CARE PLAN
The patient's goals for the shift include      The clinical goals for the shift include pt will maintain oxygen saturations on supplemental oxygen    Over the shift, the patient did make progress toward the following goals.   Problem: Pain - Adult  Goal: Verbalizes/displays adequate comfort level or baseline comfort level  Outcome: Progressing     Problem: Safety - Adult  Goal: Free from fall injury  Outcome: Progressing     Problem: Chronic Conditions and Co-morbidities  Goal: Patient's chronic conditions and co-morbidity symptoms are monitored and maintained or improved  Outcome: Progressing     Problem: Skin  Goal: Decreased wound size/increased tissue granulation at next dressing change  Outcome: Progressing  Flowsheets (Taken 11/5/2024 0551)  Decreased wound size/increased tissue granulation at next dressing change: Promote sleep for wound healing  Goal: Participates in plan/prevention/treatment measures  Outcome: Progressing  Flowsheets (Taken 11/5/2024 0551)  Participates in plan/prevention/treatment measures: Elevate heels  Goal: Prevent/manage excess moisture  Outcome: Progressing  Flowsheets (Taken 11/5/2024 0551)  Prevent/manage excess moisture:   Moisturize dry skin   Monitor for/manage infection if present  Goal: Prevent/minimize sheer/friction injuries  Outcome: Progressing  Flowsheets (Taken 11/5/2024 0551)  Prevent/minimize sheer/friction injuries:   Complete micro-shifts as needed if patient unable. Adjust patient position to relieve pressure points, not a full turn   HOB 30 degrees or less   Turn/reposition every 2 hours/use positioning/transfer devices  Goal: Promote/optimize nutrition  Outcome: Progressing  Flowsheets (Taken 11/5/2024 0551)  Promote/optimize nutrition:   Offer water/supplements/favorite foods   Assist with feeding  Goal: Promote skin healing  Outcome: Progressing  Flowsheets (Taken 11/5/2024 0551)  Promote skin healing:   Assess skin/pad under line(s)/device(s)   Protective  dressings over bony prominences

## 2024-11-05 NOTE — NURSING NOTE
Patient skin check was done with the charge nurse she has a colostomy in her right lower quadrant, a healed midline incision, incision lines to the right and left groin areas dressings clean dry and intact with any drainage observed.

## 2024-11-05 NOTE — CONSULTS
Wound Care Consult     Visit Date: 11/5/2024      Patient Name: Anny Tesfaye         MRN: [id number]           YOB: 1945     Reason for Consult: bilateral groin surgical incision and pouching system assessment.     Assessment of colostomy pouching system. Per patient pouch changed this weekend. Additional supplies at bedside. Pouch intact at this time.      Pertinent Labs:   Albumin   Date Value Ref Range Status   11/02/2024 2.6 (L) 3.4 - 5.0 g/dL Final   09/27/2024 3.3 (L) 3.4 - 5.0 g/dL Final     Albumin %   Date Value Ref Range Status   06/27/2024 72.4 % Final       Wound Assessment:  Wound 11/03/24 Other (comment) Abdomen Anterior (Active)   Site Assessment Clean;Dehisced;Red;Painful 11/03/24 0900   State of Healing Non-healing 11/03/24 0900   Treatments Cleansed;Site care 11/03/24 0900   Drainage Description Yellow 11/03/24 0900   Drainage Amount Moderate 11/03/24 0900   Dressing Foam 11/03/24 0900   Dressing Changed Changed 11/03/24 0900   Dressing Status Clean;Dry 11/04/24 0917       Wound 11/03/24 Incision Abdomen (Active)   Site Assessment Clean;Dry;Intact 11/03/24 2300   Johanna-Wound Assessment Clean;Dry 11/03/24 0900   State of Healing Closed wound edges 11/04/24 1839   Closure Steri strips 11/04/24 2030   Treatments Cleansed;Other (Comment) 11/04/24 1839   Drainage Description Serosanguineous 11/04/24 1839   Drainage Amount Scant 11/04/24 1839   Dressing Steri-strips 11/04/24 1839   Dressing Changed Changed 11/04/24 1839   Dressing Status Clean;Dry 11/04/24 2030   Adhesive Closure Strips Changed 11/04/24 1839       Wound 11/05/24 Leg Proximal;Right;Upper;Anterior (Active)   Wound Image   11/05/24 1302   Shape oval 11/05/24 1302   Wound Length (cm) 3 cm 11/05/24 1302   Wound Width (cm) 2.5 cm 11/05/24 1302   Wound Surface Area (cm^2) 7.5 cm^2 11/05/24 1302   Wound Depth (cm) 1.5 cm 11/05/24 1302   Wound Volume (cm^3) 11.25 cm^3 11/05/24 1302   Margins Well-defined edges 11/05/24 1302    Drainage Description Serous 11/05/24 1302   Drainage Amount Moderate 11/05/24 1302   Dressing Changed Changed 11/05/24 1302       Wound Team Summary Assessment: Notified Carmelita Paul CNP of wound care recommendations. Additional supplies left at bedside.     Right groin incision  Irrigate with normal saline or wound cleanser, Pat dry.  Apply no sting skin barrier (cavilon) to lucio wound   Apply Hydrofera blue classic (moisten dressing with normal saline before use)   Cover with Mepilex border dressing   Change every other day and as needed      Left groin incision- mostly approximated   Irrigate with normal saline or wound cleanser, Pat dry.  Apply no sting skin barrier (cavilon) to lucio wound   Leave open to air     Abdominal fold-Intertriginous dermatitis   Cleanse with bath wipes or soap and water. Rinse well and pat dry.   Place Interdry AG to skin folds.  Allow 1 to 2 inches to extend outside fold to wick off moisture. Remove daily for hygiene. Change every 5 days and as needed if soiled.    Gluteal cleft & buttocks-MASD/Irritant contact dermatitis due to fecal, urinary or dual incontinent.   Cleanse with bath wipes or soap and water. Rinse well and pat dry.   Apply no sting skin barrier (cavilon)   Apply Critic-aid clear  ointment to gluteal fold/groin three times a day and as needed.        Wound Team Plan: Thank you for this consult. Assessment has been completed and orders have been placed. Wound care to be completed by nursing per orders. Please place new consult if there is a change in wound status.     While in bed patient should only be on one fitted sheet, and one chux. Please, do not use brief while patient is resting in bed. Elevate heels off the bed surface at all times. Turn and reposition at least every 2 hours.        Rachel Cronin RN BSN,[facility]  [phone]   11/5/2024  4:31 PM

## 2024-11-06 ENCOUNTER — APPOINTMENT (OUTPATIENT)
Dept: INFUSION THERAPY | Facility: CLINIC | Age: 79
End: 2024-11-06
Payer: MEDICARE

## 2024-11-06 PROBLEM — I50.41: Status: ACTIVE | Noted: 2024-11-02

## 2024-11-06 PROBLEM — I50.9 ACUTE CONGESTIVE HEART FAILURE: Status: ACTIVE | Noted: 2024-11-02

## 2024-11-06 LAB
ACID FAST STN SPEC: NORMAL
ANION GAP SERPL CALC-SCNC: 9 MMOL/L (ref 10–20)
BACTERIA BLD CULT: NORMAL
BACTERIA BLD CULT: NORMAL
BUN SERPL-MCNC: 34 MG/DL (ref 6–23)
CALCIUM SERPL-MCNC: 8 MG/DL (ref 8.6–10.3)
CHLORIDE SERPL-SCNC: 108 MMOL/L (ref 98–107)
CO2 SERPL-SCNC: 27 MMOL/L (ref 21–32)
CREAT SERPL-MCNC: 2.16 MG/DL (ref 0.5–1.05)
EGFRCR SERPLBLD CKD-EPI 2021: 23 ML/MIN/1.73M*2
ERYTHROCYTE [DISTWIDTH] IN BLOOD BY AUTOMATED COUNT: 16.6 % (ref 11.5–14.5)
GLUCOSE SERPL-MCNC: 88 MG/DL (ref 74–99)
HCT VFR BLD AUTO: 27.3 % (ref 36–46)
HGB BLD-MCNC: 7.9 G/DL (ref 12–16)
MAGNESIUM SERPL-MCNC: 1.46 MG/DL (ref 1.6–2.4)
MCH RBC QN AUTO: 28.3 PG (ref 26–34)
MCHC RBC AUTO-ENTMCNC: 28.9 G/DL (ref 32–36)
MCV RBC AUTO: 98 FL (ref 80–100)
MYCOBACTERIUM SPEC CULT: NORMAL
NRBC BLD-RTO: 0 /100 WBCS (ref 0–0)
PLATELET # BLD AUTO: 219 X10*3/UL (ref 150–450)
POTASSIUM SERPL-SCNC: 4 MMOL/L (ref 3.5–5.3)
RBC # BLD AUTO: 2.79 X10*6/UL (ref 4–5.2)
SODIUM SERPL-SCNC: 140 MMOL/L (ref 136–145)
WBC # BLD AUTO: 4.5 X10*3/UL (ref 4.4–11.3)

## 2024-11-06 PROCEDURE — 97165 OT EVAL LOW COMPLEX 30 MIN: CPT | Mod: GO

## 2024-11-06 PROCEDURE — 99233 SBSQ HOSP IP/OBS HIGH 50: CPT | Performed by: INTERNAL MEDICINE

## 2024-11-06 PROCEDURE — 80048 BASIC METABOLIC PNL TOTAL CA: CPT | Performed by: INTERNAL MEDICINE

## 2024-11-06 PROCEDURE — 4A023N6 MEASUREMENT OF CARDIAC SAMPLING AND PRESSURE, RIGHT HEART, PERCUTANEOUS APPROACH: ICD-10-PCS | Performed by: HOSPITALIST

## 2024-11-06 PROCEDURE — 99232 SBSQ HOSP IP/OBS MODERATE 35: CPT | Performed by: INTERNAL MEDICINE

## 2024-11-06 PROCEDURE — 93451 RIGHT HEART CATH: CPT | Performed by: HOSPITALIST

## 2024-11-06 PROCEDURE — 99223 1ST HOSP IP/OBS HIGH 75: CPT | Performed by: NURSE PRACTITIONER

## 2024-11-06 PROCEDURE — 2500000004 HC RX 250 GENERAL PHARMACY W/ HCPCS (ALT 636 FOR OP/ED): Performed by: HOSPITALIST

## 2024-11-06 PROCEDURE — 1200000002 HC GENERAL ROOM WITH TELEMETRY DAILY

## 2024-11-06 PROCEDURE — 36415 COLL VENOUS BLD VENIPUNCTURE: CPT | Performed by: INTERNAL MEDICINE

## 2024-11-06 PROCEDURE — 2500000004 HC RX 250 GENERAL PHARMACY W/ HCPCS (ALT 636 FOR OP/ED): Performed by: NURSE PRACTITIONER

## 2024-11-06 PROCEDURE — C1769 GUIDE WIRE: HCPCS | Performed by: HOSPITALIST

## 2024-11-06 PROCEDURE — 2500000001 HC RX 250 WO HCPCS SELF ADMINISTERED DRUGS (ALT 637 FOR MEDICARE OP): Performed by: NURSE PRACTITIONER

## 2024-11-06 PROCEDURE — 97161 PT EVAL LOW COMPLEX 20 MIN: CPT | Mod: GP | Performed by: PHYSICAL THERAPIST

## 2024-11-06 PROCEDURE — C1887 CATHETER, GUIDING: HCPCS | Performed by: HOSPITALIST

## 2024-11-06 PROCEDURE — 85027 COMPLETE CBC AUTOMATED: CPT | Performed by: INTERNAL MEDICINE

## 2024-11-06 PROCEDURE — 2500000001 HC RX 250 WO HCPCS SELF ADMINISTERED DRUGS (ALT 637 FOR MEDICARE OP): Performed by: INTERNAL MEDICINE

## 2024-11-06 PROCEDURE — 2500000005 HC RX 250 GENERAL PHARMACY W/O HCPCS: Performed by: INTERNAL MEDICINE

## 2024-11-06 PROCEDURE — C1894 INTRO/SHEATH, NON-LASER: HCPCS | Performed by: HOSPITALIST

## 2024-11-06 PROCEDURE — 83735 ASSAY OF MAGNESIUM: CPT | Performed by: NURSE PRACTITIONER

## 2024-11-06 PROCEDURE — 2720000007 HC OR 272 NO HCPCS: Performed by: HOSPITALIST

## 2024-11-06 PROCEDURE — 2500000002 HC RX 250 W HCPCS SELF ADMINISTERED DRUGS (ALT 637 FOR MEDICARE OP, ALT 636 FOR OP/ED): Performed by: INTERNAL MEDICINE

## 2024-11-06 PROCEDURE — 2500000001 HC RX 250 WO HCPCS SELF ADMINISTERED DRUGS (ALT 637 FOR MEDICARE OP): Performed by: PHARMACIST

## 2024-11-06 RX ORDER — LIDOCAINE HYDROCHLORIDE 10 MG/ML
INJECTION, SOLUTION EPIDURAL; INFILTRATION; INTRACAUDAL; PERINEURAL AS NEEDED
Status: DISCONTINUED | OUTPATIENT
Start: 2024-11-06 | End: 2024-11-06 | Stop reason: HOSPADM

## 2024-11-06 RX ADMIN — ASPIRIN 81 MG: 81 TABLET, COATED ORAL at 09:17

## 2024-11-06 RX ADMIN — LEVOTHYROXINE, LIOTHYRONINE 30 MG: 19; 4.5 TABLET ORAL at 06:34

## 2024-11-06 RX ADMIN — CEFEPIME 1 G: 1 INJECTION, POWDER, FOR SOLUTION INTRAMUSCULAR; INTRAVENOUS at 14:05

## 2024-11-06 RX ADMIN — APIXABAN 2.5 MG: 2.5 TABLET, FILM COATED ORAL at 21:18

## 2024-11-06 RX ADMIN — Medication 2 L/MIN: at 09:38

## 2024-11-06 RX ADMIN — ESCITALOPRAM OXALATE 20 MG: 20 TABLET ORAL at 09:17

## 2024-11-06 RX ADMIN — ATORVASTATIN CALCIUM 10 MG: 10 TABLET, FILM COATED ORAL at 21:18

## 2024-11-06 RX ADMIN — AZITHROMYCIN DIHYDRATE 500 MG: 500 TABLET ORAL at 09:17

## 2024-11-06 RX ADMIN — AMIODARONE HYDROCHLORIDE 200 MG: 200 TABLET ORAL at 09:17

## 2024-11-06 RX ADMIN — LEVOTHYROXINE SODIUM 100 MCG: 0.1 TABLET ORAL at 09:17

## 2024-11-06 ASSESSMENT — COGNITIVE AND FUNCTIONAL STATUS - GENERAL
HELP NEEDED FOR BATHING: TOTAL
HELP NEEDED FOR BATHING: A LOT
PERSONAL GROOMING: A LOT
DRESSING REGULAR UPPER BODY CLOTHING: TOTAL
DRESSING REGULAR LOWER BODY CLOTHING: TOTAL
TOILETING: TOTAL
STANDING UP FROM CHAIR USING ARMS: TOTAL
CLIMB 3 TO 5 STEPS WITH RAILING: TOTAL
TURNING FROM BACK TO SIDE WHILE IN FLAT BAD: TOTAL
WALKING IN HOSPITAL ROOM: TOTAL
DRESSING REGULAR LOWER BODY CLOTHING: A LOT
PERSONAL GROOMING: TOTAL
HELP NEEDED FOR BATHING: TOTAL
PERSONAL GROOMING: TOTAL
STANDING UP FROM CHAIR USING ARMS: TOTAL
STANDING UP FROM CHAIR USING ARMS: A LOT
TOILETING: A LOT
EATING MEALS: A LITTLE
MOBILITY SCORE: 6
CLIMB 3 TO 5 STEPS WITH RAILING: TOTAL
DRESSING REGULAR UPPER BODY CLOTHING: TOTAL
DRESSING REGULAR UPPER BODY CLOTHING: TOTAL
DRESSING REGULAR UPPER BODY CLOTHING: A LOT
DAILY ACTIVITIY SCORE: 6
MOBILITY SCORE: 6
CLIMB 3 TO 5 STEPS WITH RAILING: A LOT
EATING MEALS: TOTAL
MOBILITY SCORE: 6
DRESSING REGULAR LOWER BODY CLOTHING: TOTAL
TOILETING: TOTAL
DAILY ACTIVITIY SCORE: 6
WALKING IN HOSPITAL ROOM: TOTAL
PERSONAL GROOMING: TOTAL
MOVING TO AND FROM BED TO CHAIR: TOTAL
MOVING TO AND FROM BED TO CHAIR: TOTAL
PERSONAL GROOMING: TOTAL
DRESSING REGULAR LOWER BODY CLOTHING: TOTAL
WALKING IN HOSPITAL ROOM: TOTAL
MOVING TO AND FROM BED TO CHAIR: A LITTLE
TOILETING: TOTAL
MOVING FROM LYING ON BACK TO SITTING ON SIDE OF FLAT BED WITH BEDRAILS: TOTAL
MOVING FROM LYING ON BACK TO SITTING ON SIDE OF FLAT BED WITH BEDRAILS: TOTAL
TOILETING: TOTAL
MOBILITY SCORE: 6
HELP NEEDED FOR BATHING: TOTAL
TURNING FROM BACK TO SIDE WHILE IN FLAT BAD: TOTAL
MOVING FROM LYING ON BACK TO SITTING ON SIDE OF FLAT BED WITH BEDRAILS: TOTAL
CLIMB 3 TO 5 STEPS WITH RAILING: TOTAL
STANDING UP FROM CHAIR USING ARMS: TOTAL
EATING MEALS: TOTAL
TURNING FROM BACK TO SIDE WHILE IN FLAT BAD: A LITTLE
CLIMB 3 TO 5 STEPS WITH RAILING: TOTAL
HELP NEEDED FOR BATHING: TOTAL
MOBILITY SCORE: 16
TURNING FROM BACK TO SIDE WHILE IN FLAT BAD: TOTAL
DAILY ACTIVITIY SCORE: 6
TURNING FROM BACK TO SIDE WHILE IN FLAT BAD: TOTAL
MOVING TO AND FROM BED TO CHAIR: TOTAL
DRESSING REGULAR LOWER BODY CLOTHING: TOTAL
WALKING IN HOSPITAL ROOM: A LITTLE
DRESSING REGULAR UPPER BODY CLOTHING: TOTAL
WALKING IN HOSPITAL ROOM: TOTAL
STANDING UP FROM CHAIR USING ARMS: TOTAL
MOVING FROM LYING ON BACK TO SITTING ON SIDE OF FLAT BED WITH BEDRAILS: A LITTLE
MOVING TO AND FROM BED TO CHAIR: TOTAL
DAILY ACTIVITIY SCORE: 13
DAILY ACTIVITIY SCORE: 6
EATING MEALS: TOTAL
MOVING FROM LYING ON BACK TO SITTING ON SIDE OF FLAT BED WITH BEDRAILS: TOTAL
EATING MEALS: TOTAL

## 2024-11-06 ASSESSMENT — ACTIVITIES OF DAILY LIVING (ADL)
ADL_ASSISTANCE: INDEPENDENT
BATHING_ASSISTANCE: MAXIMAL

## 2024-11-06 ASSESSMENT — PAIN SCALES - GENERAL
PAINLEVEL_OUTOF10: 0 - NO PAIN

## 2024-11-06 ASSESSMENT — ENCOUNTER SYMPTOMS
PSYCHIATRIC NEGATIVE: 1
GASTROINTESTINAL NEGATIVE: 1
CARDIOVASCULAR NEGATIVE: 1
ALLERGIC/IMMUNOLOGIC NEGATIVE: 1
SHORTNESS OF BREATH: 1
EYES NEGATIVE: 1
MUSCULOSKELETAL NEGATIVE: 1
HEMATOLOGIC/LYMPHATIC NEGATIVE: 1
CONSTITUTIONAL NEGATIVE: 1
ENDOCRINE NEGATIVE: 1
NEUROLOGICAL NEGATIVE: 1

## 2024-11-06 ASSESSMENT — PAIN - FUNCTIONAL ASSESSMENT
PAIN_FUNCTIONAL_ASSESSMENT: 0-10
PAIN_FUNCTIONAL_ASSESSMENT: 0-10

## 2024-11-06 ASSESSMENT — PAIN SCALES - WONG BAKER: WONGBAKER_NUMERICALRESPONSE: NO HURT

## 2024-11-06 NOTE — PROGRESS NOTES
Occupational Therapy    Evaluation    Patient Name: Anny Tesfaye  MRN: [id number]  Department: [facility] [address]  Room: [address] [place] [address]  Today's Date: 11/6/2024  Time Calculation  Start Time: 1514  Stop Time: 1532  Time Calculation (min): 18 min    Assessment  IP OT Assessment  OT Assessment:  (OT Eval complete, patient is weak and deconditioned. Patient with decreased standing balance, decreased transfers, decreased activity tolerance. Patient would benefit from Moderate intensity therapy after discharge.)  Prognosis: Good  Barriers to Discharge: Decreased caregiver support  Evaluation/Treatment Tolerance: Patient limited by fatigue  Medical Staff Made Aware: Yes  End of Session Communication: Bedside nurse  End of Session Patient Position: Bed, 2 rail up, Alarm on  Plan:  Treatment Interventions: ADL retraining, Functional transfer training, Endurance training, Patient/family training, Neuromuscular reeducation  OT Frequency: 3 times per week  OT Discharge Recommendations: Moderate intensity level of continued care  Equipment Recommended upon Discharge: Wheeled walker  OT Recommended Transfer Status: Moderate assist  OT - OK to Discharge: Yes    Subjective   Current Problem:  1. Sepsis with acute organ dysfunction, due to unspecified organism, unspecified organ dysfunction type, unspecified whether septic shock present (Multi)        2. Dysarthria        3. Acute congestive heart failure, unspecified heart failure type  Transthoracic Echo (TTE) Complete    Transthoracic Echo (TTE) Complete    Case Request Cath Lab: Right Heart Cath    Case Request Cath Lab: Right Heart Cath    Cardiac Catheterization Procedure    Cardiac Catheterization Procedure      4. Acute diastolic CHF (congestive heart failure)  Transthoracic Echo (TTE) Complete    Transthoracic Echo (TTE) Complete      5. Heart failure, systolic and diastolic, acute  Case Request Cath Lab: Right Heart Cath    Case Request Cath Lab: Right Heart Cath    Cardiac  Catheterization Procedure    Cardiac Catheterization Procedure      6. Other forms of dyspnea  Cardiac Catheterization Procedure      7. Atrial fibrillation, unspecified type (Multi)  Transesophageal Echo (UMESH) w/ Possible Cardioversion    Transesophageal Echo (UMESH) w/ Possible Cardioversion      8. SOB (shortness of breath)  Transesophageal Echo (UMESH) w/ Possible Cardioversion    Transesophageal Echo (UMESH) w/ Possible Cardioversion      9. Atrial fibrillation, persistent (Multi)  Transesophageal Echo (UMESH) w/ Possible Cardioversion    Transesophageal Echo (UMESH) w/ Possible Cardioversion        General:  General  Reason for Referral: Pt to ED 11/2 with confusion, dysarthria, and SOB. Pt was admitted for pneumonia and acute HF.  Referred By: Dr. Tony  Past Medical History Relevant to Rehab:   Past Medical History:   Diagnosis Date    Aortic aneurysm (CMS-HCC)     Chronic low back pain     CKD (chronic kidney disease) stage 3, GFR 30-59 ml/min (Multi)     Esophagitis     Glaucoma     History of scarlet fever     Hypertension     Hypothyroidism     Morbid obesity (Multi)     Nephritis     OA (osteoarthritis) of knee     Bilateral    Polymyalgia rheumatica (Multi)     Psoriasis     Sciatica      Co-Treatment: PT  Co-Treatment Reason:  (To maximize patient participation)  Prior to Session Communication: Bedside nurse  Patient Position Received: Bed, 2 rail up, Alarm on  General Comment:  (Patient is pleasant and cooperative, however requires encouragement after undergoing R Heart Catherization.)  Precautions:  Medical Precautions: Fall precautions    Pain:  Pain Assessment  Pain Assessment: 0-10  0-10 (Numeric) Pain Score: 0 - No pain    Objective   Cognition:  Overall Cognitive Status: Within Functional Limits  Orientation Level: Oriented X4     Home Living:  Type of Home:  (2 story home with 3 steps, L HR, bed/bath on 2nd floor with 12 steps, walk in shower, raised toilet seat.)  Lives With: Alone  Home Adaptive  Equipment: None   Prior Function:  Level of [place]: Independent with ADLs and functional transfers  Receives Help From: Family  ADL Assistance: Independent  Homemaking Assistance: Independent (Cooking)  Ambulatory Assistance: Independent (No assistive device)  Hand Dominance: Right  IADL History:  Homemaking Responsibilities: Yes  Meal Prep Responsibility: Primary  ADL:  Eating Assistance: Independent  Grooming Assistance: Stand by  Bathing Assistance: Maximal (Assist to bathe below knees)  UE Dressing Assistance: Minimal  LE Dressing Assistance: Maximal (Unable to don socks)  Toileting Assistance with Device: Maximal  Functional Assistance: Maximal  Activity Tolerance:  Endurance: Tolerates 10 - 20 min exercise with multiple rests  Activity Tolerance Comments:  (Fair activity tolerance)  Bed Mobility/Transfers: Bed Mobility  Bed Mobility: Yes  Bed Mobility 1  Bed Mobility 1: Supine to sitting  Level of Assistance 1: Minimum assistance  Bed Mobility Comments 1:  (Assist with upper trunk.)  Bed Mobility 2  Bed Mobility  2: Sitting to supine  Level of Assistance 2: Contact guard    Transfers  Transfer: Yes  Transfer 1  Transfer From 1: Sit to  Transfer to 1: Stand  Technique 1: Sit to stand  Transfer Device 1: Walker  Transfer Level of Assistance 1: Moderate assistance  Trials/Comments 1:  (Cues for hand placement)    Sitting Balance:  Static Sitting Balance  Static Sitting-Balance Support: Feet supported  Static Sitting-Level of Assistance: Contact guard  Dynamic Sitting Balance  Dynamic Sitting-Balance Support: Feet supported  Dynamic Sitting-Level of Assistance: Moderate assistance  Dynamic Sitting-Balance: Forward lean  Standing Balance:  Static Standing Balance  Static Standing-Balance Support: Bilateral upper extremity supported  Static Standing-Level of Assistance: Minimum assistance    IADL's:   Homemaking Responsibilities: Yes  Meal Prep Responsibility: Primary    Strength:  Strength Comments:  (EDIL UEs-  shoulder flexion 2-/5, 4/5 distally.)    Outcome Measures: [facility] Daily Activity  Putting on and taking off regular lower body clothing: A lot  Bathing (including washing, rinsing, drying): A lot  Putting on and taking off regular upper body clothing: A lot  Toileting, which includes using toilet, bedpan or urinal: A lot  Taking care of personal grooming such as brushing teeth: A lot  Eating Meals: A little  Daily Activity - Total Score: 13    Goals:   Encounter Problems       Encounter Problems (Active)       ADLs       Patient will perform UB and LB bathing with independent level of assistance.        Start:  11/06/24    Expected End:  11/20/24            Patient with complete upper body dressing with independent level of assistance donning and doffing all UE clothes with no adaptive equipment while edge of bed        Start:  11/06/24    Expected End:  11/20/24            Patient with complete lower body dressing with independent level of assistance donning and doffing all LE clothes  with no adaptive equipment while edge of bed        Start:  11/06/24    Expected End:  11/20/24            Patient will complete daily grooming tasks brushing teeth and washing face/hair with independent level of assistance and PRN adaptive equipment while standing.       Start:  11/06/24    Expected End:  11/20/24            Patient will complete toileting including hygiene clothing management/hygiene with independent level of assistance and raised toilet seat.       Start:  11/06/24    Expected End:  11/20/24               BALANCE       Pt will maintain dynamic standing balance during ADL task with independent level of assistance in order to demonstrate decreased risk of falling and improved postural control.       Start:  11/06/24    Expected End:  11/20/24               TRANSFERS       Patient will perform bed mobility independent level of assistance and bed rails in order to improve safety and independence with mobility        Start:  11/06/24    Expected End:  11/20/24            Patient will complete functional transfer to all surfaces with front wheeled walker with modified independent level of assistance.       Start:  11/06/24    Expected End:  11/20/24

## 2024-11-06 NOTE — PROGRESS NOTES
Physical Therapy    Physical Therapy Evaluation    Patient Name: Anny Tesfaye  MRN: [id number]  Department: [place] [address]  Room: [address] [place] [address]  Today's Date: 11/6/2024   Time Calculation  Start Time: 1513  Stop Time: 1531  Time Calculation (min): 18 min    Completion of this session and documentation performed under the supervision of Harmony Colby PT.    Assessment/Plan   PT Assessment  PT Assessment Results: Decreased strength, Decreased endurance, Impaired balance, Decreased mobility  Rehab Prognosis: Good  Evaluation/Treatment Tolerance: Patient tolerated treatment well  Medical Staff Made Aware: Yes  Strengths: Ability to acquire knowledge, Support of extended family/friends  Barriers to Participation: Comorbidities, Housing layout  End of Session Communication: Bedside nurse  Assessment Comment: Pt is a 80 yo F presenting with decreased independence with bed mobility, transfers and ambulation. Pt will benefit from skilled PT to assist with discharge planning and address the above limitations to allow pt to return to prior level of function, which was independent with no device.  End of Session Patient Position: Bed, 2 rail up, Alarm on  IP OR SWING BED PT PLAN  Inpatient or Swing Bed: Inpatient  PT Plan  Treatment/Interventions: Bed mobility, Transfer training, Gait training, Stair training, Balance training, Strengthening, Endurance training, Therapeutic exercise, Therapeutic activity  PT Plan: Ongoing PT  PT Frequency: 4 times per week  PT Discharge Recommendations: Moderate intensity level of continued care  PT Recommended Transfer Status: Assist x1  PT - OK to Discharge: Yes (Per POC)    Subjective   General Visit Information:  General  Reason for Referral: Pt to ED 11/2 with confusion, dysarthria, and SOB. Pt was admitted for pneumonia and acute HF.  Referred By: Dr. Tony  Past Medical History Relevant to Rehab:   Past Medical History:   Diagnosis Date    Aortic aneurysm (CMS-HCC)     Chronic low back pain      CKD (chronic kidney disease) stage 3, GFR 30-59 ml/min (Multi)     Esophagitis     Glaucoma     History of scarlet fever     Hypertension     Hypothyroidism     Morbid obesity (Multi)     Nephritis     OA (osteoarthritis) of knee     Bilateral    Polymyalgia rheumatica (Multi)     Psoriasis     Sciatica      Past Surgical History:   Procedure Laterality Date    ADENOIDECTOMY      CARDIAC CATHETERIZATION N/A 11/6/2024    Procedure: Right Heart Cath;  Surgeon: Samra Syed MD;  Location: [facility] Cardiac Cath Lab;  Service: Cardiovascular;  Laterality: N/A;    CATARACT EXTRACTION  06/2020    CATARACT EXTRACTION Right     CATARACT EXTRACTION Left 03/2021    CROWN  06/2020    DENTAL CROWN REPLACEMENT    CT ANGIO NECK  04/21/2014    CT NECK ANGIO W AND WO IV CONTRAST [facility]    DILATION AND CURETTAGE OF UTERUS      TONSILLECTOMY      WISDOM TOOTH EXTRACTION       Co-Treatment: OT  Co-Treatment Reason: Increase pt participation and safety with mobilization  Prior to Session Communication: Bedside nurse  Patient Position Received: Bed, 2 rail up, Alarm on  Preferred Learning Style: auditory, kinesthetic, verbal, visual  General Comment: Pt agreeable to PT eval. Required encourgement for OOB mobility.  Home Living:  Home Living  Type of Home: House  Lives With: Alone  Home Adaptive Equipment: None  Home Layout: Multi-level, Stairs to alternate level with rails  Alternate Level Stairs-Rails: Right  Alternate Level Stairs-Number of Steps: 12  Home Access: Stairs to enter with rails  Entrance Stairs-Rails: Left  Entrance Stairs-Number of Steps: 3  Bathroom Shower/Tub: Walk-in shower  Bathroom Toilet: Adaptive toilet seating  Bathroom Equipment: Raised toilet seat without rails  Prior Level of Function:  Prior Function Per Pt/Caregiver Report  Level of [place]: Independent with ADLs and functional transfers, Needs assistance with homemaking (Pt utilizes a cleaning service for her home, and only prepares easy  meals for herself. Niece if nearby as needed. Pt still drives.)  Receives Help From: Family  Ambulatory Assistance: Independent (No AD at baseline)  Precautions:  Precautions  Medical Precautions: Fall precautions     Vital Signs (Past 2hrs)        Date/Time Vitals Session Patient Position Pulse Resp SpO2 BP MAP (mmHg)    11/06/24 1607 --  --  103  18  100 %  84/60  68                   Objective     Cognition:  Cognition  Orientation Level: Oriented X4    General Assessments:                  Activity Tolerance  Endurance: Tolerates 10 - 20 min exercise with multiple rests                        Coordination  Movements are Fluid and Coordinated: Yes         Static Sitting Balance  Static Sitting-Balance Support: Bilateral upper extremity supported  Static Sitting-Level of Assistance: Distant supervision  Dynamic Sitting Balance  Dynamic Sitting-Balance Support: Bilateral upper extremity supported  Dynamic Sitting-Level of Assistance: Close supervision    Static Standing Balance  Static Standing-Balance Support: Bilateral upper extremity supported  Static Standing-Level of Assistance: Contact guard  Static Standing-Comment/Number of Minutes: RW  Dynamic Standing Balance  Dynamic Standing-Balance Support: Bilateral upper extremity supported  Dynamic Standing-Level of Assistance: Contact guard  Dynamic Standing-Comments: RW  Functional Assessments:  Bed Mobility  Bed Mobility: Yes  Bed Mobility 1  Bed Mobility 1: Supine to sitting  Level of Assistance 1: Minimum assistance  Bed Mobility Comments 1: Assist needed to lift trunk from bed to upright position  Bed Mobility 2  Bed Mobility  2: Sitting to supine  Level of Assistance 2: Close supervision    Transfers  Transfer: Yes  Transfer 1  Transfer From 1: Sit to, Stand to  Transfer to 1: Stand, Sit  Technique 1: Sit to stand, Stand to sit  Transfer Device 1: Walker, Gait belt  Transfer Level of Assistance 1: Moderate assistance, +2, Moderate verbal cues  Trials/Comments  1: Pt required increased encouragement to complete transfer. Mod cues for hand placement prior to sit/stand.    Ambulation/Gait Training  Ambulation/Gait Training Performed: Yes  Ambulation/Gait Training 1  Surface 1: Level tile  Device 1: Rolling walker  Gait Support Devices: Gait belt  Assistance 1: Contact guard  Quality of Gait 1: Narrow base of support, Decreased step length  Comments/Distance (ft) 1: Pt did not want to take more than a few steps, so took 3 steps forwards and back to bed. Cues to keep RW close to body.  Extremity/Trunk Assessments:  RLE   RLE : Exceptions to WFL  Strength RLE  RLE Overall Strength: Greater than or equal to 3/5 as evidenced by functional mobility  LLE   LLE : Exceptions to WFL  Strength LLE  LLE Overall Strength: Greater than or equal to 3/5 as evidenced by functional mobility  Outcome Measures:  [facility] Basic Mobility  Turning from your back to your side while in a flat bed without using bedrails: A little  Moving from lying on your back to sitting on the side of a flat bed without using bedrails: A little  Moving to and from bed to chair (including a wheelchair): A little  Standing up from a chair using your arms (e.g. wheelchair or bedside chair): A lot  To walk in hospital room: A little  Climbing 3-5 steps with railing: A lot  Basic Mobility - Total Score: 16    Encounter Problems       Encounter Problems (Active)       Mobility       STG - Patient will ambulate 100 ft with RW mod-I.       Start:  11/06/24    Expected End:  11/20/24            STG - Patient will ascend and descend three stairs with L rail and SBA.       Start:  11/06/24    Expected End:  11/20/24            STG - Patient will ascend and descend a flight of stairs with R rail and SBA.       Start:  11/06/24    Expected End:  11/20/24               PT Transfers       STG - Patient to transfer to and from sit to supine independently.       Start:  11/06/24    Expected End:  11/20/24            STG - Patient will  transfer sit to and from stand using RW mod-I.       Start:  11/06/24    Expected End:  11/20/24               Pain - Adult              Education Documentation  Body Mechanics, taught by ADITYA Cook at 11/6/2024  4:03 PM.  Learner: Patient  Readiness: Acceptance  Method: Explanation  Response: Demonstrated Understanding    Mobility Training, taught by ADITYA Cook at 11/6/2024  4:03 PM.  Learner: Patient  Readiness: Acceptance  Method: Explanation  Response: Demonstrated Understanding    Education Comments  No comments found.

## 2024-11-06 NOTE — PROGRESS NOTES
Occupational Therapy                 Therapy Communication Note    Patient Name: Anny Tesfaye  MRN: [id number]  [facility]  [facility]  Today's Date: 11/6/2024     Discipline: Occupational Therapy    Missed Visit Reason: Missed Visit Reason: Patient in a medical procedure (Patient is currently off of floor at cath lab.)

## 2024-11-06 NOTE — PROGRESS NOTES
Anny Tesfaye is a 79 y.o. female     Going for right heart cath today    Review of Systems     Constitutional: no fever, no chills, not feeling poorly, not feeling tired   Cardiovascular: no chest pain   Respiratory: no cough, wheezing   Gastrointestinal: no abdominal pain, no constipation, no melena, no nausea, no diarrhea, no vomiting and no blood in stools.   Neurological: no headache,   All other systems have been reviewed and are negative for complaint.       Vitals:    11/06/24 0938   BP: 84/53   Pulse: (!) 116   Resp: 18   Temp: 36.4 °C (97.5 °F)   SpO2: 98%        Scheduled medications  amiodarone, 200 mg, oral, q AM  apixaban, 2.5 mg, oral, BID  aspirin, 81 mg, oral, Daily  atorvastatin, 10 mg, oral, Nightly  azithromycin, 500 mg, oral, q24h  cefepime, 1 g, intravenous, q24h  escitalopram, 20 mg, oral, Daily  [Held by provider] furosemide, 40 mg, intravenous, q8h EDIN  levothyroxine, 100 mcg, oral, Daily  magnesium oxide, 400 mg, oral, Once  oxygen, , inhalation, Continuous - 02/gases  pantoprazole, 40 mg, oral, Daily before breakfast   Or  pantoprazole, 40 mg, intravenous, Daily before breakfast  thyroid (pork), 30 mg, oral, Daily before breakfast      Continuous medications     PRN medications  PRN medications: acetaminophen **OR** acetaminophen **OR** acetaminophen, ALPRAZolam, alum-mag hydroxide-simeth, guaiFENesin, melatonin, ondansetron **OR** ondansetron, oxygen, polyethylene glycol, traMADol    Lab Review   Results from last 7 days   Lab Units 11/06/24  0555 11/05/24  0648 11/04/24  0702   WBC AUTO x10*3/uL 4.5 5.8 7.0   HEMOGLOBIN g/dL 7.9* 7.7* 7.3*   HEMATOCRIT % 27.3* 26.3* 25.2*   PLATELETS AUTO x10*3/uL 219 204 216     Results from last 7 days   Lab Units 11/06/24  0555 11/05/24  0649 11/04/24  1402 11/04/24  0702 11/02/24  0944   SODIUM mmol/L 140 139  --  141 142   POTASSIUM mmol/L 4.0 4.5  --  4.9 4.4   CHLORIDE mmol/L 108* 110*  --  113* 113*   CO2 mmol/L 27 25  --  23 23   BUN mg/dL  34* 33*  --  33* 26*   CREATININE mg/dL 2.16* 2.21*  --  2.05* 1.75*   CALCIUM mg/dL 8.0* 8.0*  --  8.2* 7.9*   PROTEIN TOTAL g/dL  --   --  5.9*  --  5.3*   BILIRUBIN TOTAL mg/dL  --   --   --   --  0.5   ALK PHOS U/L  --   --   --   --  66   ALT U/L  --   --   --   --  6*   AST U/L  --   --   --   --  11   GLUCOSE mg/dL 88 91  --  91 84     Results from last 7 days   Lab Units 11/02/24  0944 11/02/24  0811   TROPHS ng/L 23* 23*        Cardiac Catheterization Procedure   Final Result      XR chest 1 view   Final Result   Small bilateral pleural effusions and enlarged cardiac silhouette.        Signed by: Anh Hansen 11/4/2024 7:04 PM   Dictation workstation:   [id number]      Transthoracic Echo (TTE) Complete   Final Result      CT head wo IV contrast   Final Result   1. Age related changes without acute intracranial process.   2. Sinus disease. Also nonspecific fluid within left mastoid cells   and the left middle ear cavity; correlate clinically.        Signed by: Francisco Carlson 11/2/2024 11:47 AM   Dictation workstation:   [id number]      CT angio chest abdomen pelvis   Final Result   CHEST:   1.  4.4 x 4.2 cm ascending aortic aneurysm. No dissection.   2. No pulmonary embolism.   3. Moderate size right and small left pleural effusions.   Infiltrates or compressive atelectasis of the right upper middle and   lower lobes and left upper and lower lobes. The volume loss is most   marked in the right lower lobe.   4. Anasarca/hypoproteinemia.        ABDOMEN AND PELVIS:   1.  4.9 x 4.7 cm infrarenal abdominal aortic aneurysm. There is a   distal abdominal aortic aneurysm intravascular bi-iliac artery stent.   There is a femoral-femoral bypass graft with flow   2. 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   3. Anasarca/hypoproteinemia. In particular, there is induration of   the left lateral abdominal wall and left side of the back which could   represent more  focal edema or contusion. It is not known if the   patient fell.   4. Possible appendectomy.   5. Presacral edema.   6. Sludge in an otherwise unremarkable gallbladder.        MACRO:   None        Signed by: Georgia Sanchez 11/2/2024 11:51 AM   Dictation workstation:   [id number]      XR chest 1 view   Final Result   Right basilar pneumonia. Small right pleural effusion.        MACRO:   None        Signed by: Georgia Sanchez 11/2/2024 10:03 AM   Dictation workstation:   [id number]      US thoracentesis    (Results Pending)   Transesophageal Echo (UMESH) w/ Possible Cardioversion    (Results Pending)         Physical Exam    Constitutional   General appearance: Alert    Pulmonary   Respiratory assessment: No respiratory distress, normal respiratory rhythm and effort.    Auscultation of Lungs: Decreased breath sounds in the bases  Cardiovascular   Auscultation of heart: Irregularly irregular rhythm  Exam for edema: Plus edema  Abdomen   Abdominal Exam: No bruits, normal bowel sounds, soft, non-tender, no abdominal mass palpated.    Liver and Spleen exam: No hepato-splenomegaly.   Musculoskeletal     Inspection of digits and nails: No clubbing or cyanosis of the fingernails.    Inspection/palpation of joints, bones and muscles: No joint swelling. Normal movement of all extremities.   Neurologic   Cranial nerves: Nerves 2-12 were intact, no focal neuro defects.         Assessment/Plan      #Community-acquired pneumonia # sepsis with leukocytosis  #Bilateral pleural effusions  #Hypoxia  S/p thoracentesis  Continue antibiotics     # Acute on chronic combined systolic and diastolic congestive heart failure  Continue IV Lasix to twice a day  Imaging fraction shows low EF at 30%  Going for right heart cath     #Paroxysmal fibrillation  Continue amiodarone and DOAC

## 2024-11-06 NOTE — POST-PROCEDURE NOTE
Physician Transition of Care Summary  Invasive Cardiovascular Lab    Procedure Date: 11/6/2024  Attending:    Annamaria Syed - Primary  Resident/Fellow/Other Assistant: Surgeons and Role:  * No surgeons found with a matching role *    Indications:   Pre-op Diagnosis      * Acute congestive heart failure, unspecified heart failure type [I50.9]     * Heart failure, systolic and diastolic, acute [I50.41]    Post-procedure diagnosis:   Post-op Diagnosis     * Acute congestive heart failure, unspecified heart failure type [I50.9]     * Heart failure, systolic and diastolic, acute [I50.41]    Procedure(s):   Right Heart Cath  51065 - MS RIGHT HEART CATH O2 SATURATION & CARDIAC OUTPUT    Procedure Findings:   RHC: Elevated right- [RA 8, RVEDP is 8 mmHg] and normal left- [PCWP 9 mmHg] sided filling pressures, elevated PA pressures at 38/25 [mean PA 31 mmHg], and normal assumed Dianna cardiac output 4.7 L/min and cardiac index of 2.6 L/min/m².  dyne × sec/cm5.    Access of the Procedure:   5 [place] left CFV, closed with manual pressure.    Complications:   None.    Stents/Implants:   None.    Anticoagulation/Antiplatelet Plan:   None.    Estimated Blood Loss:   5 mL.    Anesthesia: Moderate Sedation Anesthesia Staff: No anesthesia staff entered.    Any Specimen(s) Removed:   No specimens collected during this procedure.    Disposition:   Further management as per inpatient cardiology consult team.       Electronically signed by: Samra Syed MD, 11/6/2024 12:44 PM

## 2024-11-06 NOTE — H&P
History Of Present Illness  Anny Tesfaye is a 79 y.o. female presenting with acute systolic HF, here for RHC. PMH includes HTN, HLD, heavy tobacco use, severe PAD, AAA s/p EVAR 10/10/24 at [facility], lumbar stenosis, hypothyroidism, CKD III, polymyalgia rheumatica, and OA. Patient reports her HARLEY Tiwari should be contacted for consent prior to procedure.    Past Medical History:  Past Medical History:   Diagnosis Date    Aortic aneurysm ([facility])     Chronic low back pain     CKD (chronic kidney disease) stage 3, GFR 30-59 ml/min (Multi)     Esophagitis     Glaucoma     History of scarlet fever     Hypertension     Hypothyroidism     Morbid obesity (Multi)     Nephritis     OA (osteoarthritis) of knee     Bilateral    Polymyalgia rheumatica (Multi)     Psoriasis     Sciatica         Past Surgical History:  Past Surgical History:   Procedure Laterality Date    ADENOIDECTOMY      CATARACT EXTRACTION  06/2020    CATARACT EXTRACTION Right     CATARACT EXTRACTION Left 03/2021    CROWN  06/2020    DENTAL CROWN REPLACEMENT    CT ANGIO NECK  04/21/2014    CT NECK ANGIO W AND WO IV CONTRAST [facility]    DILATION AND CURETTAGE OF UTERUS      TONSILLECTOMY      WISDOM TOOTH EXTRACTION            Social History:   reports that she has been smoking cigarettes. She has a 40 pack-year smoking history. She has been exposed to tobacco smoke. She has never used smokeless tobacco. She reports current alcohol use of about 8.0 standard drinks of alcohol per week. She reports that she does not use drugs.     Family History:  Family History   Problem Relation Name Age of Onset    Breast cancer Mother      Osteoporosis Mother      Other (Abdominal Aortic Aneurysm) Mother      Heart failure Father      Hypertension Father      Macular degeneration Father      Heart disease Father      Cancer Sister          Sinus    Breast cancer Maternal Grandmother          Allergies:  No Known Allergies     Home Medications:  Current Outpatient  Medications   Medication Instructions    acetaminophen (TYLENOL 8 HOUR) 650 mg, oral, Every 8 hours PRN    alendronate (FOSAMAX) 70 mg, oral, Every 7 days, Take on an empty stomach. Do not lie down or eat for 1/2 hour after taking.    amiodarone (PACERONE) 200 mg, oral, Every morning    apixaban (ELIQUIS) 2.5 mg, oral, 2 times daily    ascorbic acid (VITAMIN C) 500 mg, oral, Daily    aspirin 81 mg, oral, Daily    atorvastatin (LIPITOR) 10 mg, oral, Nightly    bisacodyl (DULCOLAX (BISACODYL)) 10 mg, rectal, Daily PRN    calcium carbonate 500 mg calcium (1,250 mg) chewable tablet 1 tablet, oral, Daily    captopril (CAPOTEN) 50 mg, oral, Daily    cyanocobalamin (vitamin B-12) 5,000 mcg, oral, Daily    DAILY MULTI-VITAMIN ORAL 1 tablet, oral, Daily    docusate sodium (COLACE) 100 mg, oral, 2 times daily    escitalopram (LEXAPRO) 20 mg, oral, Daily    levomefolate calcium (L-METHYLFOLATE ORAL) 1,000 mcg, oral, Every morning    levothyroxine (SYNTHROID, LEVOXYL) 100 mcg, oral, Daily, Six days per week    lutein 6 mg capsule 1 capsule, oral, Daily    magnesium hydroxide (Milk of Magnesia) 400 mg/5 mL suspension 30 mL, oral, Daily PRN    menthol-zinc oxide (Calmoseptine) 0.44-20.6 % ointment 1 Application, Topical, 2 times daily    nicotine (Nicoderm CQ) 21 mg/24 hr patch 1 patch, transdermal, Every 24 hours    oxybutynin XL (DITROPAN-XL) 10 mg, oral, Daily    oxyCODONE (ROXICODONE) 5 mg, oral, Every 6 hours PRN    pantoprazole (PROTONIX) 40 mg, oral, Daily, Do not crush, chew, or split.    polyethylene glycol (GLYCOLAX, MIRALAX) 17 g, oral, 2 times daily PRN    sennosides (Senokot) 8.6 mg tablet 1 tablet, oral, 2 times daily    simvastatin (ZOCOR) 20 mg, oral, Nightly    sodium phosphates (Fleets) 19-7 gram/118 mL enema enema 1 enema, rectal, Daily PRN    thyroid (pork) (ARMOUR THYROID) 30 mg, oral, Daily before breakfast    tocilizumab (Actemra) 200 mg/10 mL (20 mg/mL) solution intravenous, Every 28 days, as directed     verapamil SR (CALAN-SR) 240 mg, oral, Daily       Inpatient Medications:  Scheduled medications   Medication Dose Route Frequency    amiodarone  200 mg oral q AM    [Held by provider] apixaban  2.5 mg oral BID    aspirin  81 mg oral Daily    atorvastatin  10 mg oral Nightly    azithromycin  500 mg oral q24h    cefepime  1 g intravenous q24h    escitalopram  20 mg oral Daily    furosemide  40 mg intravenous q8h EDIN    levothyroxine  100 mcg oral Daily    magnesium oxide  400 mg oral Once    pantoprazole  40 mg oral Daily before breakfast    Or    pantoprazole  40 mg intravenous Daily before breakfast    thyroid (pork)  30 mg oral Daily before breakfast     PRN medications   Medication    acetaminophen    Or    acetaminophen    Or    acetaminophen    ALPRAZolam    alum-mag hydroxide-simeth    guaiFENesin    melatonin    ondansetron    Or    ondansetron    oxygen    polyethylene glycol    traMADol     Continuous Medications   Medication Dose Last Rate         Review of Systems   Constitutional: Negative.    HENT: Negative.     Eyes: Negative.    Respiratory:  Positive for shortness of breath.    Cardiovascular: Negative.    Gastrointestinal: Negative.    Endocrine: Negative.    Genitourinary: Negative.    Musculoskeletal: Negative.    Skin: Negative.    Allergic/Immunologic: Negative.    Neurological: Negative.    Hematological: Negative.    Psychiatric/Behavioral: Negative.            Physical Exam  Constitutional:       General: She is awake. She is not in acute distress.     Appearance: She is not ill-appearing.   Cardiovascular:      Rate and Rhythm: Regular rhythm. Tachycardia present.      Pulses:           Radial pulses are 1+ on the right side and 1+ on the left side.        Dorsalis pedis pulses are 1+ on the right side and 1+ on the left side.      Heart sounds: Normal heart sounds. No murmur heard.  Pulmonary:      Effort: Pulmonary effort is normal.      Breath sounds: Normal breath sounds and air entry.     "  Comments: On 2L NC  Abdominal:      General: Bowel sounds are normal.      Palpations: Abdomen is soft.      Tenderness: There is no abdominal tenderness.   Musculoskeletal:      Right lower leg: 3+ Pitting Edema present.      Left lower le+ Edema present.   Skin:     General: Skin is warm and dry.   Neurological:      General: No focal deficit present.      Mental Status: She is alert and oriented to person, place, and time.      GCS: GCS eye subscore is 4. GCS verbal subscore is 5. GCS motor subscore is 6.   Psychiatric:         Mood and Affect: Mood normal.         Behavior: Behavior is cooperative.        Sedation Plan    ASA 3     Mallampati class: II.           NPO since last night around 1900    Last Recorded Vitals  Blood pressure 84/53, pulse (!) 116, temperature 36.4 °C (97.5 °F), temperature source Skin, resp. rate 18, height 1.651 m (5' 5\"), weight 73.9 kg (163 lb), SpO2 98%.    Oxygen Dose: *2 L/min    Vitals from the Past 24 Hours  Heart Rate:  []   Temp:  [36.4 °C (97.5 °F)-37 °C (98.6 °F)]   Resp:  [17-18]   BP: ()/(53-68)   SpO2:  [76 %-98 %]     Oxygen Dose: *2 L/min    Relevant Results    Labs    CBC:   Recent Labs     24  0555 24  0648 24  0702 24  0811 24  1058 24  0943   WBC 4.5 5.8 7.0 19.7* 7.4 5.8   HGB 7.9* 7.7* 7.3* 8.5* 11.0* 12.7   HCT 27.3* 26.3* 25.2* 28.1* 35.3* 39.6    204 216 289 294 174   MCV 98 99 103* 95 100 100     BMP/CMP:   Recent Labs     24  0555 24  0649 24  1402 24  0702 24  0944 24  1058 24  0943 24  1041 24  0930 24  1152 23  1047    139  --  141 142 139 139 139 141 140 140   K 4.0 4.5  --  4.9 4.4 5.3 5.1 5.1 5.5* 4.6 4.6   * 110*  --  113* 113* 104 102 101 103 103 103   BUN 34* 33*  --  33* 26* 31* 33* 32* 38* 45* 31*   CREATININE 2.16* 2.21*  --  2.05* 1.75* 1.16* 1.40 1.40 1.30 1.50 1.30   CO2 27 25  --  23 23 27 25 25 24 24 25 " "  CALCIUM 8.0* 8.0*  --  8.2* 7.9* 9.4 9.4 10.0 9.9 9.8 9.3   PROT  --   --  5.9*  --  5.3* 6.5  6.4 6.7 6.6 6.8 6.4 6.2   BILITOT  --   --   --   --  0.5 0.5 0.7  --  0.6 0.8 0.5   ALKPHOS  --   --   --   --  66 58 49  --  42 41 47   ALT  --   --   --   --  6* 7 12  --  17 16 12   AST  --   --   --   --  11 11 14  --  16 15 13   GLUCOSE 88 91  --  91 84 106* 103* 99 87 98 95      Lipid Panel:   Recent Labs     09/27/24  1058 08/01/24  0943 04/08/24  0955 10/23/23  1025 04/24/23  0932 10/10/22  0937 04/18/22  0834 10/22/21  1120 08/31/21  0944 04/23/21  1155 11/04/20  1108 01/28/20  0937   CHOL 138 165 168 180 168 167 173 199 160 160 186 186   HDL 65.0 74.0 78.0 88.0 78 74 77 78 62 69 72 73   CHHDL 2.1 2.2 2.2 2.0 2.2 2.3 2.2 2.6 2.6 2.3 2.6 2.5   VLDL 17  --   --   --   --   --   --   --   --   --   --   --    TRIG 85 92 80 90 62 114 102 137 131 136 134 134   NHDL 73  --   --   --   --   --   --   --   --   --   --   --      Cardiac   Results from last 7 days   Lab Units 11/04/24  1402 11/02/24  0944 11/02/24  0811   LD U/L 122  --   --    TROPHS ng/L  --  23* 23*   BNP pg/mL  --   --  1,021*      Hemoglobin A1C:   Recent Labs     10/23/23  1025 10/22/21  1120 04/23/21  1155   HGBA1C 4.9 5.1 5.2     TSH/ Free T4:   Recent Labs     09/27/24  1058 04/08/24  0955 10/23/23  1025 04/24/23  0932 10/10/22  0937 04/18/22  0834 10/22/21  1120 04/23/21  1155 09/08/20  1236 11/05/19  1022   TSH 0.02* 0.04* 0.19* 0.07* 0.07* 0.01* 0.03* 0.12* 0.19* 0.23*   FREET4 1.14* 1.60 1.30 1.3 1.4 1.8* 1.6 0.9 1.4 1.4     Iron:   Recent Labs     11/04/24  1007 11/04/24  0702 11/02/24  0811 03/03/20  1021 12/05/19  0952   FERRITIN 287*  --   --  87 87   TIBC  --  160*  --   --   --    IRONSAT  --  16*  --   --   --    BNP  --   --  1,021*  --   --      Coag:   Results from last 7 days   Lab Units 11/04/24  1402   INR  1.3*     ABO: No results found for: \"ABO\"    Past Cardiology Tests (Last 3 Years):    EKG:  No results for input(s): " "\"ATRRATE\", \"VENTRATE\", \"PRINT\", \"QRSDUR\", \"QTCFRED\", \"QTCCALCB\" in the last 79631 hours.No results found. However, due to the size of the patient record, not all encounters were searched. Please check Results Review for a complete set of results.  Echo:  Echocardiogram:   Transthoracic Echo (TTE) Complete With Contrast 11/04/2024    PHYSICIAN INTERPRETATION:  Left Ventricle: Left ventricular ejection fraction is moderately decreased, by visual estimate at 30-35%. The patient is in atrial fibrillation which may influence the estimate of left ventricular function and transvalvular flows. There is mildly increased concentric left ventricular hypertrophy. There is global hypokinesis of the left ventricle with minor regional variations. The left ventricular cavity size is normal. There is mildly increased septal and mildly increased posterior left ventricular wall thickness. Left ventricular diastolic filling cannot be determined, due to atrial fibrillation/flutter.  Left Atrium: The left atrium is severely dilated.  Right Ventricle: The right ventricle is moderately enlarged. There is moderately reduced right ventricular systolic function.  Right Atrium: The right atrium is normal in size.  Aortic Valve: The aortic valve is trileaflet. There is mild aortic valve cusp calcification. The aortic valve dimensionless index is 0.71. There is mild aortic valve regurgitation. The peak instantaneous gradient of the aortic valve is 9 mmHg. The mean gradient of the mitral valve is 5 mmHg.  Mitral Valve: The mitral valve is normal in structure. There is moderate mitral annular calcification. There is trace to mild mitral valve regurgitation.  Tricuspid Valve: The tricuspid valve is structurally normal. There is trace tricuspid regurgitation. The Doppler estimated RVSP is mildly elevated at 43.0 mmHg.  Pulmonic Valve: The pulmonic valve is structurally normal. There is trace pulmonic valve regurgitation.  Pericardium: Trivial " pericardial effusion.  Aorta: The aortic root is abnormal. Aortic root moderatly enlarged at 4.8 cm. Ascending aorta 4.4 cm.  Systemic Veins: The inferior vena cava appears dilated, with IVC inspiratory collapse less than 50%.  In comparison to the previous echocardiogram(s): There are no prior studies on this patient for comparison purposes.      CONCLUSIONS:  1. Left ventricular ejection fraction is moderately decreased, by visual estimate at 30-35%.  2. There is global hypokinesis of the left ventricle with minor regional variations.  3. Left ventricular diastolic filling cannot be determined, due to atrial fibrillation/flutter.  4. There is moderately reduced right ventricular systolic function.  5. Moderately enlarged right ventricle.  6. The left atrium is severely dilated.  7. There is moderate mitral annular calcification.  8. Mildly elevated right ventricular systolic pressure.  9. Mild aortic valve regurgitation.  10. Aortic root moderatly enlarged at 4.8 cm. Ascending aorta 4.4 cm.  11. The patient is in atrial fibrillation which may influence the estimate of left ventricular function and transvalvular flows.      Ejection Fractions:  LV EF   Date/Time Value Ref Range Status   11/04/2024 12:05 PM 33 %      Cath:  Coronary Angiography: No results found for this or any previous visit from the past 1800 days.    Right Heart Cath: No results found for this or any previous visit from the past 1800 days.    Stress Test:  Nuclear:No results found for this or any previous visit from the past 1800 days.    Metabolic Stress: No results found for this or any previous visit from the past 1800 days.    Cardiac Imaging:  Cardiac Scoring: No results found for this or any previous visit from the past 1800 days.    Cardiac MRI: No results found for this or any previous visit from the past 1800 days.         Assessment/Plan  Assessment/Plan   Principal Problem:    Sepsis with acute organ dysfunction, due to unspecified  organism, unspecified organ dysfunction type, unspecified whether septic shock present (Multi)  Active Problems:    Acute congestive heart failure    Heart failure, systolic and diastolic, acute  -RHC with Dr. Syed on 11/6/24       NP discussed with Dr. Syed regarding plan of care/ discharge plan      I spent 30 minutes in the professional and overall care of this patient.      Eladio Rose, APRN-CNP, DNP

## 2024-11-06 NOTE — PROGRESS NOTES
"SUBJECTIVE DATA:  To cath lab for RHC today; CR slightly better 2.21-> 2.16    Last Recorded Vitals:  BP 84/53 (BP Location: Left arm)   Pulse (!) 116   Temp 36.4 °C (97.5 °F) (Skin)   Resp 18   Ht 1.651 m (5' 5\")   Wt 73.9 kg (163 lb)   SpO2 98%   BMI 27.12 kg/m²     Temp:  [36.4 °C (97.5 °F)-37 °C (98.6 °F)] 36.4 °C (97.5 °F)  Heart Rate:  [] 116  Resp:  [17-18] 18  BP: ()/(53-68) 84/53    Last I/O:  I/O last 3 completed shifts:  In: - (0 mL/kg)   Out: 1650 (22.3 mL/kg) [Urine:1650 (0.6 mL/kg/hr)]  Weight: 73.9 kg   Vitals:    11/04/24 1105   Weight: 73.9 kg (163 lb)       Cardiovascular Testing:  EKG:   ECG 12 Lead ECG 12 Lead 11/4/24       ECG 12 Lead 10/17/24 [facility]       Echo:  Transthoracic Echocardiogram 11/3/24   1. Left ventricular ejection fraction is moderately decreased, by visual estimate at 30-35%.   2. There is global hypokinesis of the left ventricle with minor regional variations.   3. Left ventricular diastolic filling cannot be determined, due to atrial fibrillation/flutter.   4. There is moderately reduced right ventricular systolic function.   5. Moderately enlarged right ventricle.   6. The left atrium is severely dilated.   7. There is moderate mitral annular calcification.   8. Mildly elevated right ventricular systolic pressure.   9. Mild aortic valve regurgitation.  10. Aortic root moderatly enlarged at 4.8 cm. Ascending aorta 4.4 cm.  11. The patient is in atrial fibrillation which may influence the estimate of left ventricular function and transvalvular flows.    Transthoracic Echocardiogram 9/2024 [facility]  - The left ventricle is normal in size. There is mild septal left ventricular   hypertrophy. Left ventricular systolic function is normal. EF = 59 ± 5% (2D biplane) Left ventricular diastolic function was not evaluated due to >2+ AI.   - The right ventricle is normal in size. Right ventricular systolic function is normal.   - The left atrial cavity is severely dilated.   - " The visualized aorta is dilated with a maximal dimension of 4.6 cm.   - There is moderate (2+ - 3+) aortic valve regurgitation     Heart Catheterizations:  [facility] 24:  LEFT ANTERIOR DESCENDING: No Stenosis     DIAGONAL #1: No Stenosis   DIAGONAL #2: No Stenosis   DIAGONAL #3: No Stenosis     LEFT CIRCUMFLEX: No Stenosis     MARGINAL #1: No Stenosis   MARGINAL #2: No Stenosis     DOMINANT: NO  RIGHT CORONARY: No Stenosis     DOMINANT: YES  POSTERIOR DESCENDING: No Stenosis   POSTERIOR LATERAL: No Stenosis      Stress Test:  Nuclear Stress Test 2024  1. SPECT Perfusion Study: Abnormal.   2. No evidence of scarred myocardium.   3. There is mild (<10%) ischemia in the territory of the RCA.   4. Left ventricle is normal in size.   5. This is an intermediate risk scan.     Gated Stress FBP   LVEF % 60      Cardiac Imaging: No results found for the last 3 years.    CV Labs  Troponin I, High Sensitivity   Date/Time Value Ref Range Status   2024 09:44 AM 23 (H) 0 - 13 ng/L Final   2024 08:11 AM 23 (H) 0 - 13 ng/L Final     BNP   Date/Time Value Ref Range Status   2024 08:11 AM 1,021 (H) 0 - 99 pg/mL Final     Hemoglobin A1C   Date/Time Value Ref Range Status   10/23/2023 10:25 AM 4.9 See below % Final   10/22/2021 11:20 AM 5.1 4.0 - 6.0 % Final   2021 11:55 AM 5.2 4.0 - 6.0 % Final     LDL Calculated   Date/Time Value Ref Range Status   2024 10:58 AM 56 <=99 mg/dL Final   2024 09:43 AM 73 65 - 130 mg/dL Final   2024 09:55 AM 74 65 - 130 mg/dL Final       Diagnostic Results   Labs  CBC- 2024:  5:55 AM  4.5 7.9 219    27.3      BMP- 2024:  5:55 AM  140 34 108 88   4.0 2.16 27    Estimated Creatinine Clearance: 20.9 mL/min (A) (by C-G formula based on SCr of 2.16 mg/dL (H)).     CA: 8.0 PROTIEN: 5.9 ALT: 6 Total Bili: 0.5 M.46   PHOS: 4.0 ALBUMIN: 2.6 AST: 11 Alk Phos: 66      [facility]- 2024:  2:02 PM  1.3   14.1 _     Pertinent Imaging  XR chest 1 view  11/04/2024  Small bilateral pleural effusions and enlarged cardiac silhouette.     CT abdomen pelvis wo IV contrast 10/19/2024  Trace pneumoperitoneum and small volume ascites adjacent to the colon, which is favored expected noting recent surgery.  New  colostomy left lower quadrant.    Some mildly prominent small bowel which is favored related to ileus and noting the oral contrast has passed to the cecum.    Asymmetric body wall edema, more prominent left gluteal region and right breast region, correlation for other pathologies such as cellulitis.    CT angio chest abdomen pelvis 11/02/2024  1.  4.4 x 4.2 cm ascending aortic aneurysm. No dissection.  2. No pulmonary embolism.  3. Moderate size right and small left pleural effusions.  Infiltrates or compressive atelectasis of the right upper middle and lower lobes and left upper and lower lobes. The volume loss is most marked in the right lower lobe.  4. Anasarca/hypoproteinemia.    ABDOMEN AND PELVIS:  1.  4.9 x 4.7 cm infrarenal abdominal aortic aneurysm. There is a distal abdominal aortic aneurysm intravascular bi-iliac artery stent.  There is a femoral-femoral bypass graft with flow   2. There is occlusion in the proximal right common iliac artery which reconstitutes distally.  3. Anasarca/hypoproteinemia. In particular, there is induration of the left lateral abdominal wall and left side of the back which could represent more focal edema or contusion. It is not known if the patient fell.  4. Possible appendectomy.  5. Presacral edema.  6. Sludge in an otherwise unremarkable gallbladder.    CT lung screening follow up CT chest wo IV contrast 10/02/2024  1.  Few small bilateral noncalcified pulmonary nodules measuring up to 6 mm, likely benign. Continued screening with low-dose non-contrast chest CT in 12 months (from current date) is recommended.  2. Estimated coronary artery calcium score is * which correlates with at least  percentile rank as compared to  "matched CUMMINGS-study  subjects(https://www.cummings-nhlbi.org/Calcium/input.aspx).    CT head wo IV contrast 11/02/2024  1. Age related changes without acute intracranial process.  2. Sinus disease. Also nonspecific fluid within left mastoid cells and the left middle ear cavity; correlate clinically.       Inpatient Medications:  Scheduled medications   Medication Dose Route Frequency    amiodarone  200 mg oral q AM    [Held by provider] apixaban  2.5 mg oral BID    aspirin  81 mg oral Daily    atorvastatin  10 mg oral Nightly    azithromycin  500 mg oral q24h    cefepime  1 g intravenous q24h    escitalopram  20 mg oral Daily    furosemide  40 mg intravenous q8h EDIN    levothyroxine  100 mcg oral Daily    magnesium oxide  400 mg oral Once    pantoprazole  40 mg oral Daily before breakfast    Or    pantoprazole  40 mg intravenous Daily before breakfast    thyroid (pork)  30 mg oral Daily before breakfast     PRN medications: acetaminophen **OR** acetaminophen **OR** acetaminophen, ALPRAZolam, alum-mag hydroxide-simeth, guaiFENesin, melatonin, ondansetron **OR** ondansetron, oxygen, polyethylene glycol, traMADol       Physical Exam:   Vitals and nursing notes reviewed.  BP 84/53 (BP Location: Left arm)   Pulse (!) 116   Temp 36.4 °C (97.5 °F) (Skin)   Resp 18   Ht 1.651 m (5' 5\")   Wt 73.9 kg (163 lb)   SpO2 98%   BMI 27.12 kg/m²   GENERAL: Alert and awake, cooperative; in no acute distress; Elderly female, ill-appearing  SKIN: Warm and dry, cap refill <2  HEENT: Normocephalic, PEERL, mucous membranes pink and moist  NECK: +JVD  CARDIAC: Irregular rate and rhythm, S1S2, no murmurs or abnormal heart sounds  CHEST: Normal respiratory effort, no abnormal breath sounds  ABDOMEN: Soft, non-distended, non-tender with palpation, +abd wall edema  EXTREMITIES: 3-4+ pitting BLE edema to hips , normal pulses all 4 extremities  NEURO: Alert and oriented, mental status at baseline, no focal deficits  PSYCH: Behavior and affect " as expected     Code Status: Full Code    Assessment/Plan   Anny Tesfaye is a 79 y.o. female, with a PMH of HTN, HLD, heavy tobacco use, severe PAD, AAA s/p EVAR 10/10/24 at [facility], lumbar stenosis, hypothyroidism, CKD III, polymyalgia rheumatica, and OA, who presented to [facility] on 11/2/2024 for change in mental status. Patient reports recent prolonged hospital course at [facility] s/p EVAR on 10/10/24 c/b postoperative AF, respiratory insufficiency, volume overload, BALBIR on CKD, and ischemic colitis s/p hemicolectomy w/ colostomy. She was started on Amiodarone without conversion to NSR and Eliquis for the atrial fibrillation. Home antihypertensive medications were held at discharge givens stable BP. She was discharged to SNF 10/25/24. She now presents to [facility] ED 11/2/24 for altered mental status, SOB, and weakness, admitted for PNA and fluid overload. Cardiology is consulted for HF. Patient reports no prior hx of HF, states that edema has been an issue since her recent hospitalization.      Home CV Medications: Amiodarone 200mg daily, Eliquis 2.5mg BID, ASA 81mg daily, statin  -Previously on Captopril 50mg, HCTZ 25mg, and Verapamil 240mg CR    Transthoracic Echocardiogram 11/3/24   1. Left ventricular ejection fraction is moderately decreased, by visual estimate at 30-35%.   2. There is global hypokinesis of the left ventricle with minor regional variations.   3. Left ventricular diastolic filling cannot be determined, due to atrial fibrillation/flutter.   4. There is moderately reduced right ventricular systolic function.   5. Moderately enlarged right ventricle.   6. The left atrium is severely dilated.   7. There is moderate mitral annular calcification.   8. Mildly elevated right ventricular systolic pressure.   9. Mild aortic valve regurgitation.  10. Aortic root moderatly enlarged at 4.8 cm. Ascending aorta 4.4 cm.  11. The patient is in atrial fibrillation which may influence the estimate of left  ventricular function and transvalvular flows.    11/6 RHC: RA 8, 38/25(31), W9, CO/CI 4.7/2.6, , SVO2 47%     #Acute Systolic HF- ++Hypervolemia with diffuse anasarca likely in the setting of judicious postoperative fluid resuscitation and malnutrition, EF newly decreased on TTE [likely tachycardia-mediated] but no room to start GDMT with current renal function and BP  #Postoperative AF- Currently rate controlled 80-100s, c/w Amiodarone  -On Eliquis for OAC, has been on anticoagulation since 10/23 (Heparin gtt then transitioned to Eliquis)  #HTN- BP low, home antihypertensives held during prior hospitalization  #Recent EVAR- c/w ASA and statin  #BALBIR- Slight worsening of renal status, suspect cardiorenal, will attempt to increase diuresis        RECOMMENDATIONS:  -stop diuresis -> RA 8 and LVEDP 9  -c/w Amiodarone and Eliquis  -Patient would benefit from nutrition consult for malnutrition recommendations  -Continuous telemetry monitoring while admitted  -Monitor electrolytes, replete for K <4 and Mg <2  -Daily wts, strict I&Os, low sodium diet  -Tomorrow UMESH + DCCV   -Supportive care for BALBIR and possible PNA per primary team      STAFF ADDENDUM:    Both the MARIAMA and I have had a face to face encounter with the patient today. I have examined the patient and edited the documented physical examination as necessary.  I personally reviewed the patient's vital signs, telemetry, recent labs, medications, orders, EKGs, and pertinent cardiac imaging/ echocardiography.  I have reviewed the MARIAMA's encounter note, approve the MARIAMA's documentation and have edited the note to reflect my diagnostic and therapeutic plan.        Right heart catheterization confirms normal wedge pressure, mildly elevated right-sided filling pressures.  Preserved cardiac index 2.5.  Recommend holding diuresis for today.  UMESH and cardioversion tomorrow.  She needs UMESH since she has been in persistent afib and not been on anticoagulation for at least  a full 3 weeks without cessation.  Continue Eliquis and amiodarone.    Rashad Ramirez, DO

## 2024-11-06 NOTE — CARE PLAN
Problem: Pain - Adult  Goal: Verbalizes/displays adequate comfort level or baseline comfort level  Outcome: Progressing     Problem: Safety - Adult  Goal: Free from fall injury  Outcome: Progressing     Problem: Discharge Planning  Goal: Discharge to home or other facility with appropriate resources  Outcome: Progressing     Problem: Chronic Conditions and Co-morbidities  Goal: Patient's chronic conditions and co-morbidity symptoms are monitored and maintained or improved  Outcome: Progressing     Problem: Safety - Medical Restraint  Goal: Remains free of injury from restraints (Restraint for Interference with Medical Device)  Outcome: Progressing  Goal: Free from restraint(s) (Restraint for Interference with Medical Device)  Outcome: Progressing     Problem: Skin  Goal: Decreased wound size/increased tissue granulation at next dressing change  Outcome: Progressing  Goal: Participates in plan/prevention/treatment measures  Outcome: Progressing  Goal: Prevent/manage excess moisture  Outcome: Progressing  Goal: Prevent/minimize sheer/friction injuries  Outcome: Progressing  Flowsheets (Taken 11/6/2024 1175)  Prevent/minimize sheer/friction injuries:   HOB 30 degrees or less   Turn/reposition every 2 hours/use positioning/transfer devices  Goal: Promote/optimize nutrition  Outcome: Progressing  Goal: Promote skin healing  Outcome: Progressing   The patient's goals for the shift include      The clinical goals for the shift include to promote a safe environment and patient will remain ijury free    Over the shift, the patient did not make progress toward the following goals. Barriers to progression include . Recommendations to address these barriers include .  To promote good nutrition

## 2024-11-06 NOTE — CONSULTS
"Nutrition Assessment Note  Nutrition Assessment      Reason for Assessment  Reason for Assessment: Provider consult order    Anny Tesfaye is a 79 y.o. year old female patient with Dysarthria [R47.1]  Sepsis with acute organ dysfunction, due to unspecified organism, unspecified organ dysfunction type, unspecified whether septic shock present (Multi) [A41.9, R65.20]    referred for NA .     Chart reviewed and pt visited.  Past Medical History:   Diagnosis Date    Aortic aneurysm (CMS-HCC)     Chronic low back pain     CKD (chronic kidney disease) stage 3, GFR 30-59 ml/min (Multi)     Esophagitis     Glaucoma     History of scarlet fever     Hypertension     Hypothyroidism     Morbid obesity (Multi)     Nephritis     OA (osteoarthritis) of knee     Bilateral    Polymyalgia rheumatica (Multi)     Psoriasis     Sciatica      Per chart review:  -New a-fib  -Recent abd surgery (has colostomy)  -Recent diminished output/urine  -Some anasarca  -Thoracentesis, 700mL fluid drained 11/4  -Heart cath 11/6    Per pt:  -No food allergies  -No difficulties chewing/swallowing  -\"Blah appetite\", \"more to do with the food\"  -Nutritional supplements are \"ok\"; no need to switch out to something different    Scheduled medications  amiodarone, 200 mg, oral, q AM  apixaban, 2.5 mg, oral, BID  aspirin, 81 mg, oral, Daily  atorvastatin, 10 mg, oral, Nightly  [START ON 11/7/2024] cefepime, 1 g, intravenous, q12h  escitalopram, 20 mg, oral, Daily  [Held by provider] furosemide, 40 mg, intravenous, q8h EDIN  levothyroxine, 100 mcg, oral, Daily  magnesium oxide, 400 mg, oral, Once  pantoprazole, 40 mg, oral, Daily before breakfast   Or  pantoprazole, 40 mg, intravenous, Daily before breakfast  thyroid (pork), 30 mg, oral, Daily before breakfast      Continuous medications     PRN medications  PRN medications: acetaminophen **OR** acetaminophen **OR** acetaminophen, ALPRAZolam, alum-mag hydroxide-simeth, guaiFENesin, melatonin, ondansetron " "**OR** ondansetron, oxygen, oxygen, polyethylene glycol, traMADol     Latest Reference Range & Units 11/05/24 06:49 11/06/24 05:55   GLUCOSE 74 - 99 mg/dL 91 88   SODIUM 136 - 145 mmol/L 139 140   POTASSIUM 3.5 - 5.3 mmol/L 4.5 4.0   CHLORIDE 98 - 107 mmol/L 110 (H) 108 (H)   Bicarbonate 21 - 32 mmol/L 25 27   Anion Gap 10 - 20 mmol/L 9 (L) 9 (L)   Blood Urea Nitrogen 6 - 23 mg/dL 33 (H) 34 (H)   Creatinine 0.50 - 1.05 mg/dL 2.21 (H) 2.16 (H)   EGFR >60 mL/min/1.73m*2 22 (L) 23 (L)   Calcium 8.6 - 10.3 mg/dL 8.0 (L) 8.0 (L)   MAGNESIUM 1.60 - 2.40 mg/dL  1.46 (L)   (H): Data is abnormally high  (L): Data is abnormally low    Dietary Orders (From admission, onward)       Start     Ordered    11/07/24 0001  NPO Diet; Effective midnight  Diet effective midnight         11/06/24 1323    11/06/24 1247  Adult diet Cardiac; 70 gm fat; 2 - 3 grams Sodium  Diet effective now        Question Answer Comment   Diet type Cardiac    Fat restriction: 70 gm fat    Sodium restriction: 2 - 3 grams Sodium        11/06/24 1248    11/05/24 1931  Oral nutritional supplements  Until discontinued        Question Answer Comment   Deliver with Breakfast    Deliver with Lunch    Deliver with Dinner    Select supplement: Ensure Plus High Protein        11/05/24 1931 11/02/24 1548  May Participate in Room Service  ( ROOM SERVICE MAY PARTICIPATE)  Once        Question:  .  Answer:  Yes    11/02/24 1547                    History:  Food and Nutrient History  Energy Intake: Poor < 50 %, Fair 50-75 %  Food and Nutrient History: per pt- po intake poor during this admission- attributes it to the food. Typical eating habits look like breakfast- yogurt; lunch- fruit/cheese; dinner- something in the fridge/freezer. Endorses small meals  Vitamin/Herbal Supplement Use: Pt doesn't love nutritional supplements but is drinking them.    Anthropometrics:  Height: 165.1 cm (5' 5\")  Weight: 73.9 kg (163 lb) (reported dry wt)  BMI (Calculated): " 27.12    Weight Change: 0    Wt Readings from Last 20 Encounters:   11/06/24 73.9 kg (163 lb)   10/01/24 73.9 kg (163 lb)   09/26/24 74.4 kg (164 lb)   07/19/24 77.3 kg (170 lb 6.4 oz)   06/20/24 77.6 kg (171 lb 1.6 oz)   06/19/24 77.3 kg (170 lb 6.4 oz)   05/31/24 78.9 kg (174 lb)   05/23/24 79.2 kg (174 lb 8 oz)   05/22/24 78.9 kg (174 lb)   05/14/24 78.9 kg (174 lb)   04/25/24 80 kg (176 lb 6.4 oz)   04/18/24 79.8 kg (176 lb)   04/02/24 81.6 kg (179 lb 12.8 oz)   03/28/24 79.5 kg (175 lb 3.2 oz)   03/21/24 81.2 kg (179 lb)   02/29/24 83 kg (183 lb)   02/01/24 83.5 kg (184 lb)   01/04/24 85.7 kg (189 lb)   12/07/23 85.3 kg (188 lb)   11/09/23 84.8 kg (187 lb)     Weight Change  Significant Weight Loss: No  Significant Weight Gain: Fluid related (current wt is 174.6#; dry weight 163#)       IBW/kg (Dietitian Calculated): 56.8 kg  Percent of IBW: 130 %       Energy Needs:  Estimated Energy Needs  Total Energy Estimated Needs (kCal): 1850 kCal  Total Estimated Energy Need per Day (kCal/kg): 2000 kCal/kg  Method for Estimating Needs: 25-27kcal/kg    Estimated Protein Needs  Total Protein Estimated Needs (g): 45 g  Total Protein Estimated Needs (g/kg): 60 g/kg  Method for Estimating Needs: 0.8-1.0g/kg (IBW)    Estimated Fluid Needs  Method for Estimating Needs: 1mL/kcal or MD recommendations       Nutrition Focused Physical Findings:  Subcutaneous Fat Loss  Orbital Fat Pads: Mild-Moderate (slight dark circles and slight hollowing)  Buccal Fat Pads: Mild-Moderate (flat cheeks, minimal bounce)    Muscle Wasting  Temporalis: Mild-Moderate (slight depression)  Pectoralis (Clavicular Region): Mild-Moderate (some protrusion of clavicle)    Edema  Edema: +3 moderate  Edema Location: RLE/LLE       Physical Findings (Nutrition Deficiency/Toxicity)  Skin: Positive (abd incision; leg wound)       Nutrition Diagnosis   Malnutrition Diagnosis  Patient has Malnutrition Diagnosis: Yes  Diagnosis Status: New  Malnutrition Diagnosis:  Severe malnutrition related to acute disease or injury  As Evidenced by: Prolonged poor intake prior/during hospital admit less than 50% of estimated energy needs for greater than 5 days, moderate fat loss, moderate muscle loss and  moderate fluid accumulation  Additional Assessment Information: Muscle loss may be d/t advanced age.    Patient has Nutrition Diagnosis: Yes  Nutrition Diagnosis 1: Inadequate oral intake  Diagnosis Status (1): New  Related to (1): personal tastes  As Evidenced by (1): self-reported dislike of the foods       Nutrition Interventions/Recommendations      Food and/or Nutrient Delivery Interventions  Meals and Snacks: General healthful diet, Mineral-modified diet, Fat-modified diet     Medical Food Supplement: Commercial beverage  Goal: Ensure nutritional supplements TID    -Should po intake remain sub-optimal, consider liberalizing diet.    Nutrition Monitoring and Evaluation   Food and Nutrient Related History  Energy Intake: Estimated energy intake  Criteria: >75% of EEN via po    Fluid Intake: Estimated fluid intake    Amount of Food: Estimated amout of food, Medical food intake  Criteria: >75% of meals and nutritional supplements       Anthropometrics: Body Composition/Growth/Weight History  Weight: Measured weight, Weight change, Estimated dry weight    Weight Change: Weight change percentage, Weight gain, Weight loss    Body Mass: Body mass index (BMI)       Biochemical Data, Medical Tests and Procedures  Electrolyte and Renal Panel: Other (Comment), Chloride, BUN, Creatinine, Calcium, serum, Calcium, ionized, Magnesium  Criteria: As clinically indicated       Nutrition Focused Physical Findings  Adipose: Loss of subcutaneous fat       Digestive System: Decrease in appetite, Other (Comment)    Muscles: Muscle atrophy    Skin: Other (Comment)    Other: overall appearance and I/Os       Follow Up  Time Spent (min): 60 minutes  Last Date of Nutrition Visit: 11/06/24  Nutrition Follow-Up  Needed?: Dietitian to reassess per policy  Follow up Comment: REEMA Pickett

## 2024-11-06 NOTE — PROGRESS NOTES
Physical Therapy                 Therapy Communication Note    Patient Name: Anny Tesfaye  MRN: [id number]  Department: [facility]  Room: [facility]*  Today's Date: 11/6/2024     Discipline: Physical Therapy    Missed Visit Reason: Missed Visit Reason: Patient in a medical procedure    Missed Time: Attempt    Comment: Pt is off the floor for heart cath.

## 2024-11-07 ENCOUNTER — ANESTHESIA EVENT (OUTPATIENT)
Dept: CARDIOLOGY | Facility: HOSPITAL | Age: 79
End: 2024-11-07
Payer: MEDICARE

## 2024-11-07 ENCOUNTER — APPOINTMENT (OUTPATIENT)
Dept: CARDIOLOGY | Facility: HOSPITAL | Age: 79
DRG: 286 | End: 2024-11-07
Payer: MEDICARE

## 2024-11-07 ENCOUNTER — APPOINTMENT (OUTPATIENT)
Dept: RADIOLOGY | Facility: HOSPITAL | Age: 79
DRG: 286 | End: 2024-11-07
Payer: MEDICARE

## 2024-11-07 ENCOUNTER — APPOINTMENT (OUTPATIENT)
Dept: CARDIOLOGY | Facility: HOSPITAL | Age: 79
End: 2024-11-07
Payer: MEDICARE

## 2024-11-07 ENCOUNTER — ANESTHESIA (OUTPATIENT)
Dept: CARDIOLOGY | Facility: HOSPITAL | Age: 79
End: 2024-11-07
Payer: MEDICARE

## 2024-11-07 PROBLEM — M48.00 SPINAL STENOSIS: Status: ACTIVE | Noted: 2024-11-07

## 2024-11-07 PROBLEM — Z79.01 ANTICOAGULANT LONG-TERM USE: Status: ACTIVE | Noted: 2024-11-07

## 2024-11-07 PROBLEM — R93.1 DECREASED CARDIAC EJECTION FRACTION: Status: ACTIVE | Noted: 2024-11-07

## 2024-11-07 PROBLEM — I71.9 AORTIC ANEURYSM (CMS-HCC): Status: ACTIVE | Noted: 2024-11-07

## 2024-11-07 PROBLEM — I73.9 PERIPHERAL VASCULAR DISEASE (CMS-HCC): Status: ACTIVE | Noted: 2024-11-07

## 2024-11-07 PROBLEM — N18.9 CHRONIC RENAL INSUFFICIENCY: Status: ACTIVE | Noted: 2024-11-07

## 2024-11-07 PROBLEM — D64.9 ANEMIA: Status: ACTIVE | Noted: 2024-11-07

## 2024-11-07 LAB
ANION GAP SERPL CALC-SCNC: 10 MMOL/L (ref 10–20)
ATRIAL RATE: 125 BPM
ATRIAL RATE: 54 BPM
BUN SERPL-MCNC: 32 MG/DL (ref 6–23)
CALCIUM SERPL-MCNC: 7.9 MG/DL (ref 8.6–10.3)
CHLORIDE SERPL-SCNC: 107 MMOL/L (ref 98–107)
CO2 SERPL-SCNC: 26 MMOL/L (ref 21–32)
CREAT SERPL-MCNC: 2.01 MG/DL (ref 0.5–1.05)
EGFRCR SERPLBLD CKD-EPI 2021: 25 ML/MIN/1.73M*2
EJECTION FRACTION: 33 %
ERYTHROCYTE [DISTWIDTH] IN BLOOD BY AUTOMATED COUNT: 16.6 % (ref 11.5–14.5)
GLUCOSE SERPL-MCNC: 90 MG/DL (ref 74–99)
HCT VFR BLD AUTO: 26.1 % (ref 36–46)
HGB BLD-MCNC: 8.1 G/DL (ref 12–16)
LABORATORY COMMENT REPORT: NORMAL
LABORATORY COMMENT REPORT: NORMAL
MCH RBC QN AUTO: 30 PG (ref 26–34)
MCHC RBC AUTO-ENTMCNC: 31 G/DL (ref 32–36)
MCV RBC AUTO: 97 FL (ref 80–100)
NRBC BLD-RTO: 0 /100 WBCS (ref 0–0)
P AXIS: 262 DEGREES
P AXIS: 32 DEGREES
P OFFSET: 151 MS
P OFFSET: 195 MS
P ONSET: 106 MS
P ONSET: 83 MS
PATH REPORT.FINAL DX SPEC: NORMAL
PATH REPORT.GROSS SPEC: NORMAL
PATH REPORT.RELEVANT HX SPEC: NORMAL
PATH REPORT.TOTAL CANCER: NORMAL
PLATELET # BLD AUTO: 212 X10*3/UL (ref 150–450)
POTASSIUM SERPL-SCNC: 4 MMOL/L (ref 3.5–5.3)
PR INTERVAL: 218 MS
PR INTERVAL: 272 MS
Q ONSET: 215 MS
Q ONSET: 219 MS
QRS COUNT: 19 BEATS
QRS COUNT: 9 BEATS
QRS DURATION: 114 MS
QRS DURATION: 120 MS
QT INTERVAL: 338 MS
QT INTERVAL: 392 MS
QTC CALCULATION(BAZETT): 371 MS
QTC CALCULATION(BAZETT): 467 MS
QTC FREDERICIA: 378 MS
QTC FREDERICIA: 419 MS
R AXIS: -14 DEGREES
R AXIS: -25 DEGREES
RBC # BLD AUTO: 2.7 X10*6/UL (ref 4–5.2)
SODIUM SERPL-SCNC: 139 MMOL/L (ref 136–145)
T AXIS: 124 DEGREES
T AXIS: 3 DEGREES
T OFFSET: 388 MS
T OFFSET: 411 MS
VENTRICULAR RATE: 115 BPM
VENTRICULAR RATE: 54 BPM
WBC # BLD AUTO: 5.2 X10*3/UL (ref 4.4–11.3)

## 2024-11-07 PROCEDURE — 93005 ELECTROCARDIOGRAM TRACING: CPT

## 2024-11-07 PROCEDURE — 93320 DOPPLER ECHO COMPLETE: CPT | Performed by: INTERNAL MEDICINE

## 2024-11-07 PROCEDURE — 2500000004 HC RX 250 GENERAL PHARMACY W/ HCPCS (ALT 636 FOR OP/ED)

## 2024-11-07 PROCEDURE — 2500000004 HC RX 250 GENERAL PHARMACY W/ HCPCS (ALT 636 FOR OP/ED): Performed by: PHARMACIST

## 2024-11-07 PROCEDURE — 2500000001 HC RX 250 WO HCPCS SELF ADMINISTERED DRUGS (ALT 637 FOR MEDICARE OP): Performed by: INTERNAL MEDICINE

## 2024-11-07 PROCEDURE — 36415 COLL VENOUS BLD VENIPUNCTURE: CPT | Performed by: INTERNAL MEDICINE

## 2024-11-07 PROCEDURE — 92960 CARDIOVERSION ELECTRIC EXT: CPT | Performed by: INTERNAL MEDICINE

## 2024-11-07 PROCEDURE — 93312 ECHO TRANSESOPHAGEAL: CPT | Performed by: INTERNAL MEDICINE

## 2024-11-07 PROCEDURE — 2500000002 HC RX 250 W HCPCS SELF ADMINISTERED DRUGS (ALT 637 FOR MEDICARE OP, ALT 636 FOR OP/ED): Performed by: INTERNAL MEDICINE

## 2024-11-07 PROCEDURE — 2500000001 HC RX 250 WO HCPCS SELF ADMINISTERED DRUGS (ALT 637 FOR MEDICARE OP): Performed by: NURSE PRACTITIONER

## 2024-11-07 PROCEDURE — 71045 X-RAY EXAM CHEST 1 VIEW: CPT

## 2024-11-07 PROCEDURE — 80048 BASIC METABOLIC PNL TOTAL CA: CPT | Performed by: INTERNAL MEDICINE

## 2024-11-07 PROCEDURE — 99232 SBSQ HOSP IP/OBS MODERATE 35: CPT

## 2024-11-07 PROCEDURE — 2500000005 HC RX 250 GENERAL PHARMACY W/O HCPCS: Performed by: INTERNAL MEDICINE

## 2024-11-07 PROCEDURE — 93319 3D ECHO IMG CGEN CAR ANOMAL: CPT | Performed by: INTERNAL MEDICINE

## 2024-11-07 PROCEDURE — 3700000001 HC GENERAL ANESTHESIA TIME - INITIAL BASE CHARGE

## 2024-11-07 PROCEDURE — 97535 SELF CARE MNGMENT TRAINING: CPT | Mod: GO

## 2024-11-07 PROCEDURE — 3700000002 HC GENERAL ANESTHESIA TIME - EACH INCREMENTAL 1 MINUTE

## 2024-11-07 PROCEDURE — 71045 X-RAY EXAM CHEST 1 VIEW: CPT | Performed by: RADIOLOGY

## 2024-11-07 PROCEDURE — 93010 ELECTROCARDIOGRAM REPORT: CPT | Performed by: INTERNAL MEDICINE

## 2024-11-07 PROCEDURE — 99232 SBSQ HOSP IP/OBS MODERATE 35: CPT | Performed by: INTERNAL MEDICINE

## 2024-11-07 PROCEDURE — 93319 3D ECHO IMG CGEN CAR ANOMAL: CPT

## 2024-11-07 PROCEDURE — 5A2204Z RESTORATION OF CARDIAC RHYTHM, SINGLE: ICD-10-PCS | Performed by: INTERNAL MEDICINE

## 2024-11-07 PROCEDURE — 85027 COMPLETE CBC AUTOMATED: CPT | Performed by: INTERNAL MEDICINE

## 2024-11-07 PROCEDURE — 1200000002 HC GENERAL ROOM WITH TELEMETRY DAILY

## 2024-11-07 RX ORDER — LIDOCAINE HYDROCHLORIDE 20 MG/ML
SOLUTION OROPHARYNGEAL AS NEEDED
Status: DISCONTINUED | OUTPATIENT
Start: 2024-11-07 | End: 2024-11-12 | Stop reason: HOSPADM

## 2024-11-07 RX ORDER — PHENYLEPHRINE HCL IN 0.9% NACL 1 MG/10 ML
SYRINGE (ML) INTRAVENOUS AS NEEDED
Status: DISCONTINUED | OUTPATIENT
Start: 2024-11-07 | End: 2024-11-07

## 2024-11-07 RX ORDER — PROPOFOL 10 MG/ML
INJECTION, EMULSION INTRAVENOUS AS NEEDED
Status: DISCONTINUED | OUTPATIENT
Start: 2024-11-07 | End: 2024-11-07

## 2024-11-07 RX ORDER — MIDAZOLAM HYDROCHLORIDE 1 MG/ML
INJECTION INTRAMUSCULAR; INTRAVENOUS AS NEEDED
Status: DISCONTINUED | OUTPATIENT
Start: 2024-11-07 | End: 2024-11-07

## 2024-11-07 RX ADMIN — LEVOTHYROXINE SODIUM 100 MCG: 0.1 TABLET ORAL at 09:39

## 2024-11-07 RX ADMIN — CEFEPIME 1 G: 1 INJECTION, POWDER, FOR SOLUTION INTRAMUSCULAR; INTRAVENOUS at 00:47

## 2024-11-07 RX ADMIN — ESCITALOPRAM OXALATE 20 MG: 20 TABLET ORAL at 09:39

## 2024-11-07 RX ADMIN — AMIODARONE HYDROCHLORIDE 200 MG: 200 TABLET ORAL at 09:39

## 2024-11-07 RX ADMIN — CEFEPIME 1 G: 1 INJECTION, POWDER, FOR SOLUTION INTRAMUSCULAR; INTRAVENOUS at 12:18

## 2024-11-07 RX ADMIN — PANTOPRAZOLE SODIUM 40 MG: 40 TABLET, DELAYED RELEASE ORAL at 06:16

## 2024-11-07 RX ADMIN — ACETAMINOPHEN 650 MG: 325 TABLET, FILM COATED ORAL at 08:17

## 2024-11-07 RX ADMIN — LIDOCAINE HYDROCHLORIDE 15 ML: 20 SOLUTION ORAL; TOPICAL at 13:41

## 2024-11-07 RX ADMIN — ASPIRIN 81 MG: 81 TABLET, COATED ORAL at 09:39

## 2024-11-07 RX ADMIN — APIXABAN 2.5 MG: 2.5 TABLET, FILM COATED ORAL at 09:39

## 2024-11-07 RX ADMIN — ATORVASTATIN CALCIUM 10 MG: 10 TABLET, FILM COATED ORAL at 21:32

## 2024-11-07 RX ADMIN — LEVOTHYROXINE, LIOTHYRONINE 30 MG: 19; 4.5 TABLET ORAL at 06:16

## 2024-11-07 RX ADMIN — APIXABAN 2.5 MG: 2.5 TABLET, FILM COATED ORAL at 21:32

## 2024-11-07 RX ADMIN — BENZOCAINE, BUTAMBEN, AND TETRACAINE HYDROCHLORIDE 2 SPRAY: .028; .004; .004 AEROSOL, SPRAY TOPICAL at 13:42

## 2024-11-07 ASSESSMENT — COGNITIVE AND FUNCTIONAL STATUS - GENERAL
DAILY ACTIVITIY SCORE: 6
DAILY ACTIVITIY SCORE: 17
TOILETING: TOTAL
STANDING UP FROM CHAIR USING ARMS: TOTAL
MOVING FROM LYING ON BACK TO SITTING ON SIDE OF FLAT BED WITH BEDRAILS: TOTAL
MOVING TO AND FROM BED TO CHAIR: TOTAL
EATING MEALS: TOTAL
DRESSING REGULAR LOWER BODY CLOTHING: TOTAL
HELP NEEDED FOR BATHING: TOTAL
WALKING IN HOSPITAL ROOM: TOTAL
EATING MEALS: TOTAL
MOBILITY SCORE: 6
DRESSING REGULAR UPPER BODY CLOTHING: TOTAL
PERSONAL GROOMING: TOTAL
PERSONAL GROOMING: TOTAL
DRESSING REGULAR LOWER BODY CLOTHING: TOTAL
WALKING IN HOSPITAL ROOM: TOTAL
HELP NEEDED FOR BATHING: TOTAL
DRESSING REGULAR LOWER BODY CLOTHING: A LOT
MOVING FROM LYING ON BACK TO SITTING ON SIDE OF FLAT BED WITH BEDRAILS: TOTAL
MOVING FROM LYING ON BACK TO SITTING ON SIDE OF FLAT BED WITH BEDRAILS: TOTAL
HELP NEEDED FOR BATHING: TOTAL
PERSONAL GROOMING: A LITTLE
DRESSING REGULAR UPPER BODY CLOTHING: TOTAL
CLIMB 3 TO 5 STEPS WITH RAILING: TOTAL
TOILETING: TOTAL
DRESSING REGULAR LOWER BODY CLOTHING: TOTAL
MOBILITY SCORE: 6
TURNING FROM BACK TO SIDE WHILE IN FLAT BAD: TOTAL
TOILETING: TOTAL
MOBILITY SCORE: 6
CLIMB 3 TO 5 STEPS WITH RAILING: TOTAL
DRESSING REGULAR UPPER BODY CLOTHING: A LITTLE
MOVING TO AND FROM BED TO CHAIR: TOTAL
MOVING TO AND FROM BED TO CHAIR: TOTAL
WALKING IN HOSPITAL ROOM: TOTAL
TOILETING: TOTAL
MOVING FROM LYING ON BACK TO SITTING ON SIDE OF FLAT BED WITH BEDRAILS: TOTAL
PERSONAL GROOMING: TOTAL
MOBILITY SCORE: 6
HELP NEEDED FOR BATHING: A LOT
HELP NEEDED FOR BATHING: TOTAL
EATING MEALS: TOTAL
PERSONAL GROOMING: TOTAL
STANDING UP FROM CHAIR USING ARMS: TOTAL
TURNING FROM BACK TO SIDE WHILE IN FLAT BAD: TOTAL
DRESSING REGULAR UPPER BODY CLOTHING: TOTAL
PERSONAL GROOMING: TOTAL
MOVING TO AND FROM BED TO CHAIR: TOTAL
WALKING IN HOSPITAL ROOM: TOTAL
MOBILITY SCORE: 6
CLIMB 3 TO 5 STEPS WITH RAILING: TOTAL
TURNING FROM BACK TO SIDE WHILE IN FLAT BAD: TOTAL
DAILY ACTIVITIY SCORE: 6
WALKING IN HOSPITAL ROOM: TOTAL
CLIMB 3 TO 5 STEPS WITH RAILING: TOTAL
TOILETING: TOTAL
MOVING FROM LYING ON BACK TO SITTING ON SIDE OF FLAT BED WITH BEDRAILS: TOTAL
MOVING TO AND FROM BED TO CHAIR: TOTAL
EATING MEALS: TOTAL
EATING MEALS: TOTAL
MOVING FROM LYING ON BACK TO SITTING ON SIDE OF FLAT BED WITH BEDRAILS: TOTAL
TOILETING: A LITTLE
TURNING FROM BACK TO SIDE WHILE IN FLAT BAD: TOTAL
DRESSING REGULAR UPPER BODY CLOTHING: TOTAL
DRESSING REGULAR UPPER BODY CLOTHING: TOTAL
STANDING UP FROM CHAIR USING ARMS: TOTAL
DRESSING REGULAR LOWER BODY CLOTHING: TOTAL
HELP NEEDED FOR BATHING: TOTAL
DAILY ACTIVITIY SCORE: 6
DRESSING REGULAR LOWER BODY CLOTHING: TOTAL
CLIMB 3 TO 5 STEPS WITH RAILING: TOTAL
STANDING UP FROM CHAIR USING ARMS: TOTAL
TURNING FROM BACK TO SIDE WHILE IN FLAT BAD: TOTAL
STANDING UP FROM CHAIR USING ARMS: TOTAL
STANDING UP FROM CHAIR USING ARMS: TOTAL
TURNING FROM BACK TO SIDE WHILE IN FLAT BAD: TOTAL
WALKING IN HOSPITAL ROOM: TOTAL
HELP NEEDED FOR BATHING: TOTAL
PERSONAL GROOMING: TOTAL
TOILETING: TOTAL
MOVING TO AND FROM BED TO CHAIR: TOTAL
DRESSING REGULAR UPPER BODY CLOTHING: TOTAL
DAILY ACTIVITIY SCORE: 6
DAILY ACTIVITIY SCORE: 6
DRESSING REGULAR LOWER BODY CLOTHING: TOTAL
CLIMB 3 TO 5 STEPS WITH RAILING: TOTAL
EATING MEALS: TOTAL

## 2024-11-07 ASSESSMENT — PAIN SCALES - GENERAL
PAINLEVEL_OUTOF10: 0 - NO PAIN
PAINLEVEL_OUTOF10: 0 - NO PAIN
PAIN_LEVEL: 0
PAINLEVEL_OUTOF10: 0 - NO PAIN

## 2024-11-07 ASSESSMENT — ACTIVITIES OF DAILY LIVING (ADL): HOME_MANAGEMENT_TIME_ENTRY: 20

## 2024-11-07 ASSESSMENT — PAIN - FUNCTIONAL ASSESSMENT
PAIN_FUNCTIONAL_ASSESSMENT: 0-10

## 2024-11-07 ASSESSMENT — PAIN SCALES - WONG BAKER: WONGBAKER_NUMERICALRESPONSE: NO HURT

## 2024-11-07 NOTE — CARE PLAN
The patient's goals for the shift include      The clinical goals for the shift include patient will remain free of falls and injury    Over the shift, the patient did not make progress toward the following goals. Barriers to progression include . Recommendations to address these barriers include .  Problem: Safety - Adult  Goal: Free from fall injury  Outcome: Progressing     Problem: Discharge Planning  Goal: Discharge to home or other facility with appropriate resources  Outcome: Progressing

## 2024-11-07 NOTE — PROGRESS NOTES
"SUBJECTIVE DATA:  Seen just prior to UMESH cardioversion today  Denies any chest pain.  Dyspnea remains the same  Right heart cath reviewed from yesterday showing overall lowish filling pressures.  Successful UMESH and cardioversion.  Creatinine slightly improved today    Last Recorded Vitals:  /67   Pulse (!) 123   Temp 36.2 °C (97.2 °F) (Temporal)   Resp 16   Ht 1.651 m (5' 5\")   Wt 73.9 kg (163 lb) Comment: reported dry wt  SpO2 97%   BMI 27.12 kg/m²     Temp:  [36.2 °C (97.2 °F)-38.2 °C (100.8 °F)] 36.2 °C (97.2 °F)  Heart Rate:  [103-126] 123  Resp:  [13-22] 16  BP: ()/(57-70) 106/67    Last I/O:  I/O last 3 completed shifts:  In: - (0 mL/kg)   Out: 1655 (22.4 mL/kg) [Urine:1650 (0.6 mL/kg/hr); Blood:5]  Weight: 73.9 kg   Vitals:    11/06/24 1617   Weight: 73.9 kg (163 lb)       Cardiovascular Testing:  EKG:   ECG 12 Lead ECG 12 Lead 11/4/24       ECG 12 Lead 10/17/24 [facility]       Echo:  Transthoracic Echocardiogram 11/3/24   1. Left ventricular ejection fraction is moderately decreased, by visual estimate at 30-35%.   2. There is global hypokinesis of the left ventricle with minor regional variations.   3. Left ventricular diastolic filling cannot be determined, due to atrial fibrillation/flutter.   4. There is moderately reduced right ventricular systolic function.   5. Moderately enlarged right ventricle.   6. The left atrium is severely dilated.   7. There is moderate mitral annular calcification.   8. Mildly elevated right ventricular systolic pressure.   9. Mild aortic valve regurgitation.  10. Aortic root moderatly enlarged at 4.8 cm. Ascending aorta 4.4 cm.  11. The patient is in atrial fibrillation which may influence the estimate of left ventricular function and transvalvular flows.    Transthoracic Echocardiogram 9/2024 [facility]  - The left ventricle is normal in size. There is mild septal left ventricular   hypertrophy. Left ventricular systolic function is normal. EF = 59 ± 5% (2D biplane) " Left ventricular diastolic function was not evaluated due to >2+ AI.   - The right ventricle is normal in size. Right ventricular systolic function is normal.   - The left atrial cavity is severely dilated.   - The visualized aorta is dilated with a maximal dimension of 4.6 cm.   - There is moderate (2+ - 3+) aortic valve regurgitation     Heart Catheterizations:  [facility] 8/26/24:  LEFT ANTERIOR DESCENDING: No Stenosis     DIAGONAL #1: No Stenosis   DIAGONAL #2: No Stenosis   DIAGONAL #3: No Stenosis     LEFT CIRCUMFLEX: No Stenosis     MARGINAL #1: No Stenosis   MARGINAL #2: No Stenosis     DOMINANT: NO  RIGHT CORONARY: No Stenosis     DOMINANT: YES  POSTERIOR DESCENDING: No Stenosis   POSTERIOR LATERAL: No Stenosis      Stress Test:  Nuclear Stress Test 8/12/2024  1. SPECT Perfusion Study: Abnormal.   2. No evidence of scarred myocardium.   3. There is mild (<10%) ischemia in the territory of the RCA.   4. Left ventricle is normal in size.   5. This is an intermediate risk scan.     Gated Stress FBP   LVEF % 60      Cardiac Imaging: No results found for the last 3 years.    CV Labs  Troponin I, High Sensitivity   Date/Time Value Ref Range Status   11/02/2024 09:44 AM 23 (H) 0 - 13 ng/L Final   11/02/2024 08:11 AM 23 (H) 0 - 13 ng/L Final     BNP   Date/Time Value Ref Range Status   11/02/2024 08:11 AM 1,021 (H) 0 - 99 pg/mL Final     Hemoglobin A1C   Date/Time Value Ref Range Status   10/23/2023 10:25 AM 4.9 See below % Final   10/22/2021 11:20 AM 5.1 4.0 - 6.0 % Final   04/23/2021 11:55 AM 5.2 4.0 - 6.0 % Final     LDL Calculated   Date/Time Value Ref Range Status   09/27/2024 10:58 AM 56 <=99 mg/dL Final   08/01/2024 09:43 AM 73 65 - 130 mg/dL Final   04/08/2024 09:55 AM 74 65 - 130 mg/dL Final       Diagnostic Results   Labs  CBC- 11/7/2024:  6:11 AM  5.2 8.1 212    26.1      BMP- 11/7/2024:  6:11 AM  139 32 107 90   4.0 2.01 26    Estimated Creatinine Clearance: 22.5 mL/min (A) (by C-G formula based on SCr of 2.01  mg/dL (H)).     CA: 7.9 PROTIEN: 5.9 ALT: 6 Total Bili: 0.5 M.46   PHOS: 4.0 ALBUMIN: 2.6 AST: 11 Alk Phos: 66      COAGS- 2024:  2:02 PM  1.3   14.1 _     Pertinent Imaging  XR chest 1 view 2024  Small bilateral pleural effusions and enlarged cardiac silhouette.     CT abdomen pelvis wo IV contrast 10/19/2024  Trace pneumoperitoneum and small volume ascites adjacent to the colon, which is favored expected noting recent surgery.  New  colostomy left lower quadrant.    Some mildly prominent small bowel which is favored related to ileus and noting the oral contrast has passed to the cecum.    Asymmetric body wall edema, more prominent left gluteal region and right breast region, correlation for other pathologies such as cellulitis.    CT angio chest abdomen pelvis 2024  1.  4.4 x 4.2 cm ascending aortic aneurysm. No dissection.  2. No pulmonary embolism.  3. Moderate size right and small left pleural effusions.  Infiltrates or compressive atelectasis of the right upper middle and lower lobes and left upper and lower lobes. The volume loss is most marked in the right lower lobe.  4. Anasarca/hypoproteinemia.    ABDOMEN AND PELVIS:  1.  4.9 x 4.7 cm infrarenal abdominal aortic aneurysm. There is a distal abdominal aortic aneurysm intravascular bi-iliac artery stent.  There is a femoral-femoral bypass graft with flow   2. There is occlusion in the proximal right common iliac artery which reconstitutes distally.  3. Anasarca/hypoproteinemia. In particular, there is induration of the left lateral abdominal wall and left side of the back which could represent more focal edema or contusion. It is not known if the patient fell.  4. Possible appendectomy.  5. Presacral edema.  6. Sludge in an otherwise unremarkable gallbladder.    CT lung screening follow up CT chest wo IV contrast 10/02/2024  1.  Few small bilateral noncalcified pulmonary nodules measuring up to 6 mm, likely benign. Continued screening  "with low-dose non-contrast chest CT in 12 months (from current date) is recommended.  2. Estimated coronary artery calcium score is * which correlates with at least  percentile rank as compared to matched CUMMINGS-study  subjects(https://www.cummings-nhlbi.org/Calcium/input.aspx).    CT head wo IV contrast 11/02/2024  1. Age related changes without acute intracranial process.  2. Sinus disease. Also nonspecific fluid within left mastoid cells and the left middle ear cavity; correlate clinically.       Inpatient Medications:  Scheduled medications   Medication Dose Route Frequency    amiodarone  200 mg oral q AM    apixaban  2.5 mg oral BID    aspirin  81 mg oral Daily    atorvastatin  10 mg oral Nightly    cefepime  1 g intravenous q12h    escitalopram  20 mg oral Daily    [Held by provider] furosemide  40 mg intravenous q8h EDIN    levothyroxine  100 mcg oral Daily    magnesium oxide  400 mg oral Once    pantoprazole  40 mg oral Daily before breakfast    Or    pantoprazole  40 mg intravenous Daily before breakfast    thyroid (pork)  30 mg oral Daily before breakfast     PRN medications: acetaminophen **OR** acetaminophen **OR** acetaminophen, ALPRAZolam, alum-mag hydroxide-simeth, butamben-tetracaine-benzocaine, guaiFENesin, lidocaine, melatonin, ondansetron **OR** ondansetron, oxygen, oxygen, polyethylene glycol, traMADol       Physical Exam:   Vitals and nursing notes reviewed.  /67   Pulse (!) 123   Temp 36.2 °C (97.2 °F) (Temporal)   Resp 16   Ht 1.651 m (5' 5\")   Wt 73.9 kg (163 lb) Comment: reported dry wt  SpO2 97%   BMI 27.12 kg/m²   GENERAL: Alert and awake, cooperative; in no acute distress; Elderly female, ill-appearing  SKIN: Warm and dry, cap refill <2  HEENT: Normocephalic, PEERL, mucous membranes pink and moist  NECK: +JVD  CARDIAC: Irregular rate and rhythm, S1S2, no murmurs or abnormal heart sounds  CHEST: Normal respiratory effort, no abnormal breath sounds  ABDOMEN: Soft, non-distended, " non-tender with palpation, +abd wall edema  EXTREMITIES: 1+ pitting LE edema  NEURO: Alert and oriented, mental status at baseline, no focal deficits  PSYCH: Behavior and affect as expected     Code Status: Full Code    Assessment/Plan   Anny Tesfaye is a 79 y.o. female, with a PMH of HTN, HLD, heavy tobacco use, severe PAD, AAA s/p EVAR 10/10/24 at [facility], lumbar stenosis, hypothyroidism, CKD III, polymyalgia rheumatica, and OA, who presented to [facility] on 11/2/2024 for change in mental status. Patient reports recent prolonged hospital course at [facility] s/p EVAR on 10/10/24 c/b postoperative AF, respiratory insufficiency, volume overload, BALBIR on CKD, and ischemic colitis s/p hemicolectomy w/ colostomy. She was started on Amiodarone without conversion to NSR and Eliquis for the atrial fibrillation. Home antihypertensive medications were held at discharge givens stable BP. She was discharged to SNF 10/25/24. She now presents to [facility] ED 11/2/24 for altered mental status, SOB, and weakness, admitted for PNA and fluid overload. Cardiology is consulted for HF. Patient reports no prior hx of HF, states that edema has been an issue since her recent hospitalization.      Home CV Medications: Amiodarone 200mg daily, Eliquis 2.5mg BID, ASA 81mg daily, statin  -Previously on Captopril 50mg, HCTZ 25mg, and Verapamil 240mg CR    Transthoracic Echocardiogram 11/3/24   1. Left ventricular ejection fraction is moderately decreased, by visual estimate at 30-35%.   2. There is global hypokinesis of the left ventricle with minor regional variations.   3. Left ventricular diastolic filling cannot be determined, due to atrial fibrillation/flutter.   4. There is moderately reduced right ventricular systolic function.   5. Moderately enlarged right ventricle.   6. The left atrium is severely dilated.   7. There is moderate mitral annular calcification.   8. Mildly elevated right ventricular systolic pressure.   9. Mild aortic  valve regurgitation.  10. Aortic root moderatly enlarged at 4.8 cm. Ascending aorta 4.4 cm.  11. The patient is in atrial fibrillation which may influence the estimate of left ventricular function and transvalvular flows.    11/6 [facility]: RA 8, 38/25(31), W9, CO/CI 4.7/2.6, , SVO2 47%     #Acute Systolic HF- ++Hypervolemia with diffuse anasarca likely in the setting of judicious postoperative fluid resuscitation and malnutrition, EF newly decreased on TTE suspect A-fib related.  BP on low end making GDMT difficult  #Postoperative AF- Currently rate controlled 80-100s, c/w Amiodarone  -On Eliquis for OAC, has been on anticoagulation since 10/23 (Heparin gtt then transitioned to Eliquis)  #HTN- BP low, home antihypertensives held during prior hospitalization  #Recent EVAR- c/w ASA and statin  #BALBIR-CR down to 2.0 today from 2.2 yesterday.  Suspect more prerenal        RECOMMENDATIONS:  -Continue to hold IV diuresis for today and see what creatinine is tomorrow.  May still need a little bit more diuresis and will hopefully get better BP response post restoration of sinus rhythm.  -c/w Amiodarone and Eliquis

## 2024-11-07 NOTE — PROGRESS NOTES
11/07/24 1248   Discharge Planning   Assistance Needed PT/OT/ evals 11/6 recommended further rehab. [facility]-11/6/2024; Cardiology following; Cardioversion planned 11/7/2024-updated doris FRANCO   Home or Post Acute Services Post acute facilities (Rehab/SNF/etc)   Expected Discharge Disposition SNF  ([facility]-will need auth to return)   Does the patient need discharge transport arranged? Yes   RoundTrip coordination needed? Yes   Has discharge transport been arranged? No

## 2024-11-07 NOTE — PROGRESS NOTES
Pham Owens is a 79 y.o. female on day 5 of admission presenting with Sepsis with acute organ dysfunction, due to unspecified organism, unspecified organ dysfunction type, unspecified whether septic shock present (Multi).      Subjective   Patient is s/p successful cardioversion. She reports doing alright. She is hungry. No further complaints.    Objective   PE:  Constitutional: A&Ox3, calm and cooperative, NAD  Cardiovascular: Normal rate and regular rhythm.  Respiratory/Thorax: Good symmetric chest expansion. No labored breathing. On 3L NC.  Gastrointestinal: Abdomen nondistended, soft, non tender  Genitourinary: Voiding independently   Extremities: No peripheral edema  Neurological: A&Ox3, No focal deficits  Psychological: Appropriate mood and behavior  Skin: Warm and dry        Last Recorded Vitals  /75 (BP Location: Right arm, Patient Position: Lying)   Pulse 67   Temp 36.4 °C (97.5 °F) (Temporal)   Resp 16   Wt 73.9 kg (163 lb) Comment: reported dry wt  SpO2 96%   Intake/Output last 3 Shifts:    Intake/Output Summary (Last 24 hours) at 11/7/2024 1728  Last data filed at 11/7/2024 1413  Gross per 24 hour   Intake 50 ml   Output --   Net 50 ml       Admission Weight  Weight: 73.9 kg (163 lb) (11/04/24 1105)    Daily Weight  11/06/24 : 73.9 kg (163 lb)    Image Results  Transesophageal Echo (UMESH) w/ Possible Cardioversion     [facility], [address] [place] [address]               Tel [phone] and Fax [phone]    TRANSESOPHAGEAL ECHOCARDIOGRAM REPORT    WITH DIRECT CURRENT CARDIOVERSION     Patient Name:       PHAM OWENS   Reading Physician:   [id number] Rashad Ramirez DO  Study Date:         11/7/2024           Ordering Provider:   [id number] STEPHEN RUSSELL  MRN/PID:            [id number]            Fellow:  Accession#:         [id number]         Nurse:               Neil Fowler RN  Date of Birth/Age:  1945 / 79      Sonographer:         Souleymane De Leon RDCS                      years  Gender assigned at  F                   Additional Staff:    Jordan WEATHERS  Birth:  Height:             165.10 cm           Admit Date:          11/2/2024  Weight:             73.94 kg            Admission Status:    Inpatient - Routine  BSA / BMI:          1.81 m2 / 27.12     Encounter#:          [id number]                      kg/m2  Blood Pressure:     106/67 mmHg         Department Location: [facility] Cath Lab    Study Type:    TRANSESOPHAGEAL ECHO (UMESH) W/ POSSIBLE CARDIOVERSION  Diagnosis/ICD: Unspecified atrial fibrillation-I48.91; Shortness of                 breath-R06.02; Other persistent AFib-I48.19  Indication:    Atrial Fibrillation, SOB  CPT Code:      UMESH Complete-87841; Doppler Limited-33974; Color Doppler-49270    Patient History:  Drug Allergies:    None  Smoker:            Current.  Diabetes:          No  Pertinent History: HTN, Hyperlipidemia, CHF and Murmur. 79 y.o. female presents                     for UMESH/CVN for persistent A-fib.    Study Detail: The following Echo studies were performed: 2D, Doppler and 3D.                Patient's heart rhythm is atrial fibrillation. A bubble study was                not performed. The patient was sedated.       PHYSICIAN INTERPRETATION:  UMESH Details: The UMESH probe used was 5177. Technically adequate omniplane transesophageal echocardiogram performed. Color flow Doppler echo was performed to assess for the presence of a patent foramen ovale.  UMESH Medication: The pharynx was anesthetized with Cetacaine spray and viscous lidocaine. The patient was sedated by Anesthesia; please refer to anesthesia flow sheet for medications used.  UMESH Procedure: The probe was passed without difficulty. Complications encountered during procedure: Patient  tolerated the procedure well without any apparent complications.  UMESH Cardioversion Procedure:  The patient was placed in a supine position and anterior-posterior  defibrillation patches were applied. A biphasic Zoll defibrillator was attached  to the patient and set to synchronous mode.  One synchronized biphasic external shock was delivered at 200 Joules in attempt  to cardiovert the patient from atrial fibrillation. The procedure was  successful, resulting in sinus bradycardia.  The patient recovered uneventfully from the effects on conscious and deep  sedation. The procedure was well tolerated. There were no complications. The  patient was discharged from the Cardiac Cath Lab hemodynamically stable and with  no neurological deficits.    Left Ventricle: Left ventricular ejection fraction is moderately decreased, by visual estimate at 30-35%. There are multiple left ventricular wall motion abnormalities. The left ventricular cavity size was not assessed. Left ventricular diastolic filling was not assessed.  Left Atrium: The left atrium is enlarged. A bubble study using agitated saline was not performed. The left atrial appendage Doppler velocities are normal and there is no thrombus visualized in the left atrial appendage.  Right Ventricle: The right ventricle was not well visualized. Unable to determine right ventricular systolic function.  Right Atrium: The right atrium was not well visualized.  Aortic Valve: The aortic valve is structurally normal. There is trace aortic valve regurgitation.  Mitral Valve: The mitral valve is normal in structure. There is trace mitral valve regurgitation.  Tricuspid Valve: The tricuspid valve is structurally normal. No evidence of tricuspid regurgitation.  Pulmonic Valve: The pulmonic valve is structurally normal. There is no indication of pulmonic valve regurgitation.  Pericardium: Pericardial effusion was not assessed.  Aorta: The aortic root is abnormal. Aortic root enlarged  at 4.5 cm. Ascending aorta 4.3 cm.       CONCLUSIONS:   1. Left ventricular ejection fraction is moderately decreased, by visual estimate at 30-35%.   2. There are multiple left ventricular wall motion abnormalities.   3. Successful cardioversion for atrial fibrillation resulting in sinus bradycardia.   4. Unable to determine right ventricular systolic function.   5. The left atrium is enlarged.   6. Aortic root enlarged at 4.5 cm. Ascending aorta 4.3 cm.   7. No LA or ANGEL thrombus.    QUANTITATIVE DATA SUMMARY:     LV SYSTOLIC FUNCTION BY 2D PLANIMETRY (MOD):                       Normal Ranges:  EF-Visual:      33 %  LV EF Reported: 33 %       [id number] Rashad Ramirez DO  Electronically signed on 11/7/2024 at 3:56:32 PM       ** Final **  Electrocardiogram, 12-lead PRN ACS symptoms  Unusual P axis, possible ectopic atrial tachycardia with undetermined rhythm irregularity  Cannot rule out Anterior infarct (cited on or before 02-NOV-2024)  Abnormal ECG  When compared with ECG of 02-NOV-2024 07:38, (unconfirmed)  Ectopic atrial rhythm has replaced Atrial fibrillation  Minimal criteria for Inferior infarct are no longer Present  Electrocardiogram, 12-lead PRN ACS symptoms  Sinus bradycardia with 1st degree AV block  Incomplete left bundle branch block  Nonspecific T wave abnormality  Abnormal ECG  When compared with ECG of 07-NOV-2024 13:36, (unconfirmed)  Sinus rhythm has replaced Ectopic atrial rhythm  Vent. rate has decreased BY  61 BPM  Nonspecific T wave abnormality now evident in Anterior leads  Nonspecific T wave abnormality, improved in Lateral leads  XR chest 1 view  Narrative: Interpreted By:  Mitra Cesar,   STUDY:  XR CHEST 1 VIEW;  11/7/2024 8:46 am      INDICATION:  Signs/Symptoms:fever/ effusioin.          COMPARISON:  11/04/2024      ACCESSION NUMBER(S):  [id number]      ORDERING CLINICIAN:  KYRA PEREZ      FINDINGS:  Artifact from overlying monitoring leads. Patient is rotated. New  ill-defined  right-greater-than-left lower chest opacities with  blunting of the right costophrenic angle. Stable dense likely  calcified nodule projecting over the medial right apex and manuel  calcifications near the left hilum. The cardiomediastinal silhouette  remains enlarged.      Impression: Enlarged cardiac silhouette with right-greater-than-left basilar  infiltrates or edema and right pleural effusion.      MACRO:  None.      Signed by: Mitra Cesar 11/7/2024 9:33 AM  Dictation workstation:   [id number]    Assessment/Plan     Anny Tesfaye is a 79 y.o. female on day 5 of admission presenting with Sepsis with acute organ dysfunction, due to unspecified organism, unspecified organ dysfunction type, unspecified whether septic shock present (Multi).    #Community-acquired PNA  #B/l pleural effusions  - S/p right thoracentesis with IR 11/4       - Cytology testing  - Wean O2 as tolerated  - IV cefepime    #Acute on chronic HF  #Persistent atrial fibrillation  - Now in normal sinus  - TTE 11/3 showed 30-35% EF  - Cardiology following: s/p UMESH with cardioversion, rec holding diuretics today, continuing amiodarone and eliquis  - S/p right catheterization 11/6    #BALBIR  - Cr is improving  - Holding lasix today  - Trend BMP    #HLD  - Home statin    Hypothyroidism  - Home synthroid, armour    VTE proph: Eliquis, ASA, SCDs    Discussed with Dr Tony.    Total time spent 35 minutes, and greater than 50% of time was spent in counseling/coordination of care.    Randall Diaz PA-C

## 2024-11-07 NOTE — ANESTHESIA PREPROCEDURE EVALUATION
Patient: Anny Tesfaye    Procedure Information       Date/Time: 11/07/24 1330    Scheduled providers: Rashad Ramirez DO; Jaun Rosenberg MD    Procedure: TRANSESOPHAGEAL ECHO (UMESH) W/ POSSIBLE CARDIOVERSION    [facility]            Relevant Problems   Anesthesia (within normal limits)      Cardiac   (+) Acute congestive heart failure   (+) Aortic aneurysm (CMS-HCC) (EVAR 10/24)   (+) Decreased cardiac ejection fraction (30% EF)   (+) Essential hypertension   (+) Heart murmur   (+) Hyperlipemia, mixed   (+) Peripheral vascular disease (CMS-[facility])      Pulmonary  smoker      Neuro   (+) Anxiety   (+) Depression      GI  Hx ischemic colitis, s/p hemicolectomy   (+) Chronic GERD      /Renal   (+) Chronic renal insufficiency (Cr 2.0)      Endocrine   (+) Hypothyroid   (+) Morbid (severe) obesity due to excess calories (Multi)   (+) Secondary hyperparathyroidism (Multi)      Hematology   (+) Anemia   (+) Anticoagulant long-term use   (+) Thrombocytopenia (CMS-[facility])      Musculoskeletal  Polymyalgia rheumatica   (+) Primary generalized (osteo)arthritis   (+) Primary osteoarthritis of both first carpometacarpal joints   (+) Primary osteoarthritis of both knees   (+) Spinal stenosis       Clinical information reviewed:    Allergies  Meds               NPO Detail:  NPO/Void Status  Date of Last Liquid: 11/07/24  Time of Last Liquid: 0600  Date of Last Solid: 11/06/24  Time of Last Solid: 1800         Physical Exam    Airway  Mallampati: II     Cardiovascular    Dental    Pulmonary    Abdominal            Anesthesia Plan    History of general anesthesia?: yes  History of complications of general anesthesia?: no    ASA 3     MAC     intravenous induction   Anesthetic plan and risks discussed with patient.

## 2024-11-07 NOTE — PROGRESS NOTES
Anny Tesfaye is a 79 y.o. female     Right heart cath showed normal wedge pressures  Off of diuretics  Did have low-grade fevers yesterday  Remains on oxygen    Review of Systems     Constitutional: no fever, no chills, not feeling poorly, not feeling tired   Cardiovascular: no chest pain   Respiratory: no cough, wheezing   Gastrointestinal: no abdominal pain, no constipation, no melena, no nausea, no diarrhea, no vomiting and no blood in stools.   Neurological: no headache,   All other systems have been reviewed and are negative for complaint.       Vitals:    11/07/24 1557   BP: 128/75   Pulse: 67   Resp: 16   Temp: 36.4 °C (97.5 °F)   SpO2: 96%        Scheduled medications  amiodarone, 200 mg, oral, q AM  apixaban, 2.5 mg, oral, BID  aspirin, 81 mg, oral, Daily  atorvastatin, 10 mg, oral, Nightly  cefepime, 1 g, intravenous, q12h  escitalopram, 20 mg, oral, Daily  [Held by provider] furosemide, 40 mg, intravenous, q8h EDIN  levothyroxine, 100 mcg, oral, Daily  magnesium oxide, 400 mg, oral, Once  pantoprazole, 40 mg, oral, Daily before breakfast   Or  pantoprazole, 40 mg, intravenous, Daily before breakfast  thyroid (pork), 30 mg, oral, Daily before breakfast      Continuous medications     PRN medications  PRN medications: acetaminophen **OR** acetaminophen **OR** acetaminophen, ALPRAZolam, alum-mag hydroxide-simeth, butamben-tetracaine-benzocaine, guaiFENesin, lidocaine, melatonin, ondansetron **OR** ondansetron, oxygen, oxygen, polyethylene glycol, traMADol    Lab Review   Results from last 7 days   Lab Units 11/07/24  0611 11/06/24  0555 11/05/24  0648   WBC AUTO x10*3/uL 5.2 4.5 5.8   HEMOGLOBIN g/dL 8.1* 7.9* 7.7*   HEMATOCRIT % 26.1* 27.3* 26.3*   PLATELETS AUTO x10*3/uL 212 219 204     Results from last 7 days   Lab Units 11/07/24  0611 11/06/24  0555 11/05/24  0649 11/04/24  1402 11/04/24  0702 11/02/24  0944   SODIUM mmol/L 139 140 139  --    < > 142   POTASSIUM mmol/L 4.0 4.0 4.5  --    < > 4.4    CHLORIDE mmol/L 107 108* 110*  --    < > 113*   CO2 mmol/L 26 27 25  --    < > 23   BUN mg/dL 32* 34* 33*  --    < > 26*   CREATININE mg/dL 2.01* 2.16* 2.21*  --    < > 1.75*   CALCIUM mg/dL 7.9* 8.0* 8.0*  --    < > 7.9*   PROTEIN TOTAL g/dL  --   --   --  5.9*  --  5.3*   BILIRUBIN TOTAL mg/dL  --   --   --   --   --  0.5   ALK PHOS U/L  --   --   --   --   --  66   ALT U/L  --   --   --   --   --  6*   AST U/L  --   --   --   --   --  11   GLUCOSE mg/dL 90 88 91  --    < > 84    < > = values in this interval not displayed.     Results from last 7 days   Lab Units 11/02/24  0944 11/02/24  0811   TROPHS ng/L 23* 23*        Transesophageal Echo (UMESH) w/ Possible Cardioversion   Final Result      XR chest 1 view   Final Result   Enlarged cardiac silhouette with right-greater-than-left basilar   infiltrates or edema and right pleural effusion.        MACRO:   None.        Signed by: Mitra Cesar 11/7/2024 9:33 AM   Dictation workstation:   [id number]      Cardiac Catheterization Procedure   Final Result      XR chest 1 view   Final Result   Small bilateral pleural effusions and enlarged cardiac silhouette.        Signed by: Anh Hansen 11/4/2024 7:04 PM   Dictation workstation:   [id number]      Transthoracic Echo (TTE) Complete   Final Result      CT head wo IV contrast   Final Result   1. Age related changes without acute intracranial process.   2. Sinus disease. Also nonspecific fluid within left mastoid cells   and the left middle ear cavity; correlate clinically.        Signed by: Francisco Carlson 11/2/2024 11:47 AM   Dictation workstation:   [id number]      CT angio chest abdomen pelvis   Final Result   CHEST:   1.  4.4 x 4.2 cm ascending aortic aneurysm. No dissection.   2. No pulmonary embolism.   3. Moderate size right and small left pleural effusions.   Infiltrates or compressive atelectasis of the right upper middle and   lower lobes and left upper and lower lobes. The volume loss is most   marked in  the right lower lobe.   4. Anasarca/hypoproteinemia.        ABDOMEN AND PELVIS:   1.  4.9 x 4.7 cm infrarenal abdominal aortic aneurysm. There is a   distal abdominal aortic aneurysm intravascular bi-iliac artery stent.   There is a femoral-femoral bypass graft with flow   2. 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   3. Anasarca/hypoproteinemia. In particular, there is induration of   the left lateral abdominal wall and left side of the back which could   represent more focal edema or contusion. It is not known if the   patient fell.   4. Possible appendectomy.   5. Presacral edema.   6. Sludge in an otherwise unremarkable gallbladder.        MACRO:   None        Signed by: Georgia Sanchez 11/2/2024 11:51 AM   Dictation workstation:   [id number]      XR chest 1 view   Final Result   Right basilar pneumonia. Small right pleural effusion.        MACRO:   None        Signed by: Georgia Sanchez 11/2/2024 10:03 AM   Dictation workstation:   [id number]      US thoracentesis    (Results Pending)         Physical Exam    Constitutional   General appearance: Alert    Pulmonary   Respiratory assessment: No respiratory distress, normal respiratory rhythm and effort.    Auscultation of Lungs: Decreased breath sounds in the bases  Cardiovascular   Auscultation of heart: Irregularly irregular rhythm  Exam for edema: Plus edema  Abdomen   Abdominal Exam: No bruits, normal bowel sounds, soft, non-tender, no abdominal mass palpated.    Liver and Spleen exam: No hepato-splenomegaly.   Musculoskeletal     Inspection of digits and nails: No clubbing or cyanosis of the fingernails.    Inspection/palpation of joints, bones and muscles: No joint swelling. Normal movement of all extremities.   Neurologic   Cranial nerves: Nerves 2-12 were intact, no focal neuro defects.         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

## 2024-11-07 NOTE — ANESTHESIA POSTPROCEDURE EVALUATION
Patient: Anny Tesfaye    Procedure Summary       Date: 11/07/24 Room / Location: [facility]; [facility]    Anesthesia Start: 1339 Anesthesia Stop: 1420    Procedure: TRANSESOPHAGEAL ECHO (UMESH) W/ POSSIBLE CARDIOVERSION Diagnosis:       Atrial fibrillation, unspecified type (Multi)      SOB (shortness of breath)      Atrial fibrillation, persistent (Multi)      (afib)    Scheduled Providers: Rashad Ramirez DO; Jaun Rosenberg MD Responsible Provider: Jaun Rosenberg MD    Anesthesia Type: MAC ASA Status: 3            Anesthesia Type: MAC    Vitals Value Taken Time   BP 94/55 11/07/24 1430   Temp Afeb 11/07/24 1443   Pulse 57 11/07/24 1430   Resp 16 11/07/24 1430   SpO2 93 % 11/07/24 1430       Anesthesia Post Evaluation    Patient location during evaluation: bedside  Patient participation: complete - patient cannot participate  Level of consciousness: awake  Pain score: 0  Pain management: adequate  Airway patency: patent  Cardiovascular status: acceptable and hemodynamically stable  Respiratory status: acceptable and nonlabored ventilation  Hydration status: acceptable  Postoperative Nausea and Vomiting: none        No notable events documented.

## 2024-11-07 NOTE — PROGRESS NOTES
Occupational Therapy    Occupational Therapy Treatment    Name: Anny Tesfaye  MRN: [id number]  Department: [place] [address]  Room: [address] [place] [address]  Date: 11/07/24  Time Calculation  Start Time: 1625  Stop Time: 1645  Time Calculation (min): 20 min    Assessment:  OT Assessment:  (Patient with more energy, s/p cardioversion this date. Patient with decreased standing balance, decreased ADLs, decreased transfers. Moderate intensiy therapy recommended to maximize functional independence with ADLs.)  Prognosis: Good  Barriers to Discharge: Decreased caregiver support  Evaluation/Treatment Tolerance: Patient limited by fatigue  Medical Staff Made Aware: Yes  End of Session Communication: Bedside nurse  End of Session Patient Position: Bed, 2 rail up, Alarm on  Plan:  Treatment Interventions: ADL retraining, Functional transfer training, Endurance training, Patient/family training, Neuromuscular reeducation  OT Frequency: 3 times per week  OT Discharge Recommendations: Moderate intensity level of continued care  Equipment Recommended upon Discharge: Wheeled walker  OT Recommended Transfer Status: Moderate assist, Assist of 1  OT - OK to Discharge: Yes    Subjective   Previous Visit Info:  OT Last Visit  OT Received On: 11/07/24  General:  General  Reason for Referral: Pt to ED 11/2 with confusion, dysarthria, and SOB. Pt was admitted for pneumonia and acute HF.  Referred By: Dr. Tony  Past Medical History Relevant to Rehab:   Past Medical History:   Diagnosis Date    Aortic aneurysm (CMS-HCC)     Chronic low back pain     CKD (chronic kidney disease) stage 3, GFR 30-59 ml/min (Multi)     Esophagitis     Glaucoma     History of scarlet fever     Hypertension     Hypothyroidism     Morbid obesity (Multi)     Nephritis     OA (osteoarthritis) of knee     Bilateral    Polymyalgia rheumatica (Multi)     Psoriasis     Sciatica      Family/Caregiver Present: No  Prior to Session Communication: Bedside nurse  Patient Position Received: Bed,  2 rail up, Alarm on  General Comment:  (Patient is weak and deconditioned.)  Precautions:  Medical Precautions: Fall precautions    Pain Assessment:  Pain Assessment  Pain Assessment: 0-10  0-10 (Numeric) Pain Score: 0 - No pain     Objective   Cognition:  Overall Cognitive Status: Within Functional Limits  Orientation Level: Oriented X4    Activities of Daily Living:    Grooming  Grooming Level of Assistance: Contact guard  Grooming Where Assessed: Edge of bed  UE Dressing  UE Dressing Level of Assistance: Minimum assistance  UE Dressing Where Assessed: Edge of bed  UE Dressing Comments:  (Assist with hospital gown around shoulders.)    LE Dressing  LE Dressing: Yes  Pants Level of Assistance: Moderate assistance  Sock Level of Assistance: Minimum assistance  LE Dressing Where Assessed: Edge of bed  LE Dressing Comments:  (Assist to initiate socks on feet and foot through leg hole.)    Bed Mobility/Transfers: Bed Mobility  Bed Mobility: Yes  Bed Mobility 1  Bed Mobility 1: Supine to sitting  Level of Assistance 1: Minimum assistance  Bed Mobility Comments 1:  (Increased time required.)  Bed Mobility 2  Bed Mobility  2: Sitting to supine  Level of Assistance 2: Contact guard    Transfers  Transfer: Yes  Transfer 1  Transfer From 1: Sit to  Transfer to 1: Stand  Technique 1: Sit to stand  Transfer Device 1: Walker  Transfer Level of Assistance 1: Moderate assistance    Sitting Balance:  Static Sitting Balance  Static Sitting-Balance Support: Feet supported  Static Sitting-Level of Assistance: Contact guard  Dynamic Sitting Balance  Dynamic Sitting-Balance Support: Feet supported  Dynamic Sitting-Level of Assistance: Moderate assistance  Dynamic Sitting-Balance: Forward lean  Standing Balance:  Static Standing Balance  Static Standing-Balance Support: Bilateral upper extremity supported  Static Standing-Level of Assistance: Minimum assistance    Outcome Measures:  [facility] Daily Activity  Putting on and taking off  regular lower body clothing: A lot  Bathing (including washing, rinsing, drying): A lot  Putting on and taking off regular upper body clothing: A little  Toileting, which includes using toilet, bedpan or urinal: A little  Taking care of personal grooming such as brushing teeth: A little  Eating Meals: None  Daily Activity - Total Score: 17    Goals:  Encounter Problems       Encounter Problems (Active)       ADLs       Patient will perform UB and LB bathing with independent level of assistance.  (Progressing)       Start:  11/06/24    Expected End:  11/20/24            Patient with complete upper body dressing with independent level of assistance donning and doffing all UE clothes with no adaptive equipment while edge of bed  (Progressing)       Start:  11/06/24    Expected End:  11/20/24            Patient with complete lower body dressing with independent level of assistance donning and doffing all LE clothes  with no adaptive equipment while edge of bed  (Progressing)       Start:  11/06/24    Expected End:  11/20/24            Patient will complete daily grooming tasks brushing teeth and washing face/hair with independent level of assistance and PRN adaptive equipment while standing. (Progressing)       Start:  11/06/24    Expected End:  11/20/24            Patient will complete toileting including hygiene clothing management/hygiene with independent level of assistance and raised toilet seat. (Progressing)       Start:  11/06/24    Expected End:  11/20/24               BALANCE       Pt will maintain dynamic standing balance during ADL task with independent level of assistance in order to demonstrate decreased risk of falling and improved postural control. (Progressing)       Start:  11/06/24    Expected End:  11/20/24               TRANSFERS       Patient will perform bed mobility independent level of assistance and bed rails in order to improve safety and independence with mobility (Progressing)       Start:   11/06/24    Expected End:  11/20/24            Patient will complete functional transfer to all surfaces with front wheeled walker with modified independent level of assistance. (Progressing)       Start:  11/06/24    Expected End:  11/20/24

## 2024-11-07 NOTE — INTERVAL H&P NOTE
H&P reviewed. The patient was examined and there are no changes to the H&P.    ASA Classification: III  Mallampati Score: Class II      Here for UMESH +/- DCCV for persistent atrial fibrillation with Dr. Ramirez 11/7/24.       Further mgmt per inpatient primary and consulting teams; Cardiology following.     Anny Jefferson APREVAN-CNP  Interventional Lab/Cardiology   [facility]

## 2024-11-07 NOTE — PROCEDURES
Patient seen and examined prior to UMESH and cardioversion    She denies any dysphagia, odynophagia or prior esophageal or gastric history    After adequate sedation UMESH probe passed requiring slight adjustment in the oropharynx.  Pulse ox after induction down to 70%, but after adjustment from the left hand to right index finger oxygen level was 90%.  Of note there was some slight resistance in the mid esophagus but probe passed easily into stomach.  Perhaps undiagnosed stricture.  Will monitor clinically post UMESH.      Adequate images obtained confirming no LA or ANGEL thrombus.    A successful synchronized shock was delivered at 200 J with conversion to normal sinus rhythm    No immediate complications noted    Full report to follow in [facility] regarding UMESH findings      Rashad Ramirez DO

## 2024-11-07 NOTE — PROGRESS NOTES
Physical Therapy                 Therapy Communication Note    Patient Name: Anny Tesfaye  MRN: [id number]  Department: [facility]  Room: [address] [place] [address]A  Today's Date: 11/7/2024     Discipline: Physical Therapy    Missed Visit Reason: Missed Visit Reason: Patient in a medical procedure (Attempted PT tx, per RN pt off the floor for UMESH procedure. Pt unavailable for tx at this time.)    Missed Time: Attempt

## 2024-11-08 ENCOUNTER — APPOINTMENT (OUTPATIENT)
Dept: RADIOLOGY | Facility: HOSPITAL | Age: 79
DRG: 286 | End: 2024-11-08
Payer: MEDICARE

## 2024-11-08 LAB
ANION GAP SERPL CALC-SCNC: 10 MMOL/L (ref 10–20)
BACTERIA FLD CULT: NORMAL
BUN SERPL-MCNC: 35 MG/DL (ref 6–23)
CALCIUM SERPL-MCNC: 8.1 MG/DL (ref 8.6–10.3)
CHLORIDE SERPL-SCNC: 106 MMOL/L (ref 98–107)
CO2 SERPL-SCNC: 26 MMOL/L (ref 21–32)
CREAT SERPL-MCNC: 2.18 MG/DL (ref 0.5–1.05)
EGFRCR SERPLBLD CKD-EPI 2021: 22 ML/MIN/1.73M*2
ERYTHROCYTE [DISTWIDTH] IN BLOOD BY AUTOMATED COUNT: 16.6 % (ref 11.5–14.5)
FUNGUS SPEC CULT: NORMAL
FUNGUS SPEC FUNGUS STN: NORMAL
GLUCOSE SERPL-MCNC: 98 MG/DL (ref 74–99)
GRAM STN SPEC: NORMAL
GRAM STN SPEC: NORMAL
HCT VFR BLD AUTO: 25.2 % (ref 36–46)
HGB BLD-MCNC: 7.5 G/DL (ref 12–16)
MAGNESIUM SERPL-MCNC: 1.5 MG/DL (ref 1.6–2.4)
MCH RBC QN AUTO: 29.4 PG (ref 26–34)
MCHC RBC AUTO-ENTMCNC: 29.8 G/DL (ref 32–36)
MCV RBC AUTO: 99 FL (ref 80–100)
NRBC BLD-RTO: 0 /100 WBCS (ref 0–0)
PLATELET # BLD AUTO: 205 X10*3/UL (ref 150–450)
POTASSIUM SERPL-SCNC: 4.1 MMOL/L (ref 3.5–5.3)
RBC # BLD AUTO: 2.55 X10*6/UL (ref 4–5.2)
SODIUM SERPL-SCNC: 138 MMOL/L (ref 136–145)
WBC # BLD AUTO: 5.1 X10*3/UL (ref 4.4–11.3)

## 2024-11-08 PROCEDURE — 71045 X-RAY EXAM CHEST 1 VIEW: CPT | Performed by: SURGERY

## 2024-11-08 PROCEDURE — 71045 X-RAY EXAM CHEST 1 VIEW: CPT

## 2024-11-08 PROCEDURE — 2500000004 HC RX 250 GENERAL PHARMACY W/ HCPCS (ALT 636 FOR OP/ED): Performed by: NURSE PRACTITIONER

## 2024-11-08 PROCEDURE — 97530 THERAPEUTIC ACTIVITIES: CPT | Mod: GP | Performed by: PHYSICAL THERAPIST

## 2024-11-08 PROCEDURE — 85027 COMPLETE CBC AUTOMATED: CPT | Performed by: NURSE PRACTITIONER

## 2024-11-08 PROCEDURE — 80048 BASIC METABOLIC PNL TOTAL CA: CPT | Performed by: NURSE PRACTITIONER

## 2024-11-08 PROCEDURE — 99231 SBSQ HOSP IP/OBS SF/LOW 25: CPT | Performed by: NURSE PRACTITIONER

## 2024-11-08 PROCEDURE — 2500000002 HC RX 250 W HCPCS SELF ADMINISTERED DRUGS (ALT 637 FOR MEDICARE OP, ALT 636 FOR OP/ED): Performed by: NURSE PRACTITIONER

## 2024-11-08 PROCEDURE — 2500000001 HC RX 250 WO HCPCS SELF ADMINISTERED DRUGS (ALT 637 FOR MEDICARE OP): Performed by: NURSE PRACTITIONER

## 2024-11-08 PROCEDURE — 1200000002 HC GENERAL ROOM WITH TELEMETRY DAILY

## 2024-11-08 PROCEDURE — 36415 COLL VENOUS BLD VENIPUNCTURE: CPT | Performed by: NURSE PRACTITIONER

## 2024-11-08 PROCEDURE — 99232 SBSQ HOSP IP/OBS MODERATE 35: CPT | Performed by: INTERNAL MEDICINE

## 2024-11-08 PROCEDURE — 83735 ASSAY OF MAGNESIUM: CPT | Performed by: NURSE PRACTITIONER

## 2024-11-08 PROCEDURE — 97535 SELF CARE MNGMENT TRAINING: CPT | Mod: GO

## 2024-11-08 PROCEDURE — 99233 SBSQ HOSP IP/OBS HIGH 50: CPT | Performed by: INTERNAL MEDICINE

## 2024-11-08 RX ORDER — MAGNESIUM SULFATE HEPTAHYDRATE 40 MG/ML
2 INJECTION, SOLUTION INTRAVENOUS ONCE
Status: COMPLETED | OUTPATIENT
Start: 2024-11-08 | End: 2024-11-08

## 2024-11-08 RX ORDER — FERROUS SULFATE 325(65) MG
65 TABLET ORAL
Status: DISCONTINUED | OUTPATIENT
Start: 2024-11-09 | End: 2024-11-12 | Stop reason: HOSPADM

## 2024-11-08 RX ORDER — MIRTAZAPINE 15 MG/1
7.5 TABLET, FILM COATED ORAL NIGHTLY
Status: DISCONTINUED | OUTPATIENT
Start: 2024-11-08 | End: 2024-11-10

## 2024-11-08 RX ADMIN — ESCITALOPRAM OXALATE 20 MG: 20 TABLET ORAL at 08:40

## 2024-11-08 RX ADMIN — ATORVASTATIN CALCIUM 10 MG: 10 TABLET, FILM COATED ORAL at 21:28

## 2024-11-08 RX ADMIN — ASPIRIN 81 MG: 81 TABLET, COATED ORAL at 08:40

## 2024-11-08 RX ADMIN — LEVOTHYROXINE, LIOTHYRONINE 30 MG: 19; 4.5 TABLET ORAL at 06:53

## 2024-11-08 RX ADMIN — MAGNESIUM SULFATE HEPTAHYDRATE 2 G: 40 INJECTION, SOLUTION INTRAVENOUS at 16:41

## 2024-11-08 RX ADMIN — MIRTAZAPINE 7.5 MG: 15 TABLET, FILM COATED ORAL at 21:28

## 2024-11-08 RX ADMIN — CEFEPIME 1 G: 1 INJECTION, POWDER, FOR SOLUTION INTRAMUSCULAR; INTRAVENOUS at 13:50

## 2024-11-08 RX ADMIN — APIXABAN 2.5 MG: 2.5 TABLET, FILM COATED ORAL at 08:41

## 2024-11-08 RX ADMIN — AMIODARONE HYDROCHLORIDE 200 MG: 200 TABLET ORAL at 08:41

## 2024-11-08 RX ADMIN — CEFEPIME 1 G: 1 INJECTION, POWDER, FOR SOLUTION INTRAMUSCULAR; INTRAVENOUS at 01:02

## 2024-11-08 RX ADMIN — LEVOTHYROXINE SODIUM 100 MCG: 0.1 TABLET ORAL at 08:40

## 2024-11-08 RX ADMIN — ACETAMINOPHEN 650 MG: 325 TABLET, FILM COATED ORAL at 04:13

## 2024-11-08 RX ADMIN — APIXABAN 2.5 MG: 2.5 TABLET, FILM COATED ORAL at 21:29

## 2024-11-08 RX ADMIN — PANTOPRAZOLE SODIUM 40 MG: 40 TABLET, DELAYED RELEASE ORAL at 06:52

## 2024-11-08 ASSESSMENT — COGNITIVE AND FUNCTIONAL STATUS - GENERAL
TURNING FROM BACK TO SIDE WHILE IN FLAT BAD: TOTAL
WALKING IN HOSPITAL ROOM: TOTAL
DRESSING REGULAR LOWER BODY CLOTHING: TOTAL
WALKING IN HOSPITAL ROOM: TOTAL
DRESSING REGULAR UPPER BODY CLOTHING: TOTAL
MOBILITY SCORE: 6
DRESSING REGULAR LOWER BODY CLOTHING: TOTAL
DRESSING REGULAR LOWER BODY CLOTHING: TOTAL
TOILETING: TOTAL
DRESSING REGULAR UPPER BODY CLOTHING: TOTAL
MOBILITY SCORE: 6
CLIMB 3 TO 5 STEPS WITH RAILING: TOTAL
HELP NEEDED FOR BATHING: TOTAL
MOVING FROM LYING ON BACK TO SITTING ON SIDE OF FLAT BED WITH BEDRAILS: TOTAL
TURNING FROM BACK TO SIDE WHILE IN FLAT BAD: TOTAL
PERSONAL GROOMING: TOTAL
MOVING FROM LYING ON BACK TO SITTING ON SIDE OF FLAT BED WITH BEDRAILS: TOTAL
DAILY ACTIVITIY SCORE: 6
TOILETING: TOTAL
TOILETING: TOTAL
EATING MEALS: TOTAL
PERSONAL GROOMING: TOTAL
PERSONAL GROOMING: A LITTLE
CLIMB 3 TO 5 STEPS WITH RAILING: TOTAL
HELP NEEDED FOR BATHING: TOTAL
PERSONAL GROOMING: TOTAL
DAILY ACTIVITIY SCORE: 8
EATING MEALS: TOTAL
CLIMB 3 TO 5 STEPS WITH RAILING: TOTAL
MOVING FROM LYING ON BACK TO SITTING ON SIDE OF FLAT BED WITH BEDRAILS: TOTAL
CLIMB 3 TO 5 STEPS WITH RAILING: TOTAL
MOVING TO AND FROM BED TO CHAIR: TOTAL
MOBILITY SCORE: 16
TOILETING: TOTAL
DRESSING REGULAR UPPER BODY CLOTHING: A LITTLE
DRESSING REGULAR LOWER BODY CLOTHING: TOTAL
TOILETING: A LOT
STANDING UP FROM CHAIR USING ARMS: TOTAL
PERSONAL GROOMING: TOTAL
TURNING FROM BACK TO SIDE WHILE IN FLAT BAD: TOTAL
CLIMB 3 TO 5 STEPS WITH RAILING: A LOT
DAILY ACTIVITIY SCORE: 6
STANDING UP FROM CHAIR USING ARMS: TOTAL
DRESSING REGULAR UPPER BODY CLOTHING: TOTAL
DRESSING REGULAR UPPER BODY CLOTHING: TOTAL
MOBILITY SCORE: 6
DRESSING REGULAR UPPER BODY CLOTHING: TOTAL
STANDING UP FROM CHAIR USING ARMS: TOTAL
WALKING IN HOSPITAL ROOM: TOTAL
DRESSING REGULAR UPPER BODY CLOTHING: TOTAL
PERSONAL GROOMING: TOTAL
STANDING UP FROM CHAIR USING ARMS: TOTAL
MOBILITY SCORE: 6
MOVING TO AND FROM BED TO CHAIR: TOTAL
MOBILITY SCORE: 6
STANDING UP FROM CHAIR USING ARMS: TOTAL
MOVING FROM LYING ON BACK TO SITTING ON SIDE OF FLAT BED WITH BEDRAILS: A LITTLE
TURNING FROM BACK TO SIDE WHILE IN FLAT BAD: A LITTLE
TURNING FROM BACK TO SIDE WHILE IN FLAT BAD: TOTAL
MOVING TO AND FROM BED TO CHAIR: TOTAL
MOVING TO AND FROM BED TO CHAIR: A LITTLE
STANDING UP FROM CHAIR USING ARMS: TOTAL
DRESSING REGULAR LOWER BODY CLOTHING: TOTAL
TURNING FROM BACK TO SIDE WHILE IN FLAT BAD: TOTAL
EATING MEALS: TOTAL
CLIMB 3 TO 5 STEPS WITH RAILING: TOTAL
DAILY ACTIVITIY SCORE: 6
STANDING UP FROM CHAIR USING ARMS: TOTAL
HELP NEEDED FOR BATHING: TOTAL
HELP NEEDED FOR BATHING: A LITTLE
WALKING IN HOSPITAL ROOM: TOTAL
MOVING TO AND FROM BED TO CHAIR: TOTAL
DRESSING REGULAR LOWER BODY CLOTHING: A LOT
PERSONAL GROOMING: TOTAL
WALKING IN HOSPITAL ROOM: TOTAL
DRESSING REGULAR LOWER BODY CLOTHING: TOTAL
CLIMB 3 TO 5 STEPS WITH RAILING: TOTAL
WALKING IN HOSPITAL ROOM: A LITTLE
DAILY ACTIVITIY SCORE: 17
EATING MEALS: TOTAL
MOVING FROM LYING ON BACK TO SITTING ON SIDE OF FLAT BED WITH BEDRAILS: TOTAL
TOILETING: TOTAL
MOVING TO AND FROM BED TO CHAIR: TOTAL
DAILY ACTIVITIY SCORE: 6
TURNING FROM BACK TO SIDE WHILE IN FLAT BAD: TOTAL
DRESSING REGULAR LOWER BODY CLOTHING: TOTAL
HELP NEEDED FOR BATHING: TOTAL
MOVING TO AND FROM BED TO CHAIR: TOTAL
EATING MEALS: A LITTLE
EATING MEALS: TOTAL
WALKING IN HOSPITAL ROOM: TOTAL
HELP NEEDED FOR BATHING: TOTAL
EATING MEALS: TOTAL
DAILY ACTIVITIY SCORE: 6
CLIMB 3 TO 5 STEPS WITH RAILING: TOTAL
TURNING FROM BACK TO SIDE WHILE IN FLAT BAD: TOTAL
TOILETING: TOTAL
MOVING FROM LYING ON BACK TO SITTING ON SIDE OF FLAT BED WITH BEDRAILS: TOTAL
TOILETING: TOTAL
WALKING IN HOSPITAL ROOM: TOTAL
HELP NEEDED FOR BATHING: TOTAL
MOVING FROM LYING ON BACK TO SITTING ON SIDE OF FLAT BED WITH BEDRAILS: TOTAL
MOBILITY SCORE: 6
STANDING UP FROM CHAIR USING ARMS: A LOT
MOVING FROM LYING ON BACK TO SITTING ON SIDE OF FLAT BED WITH BEDRAILS: TOTAL
PERSONAL GROOMING: TOTAL
HELP NEEDED FOR BATHING: TOTAL
DRESSING REGULAR UPPER BODY CLOTHING: TOTAL
MOVING TO AND FROM BED TO CHAIR: TOTAL

## 2024-11-08 ASSESSMENT — PAIN SCALES - GENERAL
PAINLEVEL_OUTOF10: 6
PAINLEVEL_OUTOF10: 0 - NO PAIN

## 2024-11-08 ASSESSMENT — ACTIVITIES OF DAILY LIVING (ADL): HOME_MANAGEMENT_TIME_ENTRY: 24

## 2024-11-08 ASSESSMENT — PAIN SCALES - WONG BAKER: WONGBAKER_NUMERICALRESPONSE: NO HURT

## 2024-11-08 ASSESSMENT — PAIN - FUNCTIONAL ASSESSMENT: PAIN_FUNCTIONAL_ASSESSMENT: 0-10

## 2024-11-08 ASSESSMENT — PAIN DESCRIPTION - LOCATION: LOCATION: CHEST

## 2024-11-08 ASSESSMENT — PAIN DESCRIPTION - ORIENTATION: ORIENTATION: RIGHT

## 2024-11-08 NOTE — PROGRESS NOTES
11/08/24 1757   Discharge Planning   Assistance Needed Pending ref # [id number] requested 11/08/2024

## 2024-11-08 NOTE — PROGRESS NOTES
Physical Therapy    Physical Therapy Treatment    Patient Name: Anny Tesfaye  MRN: [id number]  Department: [place] [address]  Room: [address] [place] [address]  Today's Date: 11/8/2024  Time Calculation  Start Time: 1408  Stop Time: 1429  Time Calculation (min): 21 min     Completion of this session and documentation performed under the supervision of Harmony Colby PT.    Assessment/Plan   PT Assessment  PT Assessment Results: Decreased strength, Decreased endurance, Impaired balance, Decreased mobility  Rehab Prognosis: Good  Evaluation/Treatment Tolerance: Patient tolerated treatment well  Medical Staff Made Aware: Yes  Strengths: Ability to acquire knowledge, Premorbid level of function  Barriers to Participation: Comorbidities  End of Session Communication: Bedside nurse  Assessment Comment: Pt was able to progress distance of ambulation from last PT session. Pt able to get further with her bed mobility before needing assist to get trunk upright.  End of Session Patient Position: Bed, 2 rail up, Alarm on  PT Plan  Inpatient/Swing Bed or Outpatient: Inpatient  PT Plan  Treatment/Interventions: Bed mobility, Transfer training, Gait training, Stair training, Balance training, Neuromuscular re-education, Strengthening, Endurance training, Therapeutic exercise, Therapeutic activity  PT Plan: Ongoing PT  PT Frequency: 4 times per week  PT Discharge Recommendations: Moderate intensity level of continued care  PT Recommended Transfer Status: Assist x1  PT - OK to Discharge: Yes      General Visit Information:   PT  Visit  PT Received On: 11/08/24  Response to Previous Treatment: Patient reporting fatigue but able to participate.  General  Reason for Referral: Pt to ED 11/2 with confusion, dysarthria, and SOB. Pt was admitted for pneumonia and acute HF.  Referred By: Dr. Tony  Past Medical History Relevant to Rehab:   Past Medical History:   Diagnosis Date    Aortic aneurysm (CMS-HCC)     Chronic low back pain     CKD (chronic kidney disease)  stage 3, GFR 30-59 ml/min (Multi)     Esophagitis     Glaucoma     History of scarlet fever     Hypertension     Hypothyroidism     Morbid obesity (Multi)     Nephritis     OA (osteoarthritis) of knee     Bilateral    Polymyalgia rheumatica (Multi)     Psoriasis     Sciatica      Past Surgical History:   Procedure Laterality Date    ADENOIDECTOMY      CARDIAC CATHETERIZATION N/A 11/6/2024    Procedure: Right Heart Cath;  Surgeon: Samra Syed MD;  Location: [facility] Cardiac Cath Lab;  Service: Cardiovascular;  Laterality: N/A;    CATARACT EXTRACTION  06/2020    CATARACT EXTRACTION Right     CATARACT EXTRACTION Left 03/2021    CROWN  06/2020    DENTAL CROWN REPLACEMENT    CT ANGIO NECK  04/21/2014    CT NECK ANGIO W AND WO IV CONTRAST [facility]    DILATION AND CURETTAGE OF UTERUS      TONSILLECTOMY      WISDOM TOOTH EXTRACTION       Missed Visit: No  Family/Caregiver Present: No  Prior to Session Communication: Bedside nurse  Patient Position Received: Bed, 2 rail up, Alarm on  Preferred Learning Style: auditory, kinesthetic, verbal, visual  General Comment: Pt agreeable to PT eval. Requires encouragement to do therapy, but she states she knows she has to do it. States that she was previously fully independent.    Subjective   Precautions:  Precautions  Medical Precautions: Fall precautions    Vital Signs (Past 2hrs)        Date/Time Vitals Session Patient Position Pulse Resp SpO2 BP MAP (mmHg)    11/08/24 1400 --  --  62  17  --  --  --     11/08/24 1500 --  --  68  20  --  --  --                         Objective   Pain:  Pain Assessment  0-10 (Numeric) Pain Score: 0 - No pain  Cognition:  Cognition  Orientation Level: Oriented X4    Postural Control:  Static Sitting Balance  Static Sitting-Balance Support: Bilateral upper extremity supported  Static Sitting-Level of Assistance: Distant supervision  Dynamic Sitting Balance  Dynamic Sitting-Balance Support: Bilateral upper extremity supported  Dynamic  Sitting-Level of Assistance: Distant supervision  Static Standing Balance  Static Standing-Balance Support: Bilateral upper extremity supported  Static Standing-Level of Assistance: Contact guard  Dynamic Standing Balance  Dynamic Standing-Balance Support: Bilateral upper extremity supported  Dynamic Standing-Level of Assistance: Contact guard    Activity Tolerance:  Activity Tolerance  Endurance: Tolerates 10 - 20 min exercise with multiple rests  Treatments:   Therapeutic Exercise  Seated EOB mobility - dorsiflexion, hip flexion, knee extension 1x10 B prior to standing mobility     Therapeutic Activity  Bed Mobility  Bed Mobility: Yes  Bed Mobility 1  Bed Mobility 1: Supine to sitting, Sitting to supine  Level of Assistance 1: Minimum assistance  Bed Mobility Comments 1: Trunk assist to get fully upright. Pt utilized bedrail  Bed Mobility 2  Bed Mobility  2: Scooting  Level of Assistance 2: Close supervision  Bed Mobility Comments 2: Pt scoot 3x to her L to get higher in bed with cues on how to perform.    Ambulation/Gait Training  Ambulation/Gait Training Performed: Yes  Ambulation/Gait Training 1  Surface 1: Level tile  Device 1: Rolling walker  Gait Support Devices: Gait belt  Assistance 1: Contact guard  Quality of Gait 1: Narrow base of support, Decreased step length  Comments/Distance (ft) 1: 10 steps in room with CGA, +1 for line management. Pt has a short stride and dec gait speed  Transfers  Transfer: Yes  Transfer 1  Transfer From 1: Sit to, Stand to  Transfer to 1: Stand, Sit  Technique 1: Sit to stand, Stand to sit  Transfer Device 1: Walker, Gait belt  Transfer Level of Assistance 1: Minimum assistance  Trials/Comments 1: x2 trials. Minimal cues for hand placement. [place] to stand and allow for slow descent back to EOB.    Outcome Measures:  [facility] Basic Mobility  Turning from your back to your side while in a flat bed without using bedrails: A little  Moving from lying on your back to sitting on the  side of a flat bed without using bedrails: A little  Moving to and from bed to chair (including a wheelchair): A little  Standing up from a chair using your arms (e.g. wheelchair or bedside chair): A lot  To walk in hospital room: A little  Climbing 3-5 steps with railing: A lot  Basic Mobility - Total Score: 16    Education Documentation  Mobility Training, taught by Domonique Avendano, S-PT at 11/8/2024  3:42 PM.  Learner: Patient  Readiness: Acceptance  Method: Explanation, Demonstration  Response: Demonstrated Understanding, Verbalizes Understanding    Education Comments  No comments found.      OP EDUCATION:       Encounter Problems       Encounter Problems (Active)       Mobility       STG - Patient will ambulate 100 ft with RW mod-I. (Progressing)       Start:  11/06/24    Expected End:  11/20/24            STG - Patient will ascend and descend three stairs with L rail and SBA. (Progressing)       Start:  11/06/24    Expected End:  11/20/24            STG - Patient will ascend and descend a flight of stairs with R rail and SBA. (Progressing)       Start:  11/06/24    Expected End:  11/20/24               PT Transfers       STG - Patient to transfer to and from sit to supine independently. (Progressing)       Start:  11/06/24    Expected End:  11/20/24            STG - Patient will transfer sit to and from stand using RW mod-I. (Progressing)       Start:  11/06/24    Expected End:  11/20/24               Pain - Adult

## 2024-11-08 NOTE — PROGRESS NOTES
"SUBJECTIVE DATA:  Successful UMESH and cardioversion yesterday   Cr stable 2.18  SR per monitor rates 60s  Patient claims she feels good     Last Recorded Vitals:  /70 (BP Location: Right arm, Patient Position: Lying)   Pulse 63   Temp 36.5 °C (97.7 °F) (Oral)   Resp 18   Ht 1.651 m (5' 5\")   Wt 73.9 kg (163 lb) Comment: reported dry wt  SpO2 96%   BMI 27.12 kg/m²     Temp:  [36.2 °C (97.2 °F)-37.1 °C (98.8 °F)] 36.5 °C (97.7 °F)  Heart Rate:  [] 63  Resp:  [16-23] 18  BP: ()/(55-79) 114/70    Last I/O:  I/O last 3 completed shifts:  In: 170 (2.3 mL/kg) [P.O.:120; IV Piggyback:50]  Out: 100 (1.4 mL/kg) [Stool:100]  Weight: 73.9 kg   Vitals:    11/06/24 1617   Weight: 73.9 kg (163 lb)       Cardiovascular Testing:  EKG:   ECG 12 Lead ECG 12 Lead 11/4/24       ECG 12 Lead 10/17/24 [facility]       Echo:  Transthoracic Echocardiogram 11/3/24   1. Left ventricular ejection fraction is moderately decreased, by visual estimate at 30-35%.   2. There is global hypokinesis of the left ventricle with minor regional variations.   3. Left ventricular diastolic filling cannot be determined, due to atrial fibrillation/flutter.   4. There is moderately reduced right ventricular systolic function.   5. Moderately enlarged right ventricle.   6. The left atrium is severely dilated.   7. There is moderate mitral annular calcification.   8. Mildly elevated right ventricular systolic pressure.   9. Mild aortic valve regurgitation.  10. Aortic root moderatly enlarged at 4.8 cm. Ascending aorta 4.4 cm.  11. The patient is in atrial fibrillation which may influence the estimate of left ventricular function and transvalvular flows.    Transthoracic Echocardiogram 9/2024 [facility]  - The left ventricle is normal in size. There is mild septal left ventricular   hypertrophy. Left ventricular systolic function is normal. EF = 59 ± 5% (2D biplane) Left ventricular diastolic function was not evaluated due to >2+ AI.   - The right " ventricle is normal in size. Right ventricular systolic function is normal.   - The left atrial cavity is severely dilated.   - The visualized aorta is dilated with a maximal dimension of 4.6 cm.   - There is moderate (2+ - 3+) aortic valve regurgitation     Heart Catheterizations:  [facility] 24:  LEFT ANTERIOR DESCENDING: No Stenosis     DIAGONAL #1: No Stenosis   DIAGONAL #2: No Stenosis   DIAGONAL #3: No Stenosis     LEFT CIRCUMFLEX: No Stenosis     MARGINAL #1: No Stenosis   MARGINAL #2: No Stenosis     DOMINANT: NO  RIGHT CORONARY: No Stenosis     DOMINANT: YES  POSTERIOR DESCENDING: No Stenosis   POSTERIOR LATERAL: No Stenosis      Stress Test:  Nuclear Stress Test 2024  1. SPECT Perfusion Study: Abnormal.   2. No evidence of scarred myocardium.   3. There is mild (<10%) ischemia in the territory of the RCA.   4. Left ventricle is normal in size.   5. This is an intermediate risk scan.     Gated Stress FBP   LVEF % 60      Cardiac Imaging: No results found for the last 3 years.    CV Labs  Troponin I, High Sensitivity   Date/Time Value Ref Range Status   2024 09:44 AM 23 (H) 0 - 13 ng/L Final   2024 08:11 AM 23 (H) 0 - 13 ng/L Final     BNP   Date/Time Value Ref Range Status   2024 08:11 AM 1,021 (H) 0 - 99 pg/mL Final     Hemoglobin A1C   Date/Time Value Ref Range Status   10/23/2023 10:25 AM 4.9 See below % Final   10/22/2021 11:20 AM 5.1 4.0 - 6.0 % Final   2021 11:55 AM 5.2 4.0 - 6.0 % Final     LDL Calculated   Date/Time Value Ref Range Status   2024 10:58 AM 56 <=99 mg/dL Final   2024 09:43 AM 73 65 - 130 mg/dL Final   2024 09:55 AM 74 65 - 130 mg/dL Final       Diagnostic Results   Labs  CBC- 2024:  7:16 AM  5.1 7.5 205    25.2      BMP- 2024:  7:16 AM  138 35 106 98   4.1 2.18 26    Estimated Creatinine Clearance: 20.7 mL/min (A) (by C-G formula based on SCr of 2.18 mg/dL (H)).     CA: 8.1 PROTIEN: 5.9 ALT: 6 Total Bili: 0.5 M.46   PHOS: 4.0  ALBUMIN: 2.6 AST: 11 Alk Phos: 66      COAGS- 11/4/2024:  2:02 PM  1.3   14.1 _     Pertinent Imaging  XR chest 1 view 11/04/2024  Small bilateral pleural effusions and enlarged cardiac silhouette.     CT abdomen pelvis wo IV contrast 10/19/2024  Trace pneumoperitoneum and small volume ascites adjacent to the colon, which is favored expected noting recent surgery.  New  colostomy left lower quadrant.    Some mildly prominent small bowel which is favored related to ileus and noting the oral contrast has passed to the cecum.    Asymmetric body wall edema, more prominent left gluteal region and right breast region, correlation for other pathologies such as cellulitis.    CT angio chest abdomen pelvis 11/02/2024  1.  4.4 x 4.2 cm ascending aortic aneurysm. No dissection.  2. No pulmonary embolism.  3. Moderate size right and small left pleural effusions.  Infiltrates or compressive atelectasis of the right upper middle and lower lobes and left upper and lower lobes. The volume loss is most marked in the right lower lobe.  4. Anasarca/hypoproteinemia.    ABDOMEN AND PELVIS:  1.  4.9 x 4.7 cm infrarenal abdominal aortic aneurysm. There is a distal abdominal aortic aneurysm intravascular bi-iliac artery stent.  There is a femoral-femoral bypass graft with flow   2. There is occlusion in the proximal right common iliac artery which reconstitutes distally.  3. Anasarca/hypoproteinemia. In particular, there is induration of the left lateral abdominal wall and left side of the back which could represent more focal edema or contusion. It is not known if the patient fell.  4. Possible appendectomy.  5. Presacral edema.  6. Sludge in an otherwise unremarkable gallbladder.    CT lung screening follow up CT chest wo IV contrast 10/02/2024  1.  Few small bilateral noncalcified pulmonary nodules measuring up to 6 mm, likely benign. Continued screening with low-dose non-contrast chest CT in 12 months (from current date) is  "recommended.  2. Estimated coronary artery calcium score is * which correlates with at least  percentile rank as compared to matched CUMMINGS-study  subjects(https://www.cummings-nhlbi.org/Calcium/input.aspx).    CT head wo IV contrast 11/02/2024  1. Age related changes without acute intracranial process.  2. Sinus disease. Also nonspecific fluid within left mastoid cells and the left middle ear cavity; correlate clinically.       Inpatient Medications:  Scheduled medications   Medication Dose Route Frequency    amiodarone  200 mg oral q AM    apixaban  2.5 mg oral BID    aspirin  81 mg oral Daily    atorvastatin  10 mg oral Nightly    cefepime  1 g intravenous q12h    escitalopram  20 mg oral Daily    [Held by provider] furosemide  40 mg intravenous q8h EDIN    levothyroxine  100 mcg oral Daily    magnesium oxide  400 mg oral Once    pantoprazole  40 mg oral Daily before breakfast    Or    pantoprazole  40 mg intravenous Daily before breakfast    thyroid (pork)  30 mg oral Daily before breakfast     PRN medications: acetaminophen **OR** acetaminophen **OR** acetaminophen, ALPRAZolam, alum-mag hydroxide-simeth, butamben-tetracaine-benzocaine, guaiFENesin, lidocaine, melatonin, ondansetron **OR** ondansetron, oxygen, oxygen, oxygen, polyethylene glycol, traMADol       Physical Exam:   Vitals and nursing notes reviewed.  /70 (BP Location: Right arm, Patient Position: Lying)   Pulse 63   Temp 36.5 °C (97.7 °F) (Oral)   Resp 18   Ht 1.651 m (5' 5\")   Wt 73.9 kg (163 lb) Comment: reported dry wt  SpO2 96%   BMI 27.12 kg/m²   GENERAL: Alert and awake, cooperative; in no acute distress; Elderly female, ill-appearing  SKIN: Warm and dry, cap refill <2  HEENT: Normocephalic, PEERL, mucous membranes pink and moist  NECK: +JVD  CARDIAC: Irregular rate and rhythm, S1S2, no murmurs or abnormal heart sounds  CHEST: Normal respiratory effort, no abnormal breath sounds  ABDOMEN: Soft, non-distended, non-tender with palpation, " +abd wall edema  EXTREMITIES: 1+ pitting LE edema  NEURO: Alert and oriented, mental status at baseline, no focal deficits  PSYCH: Behavior and affect as expected     Code Status: Full Code    Assessment/Plan   Anny Tesfaye is a 79 y.o. female, with a PMH of HTN, HLD, heavy tobacco use, severe PAD, AAA s/p EVAR 10/10/24 at [facility], lumbar stenosis, hypothyroidism, CKD III, polymyalgia rheumatica, and OA, who presented to [facility] on 11/2/2024 for change in mental status. Patient reports recent prolonged hospital course at [facility] s/p EVAR on 10/10/24 c/b postoperative AF, respiratory insufficiency, volume overload, BALBIR on CKD, and ischemic colitis s/p hemicolectomy w/ colostomy. She was started on Amiodarone without conversion to NSR and Eliquis for the atrial fibrillation. Home antihypertensive medications were held at discharge givens stable BP. She was discharged to SNF 10/25/24. She now presents to [facility] ED 11/2/24 for altered mental status, SOB, and weakness, admitted for PNA and fluid overload. Cardiology is consulted for HF. Patient reports no prior hx of HF, states that edema has been an issue since her recent hospitalization.      Home CV Medications: Amiodarone 200mg daily, Eliquis 2.5mg BID, ASA 81mg daily, statin  -Previously on Captopril 50mg, HCTZ 25mg, and Verapamil 240mg CR    Transthoracic Echocardiogram 11/3/24   1. Left ventricular ejection fraction is moderately decreased, by visual estimate at 30-35%.   2. There is global hypokinesis of the left ventricle with minor regional variations.   3. Left ventricular diastolic filling cannot be determined, due to atrial fibrillation/flutter.   4. There is moderately reduced right ventricular systolic function.   5. Moderately enlarged right ventricle.   6. The left atrium is severely dilated.   7. There is moderate mitral annular calcification.   8. Mildly elevated right ventricular systolic pressure.   9. Mild aortic valve regurgitation.  10.  Aortic root moderatly enlarged at 4.8 cm. Ascending aorta 4.4 cm.  11. The patient is in atrial fibrillation which may influence the estimate of left ventricular function and transvalvular flows.    11/6 RHC: RA 8, PA 38/25(31), W9, CO/CI 4.7/2.6, , SVO2 47%     #Acute Systolic HF- ++Hypervolemia with diffuse anasarca likely in the setting of judicious postoperative fluid resuscitation and malnutrition, EF newly decreased on TTE suspect A-fib related.  BP on low end making GDMT difficult  #Postoperative AF- Currently rate controlled 80-100s, c/w Amiodarone  -On Eliquis for OAC, has been on anticoagulation since 10/23 (Heparin gtt then transitioned to Eliquis)  #HTN- BP low, home antihypertensives held during prior hospitalization  #Recent EVAR- c/w ASA and statin  #BALBIR-CR stable 2.18        RECOMMENDATIONS:  -Continue to hold IV diuresis for today and see what creatinine is tomorrow.    -will continue to follow -> will add more GDMT if CR allows  -c/w Amiodarone and Eliquis        STAFF ADDENDUM:    Both the MARIAMA and I have had a face to face encounter with the patient today. I have examined the patient and edited the documented physical examination as necessary.  I personally reviewed the patient's vital signs, telemetry, recent labs, medications, orders, EKGs, and pertinent cardiac imaging/ echocardiography.  I have reviewed the MARIAMA's encounter note, approve the MARIAMA's documentation and have edited the note to reflect my diagnostic and therapeutic plan.      Doing well post cardioversion.  Remains in sinus rhythm today.  Still with some anasarca.  BP on low end of making GDMT and diuresis difficult.  Creatinine still elevated suggesting renal insufficiency.  Continue to use cautious diuresis.  Maybe give a single dose of IV tomorrow depending on blood pressure.    Rashad Ramirez, DO               49 year old female with hx of fibroid and c/o frequent and heavy periods, has been having bleeding since 10/2023. Pt presents today for presurgical evaluation for ... 49 year old female with hx of fibroid and c/o frequent and heavy periods, has been having bleeding since 10/2023. Pt presents today for presurgical evaluation for Robotic Assisted Total Laparoscopic Hysterectomy, Bilateral Salpingectomy, Possible Oophorectomy, Possible Staging.

## 2024-11-08 NOTE — CONSULTS
Ostomy/Wound Care Consult     Visit Date: 11/8/2024      Patient Name: Anny Tesfaye         MRN: [id number]           YOB: 1945     [facility] nursing visit outcome: Saw pt with established stoma to assess needs.  Current pouch intact, stoma red and viable as seen through pouch.  Pt states pouch was changed this morning.  Spare pouch change at the bedside.       [facility] next scheduled visit/plan: will follow while inpatient    Time Increment: 15 minutes    Keeley BUTLER, RN, CWOCN  11/8/2024  1:27 PM

## 2024-11-08 NOTE — PROGRESS NOTES
Medicine NP follow up note    Subjective:  No specific complaints, remains on 3 L NC - will attempt to wean.   No further fevers.   Appetite remains poor.     Vitals (Last 24 Hours):  Heart Rate:  []   Temp:  [36.2 °C (97.2 °F)-37.1 °C (98.8 °F)]   Resp:  [11-23]   BP: ()/(55-79)   SpO2:  [93 %-98 %]     I have reviewed all imaging reports and labs pertinent to this visit / presenting problem    PHYSICAL EXAM:  Constitutional: NAD, alert and cooperative  Eyes: no icterus  ENMT: mucous membranes moist, no lesions  Head/Neck: supple   Respiratory/Thorax: diminished bilaterally, non-labored breathing, no cough, on 3 L NC  Cardiovascular: RRR, no murmurs heard  Gastrointestinal: midline abd incision intact with steri strips, LLQ abd incision healed, RLQ wound dehisced with granulation tissue and no evidence of cellulitis/surrounding tissue discoloration noted (d/w wound care RN, may be discoloration from Hydrofera blue dressing), ostomy with brown stool, ND/S/NT  : no Avendano, no SP/flank discomfort  Musculoskeletal: no joint swelling, ROM intact  Extremities: 1-2+ BLE edema   Neurological: non-focal  Skin: warm and dry  Psych: calm, stable mood     MEDS:  Scheduled meds  amiodarone, 200 mg, oral, q AM  apixaban, 2.5 mg, oral, BID  aspirin, 81 mg, oral, Daily  atorvastatin, 10 mg, oral, Nightly  cefepime, 1 g, intravenous, q12h  escitalopram, 20 mg, oral, Daily  [Held by provider] furosemide, 40 mg, intravenous, q8h EDIN  levothyroxine, 100 mcg, oral, Daily  magnesium oxide, 400 mg, oral, Once  pantoprazole, 40 mg, oral, Daily before breakfast   Or  pantoprazole, 40 mg, intravenous, Daily before breakfast  thyroid (pork), 30 mg, oral, Daily before breakfast    PRN meds  PRN medications: acetaminophen **OR** acetaminophen **OR** acetaminophen, ALPRAZolam, alum-mag hydroxide-simeth, butamben-tetracaine-benzocaine, guaiFENesin, lidocaine, melatonin, ondansetron **OR** ondansetron, oxygen, oxygen, oxygen,  polyethylene glycol, traMADol    ASSESSMENT/PLAN:  79 y.o. woman with h/o HTN, HLD, tobacco use, severe PAD, AAA s/p EVAR 10/10/24 at [facility] c/b postoperative Afib - started on eliquis, respiratory insufficiency, volume overload, BALBIR on CKD, and ischemic colitis s/p hemicolectomy w/ colostomy, lumbar stenosis, hypothyroidism, CKD III, polymyalgia rheumatica, OA, presented with change in mental status, SOB and weakness, admitted for PNA and acute HF.     Acute hypoxic respiratory failure (improving)   Acute HFrEF vs anasarca 2/2 hypoalbuminemia  Possible HAP  Pleural effusions s/p R thoracentesis (exudate by LDH)  -IV Lasix held 2/2 lower filling pressures on RHC, GDMT limited 2/2 soft BP   -continue cefepime 2/2 concomitant PSDA on Ucx  -FU final pleural cx, cytology (neg for malignancy)   -weaning O2 (down to 2 L so far)   -continue to encourage nutrition / supplements, consider remeron     Afib s/p DCCV  -c/w Amiodarone, Eliquis    BALBIR  -recent CT contrast + diuresis, Lasix remains on hold, monitor     AAA s/p EVAR 10/10/24  -c/w ASA and statin    Anemia  -progressive since surgery + eliquis initiation   -no overt bleeding, pt has never had GI scopes   -monitor, transfuse as needed    PSDA on urine cx   -pt denies urinary symptoms, cefepime as above (day 4 today)    VTE / GI prophylaxis   -on eliquis, PPI, bowel regimen in place     Discharge planning  -PT/OT, will return to SNF on DC / needs auth - started     Discussed with Dr. Tony and the interdisciplinary team     Carmelita Paul, APRN-CNP

## 2024-11-08 NOTE — PROGRESS NOTES
Occupational Therapy    Occupational Therapy Treatment    Name: Anny Tesfaye  MRN: [id number]  Department: [place] [address]  Room: [address] [place] [address]  Date: 11/08/24  Time Calculation  Start Time: 1603  Stop Time: 1627  Time Calculation (min): 24 min    Assessment:  OT Assessment:  (Patient seen for follow up treatment. Patient has decreased activity tolerance, ADLs, functional transfers, dynamic standing balance. Due to above, moderate intensity therapy required to maximize functional independence.)  Prognosis: Good  Barriers to Discharge: Decreased caregiver support  Evaluation/Treatment Tolerance: Patient limited by fatigue  Medical Staff Made Aware: Yes  End of Session Communication: Bedside nurse  End of Session Patient Position: Bed, 2 rail up, Alarm on  Plan:  Treatment Interventions: ADL retraining, Functional transfer training, Endurance training, Patient/family training, Neuromuscular reeducation  OT Frequency: 3 times per week  OT Discharge Recommendations: Moderate intensity level of continued care  Equipment Recommended upon Discharge: Wheeled walker  OT Recommended Transfer Status: Moderate assist, Assist of 1  OT - OK to Discharge: Yes    Subjective   Previous Visit Info:  OT Last Visit  OT Received On: 11/08/24  General:  General  Reason for Referral: Pt to ED 11/2 with confusion, dysarthria, and SOB. Pt was admitted for pneumonia and acute HF.  Referred By: Dr. Tony  Past Medical History Relevant to Rehab:   Past Medical History:   Diagnosis Date    Aortic aneurysm (CMS-HCC)     Chronic low back pain     CKD (chronic kidney disease) stage 3, GFR 30-59 ml/min (Multi)     Esophagitis     Glaucoma     History of scarlet fever     Hypertension     Hypothyroidism     Morbid obesity (Multi)     Nephritis     OA (osteoarthritis) of knee     Bilateral    Polymyalgia rheumatica (Multi)     Psoriasis     Sciatica      Family/Caregiver Present: No  Prior to Session Communication: Bedside nurse  Patient Position Received:  Bed, 2 rail up, Alarm on  General Comment:  (Patient with increased assist with standing from low surfaces, multiple attempts required.)  Precautions:  Medical Precautions: Fall precautions    Pain Assessment:  Pain Assessment  Pain Assessment: 0-10  0-10 (Numeric) Pain Score: 0 - No pain     Objective   Cognition:  Overall Cognitive Status: Within Functional Limits  Orientation Level: Oriented X4    Activities of Daily Living:    Grooming  Grooming Level of Assistance: Close supervision  Grooming Where Assessed: Edge of bed  Grooming Comments:  (To wash face)    UE Dressing  UE Dressing Level of Assistance: Moderate assistance  UE Dressing Where Assessed: Edge of bed  UE Dressing Comments:  (Assist with hospital gown around shoulders.)    LE Dressing  LE Dressing: Yes  Pants Level of Assistance: Minimal verbal cues  Sock Level of Assistance: Minimum assistance  LE Dressing Where Assessed: Edge of bed  LE Dressing Comments:  (Assist to initiate socks on feet.)    Functional Standing Tolerance:  Functional Standing Tolerance  Time:  (3.5 minutes with CGA)  Activity:  (Patient engaged in B UE activity of folding washclothes, patient requires CGA to maintain balance.)  Functional Standing Tolerance Comments:  (Patient fatigues easily and requires rest periods.)  Bed Mobility/Transfers: Bed Mobility  Bed Mobility: Yes  Bed Mobility 1  Bed Mobility 1: Supine to sitting  Level of Assistance 1: Minimum assistance  Bed Mobility Comments 1:  (Assist with upper trunk.)  Bed Mobility 2  Bed Mobility  2: Sitting to supine  Level of Assistance 2: Minimum assistance  Bed Mobility Comments 2:  (Cues for hand placement)    Transfers  Transfer: Yes  Transfer 1  Transfer From 1: Sit to  Transfer to 1: Stand  Technique 1: Sit to stand  Transfer Device 1: Walker  Transfer Level of Assistance 1: Moderate assistance  Trials/Comments 1:  (x 2 attempts , patient unable to stand with significant assist from therapist.)    Sitting  Balance:  Static Sitting Balance  Static Sitting-Balance Support: Feet supported  Static Sitting-Level of Assistance: Contact guard  Dynamic Sitting Balance  Dynamic Sitting-Balance Support: Feet supported  Dynamic Sitting-Level of Assistance: Minimum assistance  Dynamic Sitting-Balance: Forward lean  Standing Balance:  Static Standing Balance  Static Standing-Balance Support: Bilateral upper extremity supported  Static Standing-Level of Assistance: Minimum assistance  Dynamic Standing Balance  Dynamic Standing-Balance Support: Bilateral upper extremity supported  Dynamic Standing-Level of Assistance: Minimum assistance  Dynamic Standing-Balance: Reaching for objects, Forward lean  Dynamic Standing-Comments:  (When attempting to fold washclothes.)    Therapy/Activity:    Balance/Neuromuscular Re-Education  Balance/Neuromuscular Re-Education Activity Performed:  (Dynamic standing balance activity, patient engaged in B UE activity of folding washclothes, min A required to maintain balance.)    Outcome Measures:  [facility] Daily Activity  Putting on and taking off regular lower body clothing: A lot  Bathing (including washing, rinsing, drying): A little  Putting on and taking off regular upper body clothing: A little  Toileting, which includes using toilet, bedpan or urinal: A lot  Taking care of personal grooming such as brushing teeth: A little  Eating Meals: None  Daily Activity - Total Score: 17    Goals:  Encounter Problems       Encounter Problems (Active)       ADLs       Patient will perform UB and LB bathing with independent level of assistance.  (Progressing)       Start:  11/06/24    Expected End:  11/20/24            Patient with complete upper body dressing with independent level of assistance donning and doffing all UE clothes with no adaptive equipment while edge of bed  (Progressing)       Start:  11/06/24    Expected End:  11/20/24            Patient with complete lower body dressing with independent level  of assistance donning and doffing all LE clothes  with no adaptive equipment while edge of bed  (Progressing)       Start:  11/06/24    Expected End:  11/20/24            Patient will complete daily grooming tasks brushing teeth and washing face/hair with independent level of assistance and PRN adaptive equipment while standing. (Progressing)       Start:  11/06/24    Expected End:  11/20/24            Patient will complete toileting including hygiene clothing management/hygiene with independent level of assistance and raised toilet seat. (Progressing)       Start:  11/06/24    Expected End:  11/20/24               BALANCE       Pt will maintain dynamic standing balance during ADL task with independent level of assistance in order to demonstrate decreased risk of falling and improved postural control. (Progressing)       Start:  11/06/24    Expected End:  11/20/24               TRANSFERS       Patient will perform bed mobility independent level of assistance and bed rails in order to improve safety and independence with mobility (Progressing)       Start:  11/06/24    Expected End:  11/20/24            Patient will complete functional transfer to all surfaces with front wheeled walker with modified independent level of assistance. (Progressing)       Start:  11/06/24    Expected End:  11/20/24

## 2024-11-08 NOTE — PROGRESS NOTES
Anny Tesfaye is a 79 y.o. female     S/p successful cardioversion  Better overall kiya on 3 L and appetite remains poor    Review of Systems     Constitutional: no fever, no chills, not feeling poorly, not feeling tired   Cardiovascular: no chest pain   Respiratory: no cough, wheezing   Gastrointestinal: no abdominal pain, no constipation, no melena, no nausea, no diarrhea, no vomiting and no blood in stools.   Neurological: no headache,   All other systems have been reviewed and are negative for complaint.       Vitals:    11/08/24 1500   BP:    Pulse: 68   Resp: 20   Temp:    SpO2:         Scheduled medications  amiodarone, 200 mg, oral, q AM  apixaban, 2.5 mg, oral, BID  aspirin, 81 mg, oral, Daily  atorvastatin, 10 mg, oral, Nightly  cefepime, 1 g, intravenous, q12h  escitalopram, 20 mg, oral, Daily  [START ON 11/9/2024] ferrous sulfate (325 mg ferrous sulfate), 65 mg of iron, oral, Daily with breakfast  [Held by provider] furosemide, 40 mg, intravenous, q8h EDIN  levothyroxine, 100 mcg, oral, Daily  magnesium oxide, 400 mg, oral, Once  magnesium sulfate, 2 g, intravenous, Once  mirtazapine, 7.5 mg, oral, Nightly  pantoprazole, 40 mg, oral, Daily before breakfast   Or  pantoprazole, 40 mg, intravenous, Daily before breakfast  thyroid (pork), 30 mg, oral, Daily before breakfast      Continuous medications     PRN medications  PRN medications: acetaminophen **OR** acetaminophen **OR** acetaminophen, ALPRAZolam, alum-mag hydroxide-simeth, butamben-tetracaine-benzocaine, guaiFENesin, lidocaine, melatonin, ondansetron **OR** ondansetron, oxygen, oxygen, oxygen, polyethylene glycol, traMADol    Lab Review   Results from last 7 days   Lab Units 11/08/24  0716 11/07/24  0611 11/06/24  0555   WBC AUTO x10*3/uL 5.1 5.2 4.5   HEMOGLOBIN g/dL 7.5* 8.1* 7.9*   HEMATOCRIT % 25.2* 26.1* 27.3*   PLATELETS AUTO x10*3/uL 205 212 219     Results from last 7 days   Lab Units 11/08/24  0716 11/07/24  0611 11/06/24  0555  11/05/24  0649 11/04/24  1402 11/04/24  0702 11/02/24  0944   SODIUM mmol/L 138 139 140   < >  --    < > 142   POTASSIUM mmol/L 4.1 4.0 4.0   < >  --    < > 4.4   CHLORIDE mmol/L 106 107 108*   < >  --    < > 113*   CO2 mmol/L 26 26 27   < >  --    < > 23   BUN mg/dL 35* 32* 34*   < >  --    < > 26*   CREATININE mg/dL 2.18* 2.01* 2.16*   < >  --    < > 1.75*   CALCIUM mg/dL 8.1* 7.9* 8.0*   < >  --    < > 7.9*   PROTEIN TOTAL g/dL  --   --   --   --  5.9*  --  5.3*   BILIRUBIN TOTAL mg/dL  --   --   --   --   --   --  0.5   ALK PHOS U/L  --   --   --   --   --   --  66   ALT U/L  --   --   --   --   --   --  6*   AST U/L  --   --   --   --   --   --  11   GLUCOSE mg/dL 98 90 88   < >  --    < > 84    < > = values in this interval not displayed.     Results from last 7 days   Lab Units 11/02/24  0944 11/02/24  0811   TROPHS ng/L 23* 23*        XR chest 1 view   Final Result   1. Pulmonary vascular congestion with prominent indistinct   interstitium suggesting acute pulmonary interstitial edema/CHF.   Bibasilar opacities may relate to atelectasis or pneumonia. Moderate   to large right and moderate-sized left pleural effusion. No   pneumothorax.             MACRO:   None        Signed by: Gilles Clarke 11/8/2024 3:38 AM   Dictation workstation:   [id number]      Transesophageal Echo (UMESH) w/ Possible Cardioversion   Final Result      XR chest 1 view   Final Result   Enlarged cardiac silhouette with right-greater-than-left basilar   infiltrates or edema and right pleural effusion.        MACRO:   None.        Signed by: Mitra Cesar 11/7/2024 9:33 AM   Dictation workstation:   [id number]      Cardiac Catheterization Procedure   Final Result      XR chest 1 view   Final Result   Small bilateral pleural effusions and enlarged cardiac silhouette.        Signed by: Anh Hansen 11/4/2024 7:04 PM   Dictation workstation:   [id number]      Transthoracic Echo (TTE) Complete   Final Result      CT head wo IV contrast    Final Result   1. Age related changes without acute intracranial process.   2. Sinus disease. Also nonspecific fluid within left mastoid cells   and the left middle ear cavity; correlate clinically.        Signed by: Francisco Carlson 11/2/2024 11:47 AM   Dictation workstation:   [id number]      CT angio chest abdomen pelvis   Final Result   CHEST:   1.  4.4 x 4.2 cm ascending aortic aneurysm. No dissection.   2. No pulmonary embolism.   3. Moderate size right and small left pleural effusions.   Infiltrates or compressive atelectasis of the right upper middle and   lower lobes and left upper and lower lobes. The volume loss is most   marked in the right lower lobe.   4. Anasarca/hypoproteinemia.        ABDOMEN AND PELVIS:   1.  4.9 x 4.7 cm infrarenal abdominal aortic aneurysm. There is a   distal abdominal aortic aneurysm intravascular bi-iliac artery stent.   There is a femoral-femoral bypass graft with flow   2. 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   3. Anasarca/hypoproteinemia. In particular, there is induration of   the left lateral abdominal wall and left side of the back which could   represent more focal edema or contusion. It is not known if the   patient fell.   4. Possible appendectomy.   5. Presacral edema.   6. Sludge in an otherwise unremarkable gallbladder.        MACRO:   None        Signed by: Georgia Sanchez 11/2/2024 11:51 AM   Dictation workstation:   [id number]      XR chest 1 view   Final Result   Right basilar pneumonia. Small right pleural effusion.        MACRO:   None        Signed by: Georgia Sanchez 11/2/2024 10:03 AM   Dictation workstation:   [id number]      US thoracentesis    (Results Pending)         Physical Exam    Constitutional   General appearance: Alert    Pulmonary   Respiratory assessment: No respiratory distress, normal respiratory rhythm and effort.    Auscultation of Lungs: Decreased breath sounds in the  bases  Cardiovascular   Auscultation of heart: Irregularly irregular rhythm  Exam for edema: Plus edema  Abdomen   Abdominal Exam: No bruits, normal bowel sounds, soft, non-tender, no abdominal mass palpated.    Liver and Spleen exam: No hepato-splenomegaly.   Musculoskeletal     Inspection of digits and nails: No clubbing or cyanosis of the fingernails.    Inspection/palpation of joints, bones and muscles: No joint swelling. Normal movement of all extremities.   Neurologic   Cranial nerves: Nerves 2-12 were intact, no focal neuro defects.         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

## 2024-11-09 LAB
ANION GAP SERPL CALC-SCNC: 8 MMOL/L (ref 10–20)
BUN SERPL-MCNC: 33 MG/DL (ref 6–23)
CALCIUM SERPL-MCNC: 7.9 MG/DL (ref 8.6–10.3)
CHLORIDE SERPL-SCNC: 109 MMOL/L (ref 98–107)
CO2 SERPL-SCNC: 26 MMOL/L (ref 21–32)
CREAT SERPL-MCNC: 1.87 MG/DL (ref 0.5–1.05)
EGFRCR SERPLBLD CKD-EPI 2021: 27 ML/MIN/1.73M*2
ERYTHROCYTE [DISTWIDTH] IN BLOOD BY AUTOMATED COUNT: 16.6 % (ref 11.5–14.5)
GLUCOSE SERPL-MCNC: 86 MG/DL (ref 74–99)
HCT VFR BLD AUTO: 25.4 % (ref 36–46)
HGB BLD-MCNC: 7.5 G/DL (ref 12–16)
MAGNESIUM SERPL-MCNC: 1.66 MG/DL (ref 1.6–2.4)
MCH RBC QN AUTO: 29.2 PG (ref 26–34)
MCHC RBC AUTO-ENTMCNC: 29.5 G/DL (ref 32–36)
MCV RBC AUTO: 99 FL (ref 80–100)
NRBC BLD-RTO: 0 /100 WBCS (ref 0–0)
PLATELET # BLD AUTO: 200 X10*3/UL (ref 150–450)
POTASSIUM SERPL-SCNC: 3.9 MMOL/L (ref 3.5–5.3)
RBC # BLD AUTO: 2.57 X10*6/UL (ref 4–5.2)
SODIUM SERPL-SCNC: 139 MMOL/L (ref 136–145)
WBC # BLD AUTO: 4.6 X10*3/UL (ref 4.4–11.3)

## 2024-11-09 PROCEDURE — 85027 COMPLETE CBC AUTOMATED: CPT | Performed by: NURSE PRACTITIONER

## 2024-11-09 PROCEDURE — 83735 ASSAY OF MAGNESIUM: CPT | Performed by: NURSE PRACTITIONER

## 2024-11-09 PROCEDURE — 2500000005 HC RX 250 GENERAL PHARMACY W/O HCPCS: Performed by: NURSE PRACTITIONER

## 2024-11-09 PROCEDURE — 80048 BASIC METABOLIC PNL TOTAL CA: CPT | Performed by: NURSE PRACTITIONER

## 2024-11-09 PROCEDURE — 99232 SBSQ HOSP IP/OBS MODERATE 35: CPT | Performed by: INTERNAL MEDICINE

## 2024-11-09 PROCEDURE — 2500000002 HC RX 250 W HCPCS SELF ADMINISTERED DRUGS (ALT 637 FOR MEDICARE OP, ALT 636 FOR OP/ED): Performed by: NURSE PRACTITIONER

## 2024-11-09 PROCEDURE — 2500000001 HC RX 250 WO HCPCS SELF ADMINISTERED DRUGS (ALT 637 FOR MEDICARE OP): Performed by: NURSE PRACTITIONER

## 2024-11-09 PROCEDURE — 2500000004 HC RX 250 GENERAL PHARMACY W/ HCPCS (ALT 636 FOR OP/ED): Performed by: NURSE PRACTITIONER

## 2024-11-09 PROCEDURE — 36415 COLL VENOUS BLD VENIPUNCTURE: CPT | Performed by: NURSE PRACTITIONER

## 2024-11-09 PROCEDURE — 1200000002 HC GENERAL ROOM WITH TELEMETRY DAILY

## 2024-11-09 PROCEDURE — 2500000001 HC RX 250 WO HCPCS SELF ADMINISTERED DRUGS (ALT 637 FOR MEDICARE OP)

## 2024-11-09 PROCEDURE — 99233 SBSQ HOSP IP/OBS HIGH 50: CPT

## 2024-11-09 RX ORDER — METOPROLOL SUCCINATE 25 MG/1
25 TABLET, EXTENDED RELEASE ORAL DAILY
Status: DISCONTINUED | OUTPATIENT
Start: 2024-11-09 | End: 2024-11-12 | Stop reason: HOSPADM

## 2024-11-09 RX ORDER — FUROSEMIDE 20 MG/1
40 TABLET ORAL DAILY
Status: DISCONTINUED | OUTPATIENT
Start: 2024-11-10 | End: 2024-11-12 | Stop reason: HOSPADM

## 2024-11-09 RX ADMIN — AMIODARONE HYDROCHLORIDE 200 MG: 200 TABLET ORAL at 09:51

## 2024-11-09 RX ADMIN — ASPIRIN 81 MG: 81 TABLET, COATED ORAL at 09:51

## 2024-11-09 RX ADMIN — PANTOPRAZOLE SODIUM 40 MG: 40 TABLET, DELAYED RELEASE ORAL at 06:20

## 2024-11-09 RX ADMIN — FERROUS SULFATE TAB 325 MG (65 MG ELEMENTAL FE) 325 MG: 325 (65 FE) TAB at 09:51

## 2024-11-09 RX ADMIN — ESCITALOPRAM OXALATE 20 MG: 20 TABLET ORAL at 09:51

## 2024-11-09 RX ADMIN — LEVOTHYROXINE, LIOTHYRONINE 30 MG: 19; 4.5 TABLET ORAL at 06:20

## 2024-11-09 RX ADMIN — METOPROLOL SUCCINATE 25 MG: 25 TABLET, EXTENDED RELEASE ORAL at 16:42

## 2024-11-09 RX ADMIN — CEFEPIME 1 G: 1 INJECTION, POWDER, FOR SOLUTION INTRAMUSCULAR; INTRAVENOUS at 00:56

## 2024-11-09 RX ADMIN — APIXABAN 2.5 MG: 2.5 TABLET, FILM COATED ORAL at 21:03

## 2024-11-09 RX ADMIN — Medication 1 L/MIN: at 11:56

## 2024-11-09 RX ADMIN — LEVOTHYROXINE SODIUM 100 MCG: 0.1 TABLET ORAL at 09:51

## 2024-11-09 RX ADMIN — Medication 2 L/MIN: at 07:35

## 2024-11-09 RX ADMIN — ATORVASTATIN CALCIUM 10 MG: 10 TABLET, FILM COATED ORAL at 21:03

## 2024-11-09 RX ADMIN — APIXABAN 2.5 MG: 2.5 TABLET, FILM COATED ORAL at 09:51

## 2024-11-09 RX ADMIN — CEFEPIME 1 G: 1 INJECTION, POWDER, FOR SOLUTION INTRAMUSCULAR; INTRAVENOUS at 12:20

## 2024-11-09 RX ADMIN — MIRTAZAPINE 7.5 MG: 15 TABLET, FILM COATED ORAL at 21:03

## 2024-11-09 RX ADMIN — SACUBITRIL AND VALSARTAN 1 TABLET: 24; 26 TABLET, FILM COATED ORAL at 13:54

## 2024-11-09 ASSESSMENT — COGNITIVE AND FUNCTIONAL STATUS - GENERAL
WALKING IN HOSPITAL ROOM: TOTAL
TOILETING: TOTAL
MOVING FROM LYING ON BACK TO SITTING ON SIDE OF FLAT BED WITH BEDRAILS: A LOT
TOILETING: A LOT
MOBILITY SCORE: 6
HELP NEEDED FOR BATHING: TOTAL
DRESSING REGULAR UPPER BODY CLOTHING: A LOT
DAILY ACTIVITIY SCORE: 8
DAILY ACTIVITIY SCORE: 14
TURNING FROM BACK TO SIDE WHILE IN FLAT BAD: TOTAL
DRESSING REGULAR LOWER BODY CLOTHING: A LOT
EATING MEALS: A LITTLE
CLIMB 3 TO 5 STEPS WITH RAILING: TOTAL
STANDING UP FROM CHAIR USING ARMS: TOTAL
PERSONAL GROOMING: A LOT
WALKING IN HOSPITAL ROOM: TOTAL
MOVING FROM LYING ON BACK TO SITTING ON SIDE OF FLAT BED WITH BEDRAILS: TOTAL
MOBILITY SCORE: 9
MOVING TO AND FROM BED TO CHAIR: TOTAL
MOVING TO AND FROM BED TO CHAIR: A LOT
TURNING FROM BACK TO SIDE WHILE IN FLAT BAD: A LOT
DRESSING REGULAR UPPER BODY CLOTHING: TOTAL
PERSONAL GROOMING: TOTAL
HELP NEEDED FOR BATHING: A LOT
CLIMB 3 TO 5 STEPS WITH RAILING: TOTAL
DRESSING REGULAR LOWER BODY CLOTHING: TOTAL
STANDING UP FROM CHAIR USING ARMS: TOTAL

## 2024-11-09 ASSESSMENT — PAIN SCALES - GENERAL
PAINLEVEL_OUTOF10: 0 - NO PAIN

## 2024-11-09 ASSESSMENT — PAIN - FUNCTIONAL ASSESSMENT
PAIN_FUNCTIONAL_ASSESSMENT: 0-10

## 2024-11-09 NOTE — PROGRESS NOTES
"Subjective Data:  Patient resting, denies any c/o CP or dyspnea at rest  + dyspnea with exertion; patient is deconditioned  Reports breathing has improved  BP currently normotensive  SR on telemetry rates in the 60's    Overnight Events:    None reported     Objective Data:  Last Recorded Vitals:  Vitals:    24 0013 24 0427 24 0735 24 1156   BP: 141/73 155/80 132/82 125/78   BP Location: Right arm Right arm Right arm Right arm   Patient Position: Lying Lying Lying Lying   Pulse: 67 69 71 65   Resp:   16 19   Temp: 36.4 °C (97.5 °F) 36.4 °C (97.6 °F) 36.6 °C (97.9 °F) 36.7 °C (98.1 °F)   TempSrc: Temporal Temporal Temporal Temporal   SpO2: 99% 98% 98% 99%   Weight:       Height:         Medical Gas Therapy: Supplemental oxygen  Medical Gas Delivery Method: Nasal cannula  Weight  Av.9 kg (163 lb)  Min: 73.9 kg (163 lb)  Max: 73.9 kg (163 lb)    LABS:  CMP:  Results from last 7 days   Lab Units 24  0617 24  0716 24  0611 24  0555 24  0649 24  0702   SODIUM mmol/L 139 138 139 140 139 141   POTASSIUM mmol/L 3.9 4.1 4.0 4.0 4.5 4.9   CHLORIDE mmol/L 109* 106 107 108* 110* 113*   CO2 mmol/L 26 26 26 27 25 23   ANION GAP mmol/L 8* 10 10 9* 9* 10   BUN mg/dL 33* 35* 32* 34* 33* 33*   CREATININE mg/dL 1.87* 2.18* 2.01* 2.16* 2.21* 2.05*   EGFR mL/min/1.73m*2 27* 22* 25* 23* 22* 24*   MAGNESIUM mg/dL 1.66 1.50*  --  1.46*  --   --      CBC:  Results from last 7 days   Lab Units 24  0617 24  0716 24  0611 24  0555 24  0648 24  0702   WBC AUTO x10*3/uL 4.6 5.1 5.2 4.5 5.8 7.0   HEMOGLOBIN g/dL 7.5* 7.5* 8.1* 7.9* 7.7* 7.3*   HEMATOCRIT % 25.4* 25.2* 26.1* 27.3* 26.3* 25.2*   PLATELETS AUTO x10*3/uL 200 205 212 219 204 216   MCV fL 99 99 97 98 99 103*     COAG:   Results from last 7 days   Lab Units 24  1402   INR  1.3*     ABO: No results found for: \"ABO\"  HEME/ENDO:  Results from last 7 days   Lab Units 24  1007 " 11/04/24  0702   FERRITIN ng/mL 287*  --    IRON SATURATION %  --  16*      CARDIAC:   Results from last 7 days   Lab Units 11/04/24  1402   LD U/L 122             Last I/O:  No intake or output data in the 24 hours ending 11/09/24 1318  Net IO Since Admission: -2,145 mL [11/09/24 1318]     Inpatient Medications:  Scheduled medications   Medication Dose Route Frequency    amiodarone  200 mg oral q AM    apixaban  2.5 mg oral BID    aspirin  81 mg oral Daily    atorvastatin  10 mg oral Nightly    cefepime  1 g intravenous q12h    escitalopram  20 mg oral Daily    ferrous sulfate (325 mg ferrous sulfate)  65 mg of iron oral Daily with breakfast    [Held by provider] furosemide  40 mg intravenous q8h EDIN    levothyroxine  100 mcg oral Daily    magnesium oxide  400 mg oral Once    mirtazapine  7.5 mg oral Nightly    pantoprazole  40 mg oral Daily before breakfast    Or    pantoprazole  40 mg intravenous Daily before breakfast    thyroid (pork)  30 mg oral Daily before breakfast     PRN medications   Medication    acetaminophen    Or    acetaminophen    Or    acetaminophen    ALPRAZolam    alum-mag hydroxide-simeth    butamben-tetracaine-benzocaine    guaiFENesin    lidocaine    melatonin    ondansetron    Or    ondansetron    oxygen    oxygen    oxygen    polyethylene glycol    traMADol     Continuous Medications   Medication Dose Last Rate       Physical Exam:  General: Pleasant frail elderly female, alert and oriented x 3, NAD  HEENT:  Pupils equal and reactive.  Normocephalic.  Moist mucosa.    Neck:  No thyromegaly.  Mild JVD  Cardiovascular:  Regular rate and rhythm.  Normal S1 and S2.  Pulmonary: Lungs diminished throughout, supplemental oxygen in place  Abdomen:  Soft. Non-tender.   Non-distended.  Positive bowel sounds.  Lower Extremities:  2+ pedal pulses. No LE edema.  Neurologic:  Cranial nerves intact.  No focal deficit.   Skin: Skin warm and dry, normal skin turgor.   Psychiatric: Appropriate mood and  behavior     Assessment/Plan   Anny Tesfaye is a 79 y.o. female, with a PMH of HTN, HLD, heavy tobacco use, severe PAD, AAA s/p EVAR 10/10/24 at [facility], lumbar stenosis, hypothyroidism, CKD III, polymyalgia rheumatica, and OA, who presented to [facility] on 11/2/2024 for change in mental status. Patient reports recent prolonged hospital course at [facility] s/p EVAR on 10/10/24 c/b postoperative AF, respiratory insufficiency, volume overload, BALBIR on CKD, and ischemic colitis s/p hemicolectomy w/ colostomy. She was started on Amiodarone without conversion to NSR and Eliquis for the atrial fibrillation. Home antihypertensive medications were held at discharge givens stable BP. She was discharged to SNF 10/25/24. She now presents to [facility] ED 11/2/24 for altered mental status, SOB, and weakness, admitted for PNA and fluid overload. Cardiology is consulted for HF. Patient reports no prior hx of HF, states that edema has been an issue since her recent hospitalization.      Home CV Medications: Amiodarone 200mg daily, Eliquis 2.5mg BID, ASA 81mg daily, statin  -Previously on Captopril 50mg, HCTZ 25mg, and Verapamil 240mg CR     Transthoracic Echocardiogram 11/3/24  1. Left ventricular ejection fraction is moderately decreased, by visual estimate at 30-35%.  2. There is global hypokinesis of the left ventricle with minor regional variations.  3. Left ventricular diastolic filling cannot be determined, due to atrial fibrillation/flutter.  4. There is moderately reduced right ventricular systolic function.  5. Moderately enlarged right ventricle.  6. The left atrium is severely dilated.  7. There is moderate mitral annular calcification.   8. Mildly elevated right ventricular systolic pressure.  9. Mild aortic valve regurgitation.  10. Aortic root moderatly enlarged at 4.8 cm. Ascending aorta 4.4 cm.  11. The patient is in atrial fibrillation which may influence the estimate of left ventricular function and transvalvular  flows.     11/6 RHC: RA 8, PA 38/25(31), W9, CO/CI 4.7/2.6, , SVO2 47%    I reviewed telemetry, SR rates in the 60's     #Acute Systolic HF/ New Cardiomyopathy- Initially ++Hypervolemia with diffuse anasarca likely in the setting of judicious postoperative fluid resuscitation and malnutrition, EF newly decreased on TTE suspect A-fib related.  BP on low end making GDMT difficult    #Postoperative AF- rate controlled 80-100s, c/w Amiodarone  S/p UMESH revealing no LA or ANGEL thrombus/ Successful AF Cardioversion synchronized shock was delivered at 200 J with conversion to normal sinus rhythm   (11/7/24)  -On Eliquis for OAC, has been on anticoagulation since 10/23 (Heparin gtt then transitioned to Eliquis)    #HTN- BP low, home antihypertensives held during prior hospitalization    #Recent EVAR- c/w ASA and statin    #BALBIR-CR stable 1.87 today        RECOMMENDATIONS:  -Start furosemide 40 mg PO daily tomorrow> monitor response  -adding GDMT today and monitor response: Entresto 24/26 mg PO BID, Metop XL 25 mg PO Daily  Will hold MRA and SGLT2-I for now d/t eGFR<30  -c/w Amiodarone and Eliquis as ordered    Code Status:  Full Code    I spent 45 minutes in the professional and overall care of this patient.        Nguyen Perez, APRN-CNP

## 2024-11-09 NOTE — CARE PLAN
Problem: Safety - Medical Restraint  Goal: Remains free of injury from restraints (Restraint for Interference with Medical Device)  Outcome: Met     Problem: Pain - Adult  Goal: Verbalizes/displays adequate comfort level or baseline comfort level  Flowsheets (Taken 11/9/2024 1311)  Verbalizes/displays adequate comfort level or baseline comfort level: Encourage patient to monitor pain and request assistance     Problem: Safety - Adult  Goal: Free from fall injury  Flowsheets (Taken 11/9/2024 1311)  Free from fall injury: Instruct family/caregiver on patient safety     Problem: Discharge Planning  Goal: Discharge to home or other facility with appropriate resources  Flowsheets (Taken 11/9/2024 1311)  Discharge to home or other facility with appropriate resources: Identify barriers to discharge with patient and caregiver     Problem: Chronic Conditions and Co-morbidities  Goal: Patient's chronic conditions and co-morbidity symptoms are monitored and maintained or improved  Flowsheets (Taken 11/9/2024 1311)  Care Plan - Patient's Chronic Conditions and Co-Morbidity Symptoms are Monitored and Maintained or Improved: Monitor and assess patient's chronic conditions and comorbid symptoms for stability, deterioration, or improvement     Problem: Skin  Goal: Decreased wound size/increased tissue granulation at next dressing change  Flowsheets (Taken 11/9/2024 1311)  Decreased wound size/increased tissue granulation at next dressing change: Protective dressings over bony prominences     Problem: Chronic Conditions and Co-morbidities  Goal: Patient's chronic conditions and co-morbidity symptoms are monitored and maintained or improved  Flowsheets (Taken 11/9/2024 1311)  Care Plan - Patient's Chronic Conditions and Co-Morbidity Symptoms are Monitored and Maintained or Improved: Monitor and assess patient's chronic conditions and comorbid symptoms for stability, deterioration, or improvement     Problem: Skin  Goal: Decreased  wound size/increased tissue granulation at next dressing change  Flowsheets (Taken 11/9/2024 1311)  Decreased wound size/increased tissue granulation at next dressing change: Protective dressings over bony prominences

## 2024-11-09 NOTE — CARE PLAN
The patient's goals for the shift include      The clinical goals for the shift include pt will remain vitally stable    Over the shift, the patient did make progress toward the following goals.   Problem: Pain - Adult  Goal: Verbalizes/displays adequate comfort level or baseline comfort level  Outcome: Progressing     Problem: Safety - Adult  Goal: Free from fall injury  Outcome: Progressing     Problem: Chronic Conditions and Co-morbidities  Goal: Patient's chronic conditions and co-morbidity symptoms are monitored and maintained or improved  Outcome: Progressing     Problem: Skin  Goal: Decreased wound size/increased tissue granulation at next dressing change  Outcome: Progressing  Flowsheets (Taken 11/8/2024 2325)  Decreased wound size/increased tissue granulation at next dressing change:   Promote sleep for wound healing   Protective dressings over bony prominences  Goal: Participates in plan/prevention/treatment measures  Outcome: Progressing  Flowsheets (Taken 11/8/2024 2325)  Participates in plan/prevention/treatment measures:   Elevate heels   Increase activity/out of bed for meals  Goal: Prevent/manage excess moisture  Outcome: Progressing  Flowsheets (Taken 11/8/2024 2325)  Prevent/manage excess moisture:   Cleanse incontinence/protect with barrier cream   Moisturize dry skin   Monitor for/manage infection if present  Goal: Prevent/minimize sheer/friction injuries  Outcome: Progressing  Flowsheets (Taken 11/8/2024 2325)  Prevent/minimize sheer/friction injuries:   Increase activity/out of bed for meals   Turn/reposition every 2 hours/use positioning/transfer devices   Use pull sheet  Goal: Promote/optimize nutrition  Outcome: Progressing  Flowsheets (Taken 11/8/2024 2325)  Promote/optimize nutrition:   Offer water/supplements/favorite foods   Monitor/record intake including meals  Goal: Promote skin healing  Outcome: Progressing  Flowsheets (Taken 11/5/2024 0223)  Promote skin healing:   Assess skin/pad  under line(s)/device(s)   Protective dressings over bony prominences    .

## 2024-11-09 NOTE — PROGRESS NOTES
11/09/24 1331   Discharge Planning   Assistance Needed has md erika states nmr     Anny Tesfaye is a 79 y.o. female on day 7 of admission presenting with Sepsis with acute organ dysfunction, due to unspecified organism, unspecified organ dysfunction type, unspecified whether septic shock present (Multi).    Amber Licona RN

## 2024-11-09 NOTE — PROGRESS NOTES
Anny Tesfaye is a 79 y.o. female     Appetite remains poor  Oxygen needs down to 1 L      Review of Systems     Constitutional: no fever, no chills, not feeling poorly, not feeling tired   Cardiovascular: no chest pain   Respiratory: no cough, wheezing   Gastrointestinal: no abdominal pain, no constipation, no melena, no nausea, no diarrhea, no vomiting and no blood in stools.   Neurological: no headache,   All other systems have been reviewed and are negative for complaint.       Vitals:    11/09/24 1156   BP: 125/78   Pulse: 65   Resp: 19   Temp: 36.7 °C (98.1 °F)   SpO2: 99%        Scheduled medications  amiodarone, 200 mg, oral, q AM  apixaban, 2.5 mg, oral, BID  aspirin, 81 mg, oral, Daily  atorvastatin, 10 mg, oral, Nightly  cefepime, 1 g, intravenous, q12h  escitalopram, 20 mg, oral, Daily  ferrous sulfate (325 mg ferrous sulfate), 65 mg of iron, oral, Daily with breakfast  [START ON 11/10/2024] furosemide, 40 mg, oral, Daily  levothyroxine, 100 mcg, oral, Daily  magnesium oxide, 400 mg, oral, Once  metoprolol succinate XL, 25 mg, oral, Daily  mirtazapine, 7.5 mg, oral, Nightly  pantoprazole, 40 mg, oral, Daily before breakfast   Or  pantoprazole, 40 mg, intravenous, Daily before breakfast  sacubitriL-valsartan, 1 tablet, oral, BID  thyroid (pork), 30 mg, oral, Daily before breakfast      Continuous medications     PRN medications  PRN medications: acetaminophen **OR** acetaminophen **OR** acetaminophen, ALPRAZolam, alum-mag hydroxide-simeth, butamben-tetracaine-benzocaine, guaiFENesin, lidocaine, melatonin, ondansetron **OR** ondansetron, oxygen, oxygen, oxygen, polyethylene glycol, traMADol    Lab Review   Results from last 7 days   Lab Units 11/09/24  0617 11/08/24  0716 11/07/24  0611   WBC AUTO x10*3/uL 4.6 5.1 5.2   HEMOGLOBIN g/dL 7.5* 7.5* 8.1*   HEMATOCRIT % 25.4* 25.2* 26.1*   PLATELETS AUTO x10*3/uL 200 205 212     Results from last 7 days   Lab Units 11/09/24  0617 11/08/24  0716  11/07/24  0611 11/05/24  0649 11/04/24  1402   SODIUM mmol/L 139 138 139   < >  --    POTASSIUM mmol/L 3.9 4.1 4.0   < >  --    CHLORIDE mmol/L 109* 106 107   < >  --    CO2 mmol/L 26 26 26   < >  --    BUN mg/dL 33* 35* 32*   < >  --    CREATININE mg/dL 1.87* 2.18* 2.01*   < >  --    CALCIUM mg/dL 7.9* 8.1* 7.9*   < >  --    PROTEIN TOTAL g/dL  --   --   --   --  5.9*   GLUCOSE mg/dL 86 98 90   < >  --     < > = values in this interval not displayed.              XR chest 1 view   Final Result   1. Pulmonary vascular congestion with prominent indistinct   interstitium suggesting acute pulmonary interstitial edema/CHF.   Bibasilar opacities may relate to atelectasis or pneumonia. Moderate   to large right and moderate-sized left pleural effusion. No   pneumothorax.             MACRO:   None        Signed by: Gilles Clarke 11/8/2024 3:38 AM   Dictation workstation:   [id number]      Transesophageal Echo (UMESH) w/ Possible Cardioversion   Final Result      XR chest 1 view   Final Result   Enlarged cardiac silhouette with right-greater-than-left basilar   infiltrates or edema and right pleural effusion.        MACRO:   None.        Signed by: Mitra Cesar 11/7/2024 9:33 AM   Dictation workstation:   [id number]      Cardiac Catheterization Procedure   Final Result      XR chest 1 view   Final Result   Small bilateral pleural effusions and enlarged cardiac silhouette.        Signed by: Anh Hansen 11/4/2024 7:04 PM   Dictation workstation:   [id number]      Transthoracic Echo (TTE) Complete   Final Result      CT head wo IV contrast   Final Result   1. Age related changes without acute intracranial process.   2. Sinus disease. Also nonspecific fluid within left mastoid cells   and the left middle ear cavity; correlate clinically.        Signed by: Francisco Carlson 11/2/2024 11:47 AM   Dictation workstation:   [id number]      CT angio chest abdomen pelvis   Final Result   CHEST:   1.  4.4 x 4.2 cm ascending aortic  aneurysm. No dissection.   2. No pulmonary embolism.   3. Moderate size right and small left pleural effusions.   Infiltrates or compressive atelectasis of the right upper middle and   lower lobes and left upper and lower lobes. The volume loss is most   marked in the right lower lobe.   4. Anasarca/hypoproteinemia.        ABDOMEN AND PELVIS:   1.  4.9 x 4.7 cm infrarenal abdominal aortic aneurysm. There is a   distal abdominal aortic aneurysm intravascular bi-iliac artery stent.   There is a femoral-femoral bypass graft with flow   2. 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   3. Anasarca/hypoproteinemia. In particular, there is induration of   the left lateral abdominal wall and left side of the back which could   represent more focal edema or contusion. It is not known if the   patient fell.   4. Possible appendectomy.   5. Presacral edema.   6. Sludge in an otherwise unremarkable gallbladder.        MACRO:   None        Signed by: Georgia Sanchez 11/2/2024 11:51 AM   Dictation workstation:   [id number]      XR chest 1 view   Final Result   Right basilar pneumonia. Small right pleural effusion.        MACRO:   None        Signed by: Georgia Sanchez 11/2/2024 10:03 AM   Dictation workstation:   [id number]      US thoracentesis    (Results Pending)         Physical Exam    Constitutional   General appearance: Alert    Pulmonary   Respiratory assessment: No respiratory distress, normal respiratory rhythm and effort.    Auscultation of Lungs: Decreased breath sounds in the bases  Cardiovascular   Auscultation of heart: Irregularly irregular rhythm  Exam for edema: Plus edema  Abdomen   Abdominal Exam: No bruits, normal bowel sounds, soft, non-tender, no abdominal mass palpated.    Liver and Spleen exam: No hepato-splenomegaly.   Musculoskeletal     Inspection of digits and nails: No clubbing or cyanosis of the fingernails.    Inspection/palpation of joints, bones  and muscles: No joint swelling. Normal movement of all extremities.   Neurologic   Cranial nerves: Nerves 2-12 were intact, no focal neuro defects.         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

## 2024-11-10 VITALS
WEIGHT: 163 LBS | SYSTOLIC BLOOD PRESSURE: 117 MMHG | HEIGHT: 65 IN | TEMPERATURE: 98.5 F | HEART RATE: 67 BPM | RESPIRATION RATE: 20 BRPM | DIASTOLIC BLOOD PRESSURE: 60 MMHG | BODY MASS INDEX: 27.16 KG/M2 | OXYGEN SATURATION: 94 %

## 2024-11-10 LAB
ANION GAP SERPL CALC-SCNC: 9 MMOL/L (ref 10–20)
BUN SERPL-MCNC: 32 MG/DL (ref 6–23)
CALCIUM SERPL-MCNC: 8 MG/DL (ref 8.6–10.3)
CHLORIDE SERPL-SCNC: 110 MMOL/L (ref 98–107)
CO2 SERPL-SCNC: 24 MMOL/L (ref 21–32)
CREAT SERPL-MCNC: 1.63 MG/DL (ref 0.5–1.05)
EGFRCR SERPLBLD CKD-EPI 2021: 32 ML/MIN/1.73M*2
ERYTHROCYTE [DISTWIDTH] IN BLOOD BY AUTOMATED COUNT: 16.9 % (ref 11.5–14.5)
GLUCOSE SERPL-MCNC: 90 MG/DL (ref 74–99)
HCT VFR BLD AUTO: 30.5 % (ref 36–46)
HGB BLD-MCNC: 8.8 G/DL (ref 12–16)
MCH RBC QN AUTO: 29.4 PG (ref 26–34)
MCHC RBC AUTO-ENTMCNC: 28.9 G/DL (ref 32–36)
MCV RBC AUTO: 102 FL (ref 80–100)
NRBC BLD-RTO: 0 /100 WBCS (ref 0–0)
PLATELET # BLD AUTO: 214 X10*3/UL (ref 150–450)
POTASSIUM SERPL-SCNC: 4.1 MMOL/L (ref 3.5–5.3)
RBC # BLD AUTO: 2.99 X10*6/UL (ref 4–5.2)
SODIUM SERPL-SCNC: 139 MMOL/L (ref 136–145)
WBC # BLD AUTO: 4.7 X10*3/UL (ref 4.4–11.3)

## 2024-11-10 PROCEDURE — 99232 SBSQ HOSP IP/OBS MODERATE 35: CPT | Performed by: INTERNAL MEDICINE

## 2024-11-10 PROCEDURE — 36415 COLL VENOUS BLD VENIPUNCTURE: CPT | Performed by: NURSE PRACTITIONER

## 2024-11-10 PROCEDURE — 2500000004 HC RX 250 GENERAL PHARMACY W/ HCPCS (ALT 636 FOR OP/ED): Performed by: NURSE PRACTITIONER

## 2024-11-10 PROCEDURE — 2500000001 HC RX 250 WO HCPCS SELF ADMINISTERED DRUGS (ALT 637 FOR MEDICARE OP)

## 2024-11-10 PROCEDURE — 1200000002 HC GENERAL ROOM WITH TELEMETRY DAILY

## 2024-11-10 PROCEDURE — 2500000001 HC RX 250 WO HCPCS SELF ADMINISTERED DRUGS (ALT 637 FOR MEDICARE OP): Performed by: NURSE PRACTITIONER

## 2024-11-10 PROCEDURE — 2500000002 HC RX 250 W HCPCS SELF ADMINISTERED DRUGS (ALT 637 FOR MEDICARE OP, ALT 636 FOR OP/ED): Performed by: NURSE PRACTITIONER

## 2024-11-10 PROCEDURE — 80048 BASIC METABOLIC PNL TOTAL CA: CPT | Performed by: NURSE PRACTITIONER

## 2024-11-10 PROCEDURE — 85027 COMPLETE CBC AUTOMATED: CPT | Performed by: NURSE PRACTITIONER

## 2024-11-10 PROCEDURE — 2500000001 HC RX 250 WO HCPCS SELF ADMINISTERED DRUGS (ALT 637 FOR MEDICARE OP): Performed by: INTERNAL MEDICINE

## 2024-11-10 PROCEDURE — 99233 SBSQ HOSP IP/OBS HIGH 50: CPT

## 2024-11-10 PROCEDURE — 2500000005 HC RX 250 GENERAL PHARMACY W/O HCPCS: Performed by: NURSE PRACTITIONER

## 2024-11-10 RX ORDER — MIRTAZAPINE 15 MG/1
15 TABLET, FILM COATED ORAL NIGHTLY
Status: DISCONTINUED | OUTPATIENT
Start: 2024-11-10 | End: 2024-11-12 | Stop reason: HOSPADM

## 2024-11-10 RX ADMIN — ALPRAZOLAM 0.25 MG: 0.25 TABLET ORAL at 17:24

## 2024-11-10 RX ADMIN — FERROUS SULFATE TAB 325 MG (65 MG ELEMENTAL FE) 325 MG: 325 (65 FE) TAB at 08:43

## 2024-11-10 RX ADMIN — ATORVASTATIN CALCIUM 10 MG: 10 TABLET, FILM COATED ORAL at 20:50

## 2024-11-10 RX ADMIN — SACUBITRIL AND VALSARTAN 1 TABLET: 24; 26 TABLET, FILM COATED ORAL at 20:51

## 2024-11-10 RX ADMIN — LEVOTHYROXINE, LIOTHYRONINE 30 MG: 19; 4.5 TABLET ORAL at 05:16

## 2024-11-10 RX ADMIN — CEFEPIME 1 G: 1 INJECTION, POWDER, FOR SOLUTION INTRAMUSCULAR; INTRAVENOUS at 00:42

## 2024-11-10 RX ADMIN — METOPROLOL SUCCINATE 25 MG: 25 TABLET, EXTENDED RELEASE ORAL at 08:43

## 2024-11-10 RX ADMIN — AMIODARONE HYDROCHLORIDE 200 MG: 200 TABLET ORAL at 08:43

## 2024-11-10 RX ADMIN — PANTOPRAZOLE SODIUM 40 MG: 40 TABLET, DELAYED RELEASE ORAL at 05:16

## 2024-11-10 RX ADMIN — ESCITALOPRAM OXALATE 20 MG: 20 TABLET ORAL at 08:43

## 2024-11-10 RX ADMIN — ASPIRIN 81 MG: 81 TABLET, COATED ORAL at 08:44

## 2024-11-10 RX ADMIN — MIRTAZAPINE 15 MG: 15 TABLET, FILM COATED ORAL at 20:50

## 2024-11-10 RX ADMIN — CEFEPIME 1 G: 1 INJECTION, POWDER, FOR SOLUTION INTRAMUSCULAR; INTRAVENOUS at 12:30

## 2024-11-10 RX ADMIN — SACUBITRIL AND VALSARTAN 1 TABLET: 24; 26 TABLET, FILM COATED ORAL at 08:43

## 2024-11-10 RX ADMIN — APIXABAN 2.5 MG: 2.5 TABLET, FILM COATED ORAL at 20:50

## 2024-11-10 RX ADMIN — APIXABAN 2.5 MG: 2.5 TABLET, FILM COATED ORAL at 08:43

## 2024-11-10 RX ADMIN — Medication 2 L/MIN: at 08:24

## 2024-11-10 RX ADMIN — LEVOTHYROXINE SODIUM 100 MCG: 0.1 TABLET ORAL at 08:44

## 2024-11-10 RX ADMIN — FUROSEMIDE 40 MG: 20 TABLET ORAL at 08:43

## 2024-11-10 ASSESSMENT — COGNITIVE AND FUNCTIONAL STATUS - GENERAL
DRESSING REGULAR UPPER BODY CLOTHING: A LOT
MOVING TO AND FROM BED TO CHAIR: A LOT
PERSONAL GROOMING: A LOT
HELP NEEDED FOR BATHING: A LOT
MOVING FROM LYING ON BACK TO SITTING ON SIDE OF FLAT BED WITH BEDRAILS: A LOT
TURNING FROM BACK TO SIDE WHILE IN FLAT BAD: A LOT
DRESSING REGULAR LOWER BODY CLOTHING: A LOT
STANDING UP FROM CHAIR USING ARMS: TOTAL
WALKING IN HOSPITAL ROOM: TOTAL
CLIMB 3 TO 5 STEPS WITH RAILING: TOTAL
TOILETING: A LOT

## 2024-11-10 ASSESSMENT — PAIN SCALES - GENERAL
PAINLEVEL_OUTOF10: 0 - NO PAIN

## 2024-11-10 ASSESSMENT — PAIN - FUNCTIONAL ASSESSMENT
PAIN_FUNCTIONAL_ASSESSMENT: 0-10

## 2024-11-10 NOTE — CARE PLAN
The patient's goals for the shift include      The clinical goals for the shift include pt will remain HDS    Problem: Pain - Adult  Goal: Verbalizes/displays adequate comfort level or baseline comfort level  Outcome: Progressing     Problem: Safety - Adult  Goal: Free from fall injury  Outcome: Progressing     Problem: Discharge Planning  Goal: Discharge to home or other facility with appropriate resources  Outcome: Progressing     Problem: Chronic Conditions and Co-morbidities  Goal: Patient's chronic conditions and co-morbidity symptoms are monitored and maintained or improved  Outcome: Progressing     Problem: Safety - Medical Restraint  Goal: Free from restraint(s) (Restraint for Interference with Medical Device)  Outcome: Progressing     Problem: Skin  Goal: Decreased wound size/increased tissue granulation at next dressing change  Outcome: Progressing  Goal: Participates in plan/prevention/treatment measures  Outcome: Progressing  Goal: Prevent/manage excess moisture  Outcome: Progressing  Goal: Prevent/minimize sheer/friction injuries  Outcome: Progressing  Goal: Promote/optimize nutrition  Outcome: Progressing  Goal: Promote skin healing  Outcome: Progressing     Problem: Heart Failure  Goal: Improved gas exchange this shift  Outcome: Progressing  Goal: Improved urinary output this shift  Outcome: Progressing  Goal: Reduction in peripheral edema within 24 hours  Outcome: Progressing  Goal: Report improvement of dyspnea/breathlessness this shift  Outcome: Progressing  Goal: Weight from fluid excess reduced over 2-3 days, then stabilize  Outcome: Progressing  Goal: Increase self care and/or family involvement in 24 hours  Outcome: Progressing

## 2024-11-10 NOTE — PROGRESS NOTES
11/10/24 0940   Discharge Planning   Home or Post Acute Services Post acute facilities (Rehab/SNF/etc)   Type of Post Acute Facility Services Skilled nursing   Expected Discharge Disposition Short Term A   Does the patient need discharge transport arranged? Yes   RoundTrip coordination needed? Yes     Have auth for [facility] and they can take today.  Waiting to hear if patient is med ready.

## 2024-11-10 NOTE — PROGRESS NOTES
Anny Tesfaye is a 79 y.o. female     Appetite remains poor  Oxygen at 2 L  Will add Remeron for appetite stimulation      Review of Systems     Constitutional: no fever, no chills, not feeling poorly, not feeling tired   Cardiovascular: no chest pain   Respiratory: no cough, wheezing   Gastrointestinal: no abdominal pain, no constipation, no melena, no nausea, no diarrhea, no vomiting and no blood in stools.   Neurological: no headache,   All other systems have been reviewed and are negative for complaint.       Vitals:    11/10/24 1252   BP: 127/65   Pulse: 62   Resp: 16   Temp: 37.1 °C (98.8 °F)   SpO2: 94%        Scheduled medications  amiodarone, 200 mg, oral, q AM  apixaban, 2.5 mg, oral, BID  aspirin, 81 mg, oral, Daily  atorvastatin, 10 mg, oral, Nightly  cefepime, 1 g, intravenous, q12h  escitalopram, 20 mg, oral, Daily  ferrous sulfate (325 mg ferrous sulfate), 65 mg of iron, oral, Daily with breakfast  furosemide, 40 mg, oral, Daily  levothyroxine, 100 mcg, oral, Daily  magnesium oxide, 400 mg, oral, Once  metoprolol succinate XL, 25 mg, oral, Daily  mirtazapine, 7.5 mg, oral, Nightly  pantoprazole, 40 mg, oral, Daily before breakfast   Or  pantoprazole, 40 mg, intravenous, Daily before breakfast  sacubitriL-valsartan, 1 tablet, oral, BID  thyroid (pork), 30 mg, oral, Daily before breakfast      Continuous medications     PRN medications  PRN medications: acetaminophen **OR** acetaminophen **OR** acetaminophen, ALPRAZolam, alum-mag hydroxide-simeth, butamben-tetracaine-benzocaine, guaiFENesin, lidocaine, melatonin, ondansetron **OR** ondansetron, oxygen, oxygen, oxygen, polyethylene glycol, traMADol    Lab Review   Results from last 7 days   Lab Units 11/10/24  0532 11/09/24  0617 11/08/24  0716   WBC AUTO x10*3/uL 4.7 4.6 5.1   HEMOGLOBIN g/dL 8.8* 7.5* 7.5*   HEMATOCRIT % 30.5* 25.4* 25.2*   PLATELETS AUTO x10*3/uL 214 200 205     Results from last 7 days   Lab Units 11/10/24  0532 11/09/24  0617  11/08/24  0716 11/05/24  0649 11/04/24  1402   SODIUM mmol/L 139 139 138   < >  --    POTASSIUM mmol/L 4.1 3.9 4.1   < >  --    CHLORIDE mmol/L 110* 109* 106   < >  --    CO2 mmol/L 24 26 26   < >  --    BUN mg/dL 32* 33* 35*   < >  --    CREATININE mg/dL 1.63* 1.87* 2.18*   < >  --    CALCIUM mg/dL 8.0* 7.9* 8.1*   < >  --    PROTEIN TOTAL g/dL  --   --   --   --  5.9*   GLUCOSE mg/dL 90 86 98   < >  --     < > = values in this interval not displayed.              XR chest 1 view   Final Result   1. Pulmonary vascular congestion with prominent indistinct   interstitium suggesting acute pulmonary interstitial edema/CHF.   Bibasilar opacities may relate to atelectasis or pneumonia. Moderate   to large right and moderate-sized left pleural effusion. No   pneumothorax.             MACRO:   None        Signed by: Gilles Clarke 11/8/2024 3:38 AM   Dictation workstation:   [id number]      Transesophageal Echo (UMESH) w/ Possible Cardioversion   Final Result      XR chest 1 view   Final Result   Enlarged cardiac silhouette with right-greater-than-left basilar   infiltrates or edema and right pleural effusion.        MACRO:   None.        Signed by: Mitra Cesar 11/7/2024 9:33 AM   Dictation workstation:   [id number]      Cardiac Catheterization Procedure   Final Result      XR chest 1 view   Final Result   Small bilateral pleural effusions and enlarged cardiac silhouette.        Signed by: Anh Hansen 11/4/2024 7:04 PM   Dictation workstation:   [id number]      Transthoracic Echo (TTE) Complete   Final Result      CT head wo IV contrast   Final Result   1. Age related changes without acute intracranial process.   2. Sinus disease. Also nonspecific fluid within left mastoid cells   and the left middle ear cavity; correlate clinically.        Signed by: Francisco Carlson 11/2/2024 11:47 AM   Dictation workstation:   [id number]      CT angio chest abdomen pelvis   Final Result   CHEST:   1.  4.4 x 4.2 cm ascending aortic  aneurysm. No dissection.   2. No pulmonary embolism.   3. Moderate size right and small left pleural effusions.   Infiltrates or compressive atelectasis of the right upper middle and   lower lobes and left upper and lower lobes. The volume loss is most   marked in the right lower lobe.   4. Anasarca/hypoproteinemia.        ABDOMEN AND PELVIS:   1.  4.9 x 4.7 cm infrarenal abdominal aortic aneurysm. There is a   distal abdominal aortic aneurysm intravascular bi-iliac artery stent.   There is a femoral-femoral bypass graft with flow   2. 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   3. Anasarca/hypoproteinemia. In particular, there is induration of   the left lateral abdominal wall and left side of the back which could   represent more focal edema or contusion. It is not known if the   patient fell.   4. Possible appendectomy.   5. Presacral edema.   6. Sludge in an otherwise unremarkable gallbladder.        MACRO:   None        Signed by: Georgia Sanchez 11/2/2024 11:51 AM   Dictation workstation:   [id number]      XR chest 1 view   Final Result   Right basilar pneumonia. Small right pleural effusion.        MACRO:   None        Signed by: Georgia Sanchez 11/2/2024 10:03 AM   Dictation workstation:   [id number]      US thoracentesis    (Results Pending)         Physical Exam    Constitutional   General appearance: Alert    Pulmonary   Respiratory assessment: No respiratory distress, normal respiratory rhythm and effort.    Auscultation of Lungs: Decreased breath sounds in the bases  Cardiovascular   Auscultation of heart: Irregularly irregular rhythm  Exam for edema: Plus edema  Abdomen   Abdominal Exam: No bruits, normal bowel sounds, soft, non-tender, no abdominal mass palpated.    Liver and Spleen exam: No hepato-splenomegaly.   Musculoskeletal     Inspection of digits and nails: No clubbing or cyanosis of the fingernails.    Inspection/palpation of joints, bones  and muscles: No joint swelling. Normal movement of all extremities.   Neurologic   Cranial nerves: Nerves 2-12 were intact, no focal neuro defects.         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

## 2024-11-10 NOTE — PROGRESS NOTES
Subjective Data:  Patient resting, denies any complaints of chest pain or dyspnea at rest. Reports feeling better, still with mild dyspnea with exertion  Supp oxygen in place via nc 2 L  While patient's Niece was at the bedside they both requested that patient get up to the chair; notified RN  Later RN reported that she attempted to get patient up to the chair, but patient declined as she did the days prior (Niece was not present)    Overnight Events:    None reported     Objective Data:  Last Recorded Vitals:  Vitals:    11/10/24 0509 11/10/24 0824 11/10/24 0925 11/10/24 1140   BP: 155/83 127/67     BP Location: Right arm Right arm     Patient Position: Lying Lying     Pulse: 63 62 67 60   Resp: 13 16 15 22   Temp: 36.6 °C (97.9 °F) 36.5 °C (97.7 °F)     TempSrc: Temporal Temporal     SpO2: 97% 97%     Weight:       Height:         Medical Gas Therapy: Supplemental oxygen  Medical Gas Delivery Method: Nasal cannula (2L)  Weight  Av.9 kg (163 lb)  Min: 73.9 kg (163 lb)  Max: 73.9 kg (163 lb)    LABS:  CMP:  Results from last 7 days   Lab Units 11/10/24  0532 24  0617 24  0716 24  0611 24  0555 24  0649 24  0702   SODIUM mmol/L 139 139 138 139 140 139 141   POTASSIUM mmol/L 4.1 3.9 4.1 4.0 4.0 4.5 4.9   CHLORIDE mmol/L 110* 109* 106 107 108* 110* 113*   CO2 mmol/L 24 26 26 26 27 25 23   ANION GAP mmol/L 9* 8* 10 10 9* 9* 10   BUN mg/dL 32* 33* 35* 32* 34* 33* 33*   CREATININE mg/dL 1.63* 1.87* 2.18* 2.01* 2.16* 2.21* 2.05*   EGFR mL/min/1.73m*2 32* 27* 22* 25* 23* 22* 24*   MAGNESIUM mg/dL  --  1.66 1.50*  --  1.46*  --   --      CBC:  Results from last 7 days   Lab Units 11/10/24  0532 24  0617 24  0716 24  0611 24  0555 24  0648 24  0702   WBC AUTO x10*3/uL 4.7 4.6 5.1 5.2 4.5 5.8 7.0   HEMOGLOBIN g/dL 8.8* 7.5* 7.5* 8.1* 7.9* 7.7* 7.3*   HEMATOCRIT % 30.5* 25.4* 25.2* 26.1* 27.3* 26.3* 25.2*   PLATELETS AUTO x10*3/uL 214 200 205 212 219  "204 216   MCV fL 102* 99 99 97 98 99 103*     COAG:   Results from last 7 days   Lab Units 11/04/24  1402   INR  1.3*     ABO: No results found for: \"ABO\"  HEME/ENDO:  Results from last 7 days   Lab Units 11/04/24  1007 11/04/24  0702   FERRITIN ng/mL 287*  --    IRON SATURATION %  --  16*      CARDIAC:   Results from last 7 days   Lab Units 11/04/24  1402   LD U/L 122             Last I/O:    Intake/Output Summary (Last 24 hours) at 11/10/2024 1231  Last data filed at 11/10/2024 1143  Gross per 24 hour   Intake 26.67 ml   Output 2450 ml   Net -2423.33 ml     Net IO Since Admission: -4,568.33 mL [11/10/24 1231]          Inpatient Medications:  Scheduled medications   Medication Dose Route Frequency    amiodarone  200 mg oral q AM    apixaban  2.5 mg oral BID    aspirin  81 mg oral Daily    atorvastatin  10 mg oral Nightly    cefepime  1 g intravenous q12h    escitalopram  20 mg oral Daily    ferrous sulfate (325 mg ferrous sulfate)  65 mg of iron oral Daily with breakfast    furosemide  40 mg oral Daily    levothyroxine  100 mcg oral Daily    magnesium oxide  400 mg oral Once    metoprolol succinate XL  25 mg oral Daily    mirtazapine  7.5 mg oral Nightly    pantoprazole  40 mg oral Daily before breakfast    Or    pantoprazole  40 mg intravenous Daily before breakfast    sacubitriL-valsartan  1 tablet oral BID    thyroid (pork)  30 mg oral Daily before breakfast     PRN medications   Medication    acetaminophen    Or    acetaminophen    Or    acetaminophen    ALPRAZolam    alum-mag hydroxide-simeth    butamben-tetracaine-benzocaine    guaiFENesin    lidocaine    melatonin    ondansetron    Or    ondansetron    oxygen    oxygen    oxygen    polyethylene glycol    traMADol     Continuous Medications   Medication Dose Last Rate         Physical Exam:  General: Pleasant frail elderly female, alert and oriented x 3, NAD  HEENT:  Pupils equal and reactive.  Normocephalic.  Moist mucosa.    Neck:  No thyromegaly.  Mild " JVD  Cardiovascular:  Regular rate and rhythm.  Normal S1 and S2.  Pulmonary: Lungs diminished throughout, supplemental oxygen in place  Abdomen:  Soft. Non-tender.   Non-distended.  Positive bowel sounds.  Lower Extremities:  2+ pedal pulses. No LE edema.  Neurologic:  Cranial nerves intact.  No focal deficit.   Skin: Skin warm and dry, normal skin turgor.   Psychiatric: Appropriate mood and behavior      [facility] Indication Cardioversion 11/7/24  CONCLUSIONS:   1. Left ventricular ejection fraction is moderately decreased, by visual estimate at 30-35%.   2. There are multiple left ventricular wall motion abnormalities.   3. Successful cardioversion for atrial fibrillation resulting in sinus bradycardia.   4. Unable to determine right ventricular systolic function.   5. The left atrium is enlarged.   6. Aortic root enlarged at 4.5 cm. Ascending aorta 4.3 cm.   7. No LA or ANGEL thrombus.     11/6 RHC: RA 8, PA 38/25(31), W9, CO/CI 4.7/2.6, , SVO2 47%    Echocardiogram 11/4/24:  CONCLUSIONS:   1. Left ventricular ejection fraction is moderately decreased, by visual estimate at 30-35%.   2. There is global hypokinesis of the left ventricle with minor regional variations.   3. Left ventricular diastolic filling cannot be determined, due to atrial fibrillation/flutter.   4. There is moderately reduced right ventricular systolic function.   5. Moderately enlarged right ventricle.   6. The left atrium is severely dilated.   7. There is moderate mitral annular calcification.   8. Mildly elevated right ventricular systolic pressure.   9. Mild aortic valve regurgitation.  10. Aortic root moderatly enlarged at 4.8 cm. Ascending aorta 4.4 cm.  11. The patient is in atrial fibrillation which may influence the estimate of left ventricular function and transvalvular flows.     Assessment/Plan  Anny Tesfaye is a 79 y.o. female, with a PMH of HTN, HLD, heavy tobacco use, severe PAD, AAA s/p EVAR 10/10/24 at [facility], lumbar  stenosis, hypothyroidism, CKD III, polymyalgia rheumatica, and OA, who presented to [facility] on 11/2/2024 for change in mental status. Patient reports recent prolonged hospital course at [facility] s/p EVAR on 10/10/24 c/b postoperative AF, respiratory insufficiency, volume overload, BALBIR on CKD, and ischemic colitis s/p hemicolectomy w/ colostomy. She was started on Amiodarone without conversion to NSR and Eliquis for the atrial fibrillation. Home antihypertensive medications were held at discharge givens stable BP. She was discharged to SNF 10/25/24. She now presents to [facility] ED 11/2/24 for altered mental status, SOB, and weakness, admitted for PNA and fluid overload. Cardiology is consulted for HF. Patient reports no prior hx of HF, states that edema has been an issue since her recent hospitalization.      Home CV Medications: Amiodarone 200mg daily, Eliquis 2.5mg BID, ASA 81mg daily, statin  -Previously on Captopril 50mg, HCTZ 25mg, and Verapamil 240mg CR       I reviewed telemetry, SR rates in the 60's     #Acute Systolic HF/ New Cardiomyopathy- Initially ++Hypervolemia with diffuse anasarca likely in the setting of judicious postoperative fluid resuscitation and malnutrition, EF newly decreased on TTE suspect A-fib related.  BP on low end making GDMT difficult     #Postoperative AF- rate controlled 80-100s, c/w Amiodarone  S/p UMESH revealing no LA or ANGEL thrombus/ Successful AF Cardioversion synchronized shock was delivered at 200 J with conversion to normal sinus rhythm   (11/7/24)  -On Eliquis for OAC, has been on anticoagulation since 10/23 (Heparin gtt then transitioned to Eliquis)     #HTN- BP low, home antihypertensives held during prior hospitalization     #Recent EVAR- c/w ASA and statin     #BALBIR-CR improving 1.63 today        RECOMMENDATIONS:  -c/w furosemide 40 mg PO daily> monitor response  -added GDMT (11/9/24): Entresto 24/26 mg PO BID, Metop XL 25 mg PO Daily tolerating  Will hold MRA and  SGLT2-I for now as renal function recovers> consider initiating at outpt follow up  -c/w Amiodarone and Eliquis as ordered  Ongoing discharge planning for SNF  Cardiology follow up with Dr. Ramirez post discharge (appoint requested)  No cardiac barriers to discharge       Code Status:  Full Code    I spent 35 minutes in the professional and overall care of this patient.        Nguyen Perez, APRN-CNP

## 2024-11-11 LAB
ANION GAP SERPL CALC-SCNC: 8 MMOL/L (ref 10–20)
BUN SERPL-MCNC: 34 MG/DL (ref 6–23)
CALCIUM SERPL-MCNC: 8 MG/DL (ref 8.6–10.3)
CHLORIDE SERPL-SCNC: 106 MMOL/L (ref 98–107)
CO2 SERPL-SCNC: 30 MMOL/L (ref 21–32)
CREAT SERPL-MCNC: 1.67 MG/DL (ref 0.5–1.05)
EGFRCR SERPLBLD CKD-EPI 2021: 31 ML/MIN/1.73M*2
ERYTHROCYTE [DISTWIDTH] IN BLOOD BY AUTOMATED COUNT: 16.8 % (ref 11.5–14.5)
FUNGUS SPEC CULT: NORMAL
FUNGUS SPEC FUNGUS STN: NORMAL
GLUCOSE SERPL-MCNC: 95 MG/DL (ref 74–99)
HCT VFR BLD AUTO: 27.7 % (ref 36–46)
HGB BLD-MCNC: 8.3 G/DL (ref 12–16)
MCH RBC QN AUTO: 29.3 PG (ref 26–34)
MCHC RBC AUTO-ENTMCNC: 30 G/DL (ref 32–36)
MCV RBC AUTO: 98 FL (ref 80–100)
NRBC BLD-RTO: 0 /100 WBCS (ref 0–0)
PLATELET # BLD AUTO: 246 X10*3/UL (ref 150–450)
POTASSIUM SERPL-SCNC: 3.9 MMOL/L (ref 3.5–5.3)
RBC # BLD AUTO: 2.83 X10*6/UL (ref 4–5.2)
SODIUM SERPL-SCNC: 140 MMOL/L (ref 136–145)
WBC # BLD AUTO: 5.1 X10*3/UL (ref 4.4–11.3)

## 2024-11-11 PROCEDURE — 2500000001 HC RX 250 WO HCPCS SELF ADMINISTERED DRUGS (ALT 637 FOR MEDICARE OP)

## 2024-11-11 PROCEDURE — 85027 COMPLETE CBC AUTOMATED: CPT | Performed by: NURSE PRACTITIONER

## 2024-11-11 PROCEDURE — 99232 SBSQ HOSP IP/OBS MODERATE 35: CPT | Performed by: NURSE PRACTITIONER

## 2024-11-11 PROCEDURE — 99233 SBSQ HOSP IP/OBS HIGH 50: CPT | Performed by: INTERNAL MEDICINE

## 2024-11-11 PROCEDURE — 2500000001 HC RX 250 WO HCPCS SELF ADMINISTERED DRUGS (ALT 637 FOR MEDICARE OP): Performed by: INTERNAL MEDICINE

## 2024-11-11 PROCEDURE — 2500000004 HC RX 250 GENERAL PHARMACY W/ HCPCS (ALT 636 FOR OP/ED): Performed by: NURSE PRACTITIONER

## 2024-11-11 PROCEDURE — 2500000001 HC RX 250 WO HCPCS SELF ADMINISTERED DRUGS (ALT 637 FOR MEDICARE OP): Performed by: NURSE PRACTITIONER

## 2024-11-11 PROCEDURE — 97535 SELF CARE MNGMENT TRAINING: CPT | Mod: GO

## 2024-11-11 PROCEDURE — 1200000002 HC GENERAL ROOM WITH TELEMETRY DAILY

## 2024-11-11 PROCEDURE — 99231 SBSQ HOSP IP/OBS SF/LOW 25: CPT | Performed by: INTERNAL MEDICINE

## 2024-11-11 PROCEDURE — 36415 COLL VENOUS BLD VENIPUNCTURE: CPT | Performed by: NURSE PRACTITIONER

## 2024-11-11 PROCEDURE — 2500000002 HC RX 250 W HCPCS SELF ADMINISTERED DRUGS (ALT 637 FOR MEDICARE OP, ALT 636 FOR OP/ED): Performed by: NURSE PRACTITIONER

## 2024-11-11 PROCEDURE — 80048 BASIC METABOLIC PNL TOTAL CA: CPT | Performed by: NURSE PRACTITIONER

## 2024-11-11 ASSESSMENT — COGNITIVE AND FUNCTIONAL STATUS - GENERAL
DRESSING REGULAR UPPER BODY CLOTHING: A LITTLE
DRESSING REGULAR LOWER BODY CLOTHING: A LOT
DAILY ACTIVITIY SCORE: 17
TOILETING: A LOT
HELP NEEDED FOR BATHING: A LOT
EATING MEALS: A LOT
HELP NEEDED FOR BATHING: A LOT
MOBILITY SCORE: 12
DRESSING REGULAR UPPER BODY CLOTHING: A LOT
CLIMB 3 TO 5 STEPS WITH RAILING: A LOT
MOVING FROM LYING ON BACK TO SITTING ON SIDE OF FLAT BED WITH BEDRAILS: A LOT
MOVING TO AND FROM BED TO CHAIR: A LOT
TURNING FROM BACK TO SIDE WHILE IN FLAT BAD: A LOT
PERSONAL GROOMING: A LITTLE
DAILY ACTIVITIY SCORE: 12
TOILETING: A LOT
WALKING IN HOSPITAL ROOM: A LOT
STANDING UP FROM CHAIR USING ARMS: A LOT
DRESSING REGULAR LOWER BODY CLOTHING: A LITTLE
PERSONAL GROOMING: A LOT

## 2024-11-11 ASSESSMENT — ACTIVITIES OF DAILY LIVING (ADL): HOME_MANAGEMENT_TIME_ENTRY: 16

## 2024-11-11 ASSESSMENT — PAIN SCALES - GENERAL
PAINLEVEL_OUTOF10: 0 - NO PAIN
PAINLEVEL_OUTOF10: 4
PAINLEVEL_OUTOF10: 0 - NO PAIN
PAINLEVEL_OUTOF10: 0 - NO PAIN

## 2024-11-11 ASSESSMENT — PAIN - FUNCTIONAL ASSESSMENT: PAIN_FUNCTIONAL_ASSESSMENT: 0-10

## 2024-11-11 ASSESSMENT — PAIN DESCRIPTION - LOCATION: LOCATION: LEG

## 2024-11-11 ASSESSMENT — PAIN DESCRIPTION - ORIENTATION: ORIENTATION: LEFT

## 2024-11-11 NOTE — CONSULTS
Wound Care Consult     Visit Date: 11/11/2024      Patient Name: Anny Tesfaye         MRN: [id number]           YOB: 1945     Reason for Consult: reconsult to reassess right abdominal fold/groin surgical incision.            Pertinent Labs:   Albumin   Date Value Ref Range Status   11/02/2024 2.6 (L) 3.4 - 5.0 g/dL Final   09/27/2024 3.3 (L) 3.4 - 5.0 g/dL Final     Albumin %   Date Value Ref Range Status   06/27/2024 72.4 % Final       Wound Assessment:  Wound 11/03/24 Incision Abdomen (Active)   Site Assessment Dry;Intact;Clean 11/10/24 1245   Lucio-Wound Assessment Clean;Dry 11/10/24 1245   State of Healing Closed wound edges 11/10/24 1245   Closure Approximated;Steri strips 11/11/24 0900   Treatments Cleansed 11/07/24 1248   Sutures/Staple Line Approximated 11/11/24 0900   Drainage Description None 11/11/24 0900   Drainage Amount None 11/11/24 0900   Dressing Open to air 11/11/24 0900   Dressing Changed Changed 11/05/24 2100   Dressing Status Clean;Dry 11/08/24 0810   Adhesive Closure Strips Changed 11/05/24 2100       Wound 11/05/24 Leg Right;Upper;Anterior;Proximal (Active)   Wound Image   11/11/24 1500   Shape oval 11/11/24 1500   Wound Length (cm) 3.5 cm 11/11/24 1458   Wound Width (cm) 3.8 cm 11/11/24 1458   Wound Surface Area (cm^2) 13.3 cm^2 11/11/24 1458   Wound Depth (cm) 2.9 cm 11/11/24 1500   Wound Volume (cm^3) 18.375 cm^3 11/08/24 1135   Wound Healing % -63 11/08/24 1135   Margins Well-defined edges 11/08/24 1135   Drainage Description Serosanguineous 11/11/24 1500   Drainage Amount Moderate 11/11/24 1500   Dressing Hydrofiber 11/11/24 1500   Dressing Changed Changed 11/11/24 1500   Dressing Status Clean;Dry 11/10/24 2047       Wound Team Summary Assessment: Notified Dr. Tony of wound care recommendations. Additional supplied     Right inguinal/abdominal fold-full thickness   Irrigate with normal saline or wound cleanser, Pat dry.  Apply no sting skin barrier (cavilon) to lucio  wound   Apply cristy collagen to wound bed (will dissolve)    Pack with Aquacel  Cover with dry dressing.   Change every day and as needed.        Wound Team Plan: Thank you for this consult. Assessment has been completed and orders have been placed. Wound care to be completed by nursing per orders. Please place new consult if there is a change in wound status.     While in bed patient should only be on one fitted sheet, and one chux. Please, do not use brief while patient is resting in bed. Elevate heels off the bed surface at all times. Turn and reposition at least every 2 hours.       Rachel Cronin RN BSN,[facility],CWOCN  [phone]   11/11/2024  4:23 PM

## 2024-11-11 NOTE — CONSULTS
"  Ostomy/Wound Care Consult     Visit Date: 11/11/2024      Patient Name: Anny Tesfaye         MRN: [id number]           YOB: 1945    WO nursing visit outcome: Seen for pouch change and assessment.     [facility] next scheduled visit/plan: Will follow while inpatient    Stoma Type: End Colostomy  Fab: No  Diameter: rounds to 1 1/4\"  Location: LLQ  Protrusion: Budded  Mucosal Condition and Color: Moist, Red  Mucocutaneous Junction: Separation, Location 6-11 o'clock  Peristomal Skin: skin erosions at 9 o'clock  Location of Skin Impairment: 9 o'clock  Peristomal Contour: Flat  Supportive Tissue: Semi-Soft  Character of Output: Pasty Stool  Emptying Frequency: per floor nursing  Removed/Current Pouching System: [place] Premier convex with barrier ring  Current Wearing Time: unable to determine    Recommendations:   Skin Care: dust with stoma powder  Pouching System: 2 1/4\" [place] New Image soft convex wafer with [employer] Cera Ring and drainable pouch  Wear Time: 3-4 days  Other: supplies at bedside    Photo: 11/11/2024    Incision: midline-steri strips    Time Increment: 30 minutes     Keeley BRUSHN, RN, CWOCN  11/11/2024  4:54 PM        "

## 2024-11-11 NOTE — PROGRESS NOTES
11/11/24 1351   Discharge Planning   Home or Post Acute Services Post acute facilities (Rehab/SNF/etc)   Type of Post Acute Facility Services Skilled nursing   Expected Discharge Disposition Short Term A   Does the patient need discharge transport arranged? Yes   RoundTrip coordination needed? Yes   Has discharge transport been arranged? No     Pt discharging to [place] at the Fall, auth good through the 11/15 And according to physician note today plan for discharge tomorrow 11/12. [facility] 17/16. CT to follow. Marii Mandujano BSN/RN-TCC

## 2024-11-11 NOTE — CARE PLAN
Problem: Pain - Adult  Goal: Verbalizes/displays adequate comfort level or baseline comfort level  Outcome: Progressing     Problem: Safety - Adult  Goal: Free from fall injury  Outcome: Progressing     Problem: Discharge Planning  Goal: Discharge to home or other facility with appropriate resources  Outcome: Progressing     Problem: Chronic Conditions and Co-morbidities  Goal: Patient's chronic conditions and co-morbidity symptoms are monitored and maintained or improved  Outcome: Progressing     Problem: Skin  Goal: Decreased wound size/increased tissue granulation at next dressing change  Outcome: Progressing  Goal: Participates in plan/prevention/treatment measures  Outcome: Progressing  Goal: Prevent/manage excess moisture  Outcome: Progressing  Goal: Prevent/minimize sheer/friction injuries  Outcome: Progressing  Goal: Promote/optimize nutrition  Outcome: Progressing  Goal: Promote skin healing  Outcome: Progressing   The patient's goals for the shift include      The clinical goals for the shift include monitor vs, labs, I&O's; manage pain; promote safe ambulation with assistance; attempt OOB for meals

## 2024-11-11 NOTE — PROGRESS NOTES
Occupational Therapy    Occupational Therapy Treatment    Name: Anyn Tesfaye  MRN: [id number]  Department: [place] [address]  Room: [address] [place] [address]  Date: 11/11/24  Time Calculation  Start Time: 1625  Stop Time: 1641  Time Calculation (min): 16 min    Assessment:  OT Assessment:  (Patient continues to have decreased activity tolerance, decreased ADLs, decreased standing tolerance, decreased transfers. Patient would benefit from moderate intensity therapy to maximize functional independence.)  Prognosis: Good  Barriers to Discharge: Decreased caregiver support  Evaluation/Treatment Tolerance: Patient limited by fatigue  Medical Staff Made Aware: Yes  End of Session Communication: Bedside nurse  End of Session Patient Position: Bed, 2 rail up, Alarm on  Plan:  Treatment Interventions: ADL retraining, Functional transfer training, Endurance training, Patient/family training, Neuromuscular reeducation  OT Frequency: 3 times per week  OT Discharge Recommendations: Moderate intensity level of continued care  Equipment Recommended upon Discharge: Wheeled walker  OT Recommended Transfer Status: Moderate assist  OT - OK to Discharge: Yes    Subjective   Previous Visit Info:  OT Last Visit  OT Received On: 11/11/24  General:  General  Reason for Referral: Pt to ED 11/2 with confusion, dysarthria, and SOB. Pt was admitted for pneumonia and acute HF.  Referred By: Dr. Tony  Past Medical History Relevant to Rehab:   Past Medical History:   Diagnosis Date    Aortic aneurysm ([facility])     Chronic low back pain     CKD (chronic kidney disease) stage 3, GFR 30-59 ml/min (Multi)     Esophagitis     Glaucoma     History of scarlet fever     Hypertension     Hypothyroidism     Morbid obesity (Multi)     Nephritis     OA (osteoarthritis) of knee     Bilateral    Polymyalgia rheumatica (Multi)     Psoriasis     Sciatica      Prior to Session Communication: Bedside nurse  Patient Position Received: Bed, 2 rail up, Alarm on  General Comment:  (Patient  requires encouragement for participation in therapy.)  Precautions:  Medical Precautions: Fall precautions    Pain Assessment:  Pain Assessment  Pain Assessment: 0-10  0-10 (Numeric) Pain Score: 0 - No pain     Objective   Cognition:  Overall Cognitive Status: Within Functional Limits  Orientation Level: Oriented X4    Activities of Daily Living:    UE Dressing  UE Dressing Level of Assistance: Minimum assistance  UE Dressing Where Assessed: Edge of bed  UE Dressing Comments:  (Assist with hospital gown around shoulders.)    LE Dressing  LE Dressing: Yes  Sock Level of Assistance: Minimum assistance  LE Dressing Where Assessed: Edge of bed  LE Dressing Comments:  (Cues for hand placement)    Bed Mobility/Transfers: Bed Mobility  Bed Mobility: Yes  Bed Mobility 1  Bed Mobility 1: Supine to sitting  Level of Assistance 1: Minimum assistance  Bed Mobility Comments 1:  (Assist with upper trunk.)    Transfers  Transfer: Yes  Transfer 1  Transfer From 1: Bed to  Transfer to 1: Chair with arms  Technique 1: Sit to stand  Transfer Device 1: Walker  Transfer Level of Assistance 1: Moderate assistance  Trials/Comments 1:  (Cues for hand placement and sequencing.)    Sitting Balance:  Static Sitting Balance  Static Sitting-Balance Support: Feet supported  Static Sitting-Level of Assistance: Contact guard  Dynamic Sitting Balance  Dynamic Sitting-Balance Support: Feet supported  Dynamic Sitting-Level of Assistance: Contact guard  Dynamic Sitting-Balance: Forward lean, Reaching for objects  Standing Balance:  Static Standing Balance  Static Standing-Balance Support: Bilateral upper extremity supported  Static Standing-Level of Assistance: Minimum assistance    Therapy/Activity: Therapeutic Exercise  Therapeutic Exercise Performed: Yes  Therapeutic Exercise Activity 1:  (Wheelchair pushups x 5)    Outcome Measures:  [facility] Daily Activity  Putting on and taking off regular lower body clothing: A little  Bathing (including washing,  rinsing, drying): A lot  Putting on and taking off regular upper body clothing: A little  Toileting, which includes using toilet, bedpan or urinal: A lot  Taking care of personal grooming such as brushing teeth: A little  Eating Meals: None  Daily Activity - Total Score: 17    Goals:  Encounter Problems       Encounter Problems (Active)       ADLs       Patient will perform UB and LB bathing with independent level of assistance.  (Progressing)       Start:  11/06/24    Expected End:  11/20/24            Patient with complete upper body dressing with independent level of assistance donning and doffing all UE clothes with no adaptive equipment while edge of bed  (Progressing)       Start:  11/06/24    Expected End:  11/20/24            Patient with complete lower body dressing with independent level of assistance donning and doffing all LE clothes  with no adaptive equipment while edge of bed  (Progressing)       Start:  11/06/24    Expected End:  11/20/24            Patient will complete daily grooming tasks brushing teeth and washing face/hair with independent level of assistance and PRN adaptive equipment while standing. (Progressing)       Start:  11/06/24    Expected End:  11/20/24            Patient will complete toileting including hygiene clothing management/hygiene with independent level of assistance and raised toilet seat. (Progressing)       Start:  11/06/24    Expected End:  11/20/24               BALANCE       Pt will maintain dynamic standing balance during ADL task with independent level of assistance in order to demonstrate decreased risk of falling and improved postural control. (Progressing)       Start:  11/06/24    Expected End:  11/20/24               TRANSFERS       Patient will perform bed mobility independent level of assistance and bed rails in order to improve safety and independence with mobility (Progressing)       Start:  11/06/24    Expected End:  11/20/24            Patient will  complete functional transfer to all surfaces with front wheeled walker with modified independent level of assistance. (Progressing)       Start:  11/06/24    Expected End:  11/20/24

## 2024-11-11 NOTE — PROGRESS NOTES
Subjective Data:  Patient resting, denies any complaints of chest pain or dyspnea at rest. Reports feeling better, still with mild dyspnea with exertion  Supp oxygen in place via nc 2 L  Remains in sinus rhythm    Overnight Events:    None reported     Objective Data:  Last Recorded Vitals:  Vitals:    11/10/24 2022 11/10/24 2324 24 0358 24 0859   BP: 102/51 117/60 121/72 125/65   BP Location: Right arm Right arm Right arm Left arm   Patient Position: Lying Lying Lying Lying   Pulse: 62 67 66 57   Resp: 20    Temp: 36.4 °C (97.6 °F) 36.9 °C (98.5 °F) 36.6 °C (97.9 °F) 36.8 °C (98.2 °F)   TempSrc: Oral Oral Oral Oral   SpO2: 95% 94% 96% 94%   Weight:       Height:         Medical Gas Therapy: Supplemental oxygen (2L NC)  Medical Gas Delivery Method: Nasal cannula  Weight  Av.9 kg (163 lb)  Min: 73.9 kg (163 lb)  Max: 73.9 kg (163 lb)    LABS:  CMP:  Results from last 7 days   Lab Units 24  0546 11/10/24  0532 24  0617 24  0716 24  0611 24  0555 24  0649   SODIUM mmol/L 140 139 139 138 139 140 139   POTASSIUM mmol/L 3.9 4.1 3.9 4.1 4.0 4.0 4.5   CHLORIDE mmol/L 106 110* 109* 106 107 108* 110*   CO2 mmol/L 30 24 26 26 26 27 25   ANION GAP mmol/L 8* 9* 8* 10 10 9* 9*   BUN mg/dL 34* 32* 33* 35* 32* 34* 33*   CREATININE mg/dL 1.67* 1.63* 1.87* 2.18* 2.01* 2.16* 2.21*   EGFR mL/min/1.73m*2 31* 32* 27* 22* 25* 23* 22*   MAGNESIUM mg/dL  --   --  1.66 1.50*  --  1.46*  --      CBC:  Results from last 7 days   Lab Units 24  0546 11/10/24  0532 24  0617 24  0716 24  0611 24  0555 24  0648   WBC AUTO x10*3/uL 5.1 4.7 4.6 5.1 5.2 4.5 5.8   HEMOGLOBIN g/dL 8.3* 8.8* 7.5* 7.5* 8.1* 7.9* 7.7*   HEMATOCRIT % 27.7* 30.5* 25.4* 25.2* 26.1* 27.3* 26.3*   PLATELETS AUTO x10*3/uL 246 214 200 205 212 219 204   MCV fL 98 102* 99 99 97 98 99     COAG:   Results from last 7 days   Lab Units 24  1402   INR  1.3*     ABO: No results found  "for: \"ABO\"  HEME/ENDO:         CARDIAC:   Results from last 7 days   Lab Units 11/04/24  1402   LD U/L 122             Last I/O:    Intake/Output Summary (Last 24 hours) at 11/11/2024 1113  Last data filed at 11/11/2024 0246  Gross per 24 hour   Intake --   Output 2620 ml   Net -2620 ml     Net IO Since Admission: -6,588.33 mL [11/11/24 1113]          Inpatient Medications:  Scheduled medications   Medication Dose Route Frequency    amiodarone  200 mg oral q AM    apixaban  2.5 mg oral BID    aspirin  81 mg oral Daily    atorvastatin  10 mg oral Nightly    cefepime  1 g intravenous q12h    escitalopram  20 mg oral Daily    ferrous sulfate (325 mg ferrous sulfate)  65 mg of iron oral Daily with breakfast    furosemide  40 mg oral Daily    levothyroxine  100 mcg oral Daily    magnesium oxide  400 mg oral Once    metoprolol succinate XL  25 mg oral Daily    mirtazapine  15 mg oral Nightly    pantoprazole  40 mg oral Daily before breakfast    Or    pantoprazole  40 mg intravenous Daily before breakfast    sacubitriL-valsartan  1 tablet oral BID    thyroid (pork)  30 mg oral Daily before breakfast     PRN medications   Medication    acetaminophen    Or    acetaminophen    Or    acetaminophen    ALPRAZolam    alum-mag hydroxide-simeth    butamben-tetracaine-benzocaine    guaiFENesin    lidocaine    melatonin    ondansetron    Or    ondansetron    oxygen    oxygen    oxygen    polyethylene glycol    traMADol     Continuous Medications   Medication Dose Last Rate         Physical Exam:  General: Pleasant frail elderly female, alert and oriented x 3, NAD  Neck:  No thyromegaly.  Mild JVD  Cardiovascular:  Regular rate and rhythm.  Normal S1 and S2.  Pulmonary: Lungs diminished throughout, supplemental oxygen in place  Abdomen:  Soft. Non-tender.   Non-distended.  Positive bowel sounds.  Lower Extremities:  2+ pedal pulses. Trace edema  Neurologic:  Cranial nerves intact.  No focal deficit.   Skin: Skin warm and dry, normal " skin turgor.   Psychiatric: Appropriate mood and behavior      UMESH Indication Cardioversion 11/7/24  CONCLUSIONS:   1. Left ventricular ejection fraction is moderately decreased, by visual estimate at 30-35%.   2. There are multiple left ventricular wall motion abnormalities.   3. Successful cardioversion for atrial fibrillation resulting in sinus bradycardia.   4. Unable to determine right ventricular systolic function.   5. The left atrium is enlarged.   6. Aortic root enlarged at 4.5 cm. Ascending aorta 4.3 cm.   7. No LA or ANGEL thrombus.     11/6 RHC: RA 8, PA 38/25(31), W9, CO/CI 4.7/2.6, , SVO2 47%    Echocardiogram 11/4/24:  CONCLUSIONS:   1. Left ventricular ejection fraction is moderately decreased, by visual estimate at 30-35%.   2. There is global hypokinesis of the left ventricle with minor regional variations.   3. Left ventricular diastolic filling cannot be determined, due to atrial fibrillation/flutter.   4. There is moderately reduced right ventricular systolic function.   5. Moderately enlarged right ventricle.   6. The left atrium is severely dilated.   7. There is moderate mitral annular calcification.   8. Mildly elevated right ventricular systolic pressure.   9. Mild aortic valve regurgitation.  10. Aortic root moderatly enlarged at 4.8 cm. Ascending aorta 4.4 cm.  11. The patient is in atrial fibrillation which may influence the estimate of left ventricular function and transvalvular flows.     Assessment/Plan  Anny Tesfaye is a 79 y.o. female, with a PMH of HTN, HLD, heavy tobacco use, severe PAD, AAA s/p EVAR 10/10/24 at [facility], lumbar stenosis, hypothyroidism, CKD III, polymyalgia rheumatica, and OA, who presented to [facility] on 11/2/2024 for change in mental status. Patient reports recent prolonged hospital course at [facility] s/p EVAR on 10/10/24 c/b postoperative AF, respiratory insufficiency, volume overload, BALBIR on CKD, and ischemic colitis s/p hemicolectomy w/  colostomy. She was started on Amiodarone without conversion to NSR and Eliquis for the atrial fibrillation. Home antihypertensive medications were held at discharge givens stable BP. She was discharged to SNF 10/25/24. She now presents to [facility] ED 11/2/24 for altered mental status, SOB, and weakness, admitted for PNA and fluid overload. Cardiology is consulted for HF. Patient reports no prior hx of HF, states that edema has been an issue since her recent hospitalization.      Home CV Medications: Amiodarone 200mg daily, Eliquis 2.5mg BID, ASA 81mg daily, statin  -Previously on Captopril 50mg, HCTZ 25mg, and Verapamil 240mg CR       I reviewed telemetry, SR rates in the 60's     #Acute Systolic HF/ New Cardiomyopathy- Initially ++Hypervolemia with diffuse anasarca likely in the setting of judicious postoperative fluid resuscitation and malnutrition, EF newly decreased on TTE suspect A-fib related.  BP on low end making GDMT difficult  -Continue metoprolol and Entresto  - Repeat echo in about 1 month if maintained sinus rhythm     #Postoperative AF- rate controlled 80-100s, c/w Amiodarone  S/p UMESH revealing no LA or ANGEL thrombus/ Successful AF Cardioversion synchronized shock was delivered at 200 J with conversion to normal sinus rhythm   (11/7/24)  -On Eliquis for OAC, has been on anticoagulation since 10/23 (Heparin gtt then transitioned to Eliquis)     #HTN- BP low, home antihypertensives held during prior hospitalization     #Recent EVAR- c/w ASA and statin     #BALBIR-CR improving.        RECOMMENDATIONS:  -c/w furosemide 40 mg PO daily  -added GDMT (11/9/24): Entresto 24/26 mg PO BID, Metop XL 25 mg PO Daily tolerating  Will hold MRA and SGLT2-I for now as renal function recovers> consider initiating at outpt follow up  -c/w Amiodarone and Eliquis as ordered.  Consider stopping amiodarone in 1 to 2 months once better recovery and hopefully EF recovery  -Outpatient echo in about 1 to 2 months to assess for  recovery  Stable for DC from cardiac standpoint  She has [facility] cardiology follow-up  Feel free to contact us back further if any other questions    Code Status:  Full Code      Rashad Ramirez, DO

## 2024-11-11 NOTE — ASSESSMENT & PLAN NOTE
Anny Tesfaye is an 78 y/o Male PMH: HTN, HLD, heavy tobacco use, severe PAD, AAA s/p EVAR 10/10/24 at [facility], lumbar stenosis, hypothyroidism, CKD III, polymyalgia rheumatica, and OA     presenting to the ED with complaints of increased confusion, shortness of breath, and weakness. Patient subsequently admitted for further monitoring of above symptoms.      #Community-acquired pneumonia  #Sepsis with leukocytosis  #Bilateral pleural effusion  #hypoxia  -Continues on IV cefepime  -Continues on 2L nc  -Will need home 02 eval in am  -s/p thoracentesis  -Consider Pulmonary consult if warranted  -Consider ID consult if warranted  -PT/OT        #Malnutrition  -Continues on Remeron for appetite stimulant         #CHF  -Continues on Lasix 40 mg PO daily  -Strict I&Os, d. Weight  -Cardiology following

## 2024-11-11 NOTE — SIGNIFICANT EVENT
11/11 pt spo2 95% on 2 liters,spo2 91-92 rm air4 at rest.,pt states she's going to rehab,not home,nurse aware of results

## 2024-11-11 NOTE — NURSING NOTE
Rapid Response RN Note    The following patient has a RADAR score of 8. Unit: [facility], Room: 625/[facility]-A, T: 37 °C (98.6 °F) (Temporal), P: 59, R: 21, BP: 95/53, PulseOx: 92%      This RN Reviewed above VS with bedside RN.

## 2024-11-11 NOTE — PROGRESS NOTES
"Anny Tesfaye is a 79 y.o. female on day 9 of admission presenting with Sepsis with acute organ dysfunction, due to unspecified organism, unspecified organ dysfunction type, unspecified whether septic shock present (Multi).    Subjective   Patient alert and oriented. VSS on 2L.     Objective     Physical Exam  Vitals reviewed.   HENT:      Head: Normocephalic.      Right Ear: Tympanic membrane normal.      Nose: Nose normal.      Mouth/Throat:      Mouth: Mucous membranes are moist.   Eyes:      Pupils: Pupils are equal, round, and reactive to light.   Cardiovascular:      Rate and Rhythm: Normal rate.   Pulmonary:      Effort: Pulmonary effort is normal.      Comments: 2L nc, expiratory wheezing right   Abdominal:      General: Abdomen is flat.   Genitourinary:     Comments: deferred  Musculoskeletal:         General: Normal range of motion.      Cervical back: Normal range of motion.   Skin:     General: Skin is warm.      Capillary Refill: Capillary refill takes less than 2 seconds.   Neurological:      General: No focal deficit present.      Mental Status: She is alert and oriented to person, place, and time. Mental status is at baseline.   Psychiatric:         Mood and Affect: Mood normal.         Behavior: Behavior normal.         Thought Content: Thought content normal.         Judgment: Judgment normal.         Last Recorded Vitals  Blood pressure 91/55, pulse 60, temperature 36.6 °C (97.9 °F), temperature source Temporal, resp. rate 17, height 1.651 m (5' 5\"), weight 73.9 kg (163 lb), SpO2 91%.  Intake/Output last 3 Shifts:  I/O last 3 completed shifts:  In: - (0 mL/kg)   Out: 3670 (49.6 mL/kg) [Urine:3650 (1.4 mL/kg/hr); Stool:20]  Weight: 73.9 kg     Relevant Results  Results for orders placed or performed during the hospital encounter of 11/02/24 (from the past 24 hours)   CBC   Result Value Ref Range    WBC 5.1 4.4 - 11.3 x10*3/uL    nRBC 0.0 0.0 - 0.0 /100 WBCs    RBC 2.83 (L) 4.00 - 5.20 x10*6/uL "    Hemoglobin 8.3 (L) 12.0 - 16.0 g/dL    Hematocrit 27.7 (L) 36.0 - 46.0 %    MCV 98 80 - 100 fL    MCH 29.3 26.0 - 34.0 pg    MCHC 30.0 (L) 32.0 - 36.0 g/dL    RDW 16.8 (H) 11.5 - 14.5 %    Platelets 246 150 - 450 x10*3/uL   Basic metabolic panel   Result Value Ref Range    Glucose 95 74 - 99 mg/dL    Sodium 140 136 - 145 mmol/L    Potassium 3.9 3.5 - 5.3 mmol/L    Chloride 106 98 - 107 mmol/L    Bicarbonate 30 21 - 32 mmol/L    Anion Gap 8 (L) 10 - 20 mmol/L    Urea Nitrogen 34 (H) 6 - 23 mg/dL    Creatinine 1.67 (H) 0.50 - 1.05 mg/dL    eGFR 31 (L) >60 mL/min/1.73m*2    Calcium 8.0 (L) 8.6 - 10.3 mg/dL     *Note: Due to a large number of results and/or encounters for the requested time period, some results have not been displayed. A complete set of results can be found in Results Review.             Assessment/Plan   Assessment & Plan  Sepsis with acute organ dysfunction, due to unspecified organism, unspecified organ dysfunction type, unspecified whether septic shock present (Multi)    Anemia    Chronic renal insufficiency    Anticoagulant long-term use    Aortic aneurysm (CMS-[facility])    Decreased cardiac ejection fraction    Peripheral vascular disease (CMS-[facility])    Spinal stenosis    Acute congestive heart failure    Heart failure, systolic and diastolic, acute  Anny Tesfaye is an 78 y/o Male PMH: HTN, HLD, heavy tobacco use, severe PAD, AAA s/p EVAR 10/10/24 at [facility], lumbar stenosis, hypothyroidism, CKD III, polymyalgia rheumatica, and OA     presenting to the ED with complaints of increased confusion, shortness of breath, and weakness. Patient subsequently admitted for further monitoring of above symptoms.      #Community-acquired pneumonia  #Sepsis with leukocytosis  #Bilateral pleural effusion  #hypoxia  -Continues on IV cefepime  -Continues on 2L nc  -Will need home 02 eval in am  -s/p thoracentesis  -Consider Pulmonary consult if warranted  -Consider ID consult if  warranted  -PT/OT        #Malnutrition  -Continues on Remeron for appetite stimulant         #CHF  -Continues on Lasix 40 mg PO daily  -Strict I&Os, d. Weight  -Cardiology following        #Paroxysmal fibrillation  -Continues on amiodarone and DOAC  -Cardiology following  -monitor      #DVT prophylaxis  -scds      DC plan:  DC to facility when medically stable. PT/OT recommending mod. Auth available for [facility] [place] [facility]. Anticipate dc tomorrow after home 02 eval, patient would benefit from another day of IV abts. Interdisciplinary rounding completed with Attending Provider, Bedside RN and TCC.  Labs, results and plan of care discussed and reviewed with Dr. Tony.       I spent 20 minutes in the professional and overall care of this patient.  Sarah Velasquez, YVONNE-CNP

## 2024-11-12 VITALS
HEART RATE: 59 BPM | BODY MASS INDEX: 27.03 KG/M2 | TEMPERATURE: 97.6 F | DIASTOLIC BLOOD PRESSURE: 57 MMHG | HEIGHT: 65 IN | SYSTOLIC BLOOD PRESSURE: 96 MMHG | WEIGHT: 162.26 LBS | OXYGEN SATURATION: 97 % | RESPIRATION RATE: 21 BRPM

## 2024-11-12 PROCEDURE — 2500000001 HC RX 250 WO HCPCS SELF ADMINISTERED DRUGS (ALT 637 FOR MEDICARE OP)

## 2024-11-12 PROCEDURE — 2500000002 HC RX 250 W HCPCS SELF ADMINISTERED DRUGS (ALT 637 FOR MEDICARE OP, ALT 636 FOR OP/ED): Performed by: NURSE PRACTITIONER

## 2024-11-12 PROCEDURE — 99239 HOSP IP/OBS DSCHRG MGMT >30: CPT | Performed by: INTERNAL MEDICINE

## 2024-11-12 PROCEDURE — 2500000001 HC RX 250 WO HCPCS SELF ADMINISTERED DRUGS (ALT 637 FOR MEDICARE OP): Performed by: NURSE PRACTITIONER

## 2024-11-12 PROCEDURE — 2500000004 HC RX 250 GENERAL PHARMACY W/ HCPCS (ALT 636 FOR OP/ED): Performed by: NURSE PRACTITIONER

## 2024-11-12 RX ORDER — FERROUS SULFATE 325(65) MG
65 TABLET ORAL
Start: 2024-11-12

## 2024-11-12 RX ORDER — CIPROFLOXACIN 500 MG/1
500 TABLET ORAL DAILY
Start: 2024-11-12 | End: 2024-11-19

## 2024-11-12 RX ORDER — METOPROLOL SUCCINATE 25 MG/1
25 TABLET, EXTENDED RELEASE ORAL DAILY
Start: 2024-11-13 | End: 2024-12-13

## 2024-11-12 RX ORDER — MIRTAZAPINE 15 MG/1
15 TABLET, FILM COATED ORAL NIGHTLY
Start: 2024-11-12 | End: 2024-12-12

## 2024-11-12 RX ORDER — FUROSEMIDE 40 MG/1
40 TABLET ORAL DAILY
Start: 2024-11-13 | End: 2024-12-13

## 2024-11-12 ASSESSMENT — PAIN SCALES - GENERAL: PAINLEVEL_OUTOF10: 0 - NO PAIN

## 2024-11-12 NOTE — DISCHARGE SUMMARY
Discharge Diagnosis  Sepsis with acute organ dysfunction, due to unspecified organism, unspecified organ dysfunction type, unspecified whether septic shock present (Multi)    Issues Requiring Follow-Up  Duplicate    Test Results Pending At Discharge  Pending Labs       Order Current Status    AFB Culture/Smear Preliminary result    Fungal Culture/Smear Preliminary result            Hospital Course  Duplicate discharge summary    Pertinent Physical Exam At Time of Discharge  Physical Exam    Home Medications     Medication List      START taking these medications     ciprofloxacin 500 mg tablet; Commonly known as: Cipro; Take 1 tablet   (500 mg) by mouth once daily for 7 days.   ferrous sulfate (325 mg ferrous sulfate) tablet; Take 1 tablet (325 mg)   by mouth once daily with breakfast.   furosemide 40 mg tablet; Commonly known as: Lasix; Take 1 tablet (40 mg)   by mouth once daily.; Start taking on: November 13, 2024   metoprolol succinate XL 25 mg 24 hr tablet; Commonly known as:   Toprol-XL; Take 1 tablet (25 mg) by mouth once daily. Do not crush or   chew.; Start taking on: November 13, 2024   mirtazapine 15 mg tablet; Commonly known as: Remeron; Take 1 tablet (15   mg) by mouth once daily at bedtime.   sacubitriL-valsartan 24-26 mg tablet; Commonly known as: Entresto; Take   1 tablet by mouth 2 times a day.     CONTINUE taking these medications     acetaminophen 650 mg ER tablet; Commonly known as: Tylenol 8 HOUR   Actemra 200 mg/10 mL (20 mg/mL) solution injection; Generic drug:   tocilizumab   alendronate 70 mg tablet; Commonly known as: Fosamax; Take 1 tablet (70   mg) by mouth every 7 days. Take on an empty stomach. Do not lie down or   eat for 1/2 hour after taking.   amiodarone 200 mg tablet; Commonly known as: Pacerone   aspirin 81 mg EC tablet   atorvastatin 10 mg tablet; Commonly known as: Lipitor   calcium carbonate 500 mg calcium (1,250 mg) chewable tablet   Calmoseptine 0.44-20.6 % ointment; Generic  drug: menthol-zinc oxide   cyanocobalamin (vitamin B-12) 5,000 mcg capsule   DAILY MULTI-VITAMIN ORAL   docusate sodium 100 mg capsule; Commonly known as: Colace   Dulcolax (bisacodyl) 10 mg suppository; Generic drug: bisacodyl   Eliquis 2.5 mg tablet; Generic drug: apixaban   escitalopram 20 mg tablet; Commonly known as: Lexapro; Take 1 tablet (20   mg) by mouth once daily.   L-METHYLFOLATE ORAL   levothyroxine 100 mcg tablet; Commonly known as: Synthroid, Levoxyl;   TAKE 1 TABLET (100 MCG) BY MOUTH ONCE DAILY. SIX DAYS PER WEEK   lutein 6 mg capsule   magnesium hydroxide 400 mg/5 mL suspension; Commonly known as: Milk of   Magnesia   nicotine 21 mg/24 hr patch; Commonly known as: Nicoderm CQ   oxybutynin XL 10 mg 24 hr tablet; Commonly known as: Ditropan-XL   oxyCODONE 5 mg immediate release tablet; Commonly known as: Roxicodone   pantoprazole 40 mg EC tablet; Commonly known as: ProtoNix   polyethylene glycol 17 gram packet; Commonly known as: Glycolax, Miralax   sennosides 8.6 mg tablet; Commonly known as: Senokot   sodium phosphates 19-7 gram/118 mL enema enema; Commonly known as:   Fleets   thyroid (pork) 30 mg tablet; Commonly known as: [place] Thyroid; Take 1   tablet (30 mg) by mouth once daily in the morning. Take before meals.   Vitamin C 500 mg tablet; Generic drug: ascorbic acid     STOP taking these medications     captopril 50 mg tablet; Commonly known as: Capoten   simvastatin 20 mg tablet; Commonly known as: Zocor   verapamil  mg ER tablet; Commonly known as: Calan-SR       Outpatient Follow-Up  Future Appointments   Date Time Provider Department Center   5/16/2025  9:00 AM Filemon Montaño DO [id number] Cesar Tony MD

## 2024-11-12 NOTE — CARE PLAN
The patient's goals for the shift include      The clinical goals for the shift include monitor vs, labs, I&O's; manage pain; promote safe ambulation with assistance; attempt OOB for meals    Problem: Pain - Adult  Goal: Verbalizes/displays adequate comfort level or baseline comfort level  Outcome: Progressing     Problem: Safety - Adult  Goal: Free from fall injury  Outcome: Progressing     Problem: Discharge Planning  Goal: Discharge to home or other facility with appropriate resources  Outcome: Progressing     Problem: Chronic Conditions and Co-morbidities  Goal: Patient's chronic conditions and co-morbidity symptoms are monitored and maintained or improved  Outcome: Progressing     Problem: Safety - Medical Restraint  Goal: Free from restraint(s) (Restraint for Interference with Medical Device)  Outcome: Progressing     Problem: Skin  Goal: Decreased wound size/increased tissue granulation at next dressing change  Outcome: Progressing  Goal: Participates in plan/prevention/treatment measures  Outcome: Progressing  Goal: Prevent/manage excess moisture  Outcome: Progressing  Goal: Prevent/minimize sheer/friction injuries  Outcome: Progressing  Goal: Promote/optimize nutrition  Outcome: Progressing  Goal: Promote skin healing  Outcome: Progressing     Problem: Heart Failure  Goal: Improved gas exchange this shift  Outcome: Progressing  Goal: Improved urinary output this shift  Outcome: Progressing  Goal: Reduction in peripheral edema within 24 hours  Outcome: Progressing  Goal: Report improvement of dyspnea/breathlessness this shift  Outcome: Progressing  Goal: Weight from fluid excess reduced over 2-3 days, then stabilize  Outcome: Progressing  Goal: Increase self care and/or family involvement in 24 hours  Outcome: Progressing

## 2024-11-12 NOTE — DISCHARGE SUMMARY
Discharge Diagnosis  Sepsis with acute organ dysfunction, due to unspecified organism, unspecified organ dysfunction type, unspecified whether septic shock present (Multi)    Issues Requiring Follow-Up  Therapy  Test Results Pending At Discharge  Pending Labs       Order Current Status    AFB Culture/Smear Preliminary result    Fungal Culture/Smear Preliminary result            Hospital Course  Patient with a past medical history of diverticular disease/ischemic colitis complicated by transmittals of peritoneal inflammation/necrosis s/p colectomy, hypothyroidism dyslipidemia hypertension history of atrial fibrillation malnutrition sent in from nursing facility with increasing confusion shortness of breath and weakness  Workup in the emergency room suggests pneumonia along with fluid overload  We started the patient on IV antibiotics for pneumonia and IV Lasix for fluid overload  Patient also had a pleural effusion for which we did thoracentesis  Thoracentesis is borderline exudate  She continues to improve slowly  Because of the patient's significant edema she underwent a right heart cath which showed normal wedge pressures  As result of this we stopped the IV Lasix  And resume low-dose oral Lasix along with GDMT  Patient's oxygen needs have remained at 2 L and we have not been able to wean her off oxygen  Therefore we will discharge the patient to skilled nursing facility on oxygen and they can attempt to wean at the facility  Will discharge in stable condition  Wound care on heels to be done at the rehab facility on daily basis    Patient is quite malnourished  He could nutrition input  Added high protein diet  We also started the patient on Remeron to help stimulate her appetite    Pertinent Physical Exam At Time of Discharge  Physical Exam    Constitutional   General appearance: Alert and in no acute distress.     Pulmonary   Respiratory assessment: No respiratory distress, normal respiratory rhythm and effort.     Auscultation of Lungs: Clear bilateral breath sounds.   Cardiovascular   Auscultation of heart: Apical pulse normal, heart rate and rhythm normal, normal S1 and S2, no murmurs and no pericardial rub.    Exam for edema: 1+ edema  Abdomen   Abdominal Exam: No bruits, normal bowel sounds, soft, non-tender, no abdominal mass palpated.    Liver and Spleen exam: No hepato-splenomegaly.   Musculoskeletal   Examination of gait: Normal.    Inspection of digits and nails: No clubbing or cyanosis of the fingernails.    Inspection/palpation of joints, bones and muscles: No joint swelling. Normal movement of all extremities.   Skin   Skin inspection: Normal skin color and pigmentation, normal skin turgor and no visible rash.   Neurologic   Cranial nerves: Nerves 2-12 were intact, no focal neuro defects.    Home Medications     Medication List      START taking these medications     ciprofloxacin 500 mg tablet; Commonly known as: Cipro; Take 1 tablet   (500 mg) by mouth once daily for 7 days.   ferrous sulfate (325 mg ferrous sulfate) tablet; Take 1 tablet (325 mg)   by mouth once daily with breakfast.   furosemide 40 mg tablet; Commonly known as: Lasix; Take 1 tablet (40 mg)   by mouth once daily.; Start taking on: November 13, 2024   metoprolol succinate XL 25 mg 24 hr tablet; Commonly known as:   Toprol-XL; Take 1 tablet (25 mg) by mouth once daily. Do not crush or   chew.; Start taking on: November 13, 2024   mirtazapine 15 mg tablet; Commonly known as: Remeron; Take 1 tablet (15   mg) by mouth once daily at bedtime.   sacubitriL-valsartan 24-26 mg tablet; Commonly known as: Entresto; Take   1 tablet by mouth 2 times a day.     CONTINUE taking these medications     acetaminophen 650 mg ER tablet; Commonly known as: Tylenol 8 HOUR   Actemra 200 mg/10 mL (20 mg/mL) solution injection; Generic drug:   tocilizumab   alendronate 70 mg tablet; Commonly known as: Fosamax; Take 1 tablet (70   mg) by mouth every 7 days. Take on an  empty stomach. Do not lie down or   eat for 1/2 hour after taking.   amiodarone 200 mg tablet; Commonly known as: Pacerone   aspirin 81 mg EC tablet   atorvastatin 10 mg tablet; Commonly known as: Lipitor   calcium carbonate 500 mg calcium (1,250 mg) chewable tablet   Calmoseptine 0.44-20.6 % ointment; Generic drug: menthol-zinc oxide   cyanocobalamin (vitamin B-12) 5,000 mcg capsule   DAILY MULTI-VITAMIN ORAL   docusate sodium 100 mg capsule; Commonly known as: Colace   Dulcolax (bisacodyl) 10 mg suppository; Generic drug: bisacodyl   Eliquis 2.5 mg tablet; Generic drug: apixaban   escitalopram 20 mg tablet; Commonly known as: Lexapro; Take 1 tablet (20   mg) by mouth once daily.   L-METHYLFOLATE ORAL   levothyroxine 100 mcg tablet; Commonly known as: Synthroid, Levoxyl;   TAKE 1 TABLET (100 MCG) BY MOUTH ONCE DAILY. SIX DAYS PER WEEK   lutein 6 mg capsule   magnesium hydroxide 400 mg/5 mL suspension; Commonly known as: Milk of   Magnesia   nicotine 21 mg/24 hr patch; Commonly known as: Nicoderm CQ   oxybutynin XL 10 mg 24 hr tablet; Commonly known as: Ditropan-XL   oxyCODONE 5 mg immediate release tablet; Commonly known as: Roxicodone   pantoprazole 40 mg EC tablet; Commonly known as: ProtoNix   polyethylene glycol 17 gram packet; Commonly known as: Glycolax, Miralax   sennosides 8.6 mg tablet; Commonly known as: Senokot   sodium phosphates 19-7 gram/118 mL enema enema; Commonly known as:   Fleets   thyroid (pork) 30 mg tablet; Commonly known as: [place] Thyroid; Take 1   tablet (30 mg) by mouth once daily in the morning. Take before meals.   Vitamin C 500 mg tablet; Generic drug: ascorbic acid     STOP taking these medications     captopril 50 mg tablet; Commonly known as: Capoten   simvastatin 20 mg tablet; Commonly known as: Zocor   verapamil  mg ER tablet; Commonly known as: Calan-SR       Outpatient Follow-Up  Future Appointments   Date Time Provider Department Center   5/16/2025  9:00 AM Filemon DELGADO  DO Dany[id number]     Patient seen at bedside. Events from the last visit reviewed. Discussed with staff. Results of tests and investigations from last visit reviewed and discussed with patient/Family. Electronic chart on [facility] reviewed. Input / Recommendations  from consultants  appreciated and reviewed and agreed with.     discharge summary and profile completed. medications reviewed and discussed with patient and family.  scripts completed and signed.     total discharge time in excess of 30 minutes.      Christiano Tony MD

## 2024-11-13 LAB
ACID FAST STN SPEC: NORMAL
MYCOBACTERIUM SPEC CULT: NORMAL

## 2024-11-14 LAB
ATRIAL RATE: 125 BPM
ATRIAL RATE: 54 BPM
P AXIS: 262 DEGREES
P AXIS: 32 DEGREES
P OFFSET: 151 MS
P OFFSET: 195 MS
P ONSET: 106 MS
P ONSET: 83 MS
PR INTERVAL: 218 MS
PR INTERVAL: 272 MS
Q ONSET: 215 MS
Q ONSET: 219 MS
QRS COUNT: 19 BEATS
QRS COUNT: 9 BEATS
QRS DURATION: 114 MS
QRS DURATION: 120 MS
QT INTERVAL: 338 MS
QT INTERVAL: 392 MS
QTC CALCULATION(BAZETT): 371 MS
QTC CALCULATION(BAZETT): 467 MS
QTC FREDERICIA: 378 MS
QTC FREDERICIA: 419 MS
R AXIS: -14 DEGREES
R AXIS: -25 DEGREES
T AXIS: 124 DEGREES
T AXIS: 3 DEGREES
T OFFSET: 388 MS
T OFFSET: 411 MS
VENTRICULAR RATE: 115 BPM
VENTRICULAR RATE: 54 BPM

## 2024-11-18 LAB
FUNGUS SPEC CULT: NORMAL
FUNGUS SPEC FUNGUS STN: NORMAL

## 2024-11-20 LAB
ACID FAST STN SPEC: NORMAL
MYCOBACTERIUM SPEC CULT: NORMAL

## 2024-11-25 LAB
FUNGUS SPEC CULT: NORMAL
FUNGUS SPEC FUNGUS STN: NORMAL

## 2024-11-27 LAB
ACID FAST STN SPEC: NORMAL
MYCOBACTERIUM SPEC CULT: NORMAL

## 2024-12-04 LAB
ACID FAST STN SPEC: NORMAL
MYCOBACTERIUM SPEC CULT: NORMAL

## 2024-12-11 LAB
ACID FAST STN SPEC: NORMAL
MYCOBACTERIUM SPEC CULT: NORMAL

## 2024-12-18 LAB
ACID FAST STN SPEC: NORMAL
MYCOBACTERIUM SPEC CULT: NORMAL

## 2024-12-24 LAB
ACID FAST STN SPEC: NORMAL
MYCOBACTERIUM SPEC CULT: NORMAL

## 2025-01-15 LAB
Q ONSET: 208 MS
QRS COUNT: 18 BEATS
QRS DURATION: 112 MS
QT INTERVAL: 370 MS
QTC CALCULATION(BAZETT): 495 MS
QTC FREDERICIA: 450 MS
R AXIS: -17 DEGREES
T AXIS: 197 DEGREES
T OFFSET: 393 MS
VENTRICULAR RATE: 108 BPM

## 2025-05-16 ENCOUNTER — APPOINTMENT (OUTPATIENT)
Dept: PRIMARY CARE | Facility: CLINIC | Age: 80
End: 2025-05-16
Payer: MEDICARE

## 2025-07-31 ENCOUNTER — APPOINTMENT (OUTPATIENT)
Dept: RADIOLOGY | Facility: HOSPITAL | Age: 80
End: 2025-07-31
Payer: MEDICARE

## 2025-07-31 ENCOUNTER — HOSPITAL ENCOUNTER (EMERGENCY)
Facility: HOSPITAL | Age: 80
Discharge: HOME | End: 2025-07-31
Attending: EMERGENCY MEDICINE
Payer: MEDICARE

## 2025-07-31 ENCOUNTER — APPOINTMENT (OUTPATIENT)
Dept: CARDIOLOGY | Facility: HOSPITAL | Age: 80
End: 2025-07-31
Payer: MEDICARE

## 2025-07-31 VITALS
BODY MASS INDEX: 24.99 KG/M2 | HEART RATE: 64 BPM | RESPIRATION RATE: 23 BRPM | SYSTOLIC BLOOD PRESSURE: 125 MMHG | HEIGHT: 65 IN | OXYGEN SATURATION: 97 % | DIASTOLIC BLOOD PRESSURE: 72 MMHG | WEIGHT: 150 LBS | TEMPERATURE: 98.1 F

## 2025-07-31 DIAGNOSIS — R07.89 ATYPICAL CHEST PAIN: ICD-10-CM

## 2025-07-31 DIAGNOSIS — R07.9 CHEST PAIN, UNSPECIFIED TYPE: Primary | ICD-10-CM

## 2025-07-31 DIAGNOSIS — N39.0 ACUTE LOWER UTI: ICD-10-CM

## 2025-07-31 LAB
ALBUMIN SERPL BCP-MCNC: 2.8 G/DL (ref 3.4–5)
ALP SERPL-CCNC: 57 U/L (ref 33–136)
ALT SERPL W P-5'-P-CCNC: 9 U/L (ref 7–45)
AMORPH CRY #/AREA UR COMP ASSIST: ABNORMAL /HPF
ANION GAP SERPL CALC-SCNC: 13 MMOL/L (ref 10–20)
APPEARANCE UR: ABNORMAL
AST SERPL W P-5'-P-CCNC: 21 U/L (ref 9–39)
BACTERIA #/AREA URNS AUTO: ABNORMAL /HPF
BASOPHILS # BLD AUTO: 0.03 X10*3/UL (ref 0–0.1)
BASOPHILS NFR BLD AUTO: 0.4 %
BILIRUB SERPL-MCNC: 0.5 MG/DL (ref 0–1.2)
BILIRUB UR STRIP.AUTO-MCNC: NEGATIVE MG/DL
BNP SERPL-MCNC: 375 PG/ML (ref 0–99)
BUN SERPL-MCNC: 20 MG/DL (ref 6–23)
CALCIUM SERPL-MCNC: 8.8 MG/DL (ref 8.6–10.3)
CARDIAC TROPONIN I PNL SERPL HS: 10 NG/L (ref 0–13)
CARDIAC TROPONIN I PNL SERPL HS: 11 NG/L (ref 0–13)
CHLORIDE SERPL-SCNC: 100 MMOL/L (ref 98–107)
CO2 SERPL-SCNC: 28 MMOL/L (ref 21–32)
COLOR UR: YELLOW
CREAT SERPL-MCNC: 1.04 MG/DL (ref 0.5–1.05)
EGFRCR SERPLBLD CKD-EPI 2021: 54 ML/MIN/1.73M*2
EOSINOPHIL # BLD AUTO: 0.06 X10*3/UL (ref 0–0.4)
EOSINOPHIL NFR BLD AUTO: 0.7 %
ERYTHROCYTE [DISTWIDTH] IN BLOOD BY AUTOMATED COUNT: 17.8 % (ref 11.5–14.5)
FLUAV RNA RESP QL NAA+PROBE: NOT DETECTED
FLUBV RNA RESP QL NAA+PROBE: NOT DETECTED
GLUCOSE SERPL-MCNC: 114 MG/DL (ref 74–99)
GLUCOSE UR STRIP.AUTO-MCNC: NORMAL MG/DL
HCT VFR BLD AUTO: 28.1 % (ref 36–46)
HGB BLD-MCNC: 8.3 G/DL (ref 12–16)
IMM GRANULOCYTES # BLD AUTO: 0.04 X10*3/UL (ref 0–0.5)
IMM GRANULOCYTES NFR BLD AUTO: 0.5 % (ref 0–0.9)
KETONES UR STRIP.AUTO-MCNC: NEGATIVE MG/DL
LEUKOCYTE ESTERASE UR QL STRIP.AUTO: ABNORMAL
LYMPHOCYTES # BLD AUTO: 0.79 X10*3/UL (ref 0.8–3)
LYMPHOCYTES NFR BLD AUTO: 9.3 %
MCH RBC QN AUTO: 26.6 PG (ref 26–34)
MCHC RBC AUTO-ENTMCNC: 29.5 G/DL (ref 32–36)
MCV RBC AUTO: 90 FL (ref 80–100)
MONOCYTES # BLD AUTO: 0.61 X10*3/UL (ref 0.05–0.8)
MONOCYTES NFR BLD AUTO: 7.2 %
NEUTROPHILS # BLD AUTO: 7 X10*3/UL (ref 1.6–5.5)
NEUTROPHILS NFR BLD AUTO: 81.9 %
NITRITE UR QL STRIP.AUTO: NEGATIVE
NRBC BLD-RTO: 0 /100 WBCS (ref 0–0)
PH UR STRIP.AUTO: 5.5 [PH]
PLATELET # BLD AUTO: 306 X10*3/UL (ref 150–450)
POTASSIUM SERPL-SCNC: 4.7 MMOL/L (ref 3.5–5.3)
PROT SERPL-MCNC: 6.9 G/DL (ref 6.4–8.2)
PROT UR STRIP.AUTO-MCNC: ABNORMAL MG/DL
RBC # BLD AUTO: 3.12 X10*6/UL (ref 4–5.2)
RBC # UR STRIP.AUTO: NEGATIVE MG/DL
RBC #/AREA URNS AUTO: ABNORMAL /HPF
SARS-COV-2 RNA RESP QL NAA+PROBE: NOT DETECTED
SODIUM SERPL-SCNC: 136 MMOL/L (ref 136–145)
SP GR UR STRIP.AUTO: 1.02
SQUAMOUS #/AREA URNS AUTO: ABNORMAL /HPF
UROBILINOGEN UR STRIP.AUTO-MCNC: NORMAL MG/DL
WBC # BLD AUTO: 8.5 X10*3/UL (ref 4.4–11.3)
WBC #/AREA URNS AUTO: ABNORMAL /HPF
YEAST BUDDING #/AREA UR COMP ASSIST: PRESENT /HPF

## 2025-07-31 PROCEDURE — 36415 COLL VENOUS BLD VENIPUNCTURE: CPT | Performed by: EMERGENCY MEDICINE

## 2025-07-31 PROCEDURE — 71045 X-RAY EXAM CHEST 1 VIEW: CPT | Performed by: RADIOLOGY

## 2025-07-31 PROCEDURE — 2500000004 HC RX 250 GENERAL PHARMACY W/ HCPCS (ALT 636 FOR OP/ED): Mod: JZ | Performed by: INTERNAL MEDICINE

## 2025-07-31 PROCEDURE — 84484 ASSAY OF TROPONIN QUANT: CPT | Performed by: EMERGENCY MEDICINE

## 2025-07-31 PROCEDURE — 83880 ASSAY OF NATRIURETIC PEPTIDE: CPT | Performed by: INTERNAL MEDICINE

## 2025-07-31 PROCEDURE — 84075 ASSAY ALKALINE PHOSPHATASE: CPT | Performed by: EMERGENCY MEDICINE

## 2025-07-31 PROCEDURE — 93005 ELECTROCARDIOGRAM TRACING: CPT

## 2025-07-31 PROCEDURE — 2500000001 HC RX 250 WO HCPCS SELF ADMINISTERED DRUGS (ALT 637 FOR MEDICARE OP): Performed by: INTERNAL MEDICINE

## 2025-07-31 PROCEDURE — 71275 CT ANGIOGRAPHY CHEST: CPT | Performed by: RADIOLOGY

## 2025-07-31 PROCEDURE — 96375 TX/PRO/DX INJ NEW DRUG ADDON: CPT

## 2025-07-31 PROCEDURE — 96376 TX/PRO/DX INJ SAME DRUG ADON: CPT

## 2025-07-31 PROCEDURE — 71275 CT ANGIOGRAPHY CHEST: CPT

## 2025-07-31 PROCEDURE — 2500000004 HC RX 250 GENERAL PHARMACY W/ HCPCS (ALT 636 FOR OP/ED): Mod: JZ | Performed by: EMERGENCY MEDICINE

## 2025-07-31 PROCEDURE — 99285 EMERGENCY DEPT VISIT HI MDM: CPT | Mod: 25 | Performed by: EMERGENCY MEDICINE

## 2025-07-31 PROCEDURE — 2500000001 HC RX 250 WO HCPCS SELF ADMINISTERED DRUGS (ALT 637 FOR MEDICARE OP): Performed by: EMERGENCY MEDICINE

## 2025-07-31 PROCEDURE — 87636 SARSCOV2 & INF A&B AMP PRB: CPT | Performed by: EMERGENCY MEDICINE

## 2025-07-31 PROCEDURE — 71045 X-RAY EXAM CHEST 1 VIEW: CPT

## 2025-07-31 PROCEDURE — 96374 THER/PROPH/DIAG INJ IV PUSH: CPT

## 2025-07-31 PROCEDURE — 2550000001 HC RX 255 CONTRASTS: Performed by: EMERGENCY MEDICINE

## 2025-07-31 PROCEDURE — 87077 CULTURE AEROBIC IDENTIFY: CPT | Mod: AHULAB | Performed by: EMERGENCY MEDICINE

## 2025-07-31 PROCEDURE — 85025 COMPLETE CBC W/AUTO DIFF WBC: CPT | Performed by: EMERGENCY MEDICINE

## 2025-07-31 PROCEDURE — 81001 URINALYSIS AUTO W/SCOPE: CPT | Performed by: EMERGENCY MEDICINE

## 2025-07-31 RX ORDER — ALUMINUM HYDROXIDE, MAGNESIUM HYDROXIDE, AND SIMETHICONE 1200; 120; 1200 MG/30ML; MG/30ML; MG/30ML
20 SUSPENSION ORAL ONCE
Status: COMPLETED | OUTPATIENT
Start: 2025-07-31 | End: 2025-07-31

## 2025-07-31 RX ORDER — AMOXICILLIN AND CLAVULANATE POTASSIUM 875; 125 MG/1; MG/1
875 TABLET, FILM COATED ORAL 2 TIMES DAILY
Status: ON HOLD | COMMUNITY
Start: 2025-07-26 | End: 2025-08-02

## 2025-07-31 RX ORDER — PANTOPRAZOLE SODIUM 40 MG/10ML
40 INJECTION, POWDER, LYOPHILIZED, FOR SOLUTION INTRAVENOUS DAILY
Status: DISCONTINUED | OUTPATIENT
Start: 2025-07-31 | End: 2025-07-31 | Stop reason: HOSPADM

## 2025-07-31 RX ORDER — CEPHALEXIN 500 MG/1
500 CAPSULE ORAL 2 TIMES DAILY
Qty: 10 CAPSULE | Refills: 0 | Status: ON HOLD | OUTPATIENT
Start: 2025-07-31 | End: 2025-08-05

## 2025-07-31 RX ORDER — AMLODIPINE BESYLATE 5 MG/1
5 TABLET ORAL DAILY
Status: ON HOLD | COMMUNITY
Start: 2025-07-09

## 2025-07-31 RX ORDER — DIMETHICONE 13000 MG/L
CREAM TOPICAL
Status: ON HOLD | COMMUNITY

## 2025-07-31 RX ORDER — ONDANSETRON 4 MG/1
1 TABLET, FILM COATED ORAL EVERY 6 HOURS PRN
Status: ON HOLD | COMMUNITY
Start: 2025-01-18

## 2025-07-31 RX ORDER — HYDROMORPHONE HYDROCHLORIDE 0.2 MG/ML
0.2 INJECTION INTRAMUSCULAR; INTRAVENOUS; SUBCUTANEOUS ONCE
Status: COMPLETED | OUTPATIENT
Start: 2025-07-31 | End: 2025-07-31

## 2025-07-31 RX ORDER — ACETAMINOPHEN 325 MG/1
975 TABLET ORAL ONCE
Status: COMPLETED | OUTPATIENT
Start: 2025-07-31 | End: 2025-07-31

## 2025-07-31 RX ORDER — ACETAMINOPHEN 650 MG/1
650 SUPPOSITORY RECTAL EVERY 8 HOURS PRN
Status: ON HOLD | COMMUNITY

## 2025-07-31 RX ADMIN — HYDROMORPHONE HYDROCHLORIDE 0.2 MG: 0.2 INJECTION, SOLUTION INTRAMUSCULAR; INTRAVENOUS; SUBCUTANEOUS at 13:26

## 2025-07-31 RX ADMIN — IOHEXOL 75 ML: 350 INJECTION, SOLUTION INTRAVENOUS at 15:11

## 2025-07-31 RX ADMIN — HYDROMORPHONE HYDROCHLORIDE 0.2 MG: 0.2 INJECTION, SOLUTION INTRAMUSCULAR; INTRAVENOUS; SUBCUTANEOUS at 18:05

## 2025-07-31 RX ADMIN — ACETAMINOPHEN 975 MG: 325 TABLET ORAL at 13:26

## 2025-07-31 RX ADMIN — PANTOPRAZOLE SODIUM 40 MG: 40 INJECTION, POWDER, FOR SOLUTION INTRAVENOUS at 18:05

## 2025-07-31 RX ADMIN — ALUMINUM HYDROXIDE, MAGNESIUM HYDROXIDE, AND DIMETHICONE 20 ML: 200; 20; 200 SUSPENSION ORAL at 18:05

## 2025-07-31 ASSESSMENT — PAIN SCALES - GENERAL
PAINLEVEL_OUTOF10: 7
PAINLEVEL_OUTOF10: 4
PAINLEVEL_OUTOF10: 7

## 2025-07-31 ASSESSMENT — PAIN - FUNCTIONAL ASSESSMENT
PAIN_FUNCTIONAL_ASSESSMENT: 0-10
PAIN_FUNCTIONAL_ASSESSMENT: 0-10

## 2025-07-31 ASSESSMENT — PAIN DESCRIPTION - FREQUENCY: FREQUENCY: CONSTANT/CONTINUOUS

## 2025-07-31 ASSESSMENT — PAIN DESCRIPTION - LOCATION: LOCATION: CHEST

## 2025-07-31 NOTE — ED TRIAGE NOTES
Pt brought in from facility for chest pain x2 days and back pain for approx. 1 week. Pt said the chest pain was a 7/10 today. Facility stated the Pt seemed more altered than normal. Pt stated she has been hallucinating the past couple days. Pt is A&Ox4 upon arrival to ED.

## 2025-07-31 NOTE — PROGRESS NOTES
Pharmacy Medication History     Source of Information: SNF med list     Additional concerns with the patient's PTA list.   N/A  Notified Provider via [facility] : No    The following updates were made to the Prior to Admission medication list:     Medications ADDED:   Acetaminophen 650 mg suppository  Amlodipine 5 mg tablets   Amoxicillin- Pot Clavulanate 875-125 mg ( end date 08/02/25)  Dimethicone 1.3% cream  Ondansetron 4 mg tablet    Medications CHANGED:  N/A  Medications REMOVED:   N/A  Medications NOT TAKING:   Fosamax 70 mg tablets   Furosemide 40 mg tablets   Levomefolate calcium 1,000 mcg   Metoprolol succ XL 25 mg tablets   Mirtazapine 15 mg tablets   Nicotine 21 mg/ 24 hr patch   Entresto 24-26 mg tablets   Tocilizumab 200 mg/ 10 ml solution     Allergy reviewed : No    Meds 2 Beds : No    Outpatient pharmacy confirmed and updated in chart : No    Pharmacy name: N/A    The list below reflectives the updated PTA list. Please review each medication in order reconciliation for additional clarification and justification.    Prior to Admission Medications   Prescriptions Last Dose   DAILY MULTI-VITAMIN ORAL    Sig: Take 1 tablet by mouth once daily.   acetaminophen (Tylenol 8 HOUR) 650 mg ER tablet    Sig: Take 1 tablet (650 mg) by mouth every 8 hours if needed.             amiodarone (Pacerone) 200 mg tablet    Sig: Take 1 tablet (200 mg) by mouth once daily in the morning.   apixaban (Eliquis) 2.5 mg tablet    Sig: Take 1 tablet (2.5 mg) by mouth 2 times a day.   ascorbic acid (Vitamin C) 500 mg tablet    Sig: Take 1 tablet (500 mg) by mouth once daily.   aspirin 81 mg EC tablet    Sig: Take 1 tablet (81 mg) by mouth once daily.   atorvastatin (Lipitor) 10 mg tablet    Sig: Take 1 tablet (10 mg) by mouth once daily at bedtime.   bisacodyl (Dulcolax, bisacodyl,) 10 mg suppository    Sig: Insert 1 suppository (10 mg) into the rectum once daily as needed for constipation.   calcium carbonate 500 mg calcium (1,250  mg) chewable tablet    Sig: Chew 1 tablet (1,250 mg) once daily.   cyanocobalamin, vitamin B-12, 5,000 mcg capsule    Sig: Take 5,000 mcg by mouth once daily.   docusate sodium (Colace) 100 mg capsule    Sig: Take 1 capsule (100 mg) by mouth 2 times a day.   escitalopram (Lexapro) 20 mg tablet    Sig: Take 1 tablet (20 mg) by mouth once daily.                                 levothyroxine (Synthroid, Levoxyl) 100 mcg tablet    Sig: TAKE 1 TABLET (100 MCG) BY MOUTH ONCE DAILY. SIX DAYS PER WEEK   lutein 6 mg capsule    Sig: Take 1 capsule by mouth once daily.   magnesium hydroxide (Milk of Magnesia) 400 mg/5 mL suspension    Sig: Take 30 mL by mouth once daily as needed for constipation (if no BM in 72 hours).                                           oxyCODONE (Roxicodone) 5 mg immediate release tablet    Sig: Take 1 tablet (5 mg) by mouth every 6 hours if needed for severe pain (7 - 10).   oxybutynin XL (Ditropan-XL) 10 mg 24 hr tablet    Sig: Take 1 tablet (10 mg) by mouth once daily.   pantoprazole (ProtoNix) 40 mg EC tablet    Sig: Take 1 tablet (40 mg) by mouth once daily. Do not crush, chew, or split.   polyethylene glycol (Glycolax, Miralax) 17 gram packet    Sig: Take 17 g by mouth 2 times a day as needed (for firm or low ostomy output).             sennosides (Senokot) 8.6 mg tablet    Sig: Take 1 tablet (8.6 mg) by mouth 2 times a day.   sodium phosphates (Fleets) 19-7 gram/118 mL enema enema    Sig: Insert 133 mL (1 enema) into the rectum once daily as needed for constipation.   thyroid, pork, ([place] Thyroid) 30 mg tablet    Sig: TAKE 1 TABLET (30 MG) BY MOUTH ONCE DAILY IN THE MORNING. TAKE BEFORE MEALS.                Facility-Administered Medications: None       The list below reflectives the updated allergy list. Please review each documented allergy for additional clarification and justification.    No Known Allergies       07/31/25 at 6:10 PM - Inocencia Freed

## 2025-07-31 NOTE — ED PROVIDER NOTES
HPI   Chief Complaint   Patient presents with    Chest Pain    Back Pain       The patient is an 80-year-old female past medical history of hypertension, hyperlipidemia, CKD, peripheral vascular disease presenting with chest pain.  Patient notes developing right-sided back pain worsened with inspiration approximately 1 week ago, denies any inciting traumatic injury.  Over the last 2 days patient is also noted left-sided chest pressure that does not seem to radiate to her back, neck, arms.  Ongoing over the last 2 days.  Denies any clear precipitant.  Describes the pain as dull.  Denies similar symptoms in the past.  Denies any lower extremity edema or pain, dyspnea, does note a history of chronic supplemental oxygen use (2 L at baseline).  Denies any fevers, chills, cough.  Denies any sick contacts.  Notes is chronically immobilized.  Denies any recent medication change.              Patient History   Medical History[1]  Surgical History[2]  Family History[3]  Social History[4]    Physical Exam   ED Triage Vitals [07/31/25 1158]   Temperature Heart Rate Respirations BP   36.6 °C (97.8 °F) 65 14 141/67      Pulse Ox Temp Source Heart Rate Source Patient Position   98 % Oral Monitor Lying      BP Location FiO2 (%)     Right arm --       Physical Exam  Vitals and nursing note reviewed.   Constitutional:       General: She is not in acute distress.     Appearance: Normal appearance. She is ill-appearing. She is not toxic-appearing.      Interventions: Nasal cannula in place.   HENT:      Head: Normocephalic and atraumatic.      Nose: No congestion or rhinorrhea.      Mouth/Throat:      Mouth: Mucous membranes are moist.      Pharynx: Oropharynx is clear. No oropharyngeal exudate or posterior oropharyngeal erythema.     Eyes:      Extraocular Movements: Extraocular movements intact.      Right eye: Normal extraocular motion.      Left eye: Normal extraocular motion.      Conjunctiva/sclera: Conjunctivae normal.       Pupils: Pupils are equal, round, and reactive to light.       Cardiovascular:      Rate and Rhythm: Normal rate and regular rhythm.      Pulses: Normal pulses.           Radial pulses are 2+ on the right side and 2+ on the left side.      Heart sounds: Normal heart sounds, S1 normal and S2 normal. No murmur heard.     No friction rub. No gallop.   Pulmonary:      Effort: Pulmonary effort is normal. No respiratory distress.      Breath sounds: Normal breath sounds. No stridor. No wheezing, rhonchi or rales.   Abdominal:      General: Abdomen is flat. Bowel sounds are normal. There is no distension.      Palpations: Abdomen is soft.      Tenderness: There is no abdominal tenderness. There is no right CVA tenderness, left CVA tenderness, guarding or rebound.     Musculoskeletal:      Cervical back: Full passive range of motion without pain.      Right lower leg: No edema.      Left lower leg: No edema.     Skin:     General: Skin is warm and dry.     Neurological:      General: No focal deficit present.      Mental Status: She is alert and oriented to person, place, and time.      GCS: GCS eye subscore is 4. GCS verbal subscore is 5. GCS motor subscore is 6.      Cranial Nerves: No cranial nerve deficit.      Sensory: No sensory deficit.      Motor: No weakness, tremor or abnormal muscle tone.     Psychiatric:         Mood and Affect: Mood normal.           ED Course & MDM   ED Course as of 07/31/25 1702   Thu Jul 31, 2025   1643 ECG interpreted by me: Sinus rhythm, rate 64, , QTc 501, nonspecific ST changes in lead V1-V2, no ST changes meeting STEMI criteria [BR]      ED Course User Index  [BR] Sawyer Huerta MD         Diagnoses as of 07/31/25 1702   Chest pain, unspecified type                 No data recorded     Gerardo Coma Scale Score: 15 (07/31/25 1200 : Tete Lau RN)                           Medical Decision Making  Patient presenting with left-sided chest pain/right posterolateral chest pain  ongoing over the last several days, nonexertional in nature, patient chronically on supplemental oxygen but denies any dyspnea and has no increasing oxygen requirement today.  Concern for ACS/AMI, pulmonary embolism, pneumonia, pneumothorax, has intact radial pulses bilaterally and no lateralizing neurological deficit on exam to suggest aortic pathology.  Will assess with CTA, serial troponins, ECG, chest x-ray.  Although symptoms seem more relegated to the chest, will obtain urinalysis to assess for atypical pyelonephritis/renal stone as well given right-sided back pain.  No intra-abdominal tenderness/rebound/guarding suggest acute peritonitis/intra-abdominal pathology.    Patient's troponins within normal limits, CTA negative for acute pathology, patient did receive IV/oral analgesics for symptom control with some improvement but still endorsing left-sided chest pain at time of reassessment.  Given patient's elevated heart score (5) plan to admit for further cardiac risk stratification as her last stress test/coronary angiography was in 2024.  Patient signed out to oncoming physician pending admission.        Procedure  Procedures         [1]   Past Medical History:  Diagnosis Date    Aortic aneurysm     Chronic low back pain     CKD (chronic kidney disease) stage 3, GFR 30-59 ml/min (Multi)     Esophagitis     Glaucoma     History of scarlet fever     Hypertension     Hypothyroidism     Morbid obesity (Multi)     Nephritis     OA (osteoarthritis) of knee     Bilateral    Polymyalgia rheumatica (Multi)     Psoriasis     Sciatica    [2]   Past Surgical History:  Procedure Laterality Date    ADENOIDECTOMY      CARDIAC CATHETERIZATION N/A 11/6/2024    Procedure: Right Heart Cath;  Surgeon: Samra Syed MD;  Location: [facility] Cardiac Cath Lab;  Service: Cardiovascular;  Laterality: N/A;    CATARACT EXTRACTION  06/2020    CATARACT EXTRACTION Right     CATARACT EXTRACTION Left 03/2021    CROWN  06/2020    DENTAL CROWN  REPLACEMENT    CT ANGIO NECK  04/21/2014    CT NECK ANGIO W AND WO IV CONTRAST [facility]    DILATION AND CURETTAGE OF UTERUS      TONSILLECTOMY      WISDOM TOOTH EXTRACTION     [3]   Family History  Problem Relation Name Age of Onset    Breast cancer Mother      Osteoporosis Mother      Other (Abdominal Aortic Aneurysm) Mother      Heart failure Father      Hypertension Father      Macular degeneration Father      Heart disease Father      Cancer Sister          Sinus    Breast cancer Maternal Grandmother     [4]   Social History  Tobacco Use    Smoking status: Every Day     Current packs/day: 1.00     Average packs/day: 1 pack/day for 40.0 years (40.0 ttl pk-yrs)     Types: Cigarettes     Passive exposure: Current    Smokeless tobacco: Never    Tobacco comments:     Has tried to quit in the past.    Vaping Use    Vaping status: Never Used   Substance Use Topics    Alcohol use: Yes     Alcohol/week: 8.0 standard drinks of alcohol     Types: 7 Shots of liquor, 1 Standard drinks or equivalent per week     Comment: socailly    Drug use: Never        Sawyer Huerta MD  07/31/25 1501

## 2025-08-01 LAB — HOLD SPECIMEN: NORMAL

## 2025-08-02 LAB
ATRIAL RATE: 64 BPM
BACTERIA UR CULT: ABNORMAL
PR INTERVAL: 200 MS
Q ONSET: 218 MS
QRS COUNT: 11 BEATS
QRS DURATION: 104 MS
QT INTERVAL: 486 MS
QTC CALCULATION(BAZETT): 501 MS
QTC FREDERICIA: 496 MS
R AXIS: 213 DEGREES
T AXIS: 136 DEGREES
T OFFSET: 461 MS
VENTRICULAR RATE: 64 BPM

## 2025-08-04 ENCOUNTER — RESULTS FOLLOW-UP (OUTPATIENT)
Dept: PHARMACY | Facility: HOSPITAL | Age: 80
End: 2025-08-04
Payer: MEDICARE

## 2025-08-04 NOTE — PROGRESS NOTES
EDPD Note: Lab/Chart Reviewed    Reviewed Mr./Mrs./Ms. Anny Tesfaye 's chart regarding a positive urine culture/result that was taken during their recent emergency room visit. The patient was transferred back to their rehab/LTC facility .Therefore, I have faxed this information to [facility] at [place] at fax number [phone]. Spoke with Jovana    Susceptibility data from last 90 days.  Collected Specimen Info Organism   07/31/25 Urine from Clean Catch/Voided Candida albicans       No further follow up needed from EDPD Team.     Eleanor Medley, PharmD

## 2025-08-06 ENCOUNTER — HOSPITAL ENCOUNTER (INPATIENT)
Facility: HOSPITAL | Age: 80
End: 2025-08-06
Attending: EMERGENCY MEDICINE | Admitting: FAMILY MEDICINE
Payer: MEDICARE

## 2025-08-06 ENCOUNTER — APPOINTMENT (OUTPATIENT)
Dept: RADIOLOGY | Facility: HOSPITAL | Age: 80
DRG: 389 | End: 2025-08-06
Payer: MEDICARE

## 2025-08-06 DIAGNOSIS — I50.9 HEART FAILURE, UNSPECIFIED: ICD-10-CM

## 2025-08-06 DIAGNOSIS — I50.41 HEART FAILURE, SYSTOLIC AND DIASTOLIC, ACUTE: ICD-10-CM

## 2025-08-06 DIAGNOSIS — K56.609: Primary | ICD-10-CM

## 2025-08-06 LAB
ALBUMIN SERPL BCP-MCNC: 3 G/DL (ref 3.4–5)
ALP SERPL-CCNC: 59 U/L (ref 33–136)
ALT SERPL W P-5'-P-CCNC: 7 U/L (ref 7–45)
ANION GAP SERPL CALC-SCNC: 14 MMOL/L (ref 10–20)
AST SERPL W P-5'-P-CCNC: 10 U/L (ref 9–39)
BILIRUB SERPL-MCNC: 0.3 MG/DL (ref 0–1.2)
BUN SERPL-MCNC: 19 MG/DL (ref 6–23)
CALCIUM SERPL-MCNC: 9.4 MG/DL (ref 8.6–10.3)
CHLORIDE SERPL-SCNC: 93 MMOL/L (ref 98–107)
CO2 SERPL-SCNC: 33 MMOL/L (ref 21–32)
CREAT SERPL-MCNC: 1.04 MG/DL (ref 0.5–1.05)
EGFRCR SERPLBLD CKD-EPI 2021: 54 ML/MIN/1.73M*2
GLUCOSE SERPL-MCNC: 148 MG/DL (ref 74–99)
POTASSIUM SERPL-SCNC: 4.8 MMOL/L (ref 3.5–5.3)
PROT SERPL-MCNC: 7.2 G/DL (ref 6.4–8.2)
SODIUM SERPL-SCNC: 135 MMOL/L (ref 136–145)

## 2025-08-06 PROCEDURE — 36415 COLL VENOUS BLD VENIPUNCTURE: CPT | Performed by: EMERGENCY MEDICINE

## 2025-08-06 PROCEDURE — 85025 COMPLETE CBC W/AUTO DIFF WBC: CPT | Performed by: EMERGENCY MEDICINE

## 2025-08-06 PROCEDURE — 96375 TX/PRO/DX INJ NEW DRUG ADDON: CPT

## 2025-08-06 PROCEDURE — 99285 EMERGENCY DEPT VISIT HI MDM: CPT | Mod: 25 | Performed by: EMERGENCY MEDICINE

## 2025-08-06 PROCEDURE — 96374 THER/PROPH/DIAG INJ IV PUSH: CPT

## 2025-08-06 PROCEDURE — 2500000004 HC RX 250 GENERAL PHARMACY W/ HCPCS (ALT 636 FOR OP/ED): Performed by: EMERGENCY MEDICINE

## 2025-08-06 PROCEDURE — 82435 ASSAY OF BLOOD CHLORIDE: CPT | Performed by: EMERGENCY MEDICINE

## 2025-08-06 PROCEDURE — 74177 CT ABD & PELVIS W/CONTRAST: CPT

## 2025-08-06 PROCEDURE — 2550000001 HC RX 255 CONTRASTS: Performed by: EMERGENCY MEDICINE

## 2025-08-06 PROCEDURE — 0D9670Z DRAINAGE OF STOMACH WITH DRAINAGE DEVICE, VIA NATURAL OR ARTIFICIAL OPENING: ICD-10-PCS | Performed by: EMERGENCY MEDICINE

## 2025-08-06 RX ORDER — MORPHINE SULFATE 4 MG/ML
4 INJECTION, SOLUTION INTRAMUSCULAR; INTRAVENOUS ONCE
Status: COMPLETED | OUTPATIENT
Start: 2025-08-06 | End: 2025-08-06

## 2025-08-06 RX ORDER — ONDANSETRON HYDROCHLORIDE 2 MG/ML
4 INJECTION, SOLUTION INTRAVENOUS ONCE
Status: COMPLETED | OUTPATIENT
Start: 2025-08-06 | End: 2025-08-06

## 2025-08-06 RX ADMIN — MORPHINE SULFATE 4 MG: 4 INJECTION, SOLUTION INTRAMUSCULAR; INTRAVENOUS at 23:11

## 2025-08-06 RX ADMIN — IOHEXOL 75 ML: 350 INJECTION, SOLUTION INTRAVENOUS at 23:57

## 2025-08-06 RX ADMIN — SODIUM CHLORIDE 1000 ML: 0.9 INJECTION, SOLUTION INTRAVENOUS at 23:11

## 2025-08-06 RX ADMIN — ONDANSETRON 4 MG: 2 INJECTION, SOLUTION INTRAMUSCULAR; INTRAVENOUS at 23:11

## 2025-08-06 ASSESSMENT — PAIN SCALES - GENERAL: PAINLEVEL_OUTOF10: 3

## 2025-08-06 ASSESSMENT — PAIN DESCRIPTION - LOCATION: LOCATION: ABDOMEN

## 2025-08-06 ASSESSMENT — PAIN - FUNCTIONAL ASSESSMENT: PAIN_FUNCTIONAL_ASSESSMENT: 0-10

## 2025-08-07 ENCOUNTER — APPOINTMENT (OUTPATIENT)
Dept: RADIOLOGY | Facility: HOSPITAL | Age: 80
DRG: 389 | End: 2025-08-07
Payer: MEDICARE

## 2025-08-07 PROBLEM — K56.609 INTESTINAL OBSTRUCTION (MULTI): Status: ACTIVE | Noted: 2025-08-07

## 2025-08-07 LAB
ANION GAP SERPL CALC-SCNC: 15 MMOL/L (ref 10–20)
BASOPHILS # BLD AUTO: 0.04 X10*3/UL (ref 0–0.1)
BASOPHILS NFR BLD AUTO: 0.3 %
BUN SERPL-MCNC: 17 MG/DL (ref 6–23)
CALCIUM SERPL-MCNC: 8.7 MG/DL (ref 8.6–10.3)
CHLORIDE SERPL-SCNC: 93 MMOL/L (ref 98–107)
CO2 SERPL-SCNC: 30 MMOL/L (ref 21–32)
CREAT SERPL-MCNC: 0.98 MG/DL (ref 0.5–1.05)
EGFRCR SERPLBLD CKD-EPI 2021: 58 ML/MIN/1.73M*2
EOSINOPHIL # BLD AUTO: 0.02 X10*3/UL (ref 0–0.4)
EOSINOPHIL NFR BLD AUTO: 0.2 %
ERYTHROCYTE [DISTWIDTH] IN BLOOD BY AUTOMATED COUNT: 17.1 % (ref 11.5–14.5)
ERYTHROCYTE [DISTWIDTH] IN BLOOD BY AUTOMATED COUNT: 17.2 % (ref 11.5–14.5)
GLUCOSE SERPL-MCNC: 120 MG/DL (ref 74–99)
HCT VFR BLD AUTO: 26.5 % (ref 36–46)
HCT VFR BLD AUTO: 30.4 % (ref 36–46)
HGB BLD-MCNC: 8.1 G/DL (ref 12–16)
HGB BLD-MCNC: 9 G/DL (ref 12–16)
IMM GRANULOCYTES # BLD AUTO: 0.06 X10*3/UL (ref 0–0.5)
IMM GRANULOCYTES NFR BLD AUTO: 0.5 % (ref 0–0.9)
LYMPHOCYTES # BLD AUTO: 1.08 X10*3/UL (ref 0.8–3)
LYMPHOCYTES NFR BLD AUTO: 9.2 %
MCH RBC QN AUTO: 26.3 PG (ref 26–34)
MCH RBC QN AUTO: 26.4 PG (ref 26–34)
MCHC RBC AUTO-ENTMCNC: 29.6 G/DL (ref 32–36)
MCHC RBC AUTO-ENTMCNC: 30.6 G/DL (ref 32–36)
MCV RBC AUTO: 86 FL (ref 80–100)
MCV RBC AUTO: 89 FL (ref 80–100)
MONOCYTES # BLD AUTO: 0.35 X10*3/UL (ref 0.05–0.8)
MONOCYTES NFR BLD AUTO: 3 %
NEUTROPHILS # BLD AUTO: 10.15 X10*3/UL (ref 1.6–5.5)
NEUTROPHILS NFR BLD AUTO: 86.8 %
NRBC BLD-RTO: 0 /100 WBCS (ref 0–0)
NRBC BLD-RTO: 0 /100 WBCS (ref 0–0)
PLATELET # BLD AUTO: 521 X10*3/UL (ref 150–450)
PLATELET # BLD AUTO: 599 X10*3/UL (ref 150–450)
POTASSIUM SERPL-SCNC: 4.3 MMOL/L (ref 3.5–5.3)
RBC # BLD AUTO: 3.08 X10*6/UL (ref 4–5.2)
RBC # BLD AUTO: 3.41 X10*6/UL (ref 4–5.2)
RBC MORPH BLD: NORMAL
SODIUM SERPL-SCNC: 134 MMOL/L (ref 136–145)
WBC # BLD AUTO: 11.2 X10*3/UL (ref 4.4–11.3)
WBC # BLD AUTO: 11.7 X10*3/UL (ref 4.4–11.3)

## 2025-08-07 PROCEDURE — 2500000004 HC RX 250 GENERAL PHARMACY W/ HCPCS (ALT 636 FOR OP/ED): Performed by: FAMILY MEDICINE

## 2025-08-07 PROCEDURE — 1200000002 HC GENERAL ROOM WITH TELEMETRY DAILY

## 2025-08-07 PROCEDURE — 2500000001 HC RX 250 WO HCPCS SELF ADMINISTERED DRUGS (ALT 637 FOR MEDICARE OP): Performed by: FAMILY MEDICINE

## 2025-08-07 PROCEDURE — 71045 X-RAY EXAM CHEST 1 VIEW: CPT

## 2025-08-07 PROCEDURE — 71045 X-RAY EXAM CHEST 1 VIEW: CPT | Performed by: RADIOLOGY

## 2025-08-07 PROCEDURE — 80048 BASIC METABOLIC PNL TOTAL CA: CPT | Performed by: FAMILY MEDICINE

## 2025-08-07 PROCEDURE — 85027 COMPLETE CBC AUTOMATED: CPT | Performed by: FAMILY MEDICINE

## 2025-08-07 PROCEDURE — 74177 CT ABD & PELVIS W/CONTRAST: CPT | Performed by: RADIOLOGY

## 2025-08-07 PROCEDURE — 36415 COLL VENOUS BLD VENIPUNCTURE: CPT | Performed by: FAMILY MEDICINE

## 2025-08-07 RX ORDER — ENOXAPARIN SODIUM 100 MG/ML
40 INJECTION SUBCUTANEOUS EVERY 24 HOURS
Status: DISPENSED | OUTPATIENT
Start: 2025-08-07

## 2025-08-07 RX ORDER — PANTOPRAZOLE SODIUM 40 MG/10ML
40 INJECTION, POWDER, LYOPHILIZED, FOR SOLUTION INTRAVENOUS
Status: DISPENSED | OUTPATIENT
Start: 2025-08-07

## 2025-08-07 RX ORDER — PANTOPRAZOLE SODIUM 40 MG/1
40 TABLET, DELAYED RELEASE ORAL
Status: DISPENSED | OUTPATIENT
Start: 2025-08-07

## 2025-08-07 RX ORDER — SODIUM CHLORIDE, SODIUM LACTATE, POTASSIUM CHLORIDE, CALCIUM CHLORIDE 600; 310; 30; 20 MG/100ML; MG/100ML; MG/100ML; MG/100ML
100 INJECTION, SOLUTION INTRAVENOUS CONTINUOUS
Status: ACTIVE | OUTPATIENT
Start: 2025-08-07 | End: 2025-08-07

## 2025-08-07 RX ORDER — METOPROLOL TARTRATE 1 MG/ML
5 INJECTION, SOLUTION INTRAVENOUS EVERY 6 HOURS
Status: DISPENSED | OUTPATIENT
Start: 2025-08-07

## 2025-08-07 RX ORDER — ACETAMINOPHEN 160 MG/5ML
650 SOLUTION ORAL EVERY 4 HOURS PRN
Status: DISCONTINUED | OUTPATIENT
Start: 2025-08-07 | End: 2025-08-07 | Stop reason: SDUPTHER

## 2025-08-07 RX ORDER — ACETAMINOPHEN 160 MG/5ML
650 SOLUTION ORAL EVERY 4 HOURS PRN
Status: ACTIVE | OUTPATIENT
Start: 2025-08-07

## 2025-08-07 RX ORDER — ACETAMINOPHEN 325 MG/1
650 TABLET ORAL EVERY 4 HOURS PRN
Status: DISCONTINUED | OUTPATIENT
Start: 2025-08-07 | End: 2025-08-07 | Stop reason: SDUPTHER

## 2025-08-07 RX ORDER — ACETAMINOPHEN 650 MG/1
650 SUPPOSITORY RECTAL EVERY 4 HOURS PRN
Status: ACTIVE | OUTPATIENT
Start: 2025-08-07

## 2025-08-07 RX ORDER — SODIUM CHLORIDE 9 MG/ML
100 INJECTION, SOLUTION INTRAVENOUS CONTINUOUS
Status: ACTIVE | OUTPATIENT
Start: 2025-08-07 | End: 2025-08-08

## 2025-08-07 RX ORDER — ACETAMINOPHEN 650 MG/1
650 SUPPOSITORY RECTAL EVERY 4 HOURS PRN
Status: DISCONTINUED | OUTPATIENT
Start: 2025-08-07 | End: 2025-08-07 | Stop reason: SDUPTHER

## 2025-08-07 RX ORDER — HYDRALAZINE HYDROCHLORIDE 20 MG/ML
10 INJECTION INTRAMUSCULAR; INTRAVENOUS EVERY 6 HOURS PRN
Status: DISPENSED | OUTPATIENT
Start: 2025-08-07

## 2025-08-07 RX ORDER — ACETAMINOPHEN 325 MG/1
650 TABLET ORAL EVERY 4 HOURS PRN
Status: ACTIVE | OUTPATIENT
Start: 2025-08-07

## 2025-08-07 RX ORDER — MORPHINE SULFATE 2 MG/ML
2 INJECTION, SOLUTION INTRAMUSCULAR; INTRAVENOUS EVERY 4 HOURS PRN
Status: DISPENSED | OUTPATIENT
Start: 2025-08-07

## 2025-08-07 RX ORDER — ONDANSETRON 4 MG/1
4 TABLET, FILM COATED ORAL EVERY 8 HOURS PRN
Status: ACTIVE | OUTPATIENT
Start: 2025-08-07

## 2025-08-07 RX ORDER — ONDANSETRON HYDROCHLORIDE 2 MG/ML
4 INJECTION, SOLUTION INTRAVENOUS EVERY 6 HOURS PRN
Status: DISPENSED | OUTPATIENT
Start: 2025-08-07

## 2025-08-07 RX ADMIN — SODIUM CHLORIDE 100 ML/HR: 0.9 INJECTION, SOLUTION INTRAVENOUS at 03:54

## 2025-08-07 RX ADMIN — PANTOPRAZOLE SODIUM 40 MG: 40 TABLET, DELAYED RELEASE ORAL at 08:48

## 2025-08-07 RX ADMIN — METOPROLOL TARTRATE 5 MG: 5 INJECTION, SOLUTION INTRAVENOUS at 14:48

## 2025-08-07 RX ADMIN — ENOXAPARIN SODIUM 40 MG: 100 INJECTION SUBCUTANEOUS at 08:48

## 2025-08-07 RX ADMIN — METOPROLOL TARTRATE 5 MG: 5 INJECTION, SOLUTION INTRAVENOUS at 20:28

## 2025-08-07 RX ADMIN — HYDRALAZINE HYDROCHLORIDE 10 MG: 20 INJECTION INTRAMUSCULAR; INTRAVENOUS at 16:32

## 2025-08-07 RX ADMIN — SODIUM CHLORIDE, SODIUM LACTATE, POTASSIUM CHLORIDE, AND CALCIUM CHLORIDE 100 ML/HR: .6; .31; .03; .02 INJECTION, SOLUTION INTRAVENOUS at 09:54

## 2025-08-07 SDOH — ECONOMIC STABILITY: INCOME INSECURITY: IN THE PAST 12 MONTHS HAS THE ELECTRIC, GAS, OIL, OR WATER COMPANY THREATENED TO SHUT OFF SERVICES IN YOUR HOME?: NO

## 2025-08-07 SDOH — SOCIAL STABILITY: SOCIAL INSECURITY: HAS ANYONE EVER THREATENED TO HURT YOUR FAMILY OR YOUR PETS?: NO

## 2025-08-07 SDOH — SOCIAL STABILITY: SOCIAL INSECURITY: WITHIN THE LAST YEAR, HAVE YOU BEEN AFRAID OF YOUR PARTNER OR EX-PARTNER?: NO

## 2025-08-07 SDOH — SOCIAL STABILITY: SOCIAL INSECURITY: WITHIN THE LAST YEAR, HAVE YOU BEEN HUMILIATED OR EMOTIONALLY ABUSED IN OTHER WAYS BY YOUR PARTNER OR EX-PARTNER?: NO

## 2025-08-07 SDOH — SOCIAL STABILITY: SOCIAL INSECURITY: DO YOU FEEL ANYONE HAS EXPLOITED OR TAKEN ADVANTAGE OF YOU FINANCIALLY OR OF YOUR PERSONAL PROPERTY?: NO

## 2025-08-07 SDOH — SOCIAL STABILITY: SOCIAL INSECURITY: ABUSE: ADULT

## 2025-08-07 SDOH — SOCIAL STABILITY: SOCIAL INSECURITY: WERE YOU ABLE TO COMPLETE ALL THE BEHAVIORAL HEALTH SCREENINGS?: YES

## 2025-08-07 SDOH — SOCIAL STABILITY: SOCIAL INSECURITY: ARE YOU OR HAVE YOU BEEN THREATENED OR ABUSED PHYSICALLY, EMOTIONALLY, OR SEXUALLY BY ANYONE?: NO

## 2025-08-07 SDOH — SOCIAL STABILITY: SOCIAL INSECURITY: HAVE YOU HAD THOUGHTS OF HARMING ANYONE ELSE?: NO

## 2025-08-07 SDOH — SOCIAL STABILITY: SOCIAL INSECURITY: DO YOU FEEL UNSAFE GOING BACK TO THE PLACE WHERE YOU ARE LIVING?: NO

## 2025-08-07 SDOH — SOCIAL STABILITY: SOCIAL INSECURITY: ARE THERE ANY APPARENT SIGNS OF INJURIES/BEHAVIORS THAT COULD BE RELATED TO ABUSE/NEGLECT?: NO

## 2025-08-07 SDOH — SOCIAL STABILITY: SOCIAL INSECURITY: HAVE YOU HAD ANY THOUGHTS OF HARMING ANYONE ELSE?: NO

## 2025-08-07 SDOH — SOCIAL STABILITY: SOCIAL INSECURITY: DOES ANYONE TRY TO KEEP YOU FROM HAVING/CONTACTING OTHER FRIENDS OR DOING THINGS OUTSIDE YOUR HOME?: NO

## 2025-08-07 ASSESSMENT — COGNITIVE AND FUNCTIONAL STATUS - GENERAL
WALKING IN HOSPITAL ROOM: A LOT
PERSONAL GROOMING: A LOT
DRESSING REGULAR LOWER BODY CLOTHING: A LOT
HELP NEEDED FOR BATHING: A LOT
EATING MEALS: A LITTLE
DRESSING REGULAR LOWER BODY CLOTHING: A LOT
PERSONAL GROOMING: A LOT
CLIMB 3 TO 5 STEPS WITH RAILING: TOTAL
TOILETING: A LOT
TURNING FROM BACK TO SIDE WHILE IN FLAT BAD: A LITTLE
STANDING UP FROM CHAIR USING ARMS: A LOT
DRESSING REGULAR LOWER BODY CLOTHING: A LOT
DRESSING REGULAR UPPER BODY CLOTHING: A LOT
WALKING IN HOSPITAL ROOM: A LOT
MOVING FROM LYING ON BACK TO SITTING ON SIDE OF FLAT BED WITH BEDRAILS: A LITTLE
STANDING UP FROM CHAIR USING ARMS: A LOT
MOVING TO AND FROM BED TO CHAIR: A LITTLE
DAILY ACTIVITIY SCORE: 13
DRESSING REGULAR UPPER BODY CLOTHING: A LOT
DRESSING REGULAR UPPER BODY CLOTHING: A LOT
MOBILITY SCORE: 14
EATING MEALS: A LITTLE
DRESSING REGULAR LOWER BODY CLOTHING: A LOT
CLIMB 3 TO 5 STEPS WITH RAILING: TOTAL
MOVING FROM LYING ON BACK TO SITTING ON SIDE OF FLAT BED WITH BEDRAILS: A LITTLE
PERSONAL GROOMING: A LOT
TOILETING: A LOT
STANDING UP FROM CHAIR USING ARMS: A LOT
MOBILITY SCORE: 14
HELP NEEDED FOR BATHING: A LOT
WALKING IN HOSPITAL ROOM: A LOT
HELP NEEDED FOR BATHING: A LOT
CLIMB 3 TO 5 STEPS WITH RAILING: TOTAL
PERSONAL GROOMING: A LOT
TOILETING: A LOT
TURNING FROM BACK TO SIDE WHILE IN FLAT BAD: A LITTLE
STANDING UP FROM CHAIR USING ARMS: A LOT
DAILY ACTIVITIY SCORE: 13
WALKING IN HOSPITAL ROOM: A LOT
STANDING UP FROM CHAIR USING ARMS: A LOT
DRESSING REGULAR LOWER BODY CLOTHING: A LOT
EATING MEALS: A LITTLE
DAILY ACTIVITIY SCORE: 13
DRESSING REGULAR UPPER BODY CLOTHING: A LOT
TOILETING: A LOT
MOBILITY SCORE: 14
CLIMB 3 TO 5 STEPS WITH RAILING: TOTAL
MOVING FROM LYING ON BACK TO SITTING ON SIDE OF FLAT BED WITH BEDRAILS: A LITTLE
TURNING FROM BACK TO SIDE WHILE IN FLAT BAD: A LITTLE
DAILY ACTIVITIY SCORE: 13
HELP NEEDED FOR BATHING: A LOT
MOVING FROM LYING ON BACK TO SITTING ON SIDE OF FLAT BED WITH BEDRAILS: A LITTLE
TURNING FROM BACK TO SIDE WHILE IN FLAT BAD: A LITTLE
MOVING TO AND FROM BED TO CHAIR: A LITTLE
MOBILITY SCORE: 14
HELP NEEDED FOR BATHING: A LOT
DAILY ACTIVITIY SCORE: 13
MOVING FROM LYING ON BACK TO SITTING ON SIDE OF FLAT BED WITH BEDRAILS: A LITTLE
EATING MEALS: A LITTLE
DRESSING REGULAR UPPER BODY CLOTHING: A LOT
WALKING IN HOSPITAL ROOM: A LOT
MOBILITY SCORE: 14
CLIMB 3 TO 5 STEPS WITH RAILING: TOTAL
WALKING IN HOSPITAL ROOM: A LOT
MOVING TO AND FROM BED TO CHAIR: A LITTLE
MOVING FROM LYING ON BACK TO SITTING ON SIDE OF FLAT BED WITH BEDRAILS: A LITTLE
PERSONAL GROOMING: A LOT
EATING MEALS: A LITTLE
MOVING TO AND FROM BED TO CHAIR: A LITTLE
TURNING FROM BACK TO SIDE WHILE IN FLAT BAD: A LITTLE
CLIMB 3 TO 5 STEPS WITH RAILING: TOTAL
TURNING FROM BACK TO SIDE WHILE IN FLAT BAD: A LITTLE
DRESSING REGULAR UPPER BODY CLOTHING: A LOT
TOILETING: A LOT
DAILY ACTIVITIY SCORE: 13
EATING MEALS: A LITTLE
STANDING UP FROM CHAIR USING ARMS: A LOT
MOVING TO AND FROM BED TO CHAIR: A LITTLE
MOBILITY SCORE: 14
DRESSING REGULAR LOWER BODY CLOTHING: A LOT
PERSONAL GROOMING: A LOT
MOVING TO AND FROM BED TO CHAIR: A LITTLE
HELP NEEDED FOR BATHING: A LOT
TOILETING: A LOT

## 2025-08-07 ASSESSMENT — ACTIVITIES OF DAILY LIVING (ADL)
LACK_OF_TRANSPORTATION: NO
LACK_OF_TRANSPORTATION: NO
TOILETING: NEEDS ASSISTANCE
ASSISTIVE_DEVICE: WHEELCHAIR
JUDGMENT_ADEQUATE_SAFELY_COMPLETE_DAILY_ACTIVITIES: YES
FEEDING YOURSELF: NEEDS ASSISTANCE
BATHING: NEEDS ASSISTANCE
ADEQUATE_TO_COMPLETE_ADL: YES
WALKS IN HOME: NEEDS ASSISTANCE
GROOMING: NEEDS ASSISTANCE
DRESSING YOURSELF: NEEDS ASSISTANCE
HEARING - RIGHT EAR: FUNCTIONAL
HEARING - LEFT EAR: FUNCTIONAL
PATIENT'S MEMORY ADEQUATE TO SAFELY COMPLETE DAILY ACTIVITIES?: YES

## 2025-08-07 ASSESSMENT — PAIN SCALES - GENERAL: PAINLEVEL_OUTOF10: 0 - NO PAIN

## 2025-08-07 ASSESSMENT — PATIENT HEALTH QUESTIONNAIRE - PHQ9
2. FEELING DOWN, DEPRESSED OR HOPELESS: NOT AT ALL
SUM OF ALL RESPONSES TO PHQ9 QUESTIONS 1 & 2: 0
1. LITTLE INTEREST OR PLEASURE IN DOING THINGS: NOT AT ALL

## 2025-08-07 ASSESSMENT — LIFESTYLE VARIABLES
HOW MANY STANDARD DRINKS CONTAINING ALCOHOL DO YOU HAVE ON A TYPICAL DAY: PATIENT DOES NOT DRINK
HOW OFTEN DO YOU HAVE A DRINK CONTAINING ALCOHOL: NEVER
SKIP TO QUESTIONS 9-10: 1
AUDIT-C TOTAL SCORE: 0
AUDIT-C TOTAL SCORE: 0
HOW OFTEN DO YOU HAVE 6 OR MORE DRINKS ON ONE OCCASION: NEVER

## 2025-08-07 NOTE — H&P
History Of Present Illness  Anny Tesfaye is a 80 y.o. female presenting with nausea vomiting and abdominal pain.  And the symptoms were similar to her prior symptoms of bowel obstruction.  Patient does have a history of colitis and has had a history of colostomy.  She also has a history of atrial fibrillation she is on anticoagulation.  She has been living in a long-term care facility since November.  At baseline per patient she is not able to walk and is mostly bedbound.  She is otherwise alert oriented x 3.        Currently she denies any chest pain denies any shortness of breath denies any abdominal pain however she has not had much output in her colostomy     Past Medical History  She has a past medical history of Aortic aneurysm, Chronic low back pain, CKD (chronic kidney disease) stage 3, GFR 30-59 ml/min (Multi), Esophagitis, Glaucoma, History of scarlet fever, Hypertension, Hypothyroidism, Morbid obesity (Multi), Nephritis, OA (osteoarthritis) of knee, Polymyalgia rheumatica (Multi), Psoriasis, and Sciatica.    Surgical History  She has a past surgical history that includes CT angio neck (04/21/2014); Tonsillectomy; Adenoidectomy; Dilation and curettage of uterus; [place] tooth extraction; Cataract extraction (06/2020); Crown (06/2020); Cataract extraction (Right); Cataract extraction (Left, 03/2021); and Cardiac catheterization (N/A, 11/6/2024).     Social History  She reports that she has been smoking cigarettes. She has a 40 pack-year smoking history. She has been exposed to tobacco smoke. She has never used smokeless tobacco. She reports that she does not currently use alcohol. She reports that she does not use drugs.    Family History  Family History[1]     Allergies  Patient has no known allergies.    Review of Systems     See history of present illness patient denies any history of bleeding  Physical Exam     Well developed well nurished  No distress  Ao 3  Face symmetrical, she does have a NG  tube in place  Neck no jvd no bruit  Chest clear  CVS regular  Ext no edema  Abdo soft nontender bs hyperactive, no masses, colostomy without any output however per patient the bag was emptied just now  Cns alert appropriate able to move ext, gait not tested  Skin intact  Psych normal affect    Last Recorded Vitals  /88   Pulse 71   Temp 36.7 °C (98 °F)   Resp 19   Wt 68 kg (149 lb 14.6 oz)   SpO2 95%     Relevant Results    Scheduled medications  Scheduled Medications[2]  Continuous medications  Continuous Medications[3]  PRN medications  PRN Medications[4]  Results for orders placed or performed during the hospital encounter of 08/06/25 (from the past 96 hours)   CBC and Auto Differential   Result Value Ref Range    WBC 11.7 (H) 4.4 - 11.3 x10*3/uL    nRBC 0.0 0.0 - 0.0 /100 WBCs    RBC 3.41 (L) 4.00 - 5.20 x10*6/uL    Hemoglobin 9.0 (L) 12.0 - 16.0 g/dL    Hematocrit 30.4 (L) 36.0 - 46.0 %    MCV 89 80 - 100 fL    MCH 26.4 26.0 - 34.0 pg    MCHC 29.6 (L) 32.0 - 36.0 g/dL    RDW 17.1 (H) 11.5 - 14.5 %    Platelets 599 (H) 150 - 450 x10*3/uL    Neutrophils % 86.8 40.0 - 80.0 %    Immature Granulocytes %, Automated 0.5 0.0 - 0.9 %    Lymphocytes % 9.2 13.0 - 44.0 %    Monocytes % 3.0 2.0 - 10.0 %    Eosinophils % 0.2 0.0 - 6.0 %    Basophils % 0.3 0.0 - 2.0 %    Neutrophils Absolute 10.15 (H) 1.60 - 5.50 x10*3/uL    Immature Granulocytes Absolute, Automated 0.06 0.00 - 0.50 x10*3/uL    Lymphocytes Absolute 1.08 0.80 - 3.00 x10*3/uL    Monocytes Absolute 0.35 0.05 - 0.80 x10*3/uL    Eosinophils Absolute 0.02 0.00 - 0.40 x10*3/uL    Basophils Absolute 0.04 0.00 - 0.10 x10*3/uL   Comprehensive metabolic panel   Result Value Ref Range    Glucose 148 (H) 74 - 99 mg/dL    Sodium 135 (L) 136 - 145 mmol/L    Potassium 4.8 3.5 - 5.3 mmol/L    Chloride 93 (L) 98 - 107 mmol/L    Bicarbonate 33 (H) 21 - 32 mmol/L    Anion Gap 14 10 - 20 mmol/L    Urea Nitrogen 19 6 - 23 mg/dL    Creatinine 1.04 0.50 - 1.05 mg/dL     eGFR 54 (L) >60 mL/min/1.73m*2    Calcium 9.4 8.6 - 10.3 mg/dL    Albumin 3.0 (L) 3.4 - 5.0 g/dL    Alkaline Phosphatase 59 33 - 136 U/L    Total Protein 7.2 6.4 - 8.2 g/dL    AST 10 9 - 39 U/L    Bilirubin, Total 0.3 0.0 - 1.2 mg/dL    ALT 7 7 - 45 U/L   Morphology   Result Value Ref Range    RBC Morphology No significant RBC morphology present    Basic metabolic panel   Result Value Ref Range    Glucose 120 (H) 74 - 99 mg/dL    Sodium 134 (L) 136 - 145 mmol/L    Potassium 4.3 3.5 - 5.3 mmol/L    Chloride 93 (L) 98 - 107 mmol/L    Bicarbonate 30 21 - 32 mmol/L    Anion Gap 15 10 - 20 mmol/L    Urea Nitrogen 17 6 - 23 mg/dL    Creatinine 0.98 0.50 - 1.05 mg/dL    eGFR 58 (L) >60 mL/min/1.73m*2    Calcium 8.7 8.6 - 10.3 mg/dL   CBC   Result Value Ref Range    WBC 11.2 4.4 - 11.3 x10*3/uL    nRBC 0.0 0.0 - 0.0 /100 WBCs    RBC 3.08 (L) 4.00 - 5.20 x10*6/uL    Hemoglobin 8.1 (L) 12.0 - 16.0 g/dL    Hematocrit 26.5 (L) 36.0 - 46.0 %    MCV 86 80 - 100 fL    MCH 26.3 26.0 - 34.0 pg    MCHC 30.6 (L) 32.0 - 36.0 g/dL    RDW 17.2 (H) 11.5 - 14.5 %    Platelets 521 (H) 150 - 450 x10*3/uL         CT abdo  IMPRESSION:  Dilated loops of small bowel throughout the abdomen and pelvis with  decompressed loops of distal small bowel and a suspected transition  point in the mid abdomen compatible with small bowel obstruction.      Postsurgical changes of partial colectomy with a left abdominal wall  colostomy, moderate colonic stool burden and scattered colonic  diverticulosis. No CT evidence of acute diverticulitis.      Cholelithiasis without CT evidence of acute cholecystitis.      Cardiomegaly, small right pleural effusion and patchy bibasilar  airspace opacities which may represent atelectasis with a developing  infection not excluded in the appropriate clinical setting     Assessment/Plan   Assessment & Plan  Intestinal obstruction (Multi)    Hypothyroid    Adjustment reaction with anxiety and depression    Essential  hypertension    Stage 3 chronic kidney disease (Multi)    Anemia    Anticoagulant long-term use    Peripheral vascular disease    Patient's home medications reviewed and will start her on IV metoprolol for now she currently appears to be in regular rhythm blood pressures reviewed and will continue to monitor and will continue with as needed hydralazine.  Patient is currently n.p.o.  IV morphine for pain control.  DVT prophylaxis is in place.  Anticoagulation therapy for atrial fibrillation currently on hold will reassess every day.  Surgery following for her bowel obstruction           Rajendra Rodriguez MD         [1]   Family History  Problem Relation Name Age of Onset    Breast cancer Mother      Osteoporosis Mother      Other (Abdominal Aortic Aneurysm) Mother      Heart failure Father      Hypertension Father      Macular degeneration Father      Heart disease Father      Cancer Sister          Sinus    Breast cancer Maternal Grandmother     [2] enoxaparin, 40 mg, subcutaneous, q24h  pantoprazole, 40 mg, oral, Daily before breakfast   Or  pantoprazole, 40 mg, intravenous, Daily before breakfast  [3] lactated Ringer's, 100 mL/hr, Last Rate: 100 mL/hr (08/07/25 8159)  sodium chloride 0.9%, 100 mL/hr, Last Rate: 100 mL/hr (08/07/25 2546)  [4] PRN medications: acetaminophen **OR** acetaminophen **OR** acetaminophen, morphine, ondansetron **OR** ondansetron

## 2025-08-07 NOTE — ED PROVIDER NOTES
HPI   No chief complaint on file.      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  According to the patient, her symptoms been ongoing for the past 24 hours.  Patient states that she has diffuse abdominal discomfort with this.  The patient states that she has had persistent vomiting.  The patient denies any fever.  She states that she otherwise feels well.  Concern, the patient presents to the emergency department for further evaluation.  Patient admits that she has had symptoms like this in the past when she was diagnosed with a blocked gut.      History provided by:  Patient   used: No            Patient History   Medical History[1]  Surgical History[2]  Family History[3]  Social History[4]    Physical Exam   ED Triage Vitals   Temp Pulse Resp BP   -- -- -- --      SpO2 Temp src Heart Rate Source Patient Position   -- -- -- --      BP Location FiO2 (%)     -- --       Physical Exam  Constitutional:       Appearance: Normal appearance.   HENT:      Head: Normocephalic and atraumatic.      Right Ear: External ear normal.      Left Ear: External ear normal.      Nose: Nose normal.      Mouth/Throat:      Mouth: Mucous membranes are moist.     Eyes:      General: Lids are normal.      Extraocular Movements: Extraocular movements intact.      Pupils: Pupils are equal, round, and reactive to light.       Cardiovascular:      Rate and Rhythm: Normal rate and regular rhythm.      Heart sounds: Normal heart sounds.   Pulmonary:      Effort: Pulmonary effort is normal.      Breath sounds: Normal breath sounds.   Abdominal:      General: Abdomen is flat. There is distension.      Palpations:  Abdomen is soft.      Tenderness: There is abdominal tenderness. There is no guarding or rebound.      Comments: Patient has an ostomy.  The patient has tympany particularly in the right upper quadrant.     Musculoskeletal:         General: No deformity. Normal range of motion.      Cervical back: Normal range of motion and neck supple.     Skin:     General: Skin is warm.      Capillary Refill: Capillary refill takes less than 2 seconds.      Coloration: Skin is not jaundiced.     Neurological:      General: No focal deficit present.      Mental Status: She is alert and oriented to person, place, and time.     Psychiatric:         Mood and Affect: Mood normal.         Behavior: Behavior normal.           ED Course & MDM   Diagnoses as of 08/10/25 1931   Intestinal obstruction (Multi)                 No data recorded                                 Medical Decision Making  Medical Decision Making: Patient remained stable during my time with her in the emergency department.  CBC demonstrated no significant abnormalities Chem-7 and LFTs were essentially within normal limits.  The patient's CAT scan of the abdomen and pelvis with IV contrast demonstrated a small bowel obstruction with transition point in the mid abdomen cholelithiasis and cardiomegaly but no other significant abnormalities    Given these findings, I ordered a NG tube to the patient.  This was placed and placement was confirmed with an x-ray of the patient's chest and stomach.  During her time in the emergency department patient was given a dose of Zofran morphine as well as a liter of normal saline IV.  Patient felt improved after all of this therapy.  The patient will require admission to the hospital for further evaluation of therapy I spoke with the hospitalist who agreed the patient could be admitted to their service for further evaluation.  The patient was then admitted to the hospital in otherwise stable condition.    Amount and/or Complexity of  Data Reviewed  Labs: ordered. Decision-making details documented in ED Course.  Radiology: ordered. Decision-making details documented in ED Course.        Procedure  Procedures         [1]   Past Medical History:  Diagnosis Date    Aortic aneurysm     Chronic low back pain     CKD (chronic kidney disease) stage 3, GFR 30-59 ml/min (Multi)     Esophagitis     Glaucoma     History of scarlet fever     Hypertension     Hypothyroidism     Morbid obesity (Multi)     Nephritis     OA (osteoarthritis) of knee     Bilateral    Polymyalgia rheumatica (Multi)     Psoriasis     Sciatica    [2]   Past Surgical History:  Procedure Laterality Date    ADENOIDECTOMY      CARDIAC CATHETERIZATION N/A 11/6/2024    Procedure: Right Heart Cath;  Surgeon: Samra Syed MD;  Location: [facility] Cardiac Cath Lab;  Service: Cardiovascular;  Laterality: N/A;    CATARACT EXTRACTION  06/2020    CATARACT EXTRACTION Right     CATARACT EXTRACTION Left 03/2021    CROWN  06/2020    DENTAL CROWN REPLACEMENT    CT ANGIO NECK  04/21/2014    CT NECK ANGIO W AND WO IV CONTRAST [facility]    DILATION AND CURETTAGE OF UTERUS      TONSILLECTOMY      WISDOM TOOTH EXTRACTION     [3]   Family History  Problem Relation Name Age of Onset    Breast cancer Mother      Osteoporosis Mother      Other (Abdominal Aortic Aneurysm) Mother      Heart failure Father      Hypertension Father      Macular degeneration Father      Heart disease Father      Cancer Sister          Sinus    Breast cancer Maternal Grandmother     [4]   Social History  Tobacco Use    Smoking status: Every Day     Current packs/day: 1.00     Average packs/day: 1 pack/day for 40.0 years (40.0 ttl pk-yrs)     Types: Cigarettes     Passive exposure: Current    Smokeless tobacco: Never    Tobacco comments:     Has tried to quit in the past.    Vaping Use    Vaping status: Never Used   Substance Use Topics    Alcohol use: Yes     Alcohol/week: 8.0 standard drinks of alcohol     Types: 7 Shots of  liquor, 1 Standard drinks or equivalent per week     Comment: socailly    Drug use: Never        Milton Whittington MD  08/10/25 1932

## 2025-08-07 NOTE — CONSULTS
"Reason For Consult  Bowel obstruction    History Of Present Illness  Anny Tesfaye is a 80 y.o. female presenting with abdominal pain and n/v x1 day. Pt has hx of ischemic colitis back in October resulting in ex-lap, descending and proximal sigmoid colectomy with end colostomy and long marixa stump with Dr Aparicio. She reports having one SBO in January which required NG tube and resolved within 3 days. She has intermittent constipation but does not take stool softeners regularly. She has been constipated x1 week, and developed nausea, vomiting and abdominal pain which prompted her visit to the OR. Of noted she has hx of afib on Eliquis.     In the ED, workup is significant for WBC 11.7, H/H 9/30.4, \"Dilated loops of small bowel throughout the abdomen and pelvis withdecompressed loops of distal small bowel and a suspected transition point in the mid abdomen compatible with small bowel obstruction.\"      Past Medical History  She has a past medical history of Aortic aneurysm, Chronic low back pain, CKD (chronic kidney disease) stage 3, GFR 30-59 ml/min (Multi), Esophagitis, Glaucoma, History of scarlet fever, Hypertension, Hypothyroidism, Morbid obesity (Multi), Nephritis, OA (osteoarthritis) of knee, Polymyalgia rheumatica (Multi), Psoriasis, and Sciatica.    Surgical History  She has a past surgical history that includes CT angio neck (04/21/2014); Tonsillectomy; Adenoidectomy; Dilation and curettage of uterus; [place] tooth extraction; Cataract extraction (06/2020); Crown (06/2020); Cataract extraction (Right); Cataract extraction (Left, 03/2021); and Cardiac catheterization (N/A, 11/6/2024).     Social History  She reports that she has been smoking cigarettes. She has a 40 pack-year smoking history. She has been exposed to tobacco smoke. She has never used smokeless tobacco. She reports that she does not currently use alcohol. She reports that she does not use drugs.    Family History  Family History[1]   "   Allergies  Patient has no known allergies.    Review of Systems  Negative other than mentioned in the HPI     Physical Exam  Physical Exam:  Constitutional: Well developed, awake/alert/oriented x3, no distress, alert and cooperative. Sitting up in bed, chatty  Skin: Warm and dry, no lesions, no rashes  Eyes: PEERLA  ENMT: mucous membranes moist, no apparent injury, no lesions seen  Head/Neck: Neck supple, no apparent injury, trachea midline  Respiratory/Thorax: Patent airways, CTAB, normal breath sounds with good chest expansion, thorax symmetric  Cardiovascular: Regular, rate and rhythm, 2+ equal pulses of the extremities  Gastrointestinal: Non-distended, soft, mildly tender, Colostomy Beefy red, moist, producing brown mushy stool, and gas, well pouched. Ng in place with bilious content in canister ~500cc since arriving to [facility].  Genitourinary: voiding without difficulty  Musculoskeletal: ROM intact, no joint swelling, normal strength.   Extremities: no cyanosis edema, contusions or wounds  Neurological: alert and oriented x3  Psychological: Appropriate mood and behavior.       Last Recorded Vitals  Blood pressure 161/88, pulse 71, temperature 36.7 °C (98 °F), resp. rate 19, weight 68 kg (149 lb 14.6 oz), SpO2 95%.    Relevant Results  Results for orders placed or performed during the hospital encounter of 08/06/25 (from the past 24 hours)   CBC and Auto Differential   Result Value Ref Range    WBC 11.7 (H) 4.4 - 11.3 x10*3/uL    nRBC 0.0 0.0 - 0.0 /100 WBCs    RBC 3.41 (L) 4.00 - 5.20 x10*6/uL    Hemoglobin 9.0 (L) 12.0 - 16.0 g/dL    Hematocrit 30.4 (L) 36.0 - 46.0 %    MCV 89 80 - 100 fL    MCH 26.4 26.0 - 34.0 pg    MCHC 29.6 (L) 32.0 - 36.0 g/dL    RDW 17.1 (H) 11.5 - 14.5 %    Platelets 599 (H) 150 - 450 x10*3/uL    Neutrophils % 86.8 40.0 - 80.0 %    Immature Granulocytes %, Automated 0.5 0.0 - 0.9 %    Lymphocytes % 9.2 13.0 - 44.0 %    Monocytes % 3.0 2.0 - 10.0 %    Eosinophils % 0.2 0.0 - 6.0 %     Basophils % 0.3 0.0 - 2.0 %    Neutrophils Absolute 10.15 (H) 1.60 - 5.50 x10*3/uL    Immature Granulocytes Absolute, Automated 0.06 0.00 - 0.50 x10*3/uL    Lymphocytes Absolute 1.08 0.80 - 3.00 x10*3/uL    Monocytes Absolute 0.35 0.05 - 0.80 x10*3/uL    Eosinophils Absolute 0.02 0.00 - 0.40 x10*3/uL    Basophils Absolute 0.04 0.00 - 0.10 x10*3/uL   Comprehensive metabolic panel   Result Value Ref Range    Glucose 148 (H) 74 - 99 mg/dL    Sodium 135 (L) 136 - 145 mmol/L    Potassium 4.8 3.5 - 5.3 mmol/L    Chloride 93 (L) 98 - 107 mmol/L    Bicarbonate 33 (H) 21 - 32 mmol/L    Anion Gap 14 10 - 20 mmol/L    Urea Nitrogen 19 6 - 23 mg/dL    Creatinine 1.04 0.50 - 1.05 mg/dL    eGFR 54 (L) >60 mL/min/1.73m*2    Calcium 9.4 8.6 - 10.3 mg/dL    Albumin 3.0 (L) 3.4 - 5.0 g/dL    Alkaline Phosphatase 59 33 - 136 U/L    Total Protein 7.2 6.4 - 8.2 g/dL    AST 10 9 - 39 U/L    Bilirubin, Total 0.3 0.0 - 1.2 mg/dL    ALT 7 7 - 45 U/L   Morphology   Result Value Ref Range    RBC Morphology No significant RBC morphology present    Basic metabolic panel   Result Value Ref Range    Glucose 120 (H) 74 - 99 mg/dL    Sodium 134 (L) 136 - 145 mmol/L    Potassium 4.3 3.5 - 5.3 mmol/L    Chloride 93 (L) 98 - 107 mmol/L    Bicarbonate 30 21 - 32 mmol/L    Anion Gap 15 10 - 20 mmol/L    Urea Nitrogen 17 6 - 23 mg/dL    Creatinine 0.98 0.50 - 1.05 mg/dL    eGFR 58 (L) >60 mL/min/1.73m*2    Calcium 8.7 8.6 - 10.3 mg/dL   CBC   Result Value Ref Range    WBC 11.2 4.4 - 11.3 x10*3/uL    nRBC 0.0 0.0 - 0.0 /100 WBCs    RBC 3.08 (L) 4.00 - 5.20 x10*6/uL    Hemoglobin 8.1 (L) 12.0 - 16.0 g/dL    Hematocrit 26.5 (L) 36.0 - 46.0 %    MCV 86 80 - 100 fL    MCH 26.3 26.0 - 34.0 pg    MCHC 30.6 (L) 32.0 - 36.0 g/dL    RDW 17.2 (H) 11.5 - 14.5 %    Platelets 521 (H) 150 - 450 x10*3/uL     XR chest 1 view  Result Date: 8/7/2025  Interpreted By:  Oziel Sunshine, STUDY: XR CHEST 1 VIEW; ;  8/7/2025 4:14 am   INDICATION: Signs/Symptoms:NG tube  placement.   COMPARISON: None.   ACCESSION NUMBER(S): [id number]   ORDERING CLINICIAN: ZAY CHENEY   TECHNIQUE: A portable upright radiograph of the chest is performed.   FINDINGS: A nasogastric tube is identified. The distal tube projects beyond the gastroesophageal junction however the distal 4 cm of tube is directed cephalad in the lower esophagus. Repositioning of the tube is recommended.   The heart is mildly enlarged. There is severe tortuosity and dilatation of the thoracic aorta which is similar to the previous study.   There are low lung volumes with linear areas of atelectasis greater on the left. Calcified granulomas identified in the right apex. There is no airspace consolidation, pleural effusion, or pneumothorax. The pulmonary vessels are within normal limits. Bulla formation may be present in the left apex which is unchanged.   The osseous structures are diffusely and severely demineralized.       A nasogastric tube is coiled distally with the tip projecting cranially in the lower esophagus. Repositioning is recommended.   Persistent dilatation and tortuosity of the thoracic aorta. This is described in greater detail on a recent CT examination of 07/31/2025. Please refer to this report.   Linear areas of atelectasis in the left lung. There is no airspace consolidation.     MACRO: None   Signed by: Oziel Sunshine 8/7/2025 7:28 AM Dictation workstation:   [id number]    CT abdomen pelvis w IV contrast  Result Date: 8/7/2025  Interpreted By:  Finkelstein, Evan, STUDY: CT ABDOMEN PELVIS W IV CONTRAST;  8/7/2025 12:05 am   INDICATION: Signs/Symptoms:Abdominal pain. vomiting..     COMPARISON: CT abdomen pelvis 11/2/2024   ACCESSION NUMBER(S): [id number]   ORDERING CLINICIAN: ZAY CHENEY   TECHNIQUE: Axial CT images of the abdomen and pelvis with coronal and sagittal reconstructed images obtained after intravenous administration of 75 mL Omnipaque 350   FINDINGS: LOWER CHEST: Cardiomegaly.  Small right pleural effusion. Patchy bibasilar airspace opacities.   ABDOMEN:   LIVER: Normal attenuation and contour. BILE DUCTS: Normal caliber. GALLBLADDER: Increased density in the gallbladder favored to represent biliary sludge or stones. No wall thickening or pericholecystic fluid. PORTAL VEIN: Patent SPLEEN: Calcifications scattered throughout the spleen compatible with sequela of prior granulomatous disease. PANCREAS: Unremarkable. ADRENALS: Unremarkable. KIDNEYS, URETERS and URINARY BLADDER: Symmetric renal enhancement. No hydronephrosis or perinephric fluid collection. 0.5 cm hypodensity in the right kidney measures simple fluid attenuation, most compatible with a simple cyst. Bladder is within normal limits REPRODUCTIVE ORGANS: No pelvic masses.   ABDOMINAL WALL: Left abdominal wall colostomy. PERITONEUM: No ascites, free air or fluid collection.   BOWEL: Postsurgical changes of partial colectomy. Left abdominal wall colostomy. Scattered colonic diverticula without definite pericolonic inflammatory stranding. Moderate colonic stool burden. Postsurgical changes at the level of the cecum, possibly related to appendectomy. There are dilated loops of small bowel throughout the abdomen and pelvis with decompressed loops of distal small bowel. Suspected transition point in the mid abdomen.   VESSELS: Aorto bi-iliac stent graft repair. A femoral to femoral bypass graft is visualized and appears patent. RETROPERITONEUM: No pathologically enlarged retroperitoneal lymph nodes.   BONES: No acute osseous abnormality. Degenerative changes throughout the spine.       Dilated loops of small bowel throughout the abdomen and pelvis with decompressed loops of distal small bowel and a suspected transition point in the mid abdomen compatible with small bowel obstruction.   Postsurgical changes of partial colectomy with a left abdominal wall colostomy, moderate colonic stool burden and scattered colonic diverticulosis. No CT  evidence of acute diverticulitis.   Cholelithiasis without CT evidence of acute cholecystitis.   Cardiomegaly, small right pleural effusion and patchy bibasilar airspace opacities which may represent atelectasis with a developing infection not excluded in the appropriate clinical setting.     MACRO: None.   Signed by: Evan Finkelstein 8/7/2025 12:53 AM Dictation workstation:   [id number]    ECG 12 lead  Result Date: 8/2/2025   Poor data quality, interpretation may be adversely affected Normal sinus rhythm Septal infarct , age undetermined Lateral infarct , age undetermined Abnormal ECG When compared with ECG of 07-NOV-2024 14:00, Incomplete left bundle branch block is no longer Present Septal infarct is now Present Lateral infarct is now Present See ED provider note for full interpretation and clinical correlation Confirmed by Concepción Meza ([id number]) on 8/2/2025 9:08:22 AM    CT angio chest for pulmonary embolism  Result Date: 7/31/2025  Interpreted By:  Jovanny Cifuentes, STUDY: CT ANGIO CHEST FOR PULMONARY EMBOLISM;  7/31/2025 3:22 pm   INDICATION: Signs/Symptoms:chest pain, worse with deep inspiration.     COMPARISON: September 8, 2022 chest CT, November 2, 2024 CTA chest abdomen and pelvis   ACCESSION NUMBER(S): [id number]   ORDERING CLINICIAN: JAMAR IBRAHIM   TECHNIQUE: Helical data acquisition of the chest was obtained after intravenous administration of 75 ML Omnipaque 350, as per PE protocol. Images were reformatted in coronal and sagittal planes. Axial and coronal maximum intensity projection (MIP) images were created and reviewed. In addition, dual energy reconstructions were also performed including low energy virtual mono-energetic images and iodine density maps.   FINDINGS: POTENTIAL LIMITATIONS OF THE STUDY: Likely technically adequate although image degradation related to cardiac pulsation, and streak artifact from radiopaque structures including concentrated radiographic dye.   HEART AND VESSELS:  Allowing for presumed artifact There are no discrete filling defects within main pulmonary artery and its branches to suggest acute pulmonary embolism. Main pulmonary artery is dilated measuring 4.2 cm (Series 304, Image 205).   Similar dilation and tortuosity of the thoracic aorta. The aortic root is estimated at 5.0 cm (Series 603, Image 79) compared with 5.1 cm (Series 603, Image 74) November 2, 2024.Extensive atherosclerosis involving the thoracic aorta and branch vessels.Findings include marked calcific plaque at the origin of the right subclavian artery favoring high-grade stenosis. Mild coronary artery calcifications are seen. Please note,the study is not optimized for evaluation of coronary arteries.   Similar appearance of the cardiac chambers.   Similar trace pericardial fluid.   MEDIASTINUM AND NIKIA, LOWER NECK AND AXILLA: The visualized thyroid gland is within normal limits. No evidence of thoracic lymphadenopathy by CT criteria. Esophagus appears within normal limits as seen.   LUNGS AND AIRWAYS: Compared with November 2, 2024 interval resolution of small left-sided pleural effusion and significant improvement of right-sided effusion with tiny remaining effusion inferiorly with residual adjacent dependent atelectasis. There is apical predominant emphysema and scattered subsegmental atelectasis or scar.     UPPER ABDOMEN: The visualized subdiaphragmatic structures demonstrate no acute significant findings. Similar scattered benign postinflammatory calcifications. Similar nodular thickening left adrenal gland. There is scattered colonic diverticulosis.       CHEST WALL AND OSSEOUS STRUCTURES: Chest wall is within normal limits. No acute osseous pathology.There are no suspicious osseous lesions.Scattered degenerative changes. There is pronounced right shoulder arthrosis with chronic effusion. Chronic body wall edema. The abdominal portion appears improved.       1. No evidence of acute pulmonary embolism.  2. Improved expansion and aeration with resolution of left-sided effusion with marked improvement of right-sided effusion with residual adjacent atelectasis. 3. Similar dilation of the thoracic aorta. The aortic root is estimated at 5 cm. 4. Similar enlargement main pulmonary artery estimated 4.1 cm. Correlate for clinical evidence of pulmonary arterial hypertension. 5. Extensive atherosclerosis including similar marked calcific plaque at the origin of the right subclavian artery favoring high-grade subclavian artery stenosis although limited assessment. Correlate with symptomatology. 6. Chronic body wall edema. The abdominal portion appears improved. 7. Additional similar chronic findings as reported.   MACRO: None   Signed by: Jovanny Cifuentes 7/31/2025 4:16 PM Dictation workstation:   [id number]    XR chest 1 view  Result Date: 7/31/2025  Interpreted By:  Larry Desai, STUDY: XR CHEST 1 VIEW  7/31/2025 1:24 pm   INDICATION: Signs/Symptoms:chest pain   COMPARISON: 11/08/2024   ACCESSION NUMBER(S): [id number]   ORDERING CLINICIAN: JAMAR IBRAHIM   TECHNIQUE: A single AP portable radiograph of the chest was obtained.   FINDINGS: Multiple cardiac monitoring leads are seen over the chest.   No focal infiltrate, pleural effusion or pneumothorax is identified. The cardiac silhouette is within normal limits for size.       No focal infiltrate or pneumothorax is identified.   MACRO: None.   Signed by: Larry Desai 7/31/2025 2:34 PM Dictation workstation:   [id number]         Assessment/Plan     Anny Tesfaye is an 81 yo female with hx of ischemic colitis s/p sigmoid colectomy with end colostomy in October at [place] and SBO in January who is admitted now with SBO. On exam, pt is well appearing, NG in place with bilious content, abdomen is soft, non distended, mildly tender, colostomy Beefy red, moist, producing brown mushy stool, and gas, well pouched     Plan: SBO  - sips/chips  - stoma RN to flush colostomy   -  pain and nausea medications as needed  - oob and walking as much as possible   - maintain NG to LIWS  - SBFT tomorrow vs Saturday  - supportive care per primary team    Dispo: surgery will continue to follow. Discussed with Dr Torres.     I spent 45 minutes in the professional and overall care of this patient.      Concepción Ramírez, APRN-CNP         [1]   Family History  Problem Relation Name Age of Onset    Breast cancer Mother      Osteoporosis Mother      Other (Abdominal Aortic Aneurysm) Mother      Heart failure Father      Hypertension Father      Macular degeneration Father      Heart disease Father      Cancer Sister          Sinus    Breast cancer Maternal Grandmother

## 2025-08-07 NOTE — PROGRESS NOTES
08/07/25 1037   Discharge Planning   Living Arrangements Other (Comment)   Support Systems Family members   Assistance Needed Patient requires assistance with all mobility   Type of Residence Assisted living   Do you have animals or pets at home? No   Care Facility Name [facility]   Expected Discharge Disposition Home   Does the patient need discharge transport arranged? Yes   [facility] coordination needed? Yes   Has discharge transport been arranged? No   Financial Resource Strain   How hard is it for you to pay for the very basics like food, housing, medical care, and heating? Not hard   Housing Stability   In the last 12 months, was there a time when you were not able to pay the mortgage or rent on time? N   In the past 12 months, how many times have you moved where you were living? 0   At any time in the past 12 months, were you homeless or living in a shelter (including now)? N   Transportation Needs   In the past 12 months, has lack of transportation kept you from medical appointments or from getting medications? no   In the past 12 months, has lack of transportation kept you from meetings, work, or from getting things needed for daily living? No   Stroke Family Assessment   Stroke Family Assessment Needed No   Intensity of Service   Intensity of Service 0-30 min     Met with patient at the bedside to discuss discharge plan.  She states she is from [facility] but couldn't tell me if it was assisted living, independent living, or LTC.  Referral placed in [facility].  Patient states she spends most of her time in bed and uses a wheelchair for mobility.  She has an ostomy and does not know who she gets her supplies through.  PLAN/BARRIER: NGT, pain control  DISP: return to [facility]  ADOD: 3-5 days  Steph Paz RN     8/7/25 @ 1203  Confirmed with [facility], patient is LTC with them and has no barriers for her to return.  Steph Paz RN

## 2025-08-08 ENCOUNTER — APPOINTMENT (OUTPATIENT)
Dept: RADIOLOGY | Facility: HOSPITAL | Age: 80
DRG: 389 | End: 2025-08-08
Payer: MEDICARE

## 2025-08-08 LAB
ANION GAP SERPL CALC-SCNC: 12 MMOL/L (ref 10–20)
BUN SERPL-MCNC: 16 MG/DL (ref 6–23)
CALCIUM SERPL-MCNC: 9 MG/DL (ref 8.6–10.3)
CHLORIDE SERPL-SCNC: 94 MMOL/L (ref 98–107)
CO2 SERPL-SCNC: 33 MMOL/L (ref 21–32)
CREAT SERPL-MCNC: 0.99 MG/DL (ref 0.5–1.05)
EGFRCR SERPLBLD CKD-EPI 2021: 57 ML/MIN/1.73M*2
ERYTHROCYTE [DISTWIDTH] IN BLOOD BY AUTOMATED COUNT: 17.2 % (ref 11.5–14.5)
GLUCOSE SERPL-MCNC: 87 MG/DL (ref 74–99)
HCT VFR BLD AUTO: 25.8 % (ref 36–46)
HGB BLD-MCNC: 7.7 G/DL (ref 12–16)
MCH RBC QN AUTO: 26.4 PG (ref 26–34)
MCHC RBC AUTO-ENTMCNC: 29.8 G/DL (ref 32–36)
MCV RBC AUTO: 88 FL (ref 80–100)
NRBC BLD-RTO: 0 /100 WBCS (ref 0–0)
PLATELET # BLD AUTO: 529 X10*3/UL (ref 150–450)
POTASSIUM SERPL-SCNC: 4.3 MMOL/L (ref 3.5–5.3)
RBC # BLD AUTO: 2.92 X10*6/UL (ref 4–5.2)
SODIUM SERPL-SCNC: 135 MMOL/L (ref 136–145)
WBC # BLD AUTO: 11.4 X10*3/UL (ref 4.4–11.3)

## 2025-08-08 PROCEDURE — 74019 RADEX ABDOMEN 2 VIEWS: CPT

## 2025-08-08 PROCEDURE — 74019 RADEX ABDOMEN 2 VIEWS: CPT | Performed by: RADIOLOGY

## 2025-08-08 PROCEDURE — 2550000001 HC RX 255 CONTRASTS: Performed by: NURSE PRACTITIONER

## 2025-08-08 PROCEDURE — 85027 COMPLETE CBC AUTOMATED: CPT | Performed by: FAMILY MEDICINE

## 2025-08-08 PROCEDURE — 99232 SBSQ HOSP IP/OBS MODERATE 35: CPT | Performed by: NURSE PRACTITIONER

## 2025-08-08 PROCEDURE — 36415 COLL VENOUS BLD VENIPUNCTURE: CPT | Performed by: FAMILY MEDICINE

## 2025-08-08 PROCEDURE — 74021 RADEX ABDOMEN 3+ VIEWS: CPT | Performed by: RADIOLOGY

## 2025-08-08 PROCEDURE — 1200000002 HC GENERAL ROOM WITH TELEMETRY DAILY

## 2025-08-08 PROCEDURE — 82374 ASSAY BLOOD CARBON DIOXIDE: CPT | Performed by: FAMILY MEDICINE

## 2025-08-08 PROCEDURE — 2500000004 HC RX 250 GENERAL PHARMACY W/ HCPCS (ALT 636 FOR OP/ED): Performed by: FAMILY MEDICINE

## 2025-08-08 PROCEDURE — 2500000004 HC RX 250 GENERAL PHARMACY W/ HCPCS (ALT 636 FOR OP/ED)

## 2025-08-08 RX ORDER — SODIUM CHLORIDE 9 MG/ML
100 INJECTION, SOLUTION INTRAVENOUS CONTINUOUS
Status: ACTIVE | OUTPATIENT
Start: 2025-08-08 | End: 2025-08-09

## 2025-08-08 RX ORDER — CEPHALEXIN 500 MG/1
500 CAPSULE ORAL 2 TIMES DAILY
Status: ON HOLD | COMMUNITY
Start: 2025-08-02

## 2025-08-08 RX ORDER — DIATRIZOATE MEGLUMINE AND DIATRIZOATE SODIUM 660; 100 MG/ML; MG/ML
60 SOLUTION ORAL; RECTAL ONCE
Status: COMPLETED | OUTPATIENT
Start: 2025-08-08 | End: 2025-08-08

## 2025-08-08 RX ADMIN — ENOXAPARIN SODIUM 40 MG: 100 INJECTION SUBCUTANEOUS at 08:17

## 2025-08-08 RX ADMIN — METOPROLOL TARTRATE 5 MG: 5 INJECTION, SOLUTION INTRAVENOUS at 09:00

## 2025-08-08 RX ADMIN — SODIUM CHLORIDE 100 ML/HR: 9 INJECTION, SOLUTION INTRAVENOUS at 14:25

## 2025-08-08 RX ADMIN — MORPHINE SULFATE 2 MG: 2 INJECTION, SOLUTION INTRAMUSCULAR; INTRAVENOUS at 01:20

## 2025-08-08 RX ADMIN — METOPROLOL TARTRATE 5 MG: 5 INJECTION, SOLUTION INTRAVENOUS at 17:34

## 2025-08-08 RX ADMIN — PANTOPRAZOLE SODIUM 40 MG: 40 INJECTION, POWDER, FOR SOLUTION INTRAVENOUS at 06:03

## 2025-08-08 RX ADMIN — DIATRIZOATE MEGLUMINE AND DIATRIZOATE SODIUM 60 ML: 660; 100 LIQUID ORAL; RECTAL at 09:50

## 2025-08-08 RX ADMIN — METOPROLOL TARTRATE 5 MG: 5 INJECTION, SOLUTION INTRAVENOUS at 23:37

## 2025-08-08 RX ADMIN — METOPROLOL TARTRATE 5 MG: 5 INJECTION, SOLUTION INTRAVENOUS at 01:20

## 2025-08-08 RX ADMIN — MORPHINE SULFATE 2 MG: 2 INJECTION, SOLUTION INTRAMUSCULAR; INTRAVENOUS at 20:13

## 2025-08-08 ASSESSMENT — COGNITIVE AND FUNCTIONAL STATUS - GENERAL
STANDING UP FROM CHAIR USING ARMS: A LOT
DRESSING REGULAR LOWER BODY CLOTHING: A LOT
TOILETING: A LOT
STANDING UP FROM CHAIR USING ARMS: A LOT
MOVING TO AND FROM BED TO CHAIR: A LITTLE
MOVING FROM LYING ON BACK TO SITTING ON SIDE OF FLAT BED WITH BEDRAILS: A LITTLE
STANDING UP FROM CHAIR USING ARMS: A LOT
HELP NEEDED FOR BATHING: A LOT
DRESSING REGULAR UPPER BODY CLOTHING: A LOT
WALKING IN HOSPITAL ROOM: A LOT
DRESSING REGULAR UPPER BODY CLOTHING: A LOT
WALKING IN HOSPITAL ROOM: A LOT
TURNING FROM BACK TO SIDE WHILE IN FLAT BAD: A LITTLE
MOVING TO AND FROM BED TO CHAIR: A LITTLE
DAILY ACTIVITIY SCORE: 13
MOBILITY SCORE: 14
MOBILITY SCORE: 14
MOVING TO AND FROM BED TO CHAIR: A LITTLE
DRESSING REGULAR LOWER BODY CLOTHING: A LOT
PERSONAL GROOMING: A LOT
DRESSING REGULAR UPPER BODY CLOTHING: A LOT
EATING MEALS: A LITTLE
TURNING FROM BACK TO SIDE WHILE IN FLAT BAD: A LITTLE
EATING MEALS: A LITTLE
HELP NEEDED FOR BATHING: A LOT
WALKING IN HOSPITAL ROOM: A LOT
PERSONAL GROOMING: A LOT
CLIMB 3 TO 5 STEPS WITH RAILING: TOTAL
EATING MEALS: A LITTLE
TOILETING: A LOT
DAILY ACTIVITIY SCORE: 13
MOVING FROM LYING ON BACK TO SITTING ON SIDE OF FLAT BED WITH BEDRAILS: A LITTLE
MOBILITY SCORE: 14
DRESSING REGULAR LOWER BODY CLOTHING: A LOT
HELP NEEDED FOR BATHING: A LOT
TOILETING: A LOT
DAILY ACTIVITIY SCORE: 13
CLIMB 3 TO 5 STEPS WITH RAILING: TOTAL
CLIMB 3 TO 5 STEPS WITH RAILING: TOTAL
PERSONAL GROOMING: A LOT
TURNING FROM BACK TO SIDE WHILE IN FLAT BAD: A LITTLE
MOVING FROM LYING ON BACK TO SITTING ON SIDE OF FLAT BED WITH BEDRAILS: A LITTLE

## 2025-08-08 ASSESSMENT — PAIN - FUNCTIONAL ASSESSMENT
PAIN_FUNCTIONAL_ASSESSMENT: 0-10

## 2025-08-08 ASSESSMENT — PAIN SCALES - GENERAL
PAINLEVEL_OUTOF10: 5 - MODERATE PAIN
PAINLEVEL_OUTOF10: 0 - NO PAIN
PAINLEVEL_OUTOF10: 6
PAINLEVEL_OUTOF10: 0 - NO PAIN

## 2025-08-08 ASSESSMENT — PAIN DESCRIPTION - LOCATION: LOCATION: ABDOMEN

## 2025-08-08 NOTE — CONSULTS
"Ostomy Wound Care Consult     Visit Date: 8/7/2025      Patient Name: Anny Tesfaye         MRN: [id number]               WOC nursing visit outcome: Stoma flushed with 300 ml via stoma cone. Received return of  ~ 200 ml of brown effluent with several hard yolanda of stool. Patient started to feel \"hot and a little off\". Notified bedside nurse. Vitals obtained. Notified Concepción Ramírez CNP. Door opened and patient reports feeling cooler and experiencing less abdominal pressure.     WOC next scheduled visit/plan: woc will follow while inpatient     Stoma Type: Colostomy  Fab: No  Diameter: 1  Location: LLQ  Protrusion: Flush  Mucosal Condition and Color: Moist, Red  Mucocutaneous Junction: Intact  Peristomal Skin: Clear, intact  Location of Skin Impairment: n/a  Peristomal Contour: Flat  Supportive Tissue: Very soft  Character of Output: brown liquid effluent with hard stool yoladna   Emptying Frequency: as needed   Removed/Current Pouching System: removed lawrence 2 piece pouch. Applied barrier ring, 2 piece soft convex 2 1/4 wafer with lock and roll pouch.   Current Wearing Time: goal 3-4  Days    Recommendations:   Skin Care: dust with stoma powder as needed       Rachel Cronin RN BSN,[facility],CWOCN  [phone]   8/7/2025  9:20 PM        "

## 2025-08-08 NOTE — PROGRESS NOTES
08/08/25 1136   Discharge Planning   Expected Discharge Disposition Inter   Does the patient need discharge transport arranged? Yes   [facility] coordination needed? Yes     Patient is admission day 1 for intestinal obstruction, NG remains in place.      Patient is long term care resident at [facility].  Updates attached to referral and sent, no barriers to return when medically ready.

## 2025-08-08 NOTE — CARE PLAN
The patient's goals for the shift include      The clinical goals for the shift include safety and pain control

## 2025-08-08 NOTE — PROGRESS NOTES
Anny Tesfaye is a 80 y.o. female on day 1 of admission presenting with Intestinal obstruction (Multi).    Assessment/Plan   SBO vs obstipation    Stoma flushed yesterday, removing several hard yolanda of stool    - Pain control  - PRN antiemetics  - NPO w sips/chips  - NG to LIWS  - Monitor and record NG and stoma OP  - Stoma care  - GG challenge today    Discussed with Dr. Torres. Will continue to follow.       Subjective   Tired, but feeling a lot better. No n/v. Pain controlled.       Objective     Physical Exam  Constitutional: A&Ox3, calm and cooperative, NAD  Eyes: EOMI, clear sclera   Cardiovascular: Normal rate and regular rhythm. No murmurs  Respiratory/Thorax: CTAB, on RA  Gastrointestinal: Abdomen soft, mildly distended, nontender. NG- feculent (1025mL/24h). Stoma- pink w/o any OP or gas  Genitourinary: Purewick- clear yellow  Musculoskeletal: ROM intact  Extremities: No peripheral edema  Neurological: A&Ox3, No focal deficits   Psychological: Appropriate mood and behavior    Last Recorded Vitals  Blood pressure 164/76, pulse 69, temperature 36.4 °C (97.5 °F), resp. rate 18, weight 68 kg (149 lb 14.6 oz), SpO2 96%.  Intake/Output last 3 Shifts:  I/O last 3 completed shifts:  In: 1180 (17.4 mL/kg) [P.O.:180; I.V.:1000 (14.7 mL/kg)]  Out: 1025 (15.1 mL/kg) [Emesis/NG output:1025]  Weight: 68 kg     Relevant Results    Scheduled medications  Scheduled Medications[1]  Continuous medications  Continuous Medications[2]  PRN medications  PRN Medications[3]    Results for orders placed or performed during the hospital encounter of 08/06/25 (from the past 24 hours)   CBC   Result Value Ref Range    WBC 11.4 (H) 4.4 - 11.3 x10*3/uL    nRBC 0.0 0.0 - 0.0 /100 WBCs    RBC 2.92 (L) 4.00 - 5.20 x10*6/uL    Hemoglobin 7.7 (L) 12.0 - 16.0 g/dL    Hematocrit 25.8 (L) 36.0 - 46.0 %    MCV 88 80 - 100 fL    MCH 26.4 26.0 - 34.0 pg    MCHC 29.8 (L) 32.0 - 36.0 g/dL    RDW 17.2 (H) 11.5 - 14.5 %    Platelets 529 (H) 150  - 450 x10*3/uL   Basic metabolic panel   Result Value Ref Range    Glucose 87 74 - 99 mg/dL    Sodium 135 (L) 136 - 145 mmol/L    Potassium 4.3 3.5 - 5.3 mmol/L    Chloride 94 (L) 98 - 107 mmol/L    Bicarbonate 33 (H) 21 - 32 mmol/L    Anion Gap 12 10 - 20 mmol/L    Urea Nitrogen 16 6 - 23 mg/dL    Creatinine 0.99 0.50 - 1.05 mg/dL    eGFR 57 (L) >60 mL/min/1.73m*2    Calcium 9.0 8.6 - 10.3 mg/dL       XR chest 1 view   Final Result   A nasogastric tube is coiled distally with the tip projecting   cranially in the lower esophagus. Repositioning is recommended.        Persistent dilatation and tortuosity of the thoracic aorta. This is   described in greater detail on a recent CT examination of 07/31/2025.   Please refer to this report.        Linear areas of atelectasis in the left lung. There is no airspace   consolidation.             MACRO:   None        Signed by: Oziel Sunshine 8/7/2025 7:28 AM   Dictation workstation:   [id number]      CT abdomen pelvis w IV contrast   Final Result   Dilated loops of small bowel throughout the abdomen and pelvis with   decompressed loops of distal small bowel and a suspected transition   point in the mid abdomen compatible with small bowel obstruction.        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 No CT evidence of acute diverticulitis.        Cholelithiasis without CT evidence of acute cholecystitis.        Cardiomegaly, small right pleural effusion and patchy bibasilar   airspace opacities which may represent atelectasis with a developing   infection not excluded in the appropriate clinical setting.             MACRO:   None.        Signed by: Evan Finkelstein 8/7/2025 12:53 AM   Dictation workstation:   [id number]      XR abdomen 2 views supine and erect or decub    (Results Pending)   XR abdomen 2 views supine and erect or decub    (Results Pending)           I spent 35 minutes in the professional and overall care of this patient.    Edilberto Adam, APRN-CNP         [1] enoxaparin, 40 mg, subcutaneous, q24h  metoprolol, 5 mg, intravenous, q6h  pantoprazole, 40 mg, oral, Daily before breakfast   Or  pantoprazole, 40 mg, intravenous, Daily before breakfast  [2]    [3] PRN medications: acetaminophen **OR** acetaminophen **OR** acetaminophen, hydrALAZINE, morphine, ondansetron **OR** ondansetron, phenoL

## 2025-08-08 NOTE — PROGRESS NOTES
Pharmacy Medication History     Source of Information: FACILITY MEDICATION LIST    Additional concerns with the patient's PTA list.     Notified Provider via [facility]iku : No    The following updates were made to the Prior to Admission medication list:     Medications ADDED:   N/A  Medications CHANGED:  PANTOPROZOLE 40 MG- STRENGTH  Medications REMOVED:   ALENDRONATE 70MG  FUROSEMIDE 40 MG  LEVOMEFOLATE CALCIUM  CALMOSEPTINE 0.44-20.6 % OINT  METOPROLOL SUCC 25 MG  REMERON 15 MG  NICOTINE 21 MG/ 24 HR  ONDANSETRON 4 MG  ENTRESTO 24-26 MG  ACTEMRA 200 MG/ 10 MG  THYROID 30 MG  Medications NOT TAKING:   ALENDRONATE 70MG  FUROSEMIDE 40 MG  LEVOMEFOLATE CALCIUM  CALMOSEPTINE 0.44-20.6 % OINT  METOPROLOL SUCC 25 MG  REMERON 15 MG  NICOTINE 21 MG/ 24 HR  ONDANSETRON 4 MG  ENTRESTO 24-26 MG  ACTEMRA 200 MG/ 10 MG  THYROID 30 MG    Allergy reviewed : Yes    Meds 2 Beds : Yes    Outpatient pharmacy confirmed and updated in chart : Yes    Pharmacy name: [facility]    The list below reflectives the updated PTA list. Please review each medication in order reconciliation for additional clarification and justification.    Prior to Admission Medications   Prescriptions Last Dose      DAILY MULTI-VITAMIN ORAL       Sig: Take 1 tablet by mouth once daily.   acetaminophen (Tylenol 8 HOUR) 650 mg ER tablet       Sig: Take 1 tablet (650 mg) by mouth every 8 hours if needed.   acetaminophen (Tylenol) 650 mg suppository       Sig: Insert 1 suppository (650 mg) into the rectum every 8 hours if needed for mild pain (1 - 3).                amLODIPine (Norvasc) 5 mg tablet       Sig: Take 1 tablet (5 mg) by mouth once daily. In the morning   amiodarone (Pacerone) 200 mg tablet       Sig: Take 1 tablet (200 mg) by mouth once daily in the morning.             apixaban (Eliquis) 2.5 mg tablet       Sig: Take 1 tablet (2.5 mg) by mouth 2 times a day.   ascorbic acid (Vitamin C) 500 mg tablet       Sig: Take 1 tablet (500 mg) by mouth  once daily.   aspirin 81 mg EC tablet       Sig: Take 1 tablet (81 mg) by mouth once daily.   atorvastatin (Lipitor) 10 mg tablet       Sig: Take 1 tablet (10 mg) by mouth once daily at bedtime.   bisacodyl (Dulcolax, bisacodyl,) 10 mg suppository       Sig: Insert 1 suppository (10 mg) into the rectum once daily as needed for constipation.   calcium carbonate 500 mg calcium (1,250 mg) chewable tablet       Sig: Chew 1 tablet (1,250 mg) once daily.   cephalexin (Keflex) 500 mg capsule       Sig: Take 1 capsule (500 mg) by mouth 2 times a day for 5 days.             cyanocobalamin, vitamin B-12, 5,000 mcg capsule       Sig: Take 5,000 mcg by mouth once daily.   dimethicone (Cavilon Durable Barrier) 1.3 % cream       Sig: Apply topically. Apply to lucio area topically every day and night shift for prevention. Apply to buttocks every shift as needed after each episode of incontinence.   docusate sodium (Colace) 100 mg capsule       Sig: Take 1 capsule (100 mg) by mouth 2 times a day.   escitalopram (Lexapro) 20 mg tablet       Sig: Take 1 tablet (20 mg) by mouth once daily.                                          levothyroxine (Synthroid, Levoxyl) 100 mcg tablet       Sig: TAKE 1 TABLET (100 MCG) BY MOUTH ONCE DAILY. SIX DAYS PER WEEK   lutein 6 mg capsule       Sig: Take 1 capsule by mouth once daily.   magnesium hydroxide (Milk of Magnesia) 400 mg/5 mL suspension       Sig: Take 30 mL by mouth once daily as needed for constipation (if no BM in 72 hours).                                                       ondansetron (Zofran) 4 mg tablet       Sig: Take 1 tablet (4 mg) by mouth every 6 hours if needed for nausea or vomiting.   oxyCODONE (Roxicodone) 5 mg immediate release tablet       Sig: Take 1 tablet (5 mg) by mouth every 6 hours if needed for severe pain (7 - 10).   oxybutynin XL (Ditropan-XL) 10 mg 24 hr tablet       Sig: Take 1 tablet (10 mg) by mouth once daily.   pantoprazole (ProtoNix) 20 mg EC tablet        Sig: Take 1 tablet (20 mg) by mouth once daily. Do not crush, chew, or split.   polyethylene glycol (Glycolax, Miralax) 17 gram packet       Sig: Take 17 g by mouth 2 times a day as needed (for firm or low ostomy output).                sennosides (Senokot) 8.6 mg tablet       Sig: Take 1 tablet (8.6 mg) by mouth 2 times a day.   sodium phosphates (Fleets) 19-7 gram/118 mL enema enema       Sig: Insert 133 mL (1 enema) into the rectum once daily as needed for constipation.                                Facility-Administered Medications: None       The list below reflectives the updated allergy list. Please review each documented allergy for additional clarification and justification.    No Known Allergies       08/08/25 at 10:27 AM - Melisa Chance

## 2025-08-08 NOTE — PROGRESS NOTES
Anny Tesfaye is a 80 y.o. female on day 1 of admission presenting with Intestinal obstruction (Multi).      Subjective   Pt resting in bed  Feels ok  Just had NG clamped   For SBFT today    No abd pains or n/v  Per nurse had formed BM overnight        Objective     Last Recorded Vitals  /76   Pulse 69   Temp 36.4 °C (97.5 °F)   Resp 18   Wt 68 kg (149 lb 14.6 oz)   SpO2 96%   Intake/Output last 3 Shifts:    Intake/Output Summary (Last 24 hours) at 8/8/2025 0935  Last data filed at 8/8/2025 0550  Gross per 24 hour   Intake 1120 ml   Output 1025 ml   Net 95 ml       Admission Weight  Weight: 68 kg (149 lb 14.6 oz) (08/06/25 2235)    Daily Weight  08/06/25 : 68 kg (149 lb 14.6 oz)    Image Results  XR chest 1 view  Narrative: Interpreted By:  Oziel Sunshine,   STUDY:  XR CHEST 1 VIEW; ;  8/7/2025 4:14 am      INDICATION:  Signs/Symptoms:NG tube placement.      COMPARISON:  None.      ACCESSION NUMBER(S):  [id number]      ORDERING CLINICIAN:  ZAY CHENEY      TECHNIQUE:  A portable upright radiograph of the chest is performed.      FINDINGS:  A nasogastric tube is identified. The distal tube projects beyond the  gastroesophageal junction however the distal 4 cm of tube is directed  cephalad in the lower esophagus. Repositioning of the tube is  recommended.      The heart is mildly enlarged. There is severe tortuosity and  dilatation of the thoracic aorta which is similar to the previous  study.      There are low lung volumes with linear areas of atelectasis greater  on the left. Calcified granulomas identified in the right apex. There  is no airspace consolidation, pleural effusion, or pneumothorax. The  pulmonary vessels are within normal limits. Bulla formation may be  present in the left apex which is unchanged.      The osseous structures are diffusely and severely demineralized.      Impression: A nasogastric tube is coiled distally with the tip projecting  cranially in the lower  esophagus. Repositioning is recommended.      Persistent dilatation and tortuosity of the thoracic aorta. This is  described in greater detail on a recent CT examination of 07/31/2025.  Please refer to this report.      Linear areas of atelectasis in the left lung. There is no airspace  consolidation.          MACRO:  None      Signed by: Oziel Sunshine 8/7/2025 7:28 AM  Dictation workstation:   [id number]  CT abdomen pelvis w IV contrast  Narrative: Interpreted By:  Finkelstein, Evan,   STUDY:  CT ABDOMEN PELVIS W IV CONTRAST;  8/7/2025 12:05 am      INDICATION:  Signs/Symptoms:Abdominal pain. vomiting..          COMPARISON:  CT abdomen pelvis 11/2/2024      ACCESSION NUMBER(S):  [id number]      ORDERING CLINICIAN:  ZAY CHENEY      TECHNIQUE:  Axial CT images of the abdomen and pelvis with coronal and sagittal  reconstructed images obtained after intravenous administration of 75  mL Omnipaque 350      FINDINGS:  LOWER CHEST: Cardiomegaly. Small right pleural effusion. Patchy  bibasilar airspace opacities.      ABDOMEN:      LIVER: Normal attenuation and contour.  BILE DUCTS: Normal caliber.  GALLBLADDER: Increased density in the gallbladder favored to  represent biliary sludge or stones. No wall thickening or  pericholecystic fluid. PORTAL VEIN: Patent  SPLEEN: Calcifications scattered throughout the spleen compatible  with sequela of prior granulomatous disease. PANCREAS: Unremarkable.  ADRENALS: Unremarkable.  KIDNEYS, URETERS and URINARY BLADDER: Symmetric renal enhancement. No  hydronephrosis or perinephric fluid collection. 0.5 cm hypodensity in  the right kidney measures simple fluid attenuation, most compatible  with a simple cyst. Bladder is within normal limits REPRODUCTIVE  ORGANS: No pelvic masses.      ABDOMINAL WALL: Left abdominal wall colostomy.  PERITONEUM: No ascites, free air or fluid collection.      BOWEL: Postsurgical changes of partial colectomy. Left abdominal wall  colostomy.  Scattered colonic diverticula without definite pericolonic  inflammatory stranding. Moderate colonic stool burden. Postsurgical  changes at the level of the cecum, possibly related to appendectomy.  There are dilated loops of small bowel throughout the abdomen and  pelvis with decompressed loops of distal small bowel. Suspected  transition point in the mid abdomen.      VESSELS: Aorto bi-iliac stent graft repair. A femoral to femoral  bypass graft is visualized and appears patent. RETROPERITONEUM: No  pathologically enlarged retroperitoneal lymph nodes.      BONES: No acute osseous abnormality. Degenerative changes throughout  the spine.      Impression: Dilated loops of small bowel throughout the abdomen and pelvis with  decompressed loops of distal small bowel and a suspected transition  point in the mid abdomen compatible with small bowel obstruction.      Postsurgical changes of partial colectomy with a left abdominal wall  colostomy, moderate colonic stool burden and scattered colonic  diverticulosis. No CT evidence of acute diverticulitis.      Cholelithiasis without CT evidence of acute cholecystitis.      Cardiomegaly, small right pleural effusion and patchy bibasilar  airspace opacities which may represent atelectasis with a developing  infection not excluded in the appropriate clinical setting.          MACRO:  None.      Signed by: Evan Finkelstein 8/7/2025 12:53 AM  Dictation workstation:   [id number]    Medications Ordered Prior to Encounter[1]    Results for orders placed or performed during the hospital encounter of 08/06/25 (from the past 24 hours)   CBC   Result Value Ref Range    WBC 11.4 (H) 4.4 - 11.3 x10*3/uL    nRBC 0.0 0.0 - 0.0 /100 WBCs    RBC 2.92 (L) 4.00 - 5.20 x10*6/uL    Hemoglobin 7.7 (L) 12.0 - 16.0 g/dL    Hematocrit 25.8 (L) 36.0 - 46.0 %    MCV 88 80 - 100 fL    MCH 26.4 26.0 - 34.0 pg    MCHC 29.8 (L) 32.0 - 36.0 g/dL    RDW 17.2 (H) 11.5 - 14.5 %    Platelets 529 (H) 150 - 450  x10*3/uL   Basic metabolic panel   Result Value Ref Range    Glucose 87 74 - 99 mg/dL    Sodium 135 (L) 136 - 145 mmol/L    Potassium 4.3 3.5 - 5.3 mmol/L    Chloride 94 (L) 98 - 107 mmol/L    Bicarbonate 33 (H) 21 - 32 mmol/L    Anion Gap 12 10 - 20 mmol/L    Urea Nitrogen 16 6 - 23 mg/dL    Creatinine 0.99 0.50 - 1.05 mg/dL    eGFR 57 (L) >60 mL/min/1.73m*2    Calcium 9.0 8.6 - 10.3 mg/dL       Physical Exam    Well developed well nurished  No distress  Ao 3  Face symmetrical, she does have a NG tube in place  Neck no jvd no bruit  Chest clear  CVS regular  Ext no edema  Abdo soft nontender bs hyperactive, no masses, colostomy without any output however per patient the bag was emptied just now  Cns alert appropriate able to move ext, gait not tested  Skin intact  Psych normal affect  Relevant Results           Medications Ordered Prior to Encounter[2]  Results for orders placed or performed during the hospital encounter of 08/06/25 (from the past 24 hours)   CBC   Result Value Ref Range    WBC 11.4 (H) 4.4 - 11.3 x10*3/uL    nRBC 0.0 0.0 - 0.0 /100 WBCs    RBC 2.92 (L) 4.00 - 5.20 x10*6/uL    Hemoglobin 7.7 (L) 12.0 - 16.0 g/dL    Hematocrit 25.8 (L) 36.0 - 46.0 %    MCV 88 80 - 100 fL    MCH 26.4 26.0 - 34.0 pg    MCHC 29.8 (L) 32.0 - 36.0 g/dL    RDW 17.2 (H) 11.5 - 14.5 %    Platelets 529 (H) 150 - 450 x10*3/uL   Basic metabolic panel   Result Value Ref Range    Glucose 87 74 - 99 mg/dL    Sodium 135 (L) 136 - 145 mmol/L    Potassium 4.3 3.5 - 5.3 mmol/L    Chloride 94 (L) 98 - 107 mmol/L    Bicarbonate 33 (H) 21 - 32 mmol/L    Anion Gap 12 10 - 20 mmol/L    Urea Nitrogen 16 6 - 23 mg/dL    Creatinine 0.99 0.50 - 1.05 mg/dL    eGFR 57 (L) >60 mL/min/1.73m*2    Calcium 9.0 8.6 - 10.3 mg/dL                      Assessment & Plan  Intestinal obstruction (Multi)    Hypothyroid    Adjustment reaction with anxiety and depression    Essential hypertension    Stage 3 chronic kidney disease  (Multi)    Anemia    Anticoagulant long-term use    Peripheral vascular disease        Surgery following  Input noted  Await SBFT    -Continue current treatment as ordered. Will make adjustments as necessary.    Plan of care discussed with: Provider, RN, Patient    Patient case and plan of care discussed with Dr. GHISLAINE Rodriguez.    YVONNE Green - CNP  -In collaboration with Dr. GHISLAINE Rodriguez    [facility]  Office: [phone]  Fax: [phone]  I have reviewed the above note obtained and documented by the NP/PA and I personally participated in the key components. I have discussed the case and management of the patient's care. Changes made to the note, and all key components of history and physical/progress note done by me.  dw nursing  Oe heart rate regular  Abdo bs decreased   No output in the colostomy   Harley concepcion getting small bowel follow thru  Rajendra Rodriguez MD    .                 [1]   No current facility-administered medications on file prior to encounter.     Current Outpatient Medications on File Prior to Encounter   Medication Sig Dispense Refill    cephalexin (Keflex) 500 mg capsule Take 1 capsule (500 mg) by mouth 2 times a day.      acetaminophen (Tylenol 8 HOUR) 650 mg ER tablet Take 1 tablet (650 mg) by mouth every 8 hours if needed.      acetaminophen (Tylenol) 650 mg suppository Insert 1 suppository (650 mg) into the rectum every 8 hours if needed for mild pain (1 - 3).      alendronate (Fosamax) 70 mg tablet Take 1 tablet (70 mg) by mouth every 7 days. Take on an empty stomach. Do not lie down or eat for 1/2 hour after taking. (Patient not taking: Reported on 2025) 12 tablet 3    amiodarone (Pacerone) 200 mg tablet Take 1 tablet (200 mg) by mouth once daily in the morning.      amLODIPine (Norvasc) 5 mg tablet Take 1 tablet (5 mg) by mouth once daily. In the morning      [] amoxicillin-clavulanate (Augmentin) 875-125 mg tablet Take 875 tablets by  mouth 2 times a day. Every morning and at bedtime for Ear infection for 7 days.      apixaban (Eliquis) 2.5 mg tablet Take 1 tablet (2.5 mg) by mouth 2 times a day.      ascorbic acid (Vitamin C) 500 mg tablet Take 1 tablet (500 mg) by mouth once daily.      aspirin 81 mg EC tablet Take 1 tablet (81 mg) by mouth once daily.      atorvastatin (Lipitor) 10 mg tablet Take 1 tablet (10 mg) by mouth once daily at bedtime.      bisacodyl (Dulcolax, bisacodyl,) 10 mg suppository Insert 1 suppository (10 mg) into the rectum once daily as needed for constipation.      calcium carbonate 500 mg calcium (1,250 mg) chewable tablet Chew 1 tablet (1,250 mg) once daily.      [] cephalexin (Keflex) 500 mg capsule Take 1 capsule (500 mg) by mouth 2 times a day for 5 days. 10 capsule 0    cyanocobalamin, vitamin B-12, 5,000 mcg capsule Take 5,000 mcg by mouth once daily.      DAILY MULTI-VITAMIN ORAL Take 1 tablet by mouth once daily.      dimethicone (Cavilon Durable Barrier) 1.3 % cream Apply topically. Apply to lucio area topically every day and night shift for prevention. Apply to buttocks every shift as needed after each episode of incontinence.      docusate sodium (Colace) 100 mg capsule Take 1 capsule (100 mg) by mouth 2 times a day.      escitalopram (Lexapro) 20 mg tablet Take 1 tablet (20 mg) by mouth once daily. 90 tablet 1    ferrous sulfate, 325 mg ferrous sulfate, tablet Take 1 tablet (325 mg) by mouth once daily with breakfast. (Patient not taking: Reported on 2025)      furosemide (Lasix) 40 mg tablet Take 1 tablet (40 mg) by mouth once daily. (Patient not taking: Reported on 2025)      levomefolate calcium (L-METHYLFOLATE ORAL) Take 1,000 mcg by mouth once daily in the morning. (Patient not taking: Reported on 2025)      levothyroxine (Synthroid, Levoxyl) 100 mcg tablet TAKE 1 TABLET (100 MCG) BY MOUTH ONCE DAILY. SIX DAYS PER WEEK 90 tablet 0    lutein 6 mg capsule Take 1 capsule by mouth once  daily.      magnesium hydroxide (Milk of Magnesia) 400 mg/5 mL suspension Take 30 mL by mouth once daily as needed for constipation (if no BM in 72 hours).      menthol-zinc oxide (Calmoseptine) 0.44-20.6 % ointment Apply 1 Application topically 2 times a day. (Patient not taking: Reported on 7/31/2025)      metoprolol succinate XL (Toprol-XL) 25 mg 24 hr tablet Take 1 tablet (25 mg) by mouth once daily. Do not crush or chew. (Patient not taking: Reported on 7/31/2025)      mirtazapine (Remeron) 15 mg tablet Take 1 tablet (15 mg) by mouth once daily at bedtime. (Patient not taking: Reported on 7/31/2025)      nicotine (Nicoderm CQ) 21 mg/24 hr patch Place 1 patch on the skin once every 24 hours. (Patient not taking: Reported on 7/31/2025)      ondansetron (Zofran) 4 mg tablet Take 1 tablet (4 mg) by mouth every 6 hours if needed for nausea or vomiting.      oxybutynin XL (Ditropan-XL) 10 mg 24 hr tablet Take 1 tablet (10 mg) by mouth once daily.      oxyCODONE (Roxicodone) 5 mg immediate release tablet Take 1 tablet (5 mg) by mouth every 6 hours if needed for severe pain (7 - 10).      pantoprazole (ProtoNix) 40 mg EC tablet Take 1 tablet (40 mg) by mouth once daily. Do not crush, chew, or split.      polyethylene glycol (Glycolax, Miralax) 17 gram packet Take 17 g by mouth 2 times a day as needed (for firm or low ostomy output).      sacubitriL-valsartan (Entresto) 24-26 mg tablet Take 1 tablet by mouth 2 times a day. (Patient not taking: Reported on 7/31/2025)      sennosides (Senokot) 8.6 mg tablet Take 1 tablet (8.6 mg) by mouth 2 times a day.      sodium phosphates (Fleets) 19-7 gram/118 mL enema enema Insert 133 mL (1 enema) into the rectum once daily as needed for constipation.      thyroid, pork, ([place] Thyroid) 30 mg tablet TAKE 1 TABLET (30 MG) BY MOUTH ONCE DAILY IN THE MORNING. TAKE BEFORE MEALS. 30 tablet 0    tocilizumab (Actemra) 200 mg/10 mL (20 mg/mL) solution Infuse into a venous catheter every 28  (twenty-eight) days. as directed (Patient not taking: Reported on 2025)     [2]   No current facility-administered medications on file prior to encounter.     Current Outpatient Medications on File Prior to Encounter   Medication Sig Dispense Refill    cephalexin (Keflex) 500 mg capsule Take 1 capsule (500 mg) by mouth 2 times a day.      acetaminophen (Tylenol 8 HOUR) 650 mg ER tablet Take 1 tablet (650 mg) by mouth every 8 hours if needed.      acetaminophen (Tylenol) 650 mg suppository Insert 1 suppository (650 mg) into the rectum every 8 hours if needed for mild pain (1 - 3).      alendronate (Fosamax) 70 mg tablet Take 1 tablet (70 mg) by mouth every 7 days. Take on an empty stomach. Do not lie down or eat for 1/2 hour after taking. (Patient not taking: Reported on 2025) 12 tablet 3    amiodarone (Pacerone) 200 mg tablet Take 1 tablet (200 mg) by mouth once daily in the morning.      amLODIPine (Norvasc) 5 mg tablet Take 1 tablet (5 mg) by mouth once daily. In the morning      [] amoxicillin-clavulanate (Augmentin) 875-125 mg tablet Take 875 tablets by mouth 2 times a day. Every morning and at bedtime for Ear infection for 7 days.      apixaban (Eliquis) 2.5 mg tablet Take 1 tablet (2.5 mg) by mouth 2 times a day.      ascorbic acid (Vitamin C) 500 mg tablet Take 1 tablet (500 mg) by mouth once daily.      aspirin 81 mg EC tablet Take 1 tablet (81 mg) by mouth once daily.      atorvastatin (Lipitor) 10 mg tablet Take 1 tablet (10 mg) by mouth once daily at bedtime.      bisacodyl (Dulcolax, bisacodyl,) 10 mg suppository Insert 1 suppository (10 mg) into the rectum once daily as needed for constipation.      calcium carbonate 500 mg calcium (1,250 mg) chewable tablet Chew 1 tablet (1,250 mg) once daily.      [] cephalexin (Keflex) 500 mg capsule Take 1 capsule (500 mg) by mouth 2 times a day for 5 days. 10 capsule 0    cyanocobalamin, vitamin B-12, 5,000 mcg capsule Take 5,000 mcg by  mouth once daily.      DAILY MULTI-VITAMIN ORAL Take 1 tablet by mouth once daily.      dimethicone (Cavilon Durable Barrier) 1.3 % cream Apply topically. Apply to lucio area topically every day and night shift for prevention. Apply to buttocks every shift as needed after each episode of incontinence.      docusate sodium (Colace) 100 mg capsule Take 1 capsule (100 mg) by mouth 2 times a day.      escitalopram (Lexapro) 20 mg tablet Take 1 tablet (20 mg) by mouth once daily. 90 tablet 1    ferrous sulfate, 325 mg ferrous sulfate, tablet Take 1 tablet (325 mg) by mouth once daily with breakfast. (Patient not taking: Reported on 7/31/2025)      furosemide (Lasix) 40 mg tablet Take 1 tablet (40 mg) by mouth once daily. (Patient not taking: Reported on 7/31/2025)      levomefolate calcium (L-METHYLFOLATE ORAL) Take 1,000 mcg by mouth once daily in the morning. (Patient not taking: Reported on 7/31/2025)      levothyroxine (Synthroid, Levoxyl) 100 mcg tablet TAKE 1 TABLET (100 MCG) BY MOUTH ONCE DAILY. SIX DAYS PER WEEK 90 tablet 0    lutein 6 mg capsule Take 1 capsule by mouth once daily.      magnesium hydroxide (Milk of Magnesia) 400 mg/5 mL suspension Take 30 mL by mouth once daily as needed for constipation (if no BM in 72 hours).      menthol-zinc oxide (Calmoseptine) 0.44-20.6 % ointment Apply 1 Application topically 2 times a day. (Patient not taking: Reported on 7/31/2025)      metoprolol succinate XL (Toprol-XL) 25 mg 24 hr tablet Take 1 tablet (25 mg) by mouth once daily. Do not crush or chew. (Patient not taking: Reported on 7/31/2025)      mirtazapine (Remeron) 15 mg tablet Take 1 tablet (15 mg) by mouth once daily at bedtime. (Patient not taking: Reported on 7/31/2025)      nicotine (Nicoderm CQ) 21 mg/24 hr patch Place 1 patch on the skin once every 24 hours. (Patient not taking: Reported on 7/31/2025)      ondansetron (Zofran) 4 mg tablet Take 1 tablet (4 mg) by mouth every 6 hours if needed for nausea or  vomiting.      oxybutynin XL (Ditropan-XL) 10 mg 24 hr tablet Take 1 tablet (10 mg) by mouth once daily.      oxyCODONE (Roxicodone) 5 mg immediate release tablet Take 1 tablet (5 mg) by mouth every 6 hours if needed for severe pain (7 - 10).      pantoprazole (ProtoNix) 40 mg EC tablet Take 1 tablet (40 mg) by mouth once daily. Do not crush, chew, or split.      polyethylene glycol (Glycolax, Miralax) 17 gram packet Take 17 g by mouth 2 times a day as needed (for firm or low ostomy output).      sacubitriL-valsartan (Entresto) 24-26 mg tablet Take 1 tablet by mouth 2 times a day. (Patient not taking: Reported on 7/31/2025)      sennosides (Senokot) 8.6 mg tablet Take 1 tablet (8.6 mg) by mouth 2 times a day.      sodium phosphates (Fleets) 19-7 gram/118 mL enema enema Insert 133 mL (1 enema) into the rectum once daily as needed for constipation.      thyroid, pork, ([place] Thyroid) 30 mg tablet TAKE 1 TABLET (30 MG) BY MOUTH ONCE DAILY IN THE MORNING. TAKE BEFORE MEALS. 30 tablet 0    tocilizumab (Actemra) 200 mg/10 mL (20 mg/mL) solution Infuse into a venous catheter every 28 (twenty-eight) days. as directed (Patient not taking: Reported on 7/31/2025)

## 2025-08-09 ENCOUNTER — APPOINTMENT (OUTPATIENT)
Dept: RADIOLOGY | Facility: HOSPITAL | Age: 80
DRG: 389 | End: 2025-08-09
Payer: MEDICARE

## 2025-08-09 LAB
ANION GAP SERPL CALC-SCNC: 20 MMOL/L (ref 10–20)
BUN SERPL-MCNC: 17 MG/DL (ref 6–23)
CALCIUM SERPL-MCNC: 9.1 MG/DL (ref 8.6–10.3)
CHLORIDE SERPL-SCNC: 96 MMOL/L (ref 98–107)
CO2 SERPL-SCNC: 25 MMOL/L (ref 21–32)
CREAT SERPL-MCNC: 1.01 MG/DL (ref 0.5–1.05)
EGFRCR SERPLBLD CKD-EPI 2021: 56 ML/MIN/1.73M*2
ERYTHROCYTE [DISTWIDTH] IN BLOOD BY AUTOMATED COUNT: 17 % (ref 11.5–14.5)
GLUCOSE SERPL-MCNC: 67 MG/DL (ref 74–99)
HCT VFR BLD AUTO: 25.9 % (ref 36–46)
HGB BLD-MCNC: 7.8 G/DL (ref 12–16)
MCH RBC QN AUTO: 26.5 PG (ref 26–34)
MCHC RBC AUTO-ENTMCNC: 30.1 G/DL (ref 32–36)
MCV RBC AUTO: 88 FL (ref 80–100)
NRBC BLD-RTO: 0 /100 WBCS (ref 0–0)
PLATELET # BLD AUTO: 497 X10*3/UL (ref 150–450)
POTASSIUM SERPL-SCNC: 4 MMOL/L (ref 3.5–5.3)
RBC # BLD AUTO: 2.94 X10*6/UL (ref 4–5.2)
SODIUM SERPL-SCNC: 137 MMOL/L (ref 136–145)
WBC # BLD AUTO: 9.2 X10*3/UL (ref 4.4–11.3)

## 2025-08-09 PROCEDURE — 82374 ASSAY BLOOD CARBON DIOXIDE: CPT | Performed by: FAMILY MEDICINE

## 2025-08-09 PROCEDURE — 2500000004 HC RX 250 GENERAL PHARMACY W/ HCPCS (ALT 636 FOR OP/ED): Performed by: FAMILY MEDICINE

## 2025-08-09 PROCEDURE — 74019 RADEX ABDOMEN 2 VIEWS: CPT | Performed by: RADIOLOGY

## 2025-08-09 PROCEDURE — 2500000004 HC RX 250 GENERAL PHARMACY W/ HCPCS (ALT 636 FOR OP/ED)

## 2025-08-09 PROCEDURE — 85027 COMPLETE CBC AUTOMATED: CPT | Performed by: FAMILY MEDICINE

## 2025-08-09 PROCEDURE — 1200000002 HC GENERAL ROOM WITH TELEMETRY DAILY

## 2025-08-09 PROCEDURE — 36415 COLL VENOUS BLD VENIPUNCTURE: CPT | Performed by: FAMILY MEDICINE

## 2025-08-09 PROCEDURE — 2500000001 HC RX 250 WO HCPCS SELF ADMINISTERED DRUGS (ALT 637 FOR MEDICARE OP): Performed by: FAMILY MEDICINE

## 2025-08-09 PROCEDURE — 99233 SBSQ HOSP IP/OBS HIGH 50: CPT | Performed by: SURGERY

## 2025-08-09 PROCEDURE — 74019 RADEX ABDOMEN 2 VIEWS: CPT

## 2025-08-09 RX ORDER — SODIUM CHLORIDE 9 MG/ML
75 INJECTION, SOLUTION INTRAVENOUS CONTINUOUS
Status: ACTIVE | OUTPATIENT
Start: 2025-08-09 | End: 2025-08-10

## 2025-08-09 RX ORDER — LEVOTHYROXINE SODIUM 100 UG/1
100 TABLET ORAL
Status: ACTIVE | OUTPATIENT
Start: 2025-08-11

## 2025-08-09 RX ORDER — ESCITALOPRAM OXALATE 20 MG/1
20 TABLET ORAL DAILY
Status: DISPENSED | OUTPATIENT
Start: 2025-08-09

## 2025-08-09 RX ORDER — ATORVASTATIN CALCIUM 10 MG/1
10 TABLET, FILM COATED ORAL NIGHTLY
Status: DISPENSED | OUTPATIENT
Start: 2025-08-09

## 2025-08-09 RX ADMIN — METOPROLOL TARTRATE 5 MG: 5 INJECTION, SOLUTION INTRAVENOUS at 19:52

## 2025-08-09 RX ADMIN — ESCITALOPRAM 20 MG: 20 TABLET, FILM COATED ORAL at 09:01

## 2025-08-09 RX ADMIN — SODIUM CHLORIDE 75 ML/HR: 9 INJECTION, SOLUTION INTRAVENOUS at 16:50

## 2025-08-09 RX ADMIN — METOPROLOL TARTRATE 5 MG: 5 INJECTION, SOLUTION INTRAVENOUS at 05:27

## 2025-08-09 RX ADMIN — ENOXAPARIN SODIUM 40 MG: 100 INJECTION SUBCUTANEOUS at 09:01

## 2025-08-09 RX ADMIN — MORPHINE SULFATE 2 MG: 2 INJECTION, SOLUTION INTRAMUSCULAR; INTRAVENOUS at 05:30

## 2025-08-09 RX ADMIN — SODIUM CHLORIDE 100 ML/HR: 9 INJECTION, SOLUTION INTRAVENOUS at 04:39

## 2025-08-09 RX ADMIN — ONDANSETRON 4 MG: 2 INJECTION, SOLUTION INTRAMUSCULAR; INTRAVENOUS at 19:52

## 2025-08-09 RX ADMIN — METOPROLOL TARTRATE 5 MG: 5 INJECTION, SOLUTION INTRAVENOUS at 09:01

## 2025-08-09 RX ADMIN — MORPHINE SULFATE 2 MG: 2 INJECTION, SOLUTION INTRAMUSCULAR; INTRAVENOUS at 19:52

## 2025-08-09 RX ADMIN — METOPROLOL TARTRATE 5 MG: 5 INJECTION, SOLUTION INTRAVENOUS at 13:36

## 2025-08-09 RX ADMIN — PANTOPRAZOLE SODIUM 40 MG: 40 INJECTION, POWDER, FOR SOLUTION INTRAVENOUS at 05:27

## 2025-08-09 ASSESSMENT — COGNITIVE AND FUNCTIONAL STATUS - GENERAL
DRESSING REGULAR LOWER BODY CLOTHING: A LOT
HELP NEEDED FOR BATHING: A LOT
MOVING FROM LYING ON BACK TO SITTING ON SIDE OF FLAT BED WITH BEDRAILS: A LITTLE
WALKING IN HOSPITAL ROOM: A LOT
EATING MEALS: A LITTLE
MOBILITY SCORE: 15
WALKING IN HOSPITAL ROOM: A LOT
PERSONAL GROOMING: A LOT
MOBILITY SCORE: 15
DAILY ACTIVITIY SCORE: 13
DRESSING REGULAR UPPER BODY CLOTHING: A LOT
EATING MEALS: A LITTLE
DAILY ACTIVITIY SCORE: 13
TOILETING: A LOT
DAILY ACTIVITIY SCORE: 13
MOVING FROM LYING ON BACK TO SITTING ON SIDE OF FLAT BED WITH BEDRAILS: A LITTLE
MOVING TO AND FROM BED TO CHAIR: A LITTLE
PERSONAL GROOMING: A LOT
CLIMB 3 TO 5 STEPS WITH RAILING: TOTAL
DAILY ACTIVITIY SCORE: 13
CLIMB 3 TO 5 STEPS WITH RAILING: TOTAL
DRESSING REGULAR LOWER BODY CLOTHING: A LOT
WALKING IN HOSPITAL ROOM: A LOT
HELP NEEDED FOR BATHING: A LOT
TURNING FROM BACK TO SIDE WHILE IN FLAT BAD: A LITTLE
DRESSING REGULAR UPPER BODY CLOTHING: A LOT
PERSONAL GROOMING: A LOT
TURNING FROM BACK TO SIDE WHILE IN FLAT BAD: A LITTLE
CLIMB 3 TO 5 STEPS WITH RAILING: TOTAL
DRESSING REGULAR LOWER BODY CLOTHING: A LOT
MOVING TO AND FROM BED TO CHAIR: A LITTLE
CLIMB 3 TO 5 STEPS WITH RAILING: TOTAL
STANDING UP FROM CHAIR USING ARMS: A LITTLE
TURNING FROM BACK TO SIDE WHILE IN FLAT BAD: A LITTLE
DRESSING REGULAR LOWER BODY CLOTHING: A LOT
PERSONAL GROOMING: A LOT
TURNING FROM BACK TO SIDE WHILE IN FLAT BAD: A LITTLE
PERSONAL GROOMING: A LOT
PERSONAL GROOMING: A LOT
DRESSING REGULAR UPPER BODY CLOTHING: A LOT
DRESSING REGULAR UPPER BODY CLOTHING: A LOT
WALKING IN HOSPITAL ROOM: A LOT
STANDING UP FROM CHAIR USING ARMS: A LITTLE
HELP NEEDED FOR BATHING: A LOT
TURNING FROM BACK TO SIDE WHILE IN FLAT BAD: A LITTLE
DRESSING REGULAR LOWER BODY CLOTHING: A LOT
HELP NEEDED FOR BATHING: A LOT
MOBILITY SCORE: 15
MOVING TO AND FROM BED TO CHAIR: A LITTLE
WALKING IN HOSPITAL ROOM: A LOT
MOVING FROM LYING ON BACK TO SITTING ON SIDE OF FLAT BED WITH BEDRAILS: A LITTLE
TOILETING: A LOT
MOVING FROM LYING ON BACK TO SITTING ON SIDE OF FLAT BED WITH BEDRAILS: A LITTLE
CLIMB 3 TO 5 STEPS WITH RAILING: TOTAL
MOBILITY SCORE: 15
STANDING UP FROM CHAIR USING ARMS: A LITTLE
MOVING TO AND FROM BED TO CHAIR: A LITTLE
CLIMB 3 TO 5 STEPS WITH RAILING: TOTAL
DRESSING REGULAR UPPER BODY CLOTHING: A LOT
MOVING TO AND FROM BED TO CHAIR: A LITTLE
TOILETING: A LOT
EATING MEALS: A LITTLE
WALKING IN HOSPITAL ROOM: A LOT
EATING MEALS: A LITTLE
STANDING UP FROM CHAIR USING ARMS: A LITTLE
MOBILITY SCORE: 15
MOVING FROM LYING ON BACK TO SITTING ON SIDE OF FLAT BED WITH BEDRAILS: A LITTLE
TOILETING: A LOT
DAILY ACTIVITIY SCORE: 13
TURNING FROM BACK TO SIDE WHILE IN FLAT BAD: A LITTLE
DRESSING REGULAR LOWER BODY CLOTHING: A LOT
DRESSING REGULAR UPPER BODY CLOTHING: A LOT
MOBILITY SCORE: 15
EATING MEALS: A LITTLE
DAILY ACTIVITIY SCORE: 13
STANDING UP FROM CHAIR USING ARMS: A LITTLE
STANDING UP FROM CHAIR USING ARMS: A LITTLE
HELP NEEDED FOR BATHING: A LOT
HELP NEEDED FOR BATHING: A LOT
EATING MEALS: A LITTLE
TOILETING: A LOT
TOILETING: A LOT
MOVING TO AND FROM BED TO CHAIR: A LITTLE
MOVING FROM LYING ON BACK TO SITTING ON SIDE OF FLAT BED WITH BEDRAILS: A LITTLE

## 2025-08-09 ASSESSMENT — PAIN - FUNCTIONAL ASSESSMENT
PAIN_FUNCTIONAL_ASSESSMENT: 0-10

## 2025-08-09 ASSESSMENT — PAIN DESCRIPTION - LOCATION
LOCATION: ABDOMEN
LOCATION: ABDOMEN

## 2025-08-09 ASSESSMENT — PAIN SCALES - GENERAL
PAINLEVEL_OUTOF10: 4
PAINLEVEL_OUTOF10: 0 - NO PAIN
PAINLEVEL_OUTOF10: 0 - NO PAIN
PAINLEVEL_OUTOF10: 6
PAINLEVEL_OUTOF10: 0 - NO PAIN
PAINLEVEL_OUTOF10: 2
PAINLEVEL_OUTOF10: 8

## 2025-08-09 NOTE — PROGRESS NOTES
Feels about the same.  Some abdominal bloating but no pain, no nausea or vomiting.    On exam comfortable  /82 (BP Location: Left arm, Patient Position: Lying)   Pulse 66   Temp 36.7 °C (98 °F) (Temporal)   Resp 18   Wt 68 kg (149 lb 14.6 oz)   SpO2 96%   BMI 24.95 kg/m²   Abdomen soft, slightly distended, nontender  Not much stool in the ostomy bag    === 08/06/25 ===    XR ABDOMEN 2 VIEWS SUPINE AND ERECT OR DECUB    - Impression -  NG tube tip remains coiled within the distal esophagus. Bowel gas  pattern compatible with small-bowel obstruction.    Impression: Small bowel obstruction.  Patient comfortable, exam benign    Continue nasogastric decompression, bowel rest for now

## 2025-08-09 NOTE — PROGRESS NOTES
Anny Tesfaye is a 80 y.o. female on day 2 of admission presenting with Intestinal obstruction (Multi).      Subjective   Pt resting in bed  Feels ok  Just had NG clamped   No output in the colostomy     No abd pains or n/v  Per nurse had formed BM overnight        Objective     Last Recorded Vitals  /82 (BP Location: Left arm, Patient Position: Lying)   Pulse 66   Temp 36.7 °C (98 °F) (Temporal)   Resp 18   Wt 68 kg (149 lb 14.6 oz)   SpO2 96%   Intake/Output last 3 Shifts:    Intake/Output Summary (Last 24 hours) at 8/9/2025 1135  Last data filed at 8/9/2025 0616  Gross per 24 hour   Intake 1715 ml   Output 575 ml   Net 1140 ml       Admission Weight  Weight: 68 kg (149 lb 14.6 oz) (08/06/25 2235)    Daily Weight  08/06/25 : 68 kg (149 lb 14.6 oz)    Image Results  XR abdomen 2 views supine and erect or decub  Narrative: Interpreted By:  Roscoe Willis,   STUDY:  XR ABDOMEN 2 VIEWS SUPINE AND ERECT OR DECUB;  8/9/2025 6:33 am      INDICATION:  Signs/Symptoms:SBO.          COMPARISON:  08/08/2025      ACCESSION NUMBER(S):  [id number]      ORDERING CLINICIAN:  GUSTAVO CHAN      TECHNIQUE:  Abdomen supine and upright views      FINDINGS:  There is an NG tube in place with its tip appears to be coiled within  the distal esophagus, appearance of which is unchanged. There are  significantly distended small bowel loops, up to 4.4 cm, similar to  the previous examination. Findings are compatible with small-bowel  obstruction. Extensive vascular calcifications with aorto bi iliac  stent graft in place.      Impression: NG tube tip remains coiled within the distal esophagus. Bowel gas  pattern compatible with small-bowel obstruction.      MACRO:  None      Signed by: Roscoe Willis 8/9/2025 8:30 AM  Dictation workstation:   [id number]    Medications Ordered Prior to Encounter[1]    Results for orders placed or performed during the hospital encounter of 08/06/25 (from the past 24 hours)   CBC   Result Value  Ref Range    WBC 9.2 4.4 - 11.3 x10*3/uL    nRBC 0.0 0.0 - 0.0 /100 WBCs    RBC 2.94 (L) 4.00 - 5.20 x10*6/uL    Hemoglobin 7.8 (L) 12.0 - 16.0 g/dL    Hematocrit 25.9 (L) 36.0 - 46.0 %    MCV 88 80 - 100 fL    MCH 26.5 26.0 - 34.0 pg    MCHC 30.1 (L) 32.0 - 36.0 g/dL    RDW 17.0 (H) 11.5 - 14.5 %    Platelets 497 (H) 150 - 450 x10*3/uL   Basic metabolic panel   Result Value Ref Range    Glucose 67 (L) 74 - 99 mg/dL    Sodium 137 136 - 145 mmol/L    Potassium 4.0 3.5 - 5.3 mmol/L    Chloride 96 (L) 98 - 107 mmol/L    Bicarbonate 25 21 - 32 mmol/L    Anion Gap 20 10 - 20 mmol/L    Urea Nitrogen 17 6 - 23 mg/dL    Creatinine 1.01 0.50 - 1.05 mg/dL    eGFR 56 (L) >60 mL/min/1.73m*2    Calcium 9.1 8.6 - 10.3 mg/dL       Physical Exam    Well developed well nurished  No distress  Ao 3  Face symmetrical, she does have a NG tube in place  Neck no jvd no bruit  Chest clear  CVS regular  Ext no edema  Abdo soft nontender bs hyperactive, no masses, colostomy without any output however per patient the bag was emptied just now  Cns alert appropriate able to move ext, gait not tested  Skin intact  Psych normal affect  Relevant Results           Medications Ordered Prior to Encounter[2]  Results for orders placed or performed during the hospital encounter of 08/06/25 (from the past 24 hours)   CBC   Result Value Ref Range    WBC 9.2 4.4 - 11.3 x10*3/uL    nRBC 0.0 0.0 - 0.0 /100 WBCs    RBC 2.94 (L) 4.00 - 5.20 x10*6/uL    Hemoglobin 7.8 (L) 12.0 - 16.0 g/dL    Hematocrit 25.9 (L) 36.0 - 46.0 %    MCV 88 80 - 100 fL    MCH 26.5 26.0 - 34.0 pg    MCHC 30.1 (L) 32.0 - 36.0 g/dL    RDW 17.0 (H) 11.5 - 14.5 %    Platelets 497 (H) 150 - 450 x10*3/uL   Basic metabolic panel   Result Value Ref Range    Glucose 67 (L) 74 - 99 mg/dL    Sodium 137 136 - 145 mmol/L    Potassium 4.0 3.5 - 5.3 mmol/L    Chloride 96 (L) 98 - 107 mmol/L    Bicarbonate 25 21 - 32 mmol/L    Anion Gap 20 10 - 20 mmol/L    Urea Nitrogen 17 6 - 23 mg/dL     Creatinine 1.01 0.50 - 1.05 mg/dL    eGFR 56 (L) >60 mL/min/1.73m*2    Calcium 9.1 8.6 - 10.3 mg/dL                      Assessment & Plan  Intestinal obstruction (Multi)    Hypothyroid    Adjustment reaction with anxiety and depression    Essential hypertension    Stage 3 chronic kidney disease (Multi)    Anemia    Anticoagulant long-term use    Peripheral vascular disease        Surgery following  Input noted  Will ersume iv fluids     -Continue current treatment as ordered. Will make adjustments as necessary.    Plan of care discussed with: Provider, RN, Patient     Rajendra Rodriguez MD    .                   [1]   No current facility-administered medications on file prior to encounter.     Current Outpatient Medications on File Prior to Encounter   Medication Sig Dispense Refill    cephalexin (Keflex) 500 mg capsule Take 1 capsule (500 mg) by mouth 2 times a day.      acetaminophen (Tylenol 8 HOUR) 650 mg ER tablet Take 1 tablet (650 mg) by mouth every 8 hours if needed.      acetaminophen (Tylenol) 650 mg suppository Insert 1 suppository (650 mg) into the rectum every 8 hours if needed for mild pain (1 - 3).      alendronate (Fosamax) 70 mg tablet Take 1 tablet (70 mg) by mouth every 7 days. Take on an empty stomach. Do not lie down or eat for 1/2 hour after taking. (Patient not taking: Reported on 2025) 12 tablet 3    amiodarone (Pacerone) 200 mg tablet Take 1 tablet (200 mg) by mouth once daily in the morning.      amLODIPine (Norvasc) 5 mg tablet Take 1 tablet (5 mg) by mouth once daily. In the morning      [] amoxicillin-clavulanate (Augmentin) 875-125 mg tablet Take 875 tablets by mouth 2 times a day. Every morning and at bedtime for Ear infection for 7 days.      apixaban (Eliquis) 2.5 mg tablet Take 1 tablet (2.5 mg) by mouth 2 times a day.      ascorbic acid (Vitamin C) 500 mg tablet Take 1 tablet (500 mg) by mouth once daily.      aspirin 81 mg EC tablet Take 1 tablet (81 mg) by mouth once  daily.      atorvastatin (Lipitor) 10 mg tablet Take 1 tablet (10 mg) by mouth once daily at bedtime.      bisacodyl (Dulcolax, bisacodyl,) 10 mg suppository Insert 1 suppository (10 mg) into the rectum once daily as needed for constipation.      calcium carbonate 500 mg calcium (1,250 mg) chewable tablet Chew 1 tablet (1,250 mg) once daily.      [] cephalexin (Keflex) 500 mg capsule Take 1 capsule (500 mg) by mouth 2 times a day for 5 days. 10 capsule 0    cyanocobalamin, vitamin B-12, 5,000 mcg capsule Take 5,000 mcg by mouth once daily.      DAILY MULTI-VITAMIN ORAL Take 1 tablet by mouth once daily.      dimethicone (Cavilon Durable Barrier) 1.3 % cream Apply topically. Apply to lucio area topically every day and night shift for prevention. Apply to buttocks every shift as needed after each episode of incontinence.      docusate sodium (Colace) 100 mg capsule Take 1 capsule (100 mg) by mouth 2 times a day.      escitalopram (Lexapro) 20 mg tablet Take 1 tablet (20 mg) by mouth once daily. 90 tablet 1    ferrous sulfate, 325 mg ferrous sulfate, tablet Take 1 tablet (325 mg) by mouth once daily with breakfast. (Patient not taking: Reported on 2025)      furosemide (Lasix) 40 mg tablet Take 1 tablet (40 mg) by mouth once daily. (Patient not taking: Reported on 2025)      levomefolate calcium (L-METHYLFOLATE ORAL) Take 1,000 mcg by mouth once daily in the morning. (Patient not taking: Reported on 2025)      levothyroxine (Synthroid, Levoxyl) 100 mcg tablet TAKE 1 TABLET (100 MCG) BY MOUTH ONCE DAILY. SIX DAYS PER WEEK 90 tablet 0    lutein 6 mg capsule Take 1 capsule by mouth once daily.      magnesium hydroxide (Milk of Magnesia) 400 mg/5 mL suspension Take 30 mL by mouth once daily as needed for constipation (if no BM in 72 hours).      menthol-zinc oxide (Calmoseptine) 0.44-20.6 % ointment Apply 1 Application topically 2 times a day. (Patient not taking: Reported on 2025)      metoprolol  succinate XL (Toprol-XL) 25 mg 24 hr tablet Take 1 tablet (25 mg) by mouth once daily. Do not crush or chew. (Patient not taking: Reported on 7/31/2025)      mirtazapine (Remeron) 15 mg tablet Take 1 tablet (15 mg) by mouth once daily at bedtime. (Patient not taking: Reported on 7/31/2025)      ondansetron (Zofran) 4 mg tablet Take 1 tablet (4 mg) by mouth every 6 hours if needed for nausea or vomiting.      oxybutynin XL (Ditropan-XL) 10 mg 24 hr tablet Take 1 tablet (10 mg) by mouth once daily.      oxyCODONE (Roxicodone) 5 mg immediate release tablet Take 1 tablet (5 mg) by mouth every 6 hours if needed for severe pain (7 - 10).      pantoprazole (ProtoNix) 20 mg EC tablet Take 2 tablets (40 mg) by mouth once daily. Do not crush, chew, or split.      polyethylene glycol (Glycolax, Miralax) 17 gram packet Take 17 g by mouth 2 times a day as needed (for firm or low ostomy output).      sacubitriL-valsartan (Entresto) 24-26 mg tablet Take 1 tablet by mouth 2 times a day. (Patient not taking: Reported on 7/31/2025)      sennosides (Senokot) 8.6 mg tablet Take 1 tablet (8.6 mg) by mouth 2 times a day.      sodium phosphates (Fleets) 19-7 gram/118 mL enema enema Insert 133 mL (1 enema) into the rectum once daily as needed for constipation.      thyroid, pork, ([place] Thyroid) 30 mg tablet TAKE 1 TABLET (30 MG) BY MOUTH ONCE DAILY IN THE MORNING. TAKE BEFORE MEALS. (Patient not taking: Reported on 8/8/2025) 30 tablet 0    tocilizumab (Actemra) 200 mg/10 mL (20 mg/mL) solution Infuse into a venous catheter every 28 (twenty-eight) days. as directed (Patient not taking: Reported on 7/31/2025)      [DISCONTINUED] nicotine (Nicoderm CQ) 21 mg/24 hr patch Place 1 patch on the skin once every 24 hours. (Patient not taking: Reported on 7/31/2025)     [2]   No current facility-administered medications on file prior to encounter.     Current Outpatient Medications on File Prior to Encounter   Medication Sig Dispense Refill     cephalexin (Keflex) 500 mg capsule Take 1 capsule (500 mg) by mouth 2 times a day.      acetaminophen (Tylenol 8 HOUR) 650 mg ER tablet Take 1 tablet (650 mg) by mouth every 8 hours if needed.      acetaminophen (Tylenol) 650 mg suppository Insert 1 suppository (650 mg) into the rectum every 8 hours if needed for mild pain (1 - 3).      alendronate (Fosamax) 70 mg tablet Take 1 tablet (70 mg) by mouth every 7 days. Take on an empty stomach. Do not lie down or eat for 1/2 hour after taking. (Patient not taking: Reported on 2025) 12 tablet 3    amiodarone (Pacerone) 200 mg tablet Take 1 tablet (200 mg) by mouth once daily in the morning.      amLODIPine (Norvasc) 5 mg tablet Take 1 tablet (5 mg) by mouth once daily. In the morning      [] amoxicillin-clavulanate (Augmentin) 875-125 mg tablet Take 875 tablets by mouth 2 times a day. Every morning and at bedtime for Ear infection for 7 days.      apixaban (Eliquis) 2.5 mg tablet Take 1 tablet (2.5 mg) by mouth 2 times a day.      ascorbic acid (Vitamin C) 500 mg tablet Take 1 tablet (500 mg) by mouth once daily.      aspirin 81 mg EC tablet Take 1 tablet (81 mg) by mouth once daily.      atorvastatin (Lipitor) 10 mg tablet Take 1 tablet (10 mg) by mouth once daily at bedtime.      bisacodyl (Dulcolax, bisacodyl,) 10 mg suppository Insert 1 suppository (10 mg) into the rectum once daily as needed for constipation.      calcium carbonate 500 mg calcium (1,250 mg) chewable tablet Chew 1 tablet (1,250 mg) once daily.      [] cephalexin (Keflex) 500 mg capsule Take 1 capsule (500 mg) by mouth 2 times a day for 5 days. 10 capsule 0    cyanocobalamin, vitamin B-12, 5,000 mcg capsule Take 5,000 mcg by mouth once daily.      DAILY MULTI-VITAMIN ORAL Take 1 tablet by mouth once daily.      dimethicone (Cavilon Durable Barrier) 1.3 % cream Apply topically. Apply to lucio area topically every day and night shift for prevention. Apply to buttocks every shift as  needed after each episode of incontinence.      docusate sodium (Colace) 100 mg capsule Take 1 capsule (100 mg) by mouth 2 times a day.      escitalopram (Lexapro) 20 mg tablet Take 1 tablet (20 mg) by mouth once daily. 90 tablet 1    ferrous sulfate, 325 mg ferrous sulfate, tablet Take 1 tablet (325 mg) by mouth once daily with breakfast. (Patient not taking: Reported on 7/31/2025)      furosemide (Lasix) 40 mg tablet Take 1 tablet (40 mg) by mouth once daily. (Patient not taking: Reported on 7/31/2025)      levomefolate calcium (L-METHYLFOLATE ORAL) Take 1,000 mcg by mouth once daily in the morning. (Patient not taking: Reported on 7/31/2025)      levothyroxine (Synthroid, Levoxyl) 100 mcg tablet TAKE 1 TABLET (100 MCG) BY MOUTH ONCE DAILY. SIX DAYS PER WEEK 90 tablet 0    lutein 6 mg capsule Take 1 capsule by mouth once daily.      magnesium hydroxide (Milk of Magnesia) 400 mg/5 mL suspension Take 30 mL by mouth once daily as needed for constipation (if no BM in 72 hours).      menthol-zinc oxide (Calmoseptine) 0.44-20.6 % ointment Apply 1 Application topically 2 times a day. (Patient not taking: Reported on 7/31/2025)      metoprolol succinate XL (Toprol-XL) 25 mg 24 hr tablet Take 1 tablet (25 mg) by mouth once daily. Do not crush or chew. (Patient not taking: Reported on 7/31/2025)      mirtazapine (Remeron) 15 mg tablet Take 1 tablet (15 mg) by mouth once daily at bedtime. (Patient not taking: Reported on 7/31/2025)      ondansetron (Zofran) 4 mg tablet Take 1 tablet (4 mg) by mouth every 6 hours if needed for nausea or vomiting.      oxybutynin XL (Ditropan-XL) 10 mg 24 hr tablet Take 1 tablet (10 mg) by mouth once daily.      oxyCODONE (Roxicodone) 5 mg immediate release tablet Take 1 tablet (5 mg) by mouth every 6 hours if needed for severe pain (7 - 10).      pantoprazole (ProtoNix) 20 mg EC tablet Take 2 tablets (40 mg) by mouth once daily. Do not crush, chew, or split.      polyethylene glycol  (Glycolax, Miralax) 17 gram packet Take 17 g by mouth 2 times a day as needed (for firm or low ostomy output).      sacubitriL-valsartan (Entresto) 24-26 mg tablet Take 1 tablet by mouth 2 times a day. (Patient not taking: Reported on 7/31/2025)      sennosides (Senokot) 8.6 mg tablet Take 1 tablet (8.6 mg) by mouth 2 times a day.      sodium phosphates (Fleets) 19-7 gram/118 mL enema enema Insert 133 mL (1 enema) into the rectum once daily as needed for constipation.      thyroid, pork, ([place] Thyroid) 30 mg tablet TAKE 1 TABLET (30 MG) BY MOUTH ONCE DAILY IN THE MORNING. TAKE BEFORE MEALS. (Patient not taking: Reported on 8/8/2025) 30 tablet 0    tocilizumab (Actemra) 200 mg/10 mL (20 mg/mL) solution Infuse into a venous catheter every 28 (twenty-eight) days. as directed (Patient not taking: Reported on 7/31/2025)      [DISCONTINUED] nicotine (Nicoderm CQ) 21 mg/24 hr patch Place 1 patch on the skin once every 24 hours. (Patient not taking: Reported on 7/31/2025)

## 2025-08-10 ENCOUNTER — APPOINTMENT (OUTPATIENT)
Dept: RADIOLOGY | Facility: HOSPITAL | Age: 80
DRG: 389 | End: 2025-08-10
Payer: MEDICARE

## 2025-08-10 VITALS
TEMPERATURE: 97.5 F | HEART RATE: 53 BPM | RESPIRATION RATE: 17 BRPM | OXYGEN SATURATION: 93 % | DIASTOLIC BLOOD PRESSURE: 76 MMHG | BODY MASS INDEX: 24.95 KG/M2 | WEIGHT: 149.91 LBS | SYSTOLIC BLOOD PRESSURE: 131 MMHG

## 2025-08-10 LAB
ANION GAP SERPL CALC-SCNC: 12 MMOL/L (ref 10–20)
BUN SERPL-MCNC: 19 MG/DL (ref 6–23)
CALCIUM SERPL-MCNC: 8.3 MG/DL (ref 8.6–10.3)
CHLORIDE SERPL-SCNC: 103 MMOL/L (ref 98–107)
CO2 SERPL-SCNC: 28 MMOL/L (ref 21–32)
CREAT SERPL-MCNC: 1 MG/DL (ref 0.5–1.05)
EGFRCR SERPLBLD CKD-EPI 2021: 57 ML/MIN/1.73M*2
ERYTHROCYTE [DISTWIDTH] IN BLOOD BY AUTOMATED COUNT: 17.3 % (ref 11.5–14.5)
GLUCOSE SERPL-MCNC: 65 MG/DL (ref 74–99)
HCT VFR BLD AUTO: 26.4 % (ref 36–46)
HGB BLD-MCNC: 7.2 G/DL (ref 12–16)
MCH RBC QN AUTO: 26.3 PG (ref 26–34)
MCHC RBC AUTO-ENTMCNC: 27.3 G/DL (ref 32–36)
MCV RBC AUTO: 96 FL (ref 80–100)
NRBC BLD-RTO: 0 /100 WBCS (ref 0–0)
PLATELET # BLD AUTO: 384 X10*3/UL (ref 150–450)
POTASSIUM SERPL-SCNC: 4.2 MMOL/L (ref 3.5–5.3)
RBC # BLD AUTO: 2.74 X10*6/UL (ref 4–5.2)
SODIUM SERPL-SCNC: 139 MMOL/L (ref 136–145)
WBC # BLD AUTO: 8.3 X10*3/UL (ref 4.4–11.3)

## 2025-08-10 PROCEDURE — 36415 COLL VENOUS BLD VENIPUNCTURE: CPT

## 2025-08-10 PROCEDURE — 2500000001 HC RX 250 WO HCPCS SELF ADMINISTERED DRUGS (ALT 637 FOR MEDICARE OP): Performed by: FAMILY MEDICINE

## 2025-08-10 PROCEDURE — 74019 RADEX ABDOMEN 2 VIEWS: CPT

## 2025-08-10 PROCEDURE — 85027 COMPLETE CBC AUTOMATED: CPT

## 2025-08-10 PROCEDURE — 80048 BASIC METABOLIC PNL TOTAL CA: CPT

## 2025-08-10 PROCEDURE — 74019 RADEX ABDOMEN 2 VIEWS: CPT | Performed by: RADIOLOGY

## 2025-08-10 PROCEDURE — 1100000001 HC PRIVATE ROOM DAILY

## 2025-08-10 PROCEDURE — 2500000002 HC RX 250 W HCPCS SELF ADMINISTERED DRUGS (ALT 637 FOR MEDICARE OP, ALT 636 FOR OP/ED): Performed by: FAMILY MEDICINE

## 2025-08-10 PROCEDURE — 99232 SBSQ HOSP IP/OBS MODERATE 35: CPT | Performed by: NURSE PRACTITIONER

## 2025-08-10 PROCEDURE — 99233 SBSQ HOSP IP/OBS HIGH 50: CPT | Performed by: SURGERY

## 2025-08-10 PROCEDURE — 2500000004 HC RX 250 GENERAL PHARMACY W/ HCPCS (ALT 636 FOR OP/ED): Performed by: FAMILY MEDICINE

## 2025-08-10 RX ORDER — AMIODARONE HYDROCHLORIDE 200 MG/1
200 TABLET ORAL EVERY MORNING
Status: DISPENSED | OUTPATIENT
Start: 2025-08-10

## 2025-08-10 RX ADMIN — PANTOPRAZOLE SODIUM 40 MG: 40 INJECTION, POWDER, FOR SOLUTION INTRAVENOUS at 05:51

## 2025-08-10 RX ADMIN — ENOXAPARIN SODIUM 40 MG: 100 INJECTION SUBCUTANEOUS at 09:39

## 2025-08-10 RX ADMIN — AMIODARONE HYDROCHLORIDE 200 MG: 200 TABLET ORAL at 09:39

## 2025-08-10 RX ADMIN — METOPROLOL TARTRATE 5 MG: 5 INJECTION, SOLUTION INTRAVENOUS at 02:13

## 2025-08-10 RX ADMIN — SODIUM CHLORIDE 75 ML/HR: 9 INJECTION, SOLUTION INTRAVENOUS at 07:30

## 2025-08-10 RX ADMIN — ATORVASTATIN CALCIUM 10 MG: 10 TABLET, FILM COATED ORAL at 20:27

## 2025-08-10 RX ADMIN — METOPROLOL TARTRATE 5 MG: 5 INJECTION, SOLUTION INTRAVENOUS at 20:27

## 2025-08-10 RX ADMIN — METOPROLOL TARTRATE 5 MG: 5 INJECTION, SOLUTION INTRAVENOUS at 15:52

## 2025-08-10 RX ADMIN — ESCITALOPRAM 20 MG: 20 TABLET, FILM COATED ORAL at 09:39

## 2025-08-10 ASSESSMENT — COGNITIVE AND FUNCTIONAL STATUS - GENERAL
TURNING FROM BACK TO SIDE WHILE IN FLAT BAD: A LOT
DRESSING REGULAR LOWER BODY CLOTHING: A LITTLE
STANDING UP FROM CHAIR USING ARMS: A LOT
MOVING FROM LYING ON BACK TO SITTING ON SIDE OF FLAT BED WITH BEDRAILS: A LOT
TURNING FROM BACK TO SIDE WHILE IN FLAT BAD: A LITTLE
TOILETING: A LITTLE
STANDING UP FROM CHAIR USING ARMS: A LOT
MOVING TO AND FROM BED TO CHAIR: A LITTLE
DRESSING REGULAR LOWER BODY CLOTHING: A LITTLE
DRESSING REGULAR UPPER BODY CLOTHING: A LITTLE
MOBILITY SCORE: 14
WALKING IN HOSPITAL ROOM: A LOT
CLIMB 3 TO 5 STEPS WITH RAILING: A LOT
MOVING FROM LYING ON BACK TO SITTING ON SIDE OF FLAT BED WITH BEDRAILS: A LITTLE
EATING MEALS: A LITTLE
CLIMB 3 TO 5 STEPS WITH RAILING: TOTAL
MOVING TO AND FROM BED TO CHAIR: A LITTLE
HELP NEEDED FOR BATHING: A LITTLE
DAILY ACTIVITIY SCORE: 18
HELP NEEDED FOR BATHING: A LITTLE
MOBILITY SCORE: 13
TOILETING: A LITTLE
WALKING IN HOSPITAL ROOM: A LOT
PERSONAL GROOMING: A LITTLE
DRESSING REGULAR UPPER BODY CLOTHING: A LITTLE
DAILY ACTIVITIY SCORE: 20

## 2025-08-10 ASSESSMENT — PAIN - FUNCTIONAL ASSESSMENT: PAIN_FUNCTIONAL_ASSESSMENT: 0-10

## 2025-08-10 ASSESSMENT — PAIN SCALES - GENERAL
PAINLEVEL_OUTOF10: 0 - NO PAIN
PAINLEVEL_OUTOF10: 0 - NO PAIN

## 2025-08-10 NOTE — CARE PLAN
The patient's goals for the shift include      The clinical goals for the shift include Maintain safety and control pain        Problem: Pain - Adult  Goal: Verbalizes/displays adequate comfort level or baseline comfort level  8/10/2025 1625 by Henry Contreras RN  Outcome: Progressing  8/10/2025 1558 by Henry Contreras RN  Outcome: Progressing     Problem: Safety - Adult  Goal: Free from fall injury  8/10/2025 1625 by Henry Contreras RN  Outcome: Progressing  8/10/2025 1558 by Henry Contreras RN  Outcome: Progressing     Problem: Discharge Planning  Goal: Discharge to home or other facility with appropriate resources  8/10/2025 1625 by Henry Contreras RN  Outcome: Progressing  8/10/2025 1558 by Herny Contreras RN  Outcome: Progressing     Problem: Chronic Conditions and Co-morbidities  Goal: Patient's chronic conditions and co-morbidity symptoms are monitored and maintained or improved  8/10/2025 1625 by Henry Contreras RN  Outcome: Progressing  8/10/2025 1558 by Henry Contreras RN  Outcome: Progressing     Problem: Nutrition  Goal: Nutrient intake appropriate for maintaining nutritional needs  8/10/2025 1625 by Henry Contreras RN  Outcome: Progressing  8/10/2025 1558 by Henry Contreras RN  Outcome: Progressing     Problem: Skin  Goal: Decreased wound size/increased tissue granulation at next dressing change  8/10/2025 1625 by Henry Contreras RN  Outcome: Progressing  8/10/2025 1558 by Henry Contreras RN  Outcome: Progressing  Goal: Participates in plan/prevention/treatment measures  8/10/2025 1625 by Henry Contreras RN  Outcome: Progressing  8/10/2025 1558 by Henry Contreras RN  Outcome: Progressing  Goal: Prevent/manage excess moisture  8/10/2025 1625 by Henry Contreras RN  Outcome: Progressing  8/10/2025 1558 by Henry Contreras RN  Outcome: Progressing  Goal: Prevent/minimize sheer/friction injuries  8/10/2025 1625 by Henry Contreras RN  Outcome: Progressing  8/10/2025 1558 by Henry Contreras RN  Outcome: Progressing  Goal: Promote/optimize nutrition  8/10/2025 1625 by Henry Contreras RN  Outcome:  Progressing  8/10/2025 1558 by Henry Contreras RN  Outcome: Progressing  Goal: Promote skin healing  8/10/2025 1625 by Henry Contreras RN  Outcome: Progressing  8/10/2025 1558 by Henry Contreras RN  Outcome: Progressing

## 2025-08-10 NOTE — PROGRESS NOTES
Anny Tesfaye is a 80 y.o. female on day 3 of admission presenting with Intestinal obstruction (Multi).      Subjective   Pt resting in bed  Feels ok  Just had NG clamped   No output in the colostomy     No abd pains or n/v  Per nurse had formed BM overnight        Objective     Last Recorded Vitals  /74   Pulse 65   Temp 36.7 °C (98 °F) (Temporal)   Resp 16   Wt 68 kg (149 lb 14.6 oz)   SpO2 95%   Intake/Output last 3 Shifts:    Intake/Output Summary (Last 24 hours) at 8/10/2025 1355  Last data filed at 8/10/2025 0240  Gross per 24 hour   Intake 887.5 ml   Output 400 ml   Net 487.5 ml       Admission Weight  Weight: 68 kg (149 lb 14.6 oz) (08/06/25 2235)    Daily Weight  08/06/25 : 68 kg (149 lb 14.6 oz)    Image Results  XR abdomen 2 views supine and erect or decub  Narrative: Interpreted By:  Reji Garcia,   STUDY:  XR ABDOMEN 2 VIEWS SUPINE AND ERECT OR DECUB;  8/10/2025 8:49 am      INDICATION:  Signs/Symptoms:SBO.          COMPARISON:  CT abdomen and pelvis with contrast 6 August 2025; intervening KUB  exams most recently 9 August 2025      ACCESSION NUMBER(S):  [id number]      ORDERING CLINICIAN:  GUSTAVO CHAN      TECHNIQUE:  Supine and  uprightviews of the abdomen. 2 views      FINDINGS:  Enteric tube reaches the gastroesophageal junction before kinking  twice and flipping back cephalad with tip in lower thoracic esophagus      No small bowel air-fluid levels on the upright view      Gaseous small bowel dilation persists relatively unchanged      Impression: The enteric tube is kinked twice, about 1 cm apart, at the level of  the gastroesophageal junction, with the distal tip flipped back up in  the distal thoracic esophagus. Careful repositioning to be  reconsidered      Unchanged gaseous small bowel dilation      No free intraperitoneal air      MACRO:  None      Signed by: Reji Garcia 8/10/2025 9:10 AM  Dictation workstation:   [id number]    Medications Ordered Prior to  Encounter[1]    Results for orders placed or performed during the hospital encounter of 08/06/25 (from the past 24 hours)   Basic metabolic panel   Result Value Ref Range    Glucose 65 (L) 74 - 99 mg/dL    Sodium 139 136 - 145 mmol/L    Potassium 4.2 3.5 - 5.3 mmol/L    Chloride 103 98 - 107 mmol/L    Bicarbonate 28 21 - 32 mmol/L    Anion Gap 12 10 - 20 mmol/L    Urea Nitrogen 19 6 - 23 mg/dL    Creatinine 1.00 0.50 - 1.05 mg/dL    eGFR 57 (L) >60 mL/min/1.73m*2    Calcium 8.3 (L) 8.6 - 10.3 mg/dL   CBC   Result Value Ref Range    WBC 8.3 4.4 - 11.3 x10*3/uL    nRBC 0.0 0.0 - 0.0 /100 WBCs    RBC 2.74 (L) 4.00 - 5.20 x10*6/uL    Hemoglobin 7.2 (L) 12.0 - 16.0 g/dL    Hematocrit 26.4 (L) 36.0 - 46.0 %    MCV 96 80 - 100 fL    MCH 26.3 26.0 - 34.0 pg    MCHC 27.3 (L) 32.0 - 36.0 g/dL    RDW 17.3 (H) 11.5 - 14.5 %    Platelets 384 150 - 450 x10*3/uL       Physical Exam    Well developed well nurished  No distress  Ao 3  Face symmetrical, she does have a NG tube in place  Neck no jvd no bruit  Chest clear  CVS regular  Ext no edema  Abdo soft nontender bs hyperactive, no masses, colostomy without any output however per patient the bag was emptied just now  Cns alert appropriate able to move ext, gait not tested  Skin intact  Psych normal affect  Relevant Results  {If you would like to pull in Medications, type .meds     If you would like to pull in Lab results for the last 24 hours, type .[id number]    If you would like to pull in Imaging results, type .imgrslt :99}    {Link to Stroke Scoring tools - Link :99}     Medications Ordered Prior to Encounter[2]  Results for orders placed or performed during the hospital encounter of 08/06/25 (from the past 24 hours)   Basic metabolic panel   Result Value Ref Range    Glucose 65 (L) 74 - 99 mg/dL    Sodium 139 136 - 145 mmol/L    Potassium 4.2 3.5 - 5.3 mmol/L    Chloride 103 98 - 107 mmol/L    Bicarbonate 28 21 - 32 mmol/L    Anion Gap 12 10 - 20 mmol/L    Urea Nitrogen 19 6  - 23 mg/dL    Creatinine 1.00 0.50 - 1.05 mg/dL    eGFR 57 (L) >60 mL/min/1.73m*2    Calcium 8.3 (L) 8.6 - 10.3 mg/dL   CBC   Result Value Ref Range    WBC 8.3 4.4 - 11.3 x10*3/uL    nRBC 0.0 0.0 - 0.0 /100 WBCs    RBC 2.74 (L) 4.00 - 5.20 x10*6/uL    Hemoglobin 7.2 (L) 12.0 - 16.0 g/dL    Hematocrit 26.4 (L) 36.0 - 46.0 %    MCV 96 80 - 100 fL    MCH 26.3 26.0 - 34.0 pg    MCHC 27.3 (L) 32.0 - 36.0 g/dL    RDW 17.3 (H) 11.5 - 14.5 %    Platelets 384 150 - 450 x10*3/uL                      Assessment & Plan  Intestinal obstruction (Multi)    Hypothyroid    Adjustment reaction with anxiety and depression    Essential hypertension    Stage 3 chronic kidney disease (Multi)    Anemia    Anticoagulant long-term use    Peripheral vascular disease        Surgery following  Input noted  Will ersume iv fluids     -Continue current treatment as ordered. Will make adjustments as necessary.    Plan of care discussed with: Provider, RN, Patient     Rajendra Rodriguez MD    .                     [1]   No current facility-administered medications on file prior to encounter.     Current Outpatient Medications on File Prior to Encounter   Medication Sig Dispense Refill    cephalexin (Keflex) 500 mg capsule Take 1 capsule (500 mg) by mouth 2 times a day.      acetaminophen (Tylenol 8 HOUR) 650 mg ER tablet Take 1 tablet (650 mg) by mouth every 8 hours if needed.      acetaminophen (Tylenol) 650 mg suppository Insert 1 suppository (650 mg) into the rectum every 8 hours if needed for mild pain (1 - 3).      alendronate (Fosamax) 70 mg tablet Take 1 tablet (70 mg) by mouth every 7 days. Take on an empty stomach. Do not lie down or eat for 1/2 hour after taking. (Patient not taking: Reported on 7/31/2025) 12 tablet 3    amiodarone (Pacerone) 200 mg tablet Take 1 tablet (200 mg) by mouth once daily in the morning.      amLODIPine (Norvasc) 5 mg tablet Take 1 tablet (5 mg) by mouth once daily. In the morning      apixaban (Eliquis) 2.5 mg  tablet Take 1 tablet (2.5 mg) by mouth 2 times a day.      ascorbic acid (Vitamin C) 500 mg tablet Take 1 tablet (500 mg) by mouth once daily.      aspirin 81 mg EC tablet Take 1 tablet (81 mg) by mouth once daily.      atorvastatin (Lipitor) 10 mg tablet Take 1 tablet (10 mg) by mouth once daily at bedtime.      bisacodyl (Dulcolax, bisacodyl,) 10 mg suppository Insert 1 suppository (10 mg) into the rectum once daily as needed for constipation.      calcium carbonate 500 mg calcium (1,250 mg) chewable tablet Chew 1 tablet (1,250 mg) once daily.      [] cephalexin (Keflex) 500 mg capsule Take 1 capsule (500 mg) by mouth 2 times a day for 5 days. 10 capsule 0    cyanocobalamin, vitamin B-12, 5,000 mcg capsule Take 5,000 mcg by mouth once daily.      DAILY MULTI-VITAMIN ORAL Take 1 tablet by mouth once daily.      dimethicone (Cavilon Durable Barrier) 1.3 % cream Apply topically. Apply to lucio area topically every day and night shift for prevention. Apply to buttocks every shift as needed after each episode of incontinence.      docusate sodium (Colace) 100 mg capsule Take 1 capsule (100 mg) by mouth 2 times a day.      escitalopram (Lexapro) 20 mg tablet Take 1 tablet (20 mg) by mouth once daily. 90 tablet 1    ferrous sulfate, 325 mg ferrous sulfate, tablet Take 1 tablet (325 mg) by mouth once daily with breakfast. (Patient not taking: Reported on 2025)      furosemide (Lasix) 40 mg tablet Take 1 tablet (40 mg) by mouth once daily. (Patient not taking: Reported on 2025)      levomefolate calcium (L-METHYLFOLATE ORAL) Take 1,000 mcg by mouth once daily in the morning. (Patient not taking: Reported on 2025)      levothyroxine (Synthroid, Levoxyl) 100 mcg tablet TAKE 1 TABLET (100 MCG) BY MOUTH ONCE DAILY. SIX DAYS PER WEEK 90 tablet 0    lutein 6 mg capsule Take 1 capsule by mouth once daily.      magnesium hydroxide (Milk of Magnesia) 400 mg/5 mL suspension Take 30 mL by mouth once daily as  needed for constipation (if no BM in 72 hours).      menthol-zinc oxide (Calmoseptine) 0.44-20.6 % ointment Apply 1 Application topically 2 times a day. (Patient not taking: Reported on 7/31/2025)      metoprolol succinate XL (Toprol-XL) 25 mg 24 hr tablet Take 1 tablet (25 mg) by mouth once daily. Do not crush or chew. (Patient not taking: Reported on 7/31/2025)      mirtazapine (Remeron) 15 mg tablet Take 1 tablet (15 mg) by mouth once daily at bedtime. (Patient not taking: Reported on 7/31/2025)      ondansetron (Zofran) 4 mg tablet Take 1 tablet (4 mg) by mouth every 6 hours if needed for nausea or vomiting.      oxybutynin XL (Ditropan-XL) 10 mg 24 hr tablet Take 1 tablet (10 mg) by mouth once daily.      oxyCODONE (Roxicodone) 5 mg immediate release tablet Take 1 tablet (5 mg) by mouth every 6 hours if needed for severe pain (7 - 10).      pantoprazole (ProtoNix) 20 mg EC tablet Take 2 tablets (40 mg) by mouth once daily. Do not crush, chew, or split.      polyethylene glycol (Glycolax, Miralax) 17 gram packet Take 17 g by mouth 2 times a day as needed (for firm or low ostomy output).      sacubitriL-valsartan (Entresto) 24-26 mg tablet Take 1 tablet by mouth 2 times a day. (Patient not taking: Reported on 7/31/2025)      sennosides (Senokot) 8.6 mg tablet Take 1 tablet (8.6 mg) by mouth 2 times a day.      sodium phosphates (Fleets) 19-7 gram/118 mL enema enema Insert 133 mL (1 enema) into the rectum once daily as needed for constipation.      thyroid, pork, ([place] Thyroid) 30 mg tablet TAKE 1 TABLET (30 MG) BY MOUTH ONCE DAILY IN THE MORNING. TAKE BEFORE MEALS. (Patient not taking: Reported on 8/8/2025) 30 tablet 0    tocilizumab (Actemra) 200 mg/10 mL (20 mg/mL) solution Infuse into a venous catheter every 28 (twenty-eight) days. as directed (Patient not taking: Reported on 7/31/2025)      [DISCONTINUED] nicotine (Nicoderm CQ) 21 mg/24 hr patch Place 1 patch on the skin once every 24 hours. (Patient not  taking: Reported on 2025)     [2]   No current facility-administered medications on file prior to encounter.     Current Outpatient Medications on File Prior to Encounter   Medication Sig Dispense Refill    cephalexin (Keflex) 500 mg capsule Take 1 capsule (500 mg) by mouth 2 times a day.      acetaminophen (Tylenol 8 HOUR) 650 mg ER tablet Take 1 tablet (650 mg) by mouth every 8 hours if needed.      acetaminophen (Tylenol) 650 mg suppository Insert 1 suppository (650 mg) into the rectum every 8 hours if needed for mild pain (1 - 3).      alendronate (Fosamax) 70 mg tablet Take 1 tablet (70 mg) by mouth every 7 days. Take on an empty stomach. Do not lie down or eat for 1/2 hour after taking. (Patient not taking: Reported on 2025) 12 tablet 3    amiodarone (Pacerone) 200 mg tablet Take 1 tablet (200 mg) by mouth once daily in the morning.      amLODIPine (Norvasc) 5 mg tablet Take 1 tablet (5 mg) by mouth once daily. In the morning      apixaban (Eliquis) 2.5 mg tablet Take 1 tablet (2.5 mg) by mouth 2 times a day.      ascorbic acid (Vitamin C) 500 mg tablet Take 1 tablet (500 mg) by mouth once daily.      aspirin 81 mg EC tablet Take 1 tablet (81 mg) by mouth once daily.      atorvastatin (Lipitor) 10 mg tablet Take 1 tablet (10 mg) by mouth once daily at bedtime.      bisacodyl (Dulcolax, bisacodyl,) 10 mg suppository Insert 1 suppository (10 mg) into the rectum once daily as needed for constipation.      calcium carbonate 500 mg calcium (1,250 mg) chewable tablet Chew 1 tablet (1,250 mg) once daily.      [] cephalexin (Keflex) 500 mg capsule Take 1 capsule (500 mg) by mouth 2 times a day for 5 days. 10 capsule 0    cyanocobalamin, vitamin B-12, 5,000 mcg capsule Take 5,000 mcg by mouth once daily.      DAILY MULTI-VITAMIN ORAL Take 1 tablet by mouth once daily.      dimethicone (Cavilon Durable Barrier) 1.3 % cream Apply topically. Apply to lucio area topically every day and night shift for  prevention. Apply to buttocks every shift as needed after each episode of incontinence.      docusate sodium (Colace) 100 mg capsule Take 1 capsule (100 mg) by mouth 2 times a day.      escitalopram (Lexapro) 20 mg tablet Take 1 tablet (20 mg) by mouth once daily. 90 tablet 1    ferrous sulfate, 325 mg ferrous sulfate, tablet Take 1 tablet (325 mg) by mouth once daily with breakfast. (Patient not taking: Reported on 7/31/2025)      furosemide (Lasix) 40 mg tablet Take 1 tablet (40 mg) by mouth once daily. (Patient not taking: Reported on 7/31/2025)      levomefolate calcium (L-METHYLFOLATE ORAL) Take 1,000 mcg by mouth once daily in the morning. (Patient not taking: Reported on 7/31/2025)      levothyroxine (Synthroid, Levoxyl) 100 mcg tablet TAKE 1 TABLET (100 MCG) BY MOUTH ONCE DAILY. SIX DAYS PER WEEK 90 tablet 0    lutein 6 mg capsule Take 1 capsule by mouth once daily.      magnesium hydroxide (Milk of Magnesia) 400 mg/5 mL suspension Take 30 mL by mouth once daily as needed for constipation (if no BM in 72 hours).      menthol-zinc oxide (Calmoseptine) 0.44-20.6 % ointment Apply 1 Application topically 2 times a day. (Patient not taking: Reported on 7/31/2025)      metoprolol succinate XL (Toprol-XL) 25 mg 24 hr tablet Take 1 tablet (25 mg) by mouth once daily. Do not crush or chew. (Patient not taking: Reported on 7/31/2025)      mirtazapine (Remeron) 15 mg tablet Take 1 tablet (15 mg) by mouth once daily at bedtime. (Patient not taking: Reported on 7/31/2025)      ondansetron (Zofran) 4 mg tablet Take 1 tablet (4 mg) by mouth every 6 hours if needed for nausea or vomiting.      oxybutynin XL (Ditropan-XL) 10 mg 24 hr tablet Take 1 tablet (10 mg) by mouth once daily.      oxyCODONE (Roxicodone) 5 mg immediate release tablet Take 1 tablet (5 mg) by mouth every 6 hours if needed for severe pain (7 - 10).      pantoprazole (ProtoNix) 20 mg EC tablet Take 2 tablets (40 mg) by mouth once daily. Do not crush, chew,  or split.      polyethylene glycol (Glycolax, Miralax) 17 gram packet Take 17 g by mouth 2 times a day as needed (for firm or low ostomy output).      sacubitriL-valsartan (Entresto) 24-26 mg tablet Take 1 tablet by mouth 2 times a day. (Patient not taking: Reported on 7/31/2025)      sennosides (Senokot) 8.6 mg tablet Take 1 tablet (8.6 mg) by mouth 2 times a day.      sodium phosphates (Fleets) 19-7 gram/118 mL enema enema Insert 133 mL (1 enema) into the rectum once daily as needed for constipation.      thyroid, pork, ([place] Thyroid) 30 mg tablet TAKE 1 TABLET (30 MG) BY MOUTH ONCE DAILY IN THE MORNING. TAKE BEFORE MEALS. (Patient not taking: Reported on 8/8/2025) 30 tablet 0    tocilizumab (Actemra) 200 mg/10 mL (20 mg/mL) solution Infuse into a venous catheter every 28 (twenty-eight) days. as directed (Patient not taking: Reported on 7/31/2025)      [DISCONTINUED] nicotine (Nicoderm CQ) 21 mg/24 hr patch Place 1 patch on the skin once every 24 hours. (Patient not taking: Reported on 7/31/2025)

## 2025-08-10 NOTE — PROGRESS NOTES
Anny Tesfaye is a 80 y.o. female on day 3 of admission presenting with Intestinal obstruction (Multi).    Assessment/Plan   SBO vs obstipation - resolving    - Pain control  - PRN antiemetics  - Trial C&C today  - AM KUB unchanged  - Monitor and record stoma OP  - Stoma care  - Wean O2 as able    Discussed with Dr. Robertson. Will continue to follow.       Subjective   Feeling a lot better. No n/v. Pain controlled. Stoma started working again.       Objective     Physical Exam  Constitutional: A&Ox3, calm and cooperative, NAD  Eyes: EOMI, clear sclera   Cardiovascular: Normal rate and regular rhythm. No murmurs  Respiratory/Thorax: CTAB, on 3L/NC  Gastrointestinal: Abdomen soft, mildly distended, nontender. NG- feculent (undocumented). Stoma- pink with thick yellow OP (700mL/24h)  Genitourinary: Purewick- clear yellow  Musculoskeletal: ROM intact  Extremities: No peripheral edema  Neurological: A&Ox3, No focal deficits   Psychological: Appropriate mood and behavior    Last Recorded Vitals  Blood pressure 148/74, pulse 65, temperature 36.7 °C (98 °F), temperature source Temporal, resp. rate 16, weight 68 kg (149 lb 14.6 oz), SpO2 95%.  Intake/Output last 3 Shifts:  I/O last 3 completed shifts:  In: 2602.5 (38.3 mL/kg) [P.O.:250; I.V.:2322.5 (34.2 mL/kg); NG/GT:30]  Out: 650 (9.6 mL/kg) [Emesis/NG output:250; Stool:400]  Weight: 68 kg     Relevant Results    Scheduled medications  Scheduled Medications[1]  Continuous medications  Continuous Medications[2]  PRN medications  PRN Medications[3]    Results for orders placed or performed during the hospital encounter of 08/06/25 (from the past 24 hours)   Basic metabolic panel   Result Value Ref Range    Glucose 65 (L) 74 - 99 mg/dL    Sodium 139 136 - 145 mmol/L    Potassium 4.2 3.5 - 5.3 mmol/L    Chloride 103 98 - 107 mmol/L    Bicarbonate 28 21 - 32 mmol/L    Anion Gap 12 10 - 20 mmol/L    Urea Nitrogen 19 6 - 23 mg/dL    Creatinine 1.00 0.50 - 1.05 mg/dL    eGFR 57  (L) >60 mL/min/1.73m*2    Calcium 8.3 (L) 8.6 - 10.3 mg/dL   CBC   Result Value Ref Range    WBC 8.3 4.4 - 11.3 x10*3/uL    nRBC 0.0 0.0 - 0.0 /100 WBCs    RBC 2.74 (L) 4.00 - 5.20 x10*6/uL    Hemoglobin 7.2 (L) 12.0 - 16.0 g/dL    Hematocrit 26.4 (L) 36.0 - 46.0 %    MCV 96 80 - 100 fL    MCH 26.3 26.0 - 34.0 pg    MCHC 27.3 (L) 32.0 - 36.0 g/dL    RDW 17.3 (H) 11.5 - 14.5 %    Platelets 384 150 - 450 x10*3/uL       XR abdomen 2 views supine and erect or decub   Final Result   The enteric tube is kinked twice, about 1 cm apart, at the level of   the gastroesophageal junction, with the distal tip flipped back up in   the distal thoracic esophagus. Careful repositioning to be   reconsidered        Unchanged gaseous small bowel dilation        No free intraperitoneal air        MACRO:   None        Signed by: Reji Garcia 8/10/2025 9:10 AM   Dictation workstation:   [id number]      XR abdomen 2 views supine and erect or decub   Final Result   NG tube tip remains coiled within the distal esophagus. Bowel gas   pattern compatible with small-bowel obstruction.        MACRO:   None        Signed by: Roscoe Willis 8/9/2025 8:30 AM   Dictation workstation:   [id number]      XR abdomen 2 views supine and erect or decub   Final Result   NG tube remains in place, unchanged.        Contrast administered through the patient's NG tube into the stomach   earlier this morning has only reached the 3rd portion of the duodenum.        Persistent findings of at least partial mid to distal small bowel   obstruction.        MACRO:   None        Signed by: Jermaine Mcadams 8/8/2025 2:38 PM   Dictation workstation:   [id number]      XR abdomen 2 views supine and erect or decub   Final Result   Vascular stents in place as described. NG tube is in the proximal   stomach. Mid left pelvic wall colostomy.        Scoliosis and DJD in the dorsal spine as described. DJD in the pelvis   as well.        Contrast administered through the patient's NG  tube is only in the   gastric fundus at 1 hour after administration.        Intestinal gas pattern consistent with at least partial distal small   bowel obstruction.        MACRO:   None        Signed by: Jermaine Mcadams 8/8/2025 11:18 AM   Dictation workstation:   [id number]      XR chest 1 view   Final Result   A nasogastric tube is coiled distally with the tip projecting   cranially in the lower esophagus. Repositioning is recommended.        Persistent dilatation and tortuosity of the thoracic aorta. This is   described in greater detail on a recent CT examination of 07/31/2025.   Please refer to this report.        Linear areas of atelectasis in the left lung. There is no airspace   consolidation.             MACRO:   None        Signed by: Oziel Sunshine 8/7/2025 7:28 AM   Dictation workstation:   [id number]      CT abdomen pelvis w IV contrast   Final Result   Dilated loops of small bowel throughout the abdomen and pelvis with   decompressed loops of distal small bowel and a suspected transition   point in the mid abdomen compatible with small bowel obstruction.        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 No CT evidence of acute diverticulitis.        Cholelithiasis without CT evidence of acute cholecystitis.        Cardiomegaly, small right pleural effusion and patchy bibasilar   airspace opacities which may represent atelectasis with a developing   infection not excluded in the appropriate clinical setting.             MACRO:   None.        Signed by: Evan Finkelstein 8/7/2025 12:53 AM   Dictation workstation:   [id number]             I spent 35 minutes in the professional and overall care of this patient.    Edilberto Adam, APRN-CNP           [1] amiodarone, 200 mg, oral, q AM  atorvastatin, 10 mg, oral, Nightly  enoxaparin, 40 mg, subcutaneous, q24h  escitalopram, 20 mg, oral,  Daily  [START ON 8/11/2025] levothyroxine, 100 mcg, oral, Once per day on Monday Tuesday Wednesday Thursday Friday Saturday  metoprolol, 5 mg, intravenous, q6h  pantoprazole, 40 mg, oral, Daily before breakfast   Or  pantoprazole, 40 mg, intravenous, Daily before breakfast     [2] sodium chloride 0.9%, 75 mL/hr, Last Rate: 75 mL/hr (08/10/25 0730)     [3] PRN medications: acetaminophen **OR** acetaminophen **OR** acetaminophen, hydrALAZINE, morphine, ondansetron **OR** ondansetron, phenoL

## 2025-08-10 NOTE — PROGRESS NOTES
Ms. Tesfaye feels much better today.    Appears to be opening up with stool and gas into her ostomy bag    On exam more comfortable  Abdomen soft, nontender   stool and gas in her ostomy bag    Will clamp NG  and start on clear liquids    KUB lagging her clinical improvement. will advance diet as tolerated        impression: Small bowel obstruction resolving

## 2025-08-11 ENCOUNTER — APPOINTMENT (OUTPATIENT)
Dept: CARDIOLOGY | Facility: HOSPITAL | Age: 80
DRG: 389 | End: 2025-08-11
Payer: MEDICARE

## 2025-08-11 LAB
ANION GAP SERPL CALC-SCNC: 7 MMOL/L (ref 10–20)
AORTIC VALVE MEAN GRADIENT: 6 MMHG
AORTIC VALVE PEAK VELOCITY: 1.75 M/S
ATRIAL RATE: 54 BPM
AV PEAK GRADIENT: 12 MMHG
BUN SERPL-MCNC: 16 MG/DL (ref 6–23)
CALCIUM SERPL-MCNC: 8.2 MG/DL (ref 8.6–10.3)
CHLORIDE SERPL-SCNC: 102 MMOL/L (ref 98–107)
CO2 SERPL-SCNC: 30 MMOL/L (ref 21–32)
CREAT SERPL-MCNC: 1.03 MG/DL (ref 0.5–1.05)
EGFRCR SERPLBLD CKD-EPI 2021: 55 ML/MIN/1.73M*2
EJECTION FRACTION: 38 %
ERYTHROCYTE [DISTWIDTH] IN BLOOD BY AUTOMATED COUNT: 17 % (ref 11.5–14.5)
GLUCOSE SERPL-MCNC: 88 MG/DL (ref 74–99)
HCT VFR BLD AUTO: 27 % (ref 36–46)
HGB BLD-MCNC: 7.8 G/DL (ref 12–16)
LEFT ATRIUM VOLUME AREA LENGTH INDEX BSA: 56 ML/M2
LEFT VENTRICLE INTERNAL DIMENSION DIASTOLE: 5.78 CM (ref 3.5–6)
MAGNESIUM SERPL-MCNC: 1.89 MG/DL (ref 1.6–2.4)
MCH RBC QN AUTO: 26 PG (ref 26–34)
MCHC RBC AUTO-ENTMCNC: 28.9 G/DL (ref 32–36)
MCV RBC AUTO: 90 FL (ref 80–100)
MITRAL VALVE E/A RATIO: 0.7
MITRAL VALVE E/E' RATIO: 13.65
NRBC BLD-RTO: 0 /100 WBCS (ref 0–0)
P AXIS: -6 DEGREES
P OFFSET: 183 MS
P ONSET: 115 MS
PLATELET # BLD AUTO: 409 X10*3/UL (ref 150–450)
POTASSIUM SERPL-SCNC: 3.4 MMOL/L (ref 3.5–5.3)
PR INTERVAL: 200 MS
Q ONSET: 215 MS
QRS COUNT: 9 BEATS
QRS DURATION: 126 MS
QT INTERVAL: 548 MS
QTC CALCULATION(BAZETT): 514 MS
QTC FREDERICIA: 526 MS
R AXIS: -32 DEGREES
RBC # BLD AUTO: 3 X10*6/UL (ref 4–5.2)
RIGHT VENTRICLE FREE WALL PEAK S': 11.56 CM/S
RIGHT VENTRICLE PEAK SYSTOLIC PRESSURE: 45 MMHG
SODIUM SERPL-SCNC: 136 MMOL/L (ref 136–145)
T AXIS: 23 DEGREES
T OFFSET: 489 MS
TRICUSPID ANNULAR PLANE SYSTOLIC EXCURSION: 2.4 CM
VENTRICULAR RATE: 53 BPM
WBC # BLD AUTO: 8.7 X10*3/UL (ref 4.4–11.3)

## 2025-08-11 PROCEDURE — 36415 COLL VENOUS BLD VENIPUNCTURE: CPT

## 2025-08-11 PROCEDURE — 2500000002 HC RX 250 W HCPCS SELF ADMINISTERED DRUGS (ALT 637 FOR MEDICARE OP, ALT 636 FOR OP/ED): Performed by: FAMILY MEDICINE

## 2025-08-11 PROCEDURE — 2500000001 HC RX 250 WO HCPCS SELF ADMINISTERED DRUGS (ALT 637 FOR MEDICARE OP): Performed by: FAMILY MEDICINE

## 2025-08-11 PROCEDURE — 93005 ELECTROCARDIOGRAM TRACING: CPT

## 2025-08-11 PROCEDURE — 99223 1ST HOSP IP/OBS HIGH 75: CPT | Performed by: INTERNAL MEDICINE

## 2025-08-11 PROCEDURE — 1200000002 HC GENERAL ROOM WITH TELEMETRY DAILY

## 2025-08-11 PROCEDURE — 2500000004 HC RX 250 GENERAL PHARMACY W/ HCPCS (ALT 636 FOR OP/ED): Performed by: FAMILY MEDICINE

## 2025-08-11 PROCEDURE — 85027 COMPLETE CBC AUTOMATED: CPT

## 2025-08-11 PROCEDURE — 93308 TTE F-UP OR LMTD: CPT

## 2025-08-11 PROCEDURE — 93010 ELECTROCARDIOGRAM REPORT: CPT | Performed by: INTERNAL MEDICINE

## 2025-08-11 PROCEDURE — 99232 SBSQ HOSP IP/OBS MODERATE 35: CPT

## 2025-08-11 PROCEDURE — 2500000002 HC RX 250 W HCPCS SELF ADMINISTERED DRUGS (ALT 637 FOR MEDICARE OP, ALT 636 FOR OP/ED): Performed by: NURSE PRACTITIONER

## 2025-08-11 PROCEDURE — 97161 PT EVAL LOW COMPLEX 20 MIN: CPT | Mod: GP

## 2025-08-11 PROCEDURE — 83735 ASSAY OF MAGNESIUM: CPT | Performed by: NURSE PRACTITIONER

## 2025-08-11 PROCEDURE — 82374 ASSAY BLOOD CARBON DIOXIDE: CPT

## 2025-08-11 RX ORDER — POTASSIUM CHLORIDE 20 MEQ/1
40 TABLET, EXTENDED RELEASE ORAL ONCE
Status: COMPLETED | OUTPATIENT
Start: 2025-08-11 | End: 2025-08-11

## 2025-08-11 RX ADMIN — ESCITALOPRAM 20 MG: 20 TABLET, FILM COATED ORAL at 09:03

## 2025-08-11 RX ADMIN — METOPROLOL TARTRATE 5 MG: 5 INJECTION, SOLUTION INTRAVENOUS at 09:00

## 2025-08-11 RX ADMIN — PANTOPRAZOLE SODIUM 40 MG: 40 TABLET, DELAYED RELEASE ORAL at 05:18

## 2025-08-11 RX ADMIN — METOPROLOL TARTRATE 5 MG: 5 INJECTION, SOLUTION INTRAVENOUS at 02:16

## 2025-08-11 RX ADMIN — ATORVASTATIN CALCIUM 10 MG: 10 TABLET, FILM COATED ORAL at 20:52

## 2025-08-11 RX ADMIN — AMIODARONE HYDROCHLORIDE 200 MG: 200 TABLET ORAL at 09:03

## 2025-08-11 RX ADMIN — LEVOTHYROXINE SODIUM 100 MCG: 0.1 TABLET ORAL at 05:17

## 2025-08-11 RX ADMIN — POTASSIUM CHLORIDE 40 MEQ: 1500 TABLET, EXTENDED RELEASE ORAL at 12:43

## 2025-08-11 RX ADMIN — ENOXAPARIN SODIUM 40 MG: 100 INJECTION SUBCUTANEOUS at 09:03

## 2025-08-11 ASSESSMENT — COGNITIVE AND FUNCTIONAL STATUS - GENERAL
TURNING FROM BACK TO SIDE WHILE IN FLAT BAD: TOTAL
TURNING FROM BACK TO SIDE WHILE IN FLAT BAD: A LITTLE
MOVING TO AND FROM BED TO CHAIR: TOTAL
CLIMB 3 TO 5 STEPS WITH RAILING: TOTAL
TOILETING: A LITTLE
DAILY ACTIVITIY SCORE: 19
HELP NEEDED FOR BATHING: A LITTLE
MOBILITY SCORE: 7
TURNING FROM BACK TO SIDE WHILE IN FLAT BAD: A LITTLE
WALKING IN HOSPITAL ROOM: A LOT
MOVING FROM LYING ON BACK TO SITTING ON SIDE OF FLAT BED WITH BEDRAILS: A LOT
STANDING UP FROM CHAIR USING ARMS: A LOT
TOILETING: A LITTLE
CLIMB 3 TO 5 STEPS WITH RAILING: TOTAL
DAILY ACTIVITIY SCORE: 20
WALKING IN HOSPITAL ROOM: A LOT
WALKING IN HOSPITAL ROOM: TOTAL
DRESSING REGULAR LOWER BODY CLOTHING: A LITTLE
MOBILITY SCORE: 13
CLIMB 3 TO 5 STEPS WITH RAILING: TOTAL
PERSONAL GROOMING: A LITTLE
DRESSING REGULAR LOWER BODY CLOTHING: A LITTLE
MOBILITY SCORE: 13
MOVING TO AND FROM BED TO CHAIR: A LOT
MOVING FROM LYING ON BACK TO SITTING ON SIDE OF FLAT BED WITH BEDRAILS: A LITTLE
MOVING TO AND FROM BED TO CHAIR: A LOT
DRESSING REGULAR UPPER BODY CLOTHING: A LITTLE
STANDING UP FROM CHAIR USING ARMS: TOTAL
HELP NEEDED FOR BATHING: A LITTLE
STANDING UP FROM CHAIR USING ARMS: A LOT
MOVING FROM LYING ON BACK TO SITTING ON SIDE OF FLAT BED WITH BEDRAILS: A LITTLE
DRESSING REGULAR UPPER BODY CLOTHING: A LITTLE

## 2025-08-11 ASSESSMENT — PAIN SCALES - GENERAL
PAINLEVEL_OUTOF10: 0 - NO PAIN
PAINLEVEL_OUTOF10: 0 - NO PAIN
PAINLEVEL_OUTOF10: 2

## 2025-08-11 ASSESSMENT — PAIN - FUNCTIONAL ASSESSMENT
PAIN_FUNCTIONAL_ASSESSMENT: 0-10
PAIN_FUNCTIONAL_ASSESSMENT: 0-10

## 2025-08-11 ASSESSMENT — ACTIVITIES OF DAILY LIVING (ADL): ADL_ASSISTANCE: NEEDS ASSISTANCE

## 2025-08-11 NOTE — PROGRESS NOTES
Physical Therapy    Physical Therapy Evaluation    Patient Name: Anny Tesfaye  MRN: [id number]  Department: [facility] NONV1  Room: [address] [place], [place] [address]  Today's Date: 8/11/2025   Time Calculation  Start Time: 1413  Stop Time: 1430  Time Calculation (min): 17 min    Assessment/Plan   PT Assessment  PT Assessment Results: Decreased strength, Decreased range of motion, Decreased endurance, Impaired balance, Decreased mobility, Pain  Rehab Prognosis: Fair  Barriers to Discharge Home: No anticipated barriers  End of Session Communication: Bedside nurse  Assessment Comment: PT Evaluation Completed. The patient presented with decreased mobility, balance, endurance, strength, safety awareness, and increased pain and fatigue. These impairments are negatively impacting her ability to perform at baseline functional level, in home environment. This patient would benefit from skilled therapy intervention to address limitations and progress towards the PT goals.  Anticipate moderate frequency PT needs at discharge  End of Session Patient Position: Bed, 3 rail up, Alarm off, caregiver present (with RN)  IP OR SWING BED PT PLAN  Inpatient or Swing Bed: Inpatient  PT Plan  Treatment/Interventions: Bed mobility, Transfer training, Balance training, Strengthening, Endurance training, Therapeutic exercise, Range of motion, Home exercise program, Therapeutic activity, Positioning  PT Plan: Ongoing PT  PT Frequency: 3 times per week (during this acute inpatient hospitalization.)  PT Discharge Recommendations: Moderate intensity level of continued care (Based on current functional status and rehab potential, patient is anticipated to tolerate and benefit from 5 or more days per week of skilled rehabilitative therapy after discharge from this acute inpatient hospitalization.)  PT Recommended Transfer Status:  (Unable to assess)  PT - OK to Discharge: Yes (PT POC established.)    Subjective     PT Visit Info:  PT Received On: 08/11/25  General Visit  Information:  General  Reason for Referral: To ED with abdominal pain, nausea, and vomiting. Pt found to have an intestinal obstruction.  Referred By: Rajendra Rodriguez MD  Past Medical History Relevant to Rehab: Medical History[1]    Prior to Session Communication: Bedside nurse  Patient Position Received: Bed, 3 rail up, Alarm on  General Comment: Pt initially agreeable to PT eval with encouragement. Later agreeable to only bed level activity.  Home Living:  Home Living  Type of Home: Long Term Care facility  Lives With: Alone  Home Adaptive Equipment: Wheelchair-manual, Walker rolling or standard  Home Layout: One level  Home Access: Level entry  Bathroom Shower/Tub: Walk-in shower  Bathroom Equipment: Shower chair with back  Prior Level of Function:  Prior Function Per Pt/Caregiver Report  Level of Woods: Needs assistance with ADLs, Needs assistance with homemaking  Receives Help From: Personal care attendant  ADL Assistance: Needs assistance (assist for dressing and bathing)  Homemaking Assistance: Needs assistance (staff completes)  Ambulatory Assistance: Needs assistance (Reports she has not ambulated in 4-5 months. Requires assist for transfers. Able to propel WC with feet as needed.)  Prior Function Comments: Denies any falls in the past 6 months.  Precautions:  Precautions  Medical Precautions: Fall precautions             Objective   Pain:  Pain Assessment  Pain Assessment:  (Initially reporting no pain, reports back pain once completing bed mobility but does not provide a rating.)  Cognition:  Cognition  Overall Cognitive Status: Within Functional Limits  Orientation Level: Oriented X4    General Assessments:  Activity Tolerance  Endurance: Tolerates 10 - 20 min exercise with multiple rests    Sensation  Light Touch: No apparent deficits                      Functional Assessments:  Bed Mobility  Bed Mobility: Yes  Bed Mobility 1  Bed Mobility 1: Rolling right, Rolling left  Level of Assistance 1:  Moderate assistance, Minimum assistance  Bed Mobility Comments 1: Varying min-mod A required for rolling. Cues for hand placement and sequencing. Limited by pain.  Bed Mobility 2  Bed Mobility  2: Scooting (boosting up in bed)  Level of Assistance 2: Dependent, +2  Bed Mobility Comments 2: assist needed to boost pt up towards HOB. Attempts to complete without assist however unsuccessful.    Transfers  Transfer: No            Extremity/Trunk Assessments:  RUE   RUE : Within Functional Limits  LUE   LUE: Within Functional Limits  RLE   RLE :  (Strength grossly 3+/5, ROM WFL.)  LLE   LLE :  (Strength grossly 3+/5, ROM WFL.)  Outcome Measures:  [facility] Basic Mobility  Turning from your back to your side while in a flat bed without using bedrails: A lot  Moving from lying on your back to sitting on the side of a flat bed without using bedrails: Total  Moving to and from bed to chair (including a wheelchair): Total  Standing up from a chair using your arms (e.g. wheelchair or bedside chair): Total  To walk in hospital room: Total  Climbing 3-5 steps with railing: Total  Basic Mobility - Total Score: 7    Encounter Problems       Encounter Problems (Active)       Balance       STG - Maintains dynamic sitting balance without upper extremity support x10 mins with CGA or less and no LOB.        Start:  08/11/25    Expected End:  08/25/25               Mobility       STG - Patient will ambulate 10 ft with mod A and a RW       Start:  08/11/25    Expected End:  08/25/25               PT Transfers       STG - Patient will perform bed mobility with min A       Start:  08/11/25    Expected End:  08/25/25            STG - Patient will transfer sit to and from stand with mod A and a RW       Start:  08/11/25    Expected End:  08/25/25                   Education Documentation  Body Mechanics, taught by Yumiko Aranda, PT at 8/11/2025  3:11 PM.  Learner: Patient  Readiness: Acceptance  Method: Explanation  Response: Verbalizes  Understanding    Mobility Training, taught by Yumiko Aranda, PT at 8/11/2025  3:11 PM.  Learner: Patient  Readiness: Acceptance  Method: Explanation  Response: Verbalizes Understanding    Education Comments  No comments found.                 [1]   Past Medical History:  Diagnosis Date    Aortic aneurysm     Chronic low back pain     CKD (chronic kidney disease) stage 3, GFR 30-59 ml/min (Multi)     Esophagitis     Glaucoma     History of scarlet fever     Hypertension     Hypothyroidism     Morbid obesity (Multi)     Nephritis     OA (osteoarthritis) of knee     Bilateral    Polymyalgia rheumatica (Multi)     Psoriasis     Sciatica

## 2025-08-11 NOTE — PROGRESS NOTES
Anny Tesfaye is a 80 y.o. female on day 4 of admission presenting with Intestinal obstruction (Multi).    Subjective   Pt presenting to ED on 8/6 w abd pain, constipation, and n/v. Prior hx of SBO in January.     Pt seen on am rounds. NAEO. Reports doing well. Pain well controlled on current regimen. Appetite good, tolerating diet, denies N/V. Reporting lots of gas and stool OP from ostomy.           Objective   PE:  Constitutional: A&Ox3, calm and cooperative, NAD  Eyes: EOMI, clear sclera   Cardiovascular: Normal rate and regular rhythm. No murmurs  Respiratory/Thorax: CTAB, on 3L O2  Gastrointestinal: abd soft, mildly distended, nontender; +BS x4; NG clamped; stoma  well-pouched, ostomy OP 100mL/24h, ~half pouch filled w soft brown stool and lots of gas  Genitourinary: Voiding independently   Musculoskeletal: ROM intact, no joint swelling, normal strength  Extremities: No peripheral edema, contusions, or wounds  Neurological: A&Ox3, No focal deficits   Psychological: Appropriate mood and behavior  Skin: Warm and dry     Last Recorded Vitals  Blood pressure 144/71, pulse 59, temperature 36.7 °C (98 °F), temperature source Temporal, resp. rate 17, weight 68 kg (149 lb 14.6 oz), SpO2 97%.  Intake/Output last 3 Shifts:  I/O last 3 completed shifts:  In: 2322.5 (34.2 mL/kg) [P.O.:510; I.V.:1812.5 (26.7 mL/kg)]  Out: 400 (5.9 mL/kg) [Stool:400]  Weight: 68 kg     Relevant Results  Scheduled medications  Scheduled Medications[1]  Continuous medications  Continuous Medications[2]  PRN medications  PRN Medications[3]    Results for orders placed or performed during the hospital encounter of 08/06/25 (from the past 24 hours)   Basic metabolic panel   Result Value Ref Range    Glucose 88 74 - 99 mg/dL    Sodium 136 136 - 145 mmol/L    Potassium 3.4 (L) 3.5 - 5.3 mmol/L    Chloride 102 98 - 107 mmol/L    Bicarbonate 30 21 - 32 mmol/L    Anion Gap 7 (L) 10 - 20 mmol/L    Urea Nitrogen 16 6 - 23 mg/dL    Creatinine 1.03  0.50 - 1.05 mg/dL    eGFR 55 (L) >60 mL/min/1.73m*2    Calcium 8.2 (L) 8.6 - 10.3 mg/dL   CBC   Result Value Ref Range    WBC 8.7 4.4 - 11.3 x10*3/uL    nRBC 0.0 0.0 - 0.0 /100 WBCs    RBC 3.00 (L) 4.00 - 5.20 x10*6/uL    Hemoglobin 7.8 (L) 12.0 - 16.0 g/dL    Hematocrit 27.0 (L) 36.0 - 46.0 %    MCV 90 80 - 100 fL    MCH 26.0 26.0 - 34.0 pg    MCHC 28.9 (L) 32.0 - 36.0 g/dL    RDW 17.0 (H) 11.5 - 14.5 %    Platelets 409 150 - 450 x10*3/uL     XR abdomen 2 views supine and erect or decub  Result Date: 8/10/2025  Interpreted By:  Reji Garcia, STUDY: XR ABDOMEN 2 VIEWS SUPINE AND ERECT OR DECUB;  8/10/2025 8:49 am   INDICATION: Signs/Symptoms:SBO.     COMPARISON: CT abdomen and pelvis with contrast 6 August 2025; intervening KUB exams most recently 9 August 2025   ACCESSION NUMBER(S): [id number]   ORDERING CLINICIAN: GUSTAVO CHAN   TECHNIQUE: Supine and  uprightviews of the abdomen. 2 views   FINDINGS: Enteric tube reaches the gastroesophageal junction before kinking twice and flipping back cephalad with tip in lower thoracic esophagus   No small bowel air-fluid levels on the upright view   Gaseous small bowel dilation persists relatively unchanged       The enteric tube is kinked twice, about 1 cm apart, at the level of the gastroesophageal junction, with the distal tip flipped back up in the distal thoracic esophagus. Careful repositioning to be reconsidered   Unchanged gaseous small bowel dilation   No free intraperitoneal air   MACRO: None   Signed by: Reji Garcia 8/10/2025 9:10 AM Dictation workstation:   [id number]    XR abdomen 2 views supine and erect or decub  Result Date: 8/9/2025  Interpreted By:  Roscoe Willis, STUDY: XR ABDOMEN 2 VIEWS SUPINE AND ERECT OR DECUB;  8/9/2025 6:33 am   INDICATION: Signs/Symptoms:SBO.     COMPARISON: 08/08/2025   ACCESSION NUMBER(S): [id number]   ORDERING CLINICIAN: GUSTAVO CHAN   TECHNIQUE: Abdomen supine and upright views   FINDINGS: There is an NG tube in place  with its tip appears to be coiled within the distal esophagus, appearance of which is unchanged. There are significantly distended small bowel loops, up to 4.4 cm, similar to the previous examination. Findings are compatible with small-bowel obstruction. Extensive vascular calcifications with aorto bi iliac stent graft in place.       NG tube tip remains coiled within the distal esophagus. Bowel gas pattern compatible with small-bowel obstruction.   MACRO: None   Signed by: Roscoe Willis 8/9/2025 8:30 AM Dictation workstation:   [id number]    XR abdomen 2 views supine and erect or decub  Result Date: 8/8/2025  Interpreted By:  Jermaine Mcadams, STUDY: XR ABDOMEN 2 VIEWS SUPINE AND ERECT OR DECUB;  8/8/2025 2:17 pm   INDICATION: Signs/Symptoms:Modified GG Study- 4 hours.   COMPARISON: CT scan abdomen and pelvis dated 08/06/2025. KUB obtained 1 hour after Gastrografin contrast administration through the patient's NG tube earlier this morning.   ACCESSION NUMBER(S): [id number]   ORDERING CLINICIAN: GUSTAVO CHAN   TECHNIQUE: AP portable supine and upright views of the abdomen and pelvis were obtained at 4:00 a.m. after contrast administration through the patient's NG tube. Stable NG tube in place with distal tip in the proximal stomach.   FINDINGS: There is a greater degree of contrast in the gastric fundus than there was previously. There is mild contrast throughout the remainder of the stomach and a small faint amount of contrast into the proximal duodenum. There is moderate gaseous distention of multiple small bowel loops. Stable proximal left pelvic colostomy. There is mild-to-moderate stool and mild gas throughout the colon. Stable vascular stents in the mid to distal abdominal aorta down through the common iliac arteries. Stable DJD and scoliosis in the dorsal spine. The extreme lung bases remain clear.       NG tube remains in place, unchanged.   Contrast administered through the patient's NG tube into the  stomach earlier this morning has only reached the 3rd portion of the duodenum.   Persistent findings of at least partial mid to distal small bowel obstruction.   MACRO: None   Signed by: Jermaine Mcadams 8/8/2025 2:38 PM Dictation workstation:   [id number]    XR abdomen 2 views supine and erect or decub  Result Date: 8/8/2025  Interpreted By:  Jermaine Mcadams, STUDY: XR ABDOMEN 2 VIEWS SUPINE AND ERECT OR DECUB;  8/8/2025 11:04 am   INDICATION: Signs/Symptoms:Modified GG Study- 1 hour.   COMPARISON: CT scan from 08/06/2025.   ACCESSION NUMBER(S): [id number]   ORDERING CLINICIAN: GUSTAVO CHAN   TECHNIQUE: 1 hour after the administration of Gastrografin contrast material through the patient's NG tube, 2 portable  views of the abdomen and pelvis were obtained. 1 was supine the other was upright.   FINDINGS: There is Gastrografin contrast in the gastric fundus. There is an NG tube in place also in the gastric fundus. There is mild gaseous distention of multiple small bowel loops throughout the abdomen and pelvis. There is also mild-to-moderate stool and mild gas throughout the colon and rectum. No pneumoperitoneum on the upright view. There are small bowel air-fluid levels on that view. There is a colostomy overlying the lateral aspect of the left iliac wing.   Vascular stent material in the mid to distal abdominal aorta down through the common iliac arteries. There are atherosclerotic calcifications in the external and internal iliac arteries and the common femoral arteries.   Slight S-shaped dorsal spine scoliosis. Moderate multilevel lumbar spine disc space narrowing with endplate spurring and there is also vacuum disc phenomenon at several levels. Mild-to-moderate DJD in the distal thoracic spine. Moderate sclerotic arthritic changes in both SI joints. Mild DJD in both hips. No destructive bone lesion.   The extreme lung bases were grossly clear.       Vascular stents in place as described. NG tube is in the  proximal stomach. Mid left pelvic wall colostomy.   Scoliosis and DJD in the dorsal spine as described. DJD in the pelvis as well.   Contrast administered through the patient's NG tube is only in the gastric fundus at 1 hour after administration.   Intestinal gas pattern consistent with at least partial distal small bowel obstruction.   MACRO: None   Signed by: Jermaine Mcadams 8/8/2025 11:18 AM Dictation workstation:   [id number]    XR chest 1 view  Result Date: 8/7/2025  Interpreted By:  Oziel Sunshine, STUDY: XR CHEST 1 VIEW; ;  8/7/2025 4:14 am   INDICATION: Signs/Symptoms:NG tube placement.   COMPARISON: None.   ACCESSION NUMBER(S): [id number]   ORDERING CLINICIAN: ZAY CHENEY   TECHNIQUE: A portable upright radiograph of the chest is performed.   FINDINGS: A nasogastric tube is identified. The distal tube projects beyond the gastroesophageal junction however the distal 4 cm of tube is directed cephalad in the lower esophagus. Repositioning of the tube is recommended.   The heart is mildly enlarged. There is severe tortuosity and dilatation of the thoracic aorta which is similar to the previous study.   There are low lung volumes with linear areas of atelectasis greater on the left. Calcified granulomas identified in the right apex. There is no airspace consolidation, pleural effusion, or pneumothorax. The pulmonary vessels are within normal limits. Bulla formation may be present in the left apex which is unchanged.   The osseous structures are diffusely and severely demineralized.       A nasogastric tube is coiled distally with the tip projecting cranially in the lower esophagus. Repositioning is recommended.   Persistent dilatation and tortuosity of the thoracic aorta. This is described in greater detail on a recent CT examination of 07/31/2025. Please refer to this report.   Linear areas of atelectasis in the left lung. There is no airspace consolidation.     MACRO: None   Signed by: Oziel  Jong 8/7/2025 7:28 AM Dictation workstation:   [id number]    CT abdomen pelvis w IV contrast  Result Date: 8/7/2025  Interpreted By:  Finkelstein, Evan, STUDY: CT ABDOMEN PELVIS W IV CONTRAST;  8/7/2025 12:05 am   INDICATION: Signs/Symptoms:Abdominal pain. vomiting..     COMPARISON: CT abdomen pelvis 11/2/2024   ACCESSION NUMBER(S): [id number]   ORDERING CLINICIAN: ZAY CHENEY   TECHNIQUE: Axial CT images of the abdomen and pelvis with coronal and sagittal reconstructed images obtained after intravenous administration of 75 mL Omnipaque 350   FINDINGS: LOWER CHEST: Cardiomegaly. Small right pleural effusion. Patchy bibasilar airspace opacities.   ABDOMEN:   LIVER: Normal attenuation and contour. BILE DUCTS: Normal caliber. GALLBLADDER: Increased density in the gallbladder favored to represent biliary sludge or stones. No wall thickening or pericholecystic fluid. PORTAL VEIN: Patent SPLEEN: Calcifications scattered throughout the spleen compatible with sequela of prior granulomatous disease. PANCREAS: Unremarkable. ADRENALS: Unremarkable. KIDNEYS, URETERS and URINARY BLADDER: Symmetric renal enhancement. No hydronephrosis or perinephric fluid collection. 0.5 cm hypodensity in the right kidney measures simple fluid attenuation, most compatible with a simple cyst. Bladder is within normal limits REPRODUCTIVE ORGANS: No pelvic masses.   ABDOMINAL WALL: Left abdominal wall colostomy. PERITONEUM: No ascites, free air or fluid collection.   BOWEL: Postsurgical changes of partial colectomy. Left abdominal wall colostomy. Scattered colonic diverticula without definite pericolonic inflammatory stranding. Moderate colonic stool burden. Postsurgical changes at the level of the cecum, possibly related to appendectomy. There are dilated loops of small bowel throughout the abdomen and pelvis with decompressed loops of distal small bowel. Suspected transition point in the mid abdomen.   VESSELS: Aorto bi-iliac stent  graft repair. A femoral to femoral bypass graft is visualized and appears patent. RETROPERITONEUM: No pathologically enlarged retroperitoneal lymph nodes.   BONES: No acute osseous abnormality. Degenerative changes throughout the spine.       Dilated loops of small bowel throughout the abdomen and pelvis with decompressed loops of distal small bowel and a suspected transition point in the mid abdomen compatible with small bowel obstruction.   Postsurgical changes of partial colectomy with a left abdominal wall colostomy, moderate colonic stool burden and scattered colonic diverticulosis. No CT evidence of acute diverticulitis.   Cholelithiasis without CT evidence of acute cholecystitis.   Cardiomegaly, small right pleural effusion and patchy bibasilar airspace opacities which may represent atelectasis with a developing infection not excluded in the appropriate clinical setting.     MACRO: None.   Signed by: Evan Finkelstein 8/7/2025 12:53 AM Dictation workstation:   [id number]    ECG 12 lead  Result Date: 8/2/2025   Poor data quality, interpretation may be adversely affected Normal sinus rhythm Septal infarct , age undetermined Lateral infarct , age undetermined Abnormal ECG When compared with ECG of 07-NOV-2024 14:00, Incomplete left bundle branch block is no longer Present Septal infarct is now Present Lateral infarct is now Present See ED provider note for full interpretation and clinical correlation Confirmed by Concepción Meza ([id number]) on 8/2/2025 9:08:22 AM    CT angio chest for pulmonary embolism  Result Date: 7/31/2025  Interpreted By:  Jovanny Cifuentes, STUDY: CT ANGIO CHEST FOR PULMONARY EMBOLISM;  7/31/2025 3:22 pm   INDICATION: Signs/Symptoms:chest pain, worse with deep inspiration.     COMPARISON: September 8, 2022 chest CT, November 2, 2024 CTA chest abdomen and pelvis   ACCESSION NUMBER(S): [id number]   ORDERING CLINICIAN: JAMAR IBRAHIM   TECHNIQUE: Helical data acquisition of the chest was obtained  after intravenous administration of 75 ML Omnipaque 350, as per PE protocol. Images were reformatted in coronal and sagittal planes. Axial and coronal maximum intensity projection (MIP) images were created and reviewed. In addition, dual energy reconstructions were also performed including low energy virtual mono-energetic images and iodine density maps.   FINDINGS: POTENTIAL LIMITATIONS OF THE STUDY: Likely technically adequate although image degradation related to cardiac pulsation, and streak artifact from radiopaque structures including concentrated radiographic dye.   HEART AND VESSELS: Allowing for presumed artifact There are no discrete filling defects within main pulmonary artery and its branches to suggest acute pulmonary embolism. Main pulmonary artery is dilated measuring 4.2 cm (Series 304, Image 205).   Similar dilation and tortuosity of the thoracic aorta. The aortic root is estimated at 5.0 cm (Series 603, Image 79) compared with 5.1 cm (Series 603, Image 74) November 2, 2024.Extensive atherosclerosis involving the thoracic aorta and branch vessels.Findings include marked calcific plaque at the origin of the right subclavian artery favoring high-grade stenosis. Mild coronary artery calcifications are seen. Please note,the study is not optimized for evaluation of coronary arteries.   Similar appearance of the cardiac chambers.   Similar trace pericardial fluid.   MEDIASTINUM AND NIKIA, LOWER NECK AND AXILLA: The visualized thyroid gland is within normal limits. No evidence of thoracic lymphadenopathy by CT criteria. Esophagus appears within normal limits as seen.   LUNGS AND AIRWAYS: Compared with November 2, 2024 interval resolution of small left-sided pleural effusion and significant improvement of right-sided effusion with tiny remaining effusion inferiorly with residual adjacent dependent atelectasis. There is apical predominant emphysema and scattered subsegmental atelectasis or scar.     UPPER  ABDOMEN: The visualized subdiaphragmatic structures demonstrate no acute significant findings. Similar scattered benign postinflammatory calcifications. Similar nodular thickening left adrenal gland. There is scattered colonic diverticulosis.       CHEST WALL AND OSSEOUS STRUCTURES: Chest wall is within normal limits. No acute osseous pathology.There are no suspicious osseous lesions.Scattered degenerative changes. There is pronounced right shoulder arthrosis with chronic effusion. Chronic body wall edema. The abdominal portion appears improved.       1. No evidence of acute pulmonary embolism. 2. Improved expansion and aeration with resolution of left-sided effusion with marked improvement of right-sided effusion with residual adjacent atelectasis. 3. Similar dilation of the thoracic aorta. The aortic root is estimated at 5 cm. 4. Similar enlargement main pulmonary artery estimated 4.1 cm. Correlate for clinical evidence of pulmonary arterial hypertension. 5. Extensive atherosclerosis including similar marked calcific plaque at the origin of the right subclavian artery favoring high-grade subclavian artery stenosis although limited assessment. Correlate with symptomatology. 6. Chronic body wall edema. The abdominal portion appears improved. 7. Additional similar chronic findings as reported.   MACRO: None   Signed by: Jovanny Cifuentes 7/31/2025 4:16 PM Dictation workstation:   [id number]    XR chest 1 view  Result Date: 7/31/2025  Interpreted By:  Larry Desai, STUDY: XR CHEST 1 VIEW  7/31/2025 1:24 pm   INDICATION: Signs/Symptoms:chest pain   COMPARISON: 11/08/2024   ACCESSION NUMBER(S): [id number]   ORDERING CLINICIAN: JAMAR IBRAHIM   TECHNIQUE: A single AP portable radiograph of the chest was obtained.   FINDINGS: Multiple cardiac monitoring leads are seen over the chest.   No focal infiltrate, pleural effusion or pneumothorax is identified. The cardiac silhouette is within normal limits for size.       No  focal infiltrate or pneumothorax is identified.   MACRO: None.   Signed by: Larry Desai 7/31/2025 2:34 PM Dictation workstation:   [id number]        Assessment & Plan  Intestinal obstruction (Multi)    Hypothyroid    Adjustment reaction with anxiety and depression    Essential hypertension    Stage 3 chronic kidney disease (Multi)    Anemia    Anticoagulant long-term use    Peripheral vascular disease    Pt presenting to ED on 8/6 w abd pain, constipation, and n/v. Prior hx of SBO in January. Current SBO resolving. Doing well.   - abd soft, mildly distended, nontender; +BS x4; NG clamped; stoma  well-pouched, ostomy OP 100mL/24h, ~half pouch filled w soft brown stool and lots of gas   - VSS, afebrile  - labs: K 3.4; GFR 55, Cr 1.03; WBC 8.7; H/H 7.2/26.4 8/10 --> 7.8/27.0 8/11    Plan:  - Diet: CLD  - Okay to advance to FLD; continue to ADAT   - Remove NG today   - DVT prophylaxis: SCDs/lovenox   - Encourage IS, encourage ambulation  - Continue current pain control regimen, pain well controlled  - PRN antiemetic   - Continue stoma care, monitor ostomy OP   - Wean supplemental O2 as tolerated       Dispo: Discussed plan with Dr. Torres.     I spent 35 minutes in the professional and overall care of this patient.       Clary Barger PA-C         [1] [Held by provider] amiodarone, 200 mg, oral, q AM  atorvastatin, 10 mg, oral, Nightly  enoxaparin, 40 mg, subcutaneous, q24h  escitalopram, 20 mg, oral, Daily  levothyroxine, 100 mcg, oral, Once per day on Monday Tuesday Wednesday Thursday Friday Saturday  metoprolol, 5 mg, intravenous, q6h  pantoprazole, 40 mg, oral, Daily before breakfast   Or  pantoprazole, 40 mg, intravenous, Daily before breakfast  [2]    [3] PRN medications: acetaminophen **OR** acetaminophen **OR** acetaminophen, hydrALAZINE, morphine, ondansetron **OR** ondansetron, phenoL

## 2025-08-11 NOTE — PROGRESS NOTES
08/11/25 1514   Discharge Planning   Who is requesting discharge planning? Provider   Home or Post Acute Services Post acute facilities (Rehab/SNF/etc)   Type of Post Acute Facility Services Long term care   Expected Discharge Disposition Inter   Does the patient need discharge transport arranged? Yes   [facility] coordination needed? Yes   Has discharge transport been arranged? No   Intensity of Service   Intensity of Service 0-30 min     8/11/25 1515  Cardio consulted and rec remaining off amiodarone at this time.  SBO resolving.  Waiting on RBF.  Will return to [facility] at discharge.  Maria Elena Chang RN T[facility]

## 2025-08-11 NOTE — CARE PLAN
The patient's goals for the shift include      The clinical goals for the shift include Maintain safety and control pain

## 2025-08-11 NOTE — PROGRESS NOTES
Anny Tesfaye is a 80 y.o. female on day 4 of admission presenting with Intestinal obstruction (Multi).      Subjective   Pt resting in bed  Feels ok  Just had NG removed   Feels well         Objective     Last Recorded Vitals  /71   Pulse 59   Temp 36.7 °C (98 °F) (Temporal)   Resp 17   Wt 68 kg (149 lb 14.6 oz)   SpO2 97%   Intake/Output last 3 Shifts:    Intake/Output Summary (Last 24 hours) at 8/11/2025 1210  Last data filed at 8/11/2025 0905  Gross per 24 hour   Intake 1675 ml   Output 0 ml   Net 1675 ml       Admission Weight  Weight: 68 kg (149 lb 14.6 oz) (08/06/25 2235)    Daily Weight  08/06/25 : 68 kg (149 lb 14.6 oz)    Image Results  Electrocardiogram, 12-lead PRN ACS symptoms  Undetermined rhythm  Left axis deviation  Left ventricular hypertrophy with QRS widening  T wave abnormality, consider anterior ischemia  Abnormal ECG  When compared with ECG of 10-AUG-2025 12:46, (unconfirmed)  Current undetermined rhythm precludes rhythm comparison, needs review  QT has shortened    Medications Ordered Prior to Encounter[1]    Results for orders placed or performed during the hospital encounter of 08/06/25 (from the past 24 hours)   Basic metabolic panel   Result Value Ref Range    Glucose 88 74 - 99 mg/dL    Sodium 136 136 - 145 mmol/L    Potassium 3.4 (L) 3.5 - 5.3 mmol/L    Chloride 102 98 - 107 mmol/L    Bicarbonate 30 21 - 32 mmol/L    Anion Gap 7 (L) 10 - 20 mmol/L    Urea Nitrogen 16 6 - 23 mg/dL    Creatinine 1.03 0.50 - 1.05 mg/dL    eGFR 55 (L) >60 mL/min/1.73m*2    Calcium 8.2 (L) 8.6 - 10.3 mg/dL   CBC   Result Value Ref Range    WBC 8.7 4.4 - 11.3 x10*3/uL    nRBC 0.0 0.0 - 0.0 /100 WBCs    RBC 3.00 (L) 4.00 - 5.20 x10*6/uL    Hemoglobin 7.8 (L) 12.0 - 16.0 g/dL    Hematocrit 27.0 (L) 36.0 - 46.0 %    MCV 90 80 - 100 fL    MCH 26.0 26.0 - 34.0 pg    MCHC 28.9 (L) 32.0 - 36.0 g/dL    RDW 17.0 (H) 11.5 - 14.5 %    Platelets 409 150 - 450 x10*3/uL   Electrocardiogram, 12-lead PRN  ACS symptoms   Result Value Ref Range    Ventricular Rate 53 BPM    Atrial Rate 54 BPM    UT Interval 200 ms    QRS Duration 126 ms    QT Interval 548 ms    QTC Calculation(Bazett) 514 ms    P Axis -6 degrees    R Axis -32 degrees    T Axis 23 degrees    QRS Count 9 beats    Q Onset 215 ms    P Onset 115 ms    P Offset 183 ms    T Offset 489 ms    QTC Fredericia 526 ms       Physical Exam    Well developed well nurished  No distress  Ao 3   Neck no jvd no bruit  Chest clear  CVS regular  Ext no edema  Abdo soft nontender bs normal  no masses, colostomy does have output  Cns alert appropriate able to move ext, gait not tested  Skin intact  Psych normal affect  Relevant Results           Medications Ordered Prior to Encounter[2]  Results for orders placed or performed during the hospital encounter of 08/06/25 (from the past 24 hours)   Basic metabolic panel   Result Value Ref Range    Glucose 88 74 - 99 mg/dL    Sodium 136 136 - 145 mmol/L    Potassium 3.4 (L) 3.5 - 5.3 mmol/L    Chloride 102 98 - 107 mmol/L    Bicarbonate 30 21 - 32 mmol/L    Anion Gap 7 (L) 10 - 20 mmol/L    Urea Nitrogen 16 6 - 23 mg/dL    Creatinine 1.03 0.50 - 1.05 mg/dL    eGFR 55 (L) >60 mL/min/1.73m*2    Calcium 8.2 (L) 8.6 - 10.3 mg/dL   CBC   Result Value Ref Range    WBC 8.7 4.4 - 11.3 x10*3/uL    nRBC 0.0 0.0 - 0.0 /100 WBCs    RBC 3.00 (L) 4.00 - 5.20 x10*6/uL    Hemoglobin 7.8 (L) 12.0 - 16.0 g/dL    Hematocrit 27.0 (L) 36.0 - 46.0 %    MCV 90 80 - 100 fL    MCH 26.0 26.0 - 34.0 pg    MCHC 28.9 (L) 32.0 - 36.0 g/dL    RDW 17.0 (H) 11.5 - 14.5 %    Platelets 409 150 - 450 x10*3/uL   Electrocardiogram, 12-lead PRN ACS symptoms   Result Value Ref Range    Ventricular Rate 53 BPM    Atrial Rate 54 BPM    UT Interval 200 ms    QRS Duration 126 ms    QT Interval 548 ms    QTC Calculation(Bazett) 514 ms    P Axis -6 degrees    R Axis -32 degrees    T Axis 23 degrees    QRS Count 9 beats    Q Onset 215 ms    P Onset 115 ms    P Offset 183 ms     T Offset 489 ms    QTC Fredericia 526 ms                      Assessment & Plan  Intestinal obstruction (Multi)    Hypothyroid    Adjustment reaction with anxiety and depression    Essential hypertension    Stage 3 chronic kidney disease (Multi)    Anemia    Anticoagulant long-term use    Peripheral vascular disease    Atrial fibrillation     Resume anticoagulation once ok w surgery   Consulted cardiology as qtc is elevated, pt is off of amiodarone due to input from cards noted  Cont to monitor qtc  Rate control per cards     Surgery following  Input noted  NG removed  Cont to monitor         -Continue current treatment as ordered. Will make adjustments as necessary.    Plan of care discussed with: Provider, RN, Patient        Patient case and plan of care discussed with Dr. GHISLAINE Rodriguez.    Aakash Brice, YVONNE - CNP  -In collaboration with Dr. GHISLAINE Rodriguez    [facility]  Office: [phone]  Fax: [phone]  I have reviewed the above note obtained and documented by the NP/PA and I personally participated in the key components. I have discussed the case and management of the patient's care. Changes made to the note, and all key components of history and physical/progress note done by me.  dw nursing  Rajendra Rodriguez MD                   [1]   No current facility-administered medications on file prior to encounter.     Current Outpatient Medications on File Prior to Encounter   Medication Sig Dispense Refill    cephalexin (Keflex) 500 mg capsule Take 1 capsule (500 mg) by mouth 2 times a day.      acetaminophen (Tylenol 8 HOUR) 650 mg ER tablet Take 1 tablet (650 mg) by mouth every 8 hours if needed.      acetaminophen (Tylenol) 650 mg suppository Insert 1 suppository (650 mg) into the rectum every 8 hours if needed for mild pain (1 - 3).      alendronate (Fosamax) 70 mg tablet Take 1 tablet (70 mg) by mouth every 7 days. Take on an empty stomach. Do not lie down or eat for 1/2 hour  after taking. (Patient not taking: Reported on 2025) 12 tablet 3    amiodarone (Pacerone) 200 mg tablet Take 1 tablet (200 mg) by mouth once daily in the morning.      amLODIPine (Norvasc) 5 mg tablet Take 1 tablet (5 mg) by mouth once daily. In the morning      apixaban (Eliquis) 2.5 mg tablet Take 1 tablet (2.5 mg) by mouth 2 times a day.      ascorbic acid (Vitamin C) 500 mg tablet Take 1 tablet (500 mg) by mouth once daily.      aspirin 81 mg EC tablet Take 1 tablet (81 mg) by mouth once daily.      atorvastatin (Lipitor) 10 mg tablet Take 1 tablet (10 mg) by mouth once daily at bedtime.      bisacodyl (Dulcolax, bisacodyl,) 10 mg suppository Insert 1 suppository (10 mg) into the rectum once daily as needed for constipation.      calcium carbonate 500 mg calcium (1,250 mg) chewable tablet Chew 1 tablet (1,250 mg) once daily.      [] cephalexin (Keflex) 500 mg capsule Take 1 capsule (500 mg) by mouth 2 times a day for 5 days. 10 capsule 0    cyanocobalamin, vitamin B-12, 5,000 mcg capsule Take 5,000 mcg by mouth once daily.      DAILY MULTI-VITAMIN ORAL Take 1 tablet by mouth once daily.      dimethicone (Cavilon Durable Barrier) 1.3 % cream Apply topically. Apply to lucio area topically every day and night shift for prevention. Apply to buttocks every shift as needed after each episode of incontinence.      docusate sodium (Colace) 100 mg capsule Take 1 capsule (100 mg) by mouth 2 times a day.      escitalopram (Lexapro) 20 mg tablet Take 1 tablet (20 mg) by mouth once daily. 90 tablet 1    ferrous sulfate, 325 mg ferrous sulfate, tablet Take 1 tablet (325 mg) by mouth once daily with breakfast. (Patient not taking: Reported on 2025)      furosemide (Lasix) 40 mg tablet Take 1 tablet (40 mg) by mouth once daily. (Patient not taking: Reported on 2025)      levomefolate calcium (L-METHYLFOLATE ORAL) Take 1,000 mcg by mouth once daily in the morning. (Patient not taking: Reported on 2025)       levothyroxine (Synthroid, Levoxyl) 100 mcg tablet TAKE 1 TABLET (100 MCG) BY MOUTH ONCE DAILY. SIX DAYS PER WEEK 90 tablet 0    lutein 6 mg capsule Take 1 capsule by mouth once daily.      magnesium hydroxide (Milk of Magnesia) 400 mg/5 mL suspension Take 30 mL by mouth once daily as needed for constipation (if no BM in 72 hours).      menthol-zinc oxide (Calmoseptine) 0.44-20.6 % ointment Apply 1 Application topically 2 times a day. (Patient not taking: Reported on 7/31/2025)      metoprolol succinate XL (Toprol-XL) 25 mg 24 hr tablet Take 1 tablet (25 mg) by mouth once daily. Do not crush or chew. (Patient not taking: Reported on 7/31/2025)      mirtazapine (Remeron) 15 mg tablet Take 1 tablet (15 mg) by mouth once daily at bedtime. (Patient not taking: Reported on 7/31/2025)      ondansetron (Zofran) 4 mg tablet Take 1 tablet (4 mg) by mouth every 6 hours if needed for nausea or vomiting.      oxybutynin XL (Ditropan-XL) 10 mg 24 hr tablet Take 1 tablet (10 mg) by mouth once daily.      oxyCODONE (Roxicodone) 5 mg immediate release tablet Take 1 tablet (5 mg) by mouth every 6 hours if needed for severe pain (7 - 10).      pantoprazole (ProtoNix) 20 mg EC tablet Take 2 tablets (40 mg) by mouth once daily. Do not crush, chew, or split.      polyethylene glycol (Glycolax, Miralax) 17 gram packet Take 17 g by mouth 2 times a day as needed (for firm or low ostomy output).      sacubitriL-valsartan (Entresto) 24-26 mg tablet Take 1 tablet by mouth 2 times a day. (Patient not taking: Reported on 7/31/2025)      sennosides (Senokot) 8.6 mg tablet Take 1 tablet (8.6 mg) by mouth 2 times a day.      sodium phosphates (Fleets) 19-7 gram/118 mL enema enema Insert 133 mL (1 enema) into the rectum once daily as needed for constipation.      thyroid, pork, ([place] Thyroid) 30 mg tablet TAKE 1 TABLET (30 MG) BY MOUTH ONCE DAILY IN THE MORNING. TAKE BEFORE MEALS. (Patient not taking: Reported on 8/8/2025) 30 tablet 0     tocilizumab (Actemra) 200 mg/10 mL (20 mg/mL) solution Infuse into a venous catheter every 28 (twenty-eight) days. as directed (Patient not taking: Reported on 2025)      [DISCONTINUED] nicotine (Nicoderm CQ) 21 mg/24 hr patch Place 1 patch on the skin once every 24 hours. (Patient not taking: Reported on 2025)     [2]   No current facility-administered medications on file prior to encounter.     Current Outpatient Medications on File Prior to Encounter   Medication Sig Dispense Refill    cephalexin (Keflex) 500 mg capsule Take 1 capsule (500 mg) by mouth 2 times a day.      acetaminophen (Tylenol 8 HOUR) 650 mg ER tablet Take 1 tablet (650 mg) by mouth every 8 hours if needed.      acetaminophen (Tylenol) 650 mg suppository Insert 1 suppository (650 mg) into the rectum every 8 hours if needed for mild pain (1 - 3).      alendronate (Fosamax) 70 mg tablet Take 1 tablet (70 mg) by mouth every 7 days. Take on an empty stomach. Do not lie down or eat for 1/2 hour after taking. (Patient not taking: Reported on 2025) 12 tablet 3    amiodarone (Pacerone) 200 mg tablet Take 1 tablet (200 mg) by mouth once daily in the morning.      amLODIPine (Norvasc) 5 mg tablet Take 1 tablet (5 mg) by mouth once daily. In the morning      apixaban (Eliquis) 2.5 mg tablet Take 1 tablet (2.5 mg) by mouth 2 times a day.      ascorbic acid (Vitamin C) 500 mg tablet Take 1 tablet (500 mg) by mouth once daily.      aspirin 81 mg EC tablet Take 1 tablet (81 mg) by mouth once daily.      atorvastatin (Lipitor) 10 mg tablet Take 1 tablet (10 mg) by mouth once daily at bedtime.      bisacodyl (Dulcolax, bisacodyl,) 10 mg suppository Insert 1 suppository (10 mg) into the rectum once daily as needed for constipation.      calcium carbonate 500 mg calcium (1,250 mg) chewable tablet Chew 1 tablet (1,250 mg) once daily.      [] cephalexin (Keflex) 500 mg capsule Take 1 capsule (500 mg) by mouth 2 times a day for 5 days. 10  capsule 0    cyanocobalamin, vitamin B-12, 5,000 mcg capsule Take 5,000 mcg by mouth once daily.      DAILY MULTI-VITAMIN ORAL Take 1 tablet by mouth once daily.      dimethicone (Cavilon Durable Barrier) 1.3 % cream Apply topically. Apply to lucio area topically every day and night shift for prevention. Apply to buttocks every shift as needed after each episode of incontinence.      docusate sodium (Colace) 100 mg capsule Take 1 capsule (100 mg) by mouth 2 times a day.      escitalopram (Lexapro) 20 mg tablet Take 1 tablet (20 mg) by mouth once daily. 90 tablet 1    ferrous sulfate, 325 mg ferrous sulfate, tablet Take 1 tablet (325 mg) by mouth once daily with breakfast. (Patient not taking: Reported on 7/31/2025)      furosemide (Lasix) 40 mg tablet Take 1 tablet (40 mg) by mouth once daily. (Patient not taking: Reported on 7/31/2025)      levomefolate calcium (L-METHYLFOLATE ORAL) Take 1,000 mcg by mouth once daily in the morning. (Patient not taking: Reported on 7/31/2025)      levothyroxine (Synthroid, Levoxyl) 100 mcg tablet TAKE 1 TABLET (100 MCG) BY MOUTH ONCE DAILY. SIX DAYS PER WEEK 90 tablet 0    lutein 6 mg capsule Take 1 capsule by mouth once daily.      magnesium hydroxide (Milk of Magnesia) 400 mg/5 mL suspension Take 30 mL by mouth once daily as needed for constipation (if no BM in 72 hours).      menthol-zinc oxide (Calmoseptine) 0.44-20.6 % ointment Apply 1 Application topically 2 times a day. (Patient not taking: Reported on 7/31/2025)      metoprolol succinate XL (Toprol-XL) 25 mg 24 hr tablet Take 1 tablet (25 mg) by mouth once daily. Do not crush or chew. (Patient not taking: Reported on 7/31/2025)      mirtazapine (Remeron) 15 mg tablet Take 1 tablet (15 mg) by mouth once daily at bedtime. (Patient not taking: Reported on 7/31/2025)      ondansetron (Zofran) 4 mg tablet Take 1 tablet (4 mg) by mouth every 6 hours if needed for nausea or vomiting.      oxybutynin XL (Ditropan-XL) 10 mg 24 hr  tablet Take 1 tablet (10 mg) by mouth once daily.      oxyCODONE (Roxicodone) 5 mg immediate release tablet Take 1 tablet (5 mg) by mouth every 6 hours if needed for severe pain (7 - 10).      pantoprazole (ProtoNix) 20 mg EC tablet Take 2 tablets (40 mg) by mouth once daily. Do not crush, chew, or split.      polyethylene glycol (Glycolax, Miralax) 17 gram packet Take 17 g by mouth 2 times a day as needed (for firm or low ostomy output).      sacubitriL-valsartan (Entresto) 24-26 mg tablet Take 1 tablet by mouth 2 times a day. (Patient not taking: Reported on 7/31/2025)      sennosides (Senokot) 8.6 mg tablet Take 1 tablet (8.6 mg) by mouth 2 times a day.      sodium phosphates (Fleets) 19-7 gram/118 mL enema enema Insert 133 mL (1 enema) into the rectum once daily as needed for constipation.      thyroid, pork, ([place] Thyroid) 30 mg tablet TAKE 1 TABLET (30 MG) BY MOUTH ONCE DAILY IN THE MORNING. TAKE BEFORE MEALS. (Patient not taking: Reported on 8/8/2025) 30 tablet 0    tocilizumab (Actemra) 200 mg/10 mL (20 mg/mL) solution Infuse into a venous catheter every 28 (twenty-eight) days. as directed (Patient not taking: Reported on 7/31/2025)      [DISCONTINUED] nicotine (Nicoderm CQ) 21 mg/24 hr patch Place 1 patch on the skin once every 24 hours. (Patient not taking: Reported on 7/31/2025)

## 2025-08-11 NOTE — CONSULTS
"Inpatient consult to Cardiology  Consult performed by: Anny Jefferson, APRN-CNP  Consult ordered by: Rajendra Rodriguez MD  Reason for consult: pt takes amiodarone at home, admitted for bowel obstruciton, improving, however her qtc interval is elevated, please eval for resuing amiodarone        History Of Present Illness:    Anny Tesfaye is a 80 y.o. female with PMHx significant for Afib on Amiodarone and Eliquis (dx 10/2024 post EVAR) s/p UMESH/DCCV 2024, HFrEF  HTN, HLD, heavy tobacco use, chronic hypoxic resp failure on 3L oxygen, severe PAD, AAA s/p EVAR 10/10/24 at [facility] and L-R fem-fem bypass c/b ischemic colitis s/p ex-lap, descending and proximal sigmoid colectomy with end colostomy and long marixa stump, SBO 2025, lumbar stenosis, hypothyroidism, CKD III, polymyalgia rheumatica, and OA  who presented to [facility]  from [facility] ([facility]) with c/o abdominal pain, n/v and admitted with SBO. Cardiology consulted for \"pt takes amiodarone at home, admitted for bowel obstruciton, improving, however her qtc interval is elevated, please eval for resuming amiodarone.\"     Patient is currently being followed by General Surgery for SBO; resolving with conservative mgmt. Currently NG that is clamped and she is tolerating clears. Nausea, abdominal pain improved.   She denies c/o cp. She has stable chronic MONGE and is on 3L continuously. No orthopnea or BLE edema. She denies c/o LH/dizziness/syncope/palpitations.     Tele: SB/SR    Outpatient cardiac medications: amiodarone 200 mg daily, amlodipine 5 mg daily, eliquis 2.5 mg bid, asa 81 mg daily, atorvastatin 10 mg daily,     Social history: Former smoker. No alcohol abuse, or illicit drug use.           Past Cardiology Tests (Last 3 Years):  EK/11/25: SR, LAD, LVH, TWI v1-v5, QT/Qtc 548/514 msec                8/10/25:   SR, LVH, LAD, TWI v1-v5, QT/Qtc 546/572 msec          Echo:  Results for orders placed during the hospital encounter of " 11/02/24    Transesophageal Echo (UMESH) w/ Possible Cardioversion    Interpretation Summary  [facility], [facility] [address]  Tel [phone] and Fax [phone]    TRANSESOPHAGEAL ECHOCARDIOGRAM REPORT    WITH DIRECT CURRENT CARDIOVERSION    Patient Name:       PHAM OWENS   Xiang Physician:   [id number] Rashad Ramirez DO  Study Date:         11/7/2024           Ordering Provider:   [id number] STEPHEN RUSSELL  MRN/PID:            [id number]            Fellow:  Accession#:         [id number]        Nurse:               Neil Fowler RN  Date of Birth/Age:  1945 / 79      Sonographer:         Souleymane De Leon RDCS  years  Gender assigned at  F                   Additional Staff:    Jordan WEATHERS  Birth:  Height:             165.10 cm           Admit Date:          11/2/2024  Weight:             73.94 kg            Admission Status:    Inpatient - Routine  BSA / BMI:          1.81 m2 / 27.12     Encounter#:          [id number]  kg/m2  Blood Pressure:     106/67 mmHg         Department Location: [facility] Cath Lab    Study Type:    TRANSESOPHAGEAL ECHO (UMESH) W/ POSSIBLE CARDIOVERSION  Diagnosis/ICD: Unspecified atrial fibrillation-I48.91; Shortness of  breath-R06.02; Other persistent AFib-I48.19  Indication:    Atrial Fibrillation, SOB  CPT Code:      UMESH Complete-56073; Doppler Limited-88508; Color Doppler-30691    Patient History:  Drug Allergies:    None  Smoker:            Current.  Diabetes:          No  Pertinent History: HTN, Hyperlipidemia, CHF and Murmur. 79 y.o. female presents  for UMESH/CVN for persistent A-fib.    Study Detail: The following Echo studies were performed: 2D, Doppler and 3D.  Patient's heart rhythm is atrial fibrillation. A bubble study was  not performed. The patient was sedated.      PHYSICIAN INTERPRETATION:  UMESH Details: The UMESH probe used was 5177. Technically adequate omniplane transesophageal echocardiogram performed. Color flow Doppler echo  was performed to assess for the presence of a patent foramen ovale.  UMESH Medication: The pharynx was anesthetized with Cetacaine spray and viscous lidocaine. The patient was sedated by Anesthesia; please refer to anesthesia flow sheet for medications used.  UMESH Procedure: The probe was passed without difficulty. Complications encountered during procedure: Patient tolerated the procedure well without any apparent complications.  UMESH Cardioversion Procedure:  The patient was placed in a supine position and anterior-posterior  defibrillation patches were applied. A biphasic Zoll defibrillator was attached  to the patient and set to synchronous mode.  One synchronized biphasic external shock was delivered at 200 Joules in attempt  to cardiovert the patient from atrial fibrillation. The procedure was  successful, resulting in sinus bradycardia.  The patient recovered uneventfully from the effects on conscious and deep  sedation. The procedure was well tolerated. There were no complications. The  patient was discharged from the Cardiac Cath Lab hemodynamically stable and with  no neurological deficits.    Left Ventricle: Left ventricular ejection fraction is moderately decreased, by visual estimate at 30-35%. There are multiple left ventricular wall motion abnormalities. The left ventricular cavity size was not assessed. Left ventricular diastolic filling was not assessed.  Left Atrium: The left atrium is enlarged. A bubble study using agitated saline was not performed. The left atrial appendage Doppler velocities are normal and there is no thrombus visualized in the left atrial appendage.  Right Ventricle: The right ventricle was not well visualized. Unable to determine right ventricular systolic function.  Right Atrium: The right atrium was not well visualized.  Aortic Valve: The aortic valve is structurally normal. There is trace aortic valve regurgitation.  Mitral Valve: The mitral valve is normal in structure. There  is trace mitral valve regurgitation.  Tricuspid Valve: The tricuspid valve is structurally normal. No evidence of tricuspid regurgitation.  Pulmonic Valve: The pulmonic valve is structurally normal. There is no indication of pulmonic valve regurgitation.  Pericardium: Pericardial effusion was not assessed.  Aorta: The aortic root is abnormal. Aortic root enlarged at 4.5 cm. Ascending aorta 4.3 cm.      CONCLUSIONS:  1. Left ventricular ejection fraction is moderately decreased, by visual estimate at 30-35%.  2. There are multiple left ventricular wall motion abnormalities.  3. Successful cardioversion for atrial fibrillation resulting in sinus bradycardia.  4. Unable to determine right ventricular systolic function.  5. The left atrium is enlarged.  6. Aortic root enlarged at 4.5 cm. Ascending aorta 4.3 cm.  7. No LA or ANGEL thrombus.    QUANTITATIVE DATA SUMMARY:    LV SYSTOLIC FUNCTION BY 2D PLANIMETRY (MOD):  Normal Ranges:  EF-Visual:      33 %  LV EF Reported: 33 %      [id number] Rashad Ramirez DO  Electronically signed on 11/7/2024 at 3:56:32 PM        ** Final **    Echocardiogram 11/4/24:  CONCLUSIONS:   1. Left ventricular ejection fraction is moderately decreased, by visual estimate at 30-35%.   2. There is global hypokinesis of the left ventricle with minor regional variations.   3. Left ventricular diastolic filling cannot be determined, due to atrial fibrillation/flutter.   4. There is moderately reduced right ventricular systolic function.   5. Moderately enlarged right ventricle.   6. The left atrium is severely dilated.   7. There is moderate mitral annular calcification.   8. Mildly elevated right ventricular systolic pressure.   9. Mild aortic valve regurgitation.  10. Aortic root moderatly enlarged at 4.8 cm. Ascending aorta 4.4 cm.  11. The patient is in atrial fibrillation which may influence the estimate of left ventricular function and transvalvular flows.      Transthoracic Echocardiogram 9/2024  [facility]  - The left ventricle is normal in size. There is mild septal left ventricular   hypertrophy. Left ventricular systolic function is normal. EF = 59 ± 5% (2D biplane) Left ventricular diastolic function was not evaluated due to >2+ AI.   - The right ventricle is normal in size. Right ventricular systolic function is normal.   - The left atrial cavity is severely dilated.   - The visualized aorta is dilated with a maximal dimension of 4.6 cm.   - There is moderate (2+ - 3+) aortic valve regurgitation         Ejection Fractions:  LV EF   Date/Time Value Ref Range Status   11/07/2024 02:29 PM 33 %    11/04/2024 12:05 PM 33 %      Cath:  Results for orders placed during the hospital encounter of 11/02/24    Cardiac Catheterization Procedure    Narrative  [facility], Cath Lab, [address] [place] [address]    Cardiovascular Catheterization Report    Patient Name:     PHAM MABEL OWENS   Performing Physician:  [id number] Syed MD  Study Date:       11/6/2024           Verifying Physician:   [id number] Syed MD  MRN/PID:          [id number]            Cardiologist/Co-Scrub:  Accession#:       [id number]        Ordering Provider:     [id number] STEPHEN RUSSELL  Date of           1945 / 79      Cardiologist:  Birth/Age:        years  Gender:           F                   Fellow:  Encounter#:       [id number]          Surgeon:      Study: Right Heart Cath      Indications:  Heart failure.    Procedure Description:  After infiltration of local anesthetic, the left femoral vein was identified with two-dimensional ultrasound. Under direct ultrasound visualization, the left femoral vein was cannulated with a micropuncture technique. A 5 Gambian sheath was placed in the vein. Cardiac output was calculated via the Dianna method. Post-procedure, the venous sheath was pulled and pressure was applied to the site.    Right Heart Catheterization:  Cardiac output was calculated via the Dianna method. RHC:  Elevated right_ [RA 8, RVEDP is 8 mmHg] and normal left- [PCWP 9 mmHg] sided filling pressures, elevated PA pressures at 38/25 [mean PA 31 mmHg], and normal assumed Dianna cardiac output 4.7 L/min and cardiac index of 2.6 L/min/mï¿½.    Complications:  No in-lab complications observed.    Cardiac Cath Post Procedure Notes:  Post Procedure Diagnosis: See beow.  Blood Loss:               Estimated blood loss during the procedure was 5 mls.  Specimens Removed:        Number of specimen(s) removed: none.    ____________________________________________________________________________________  CONCLUSIONS:  1. RHC: Elevated right_ [RA 8, RVEDP is 8 mmHg] and normal left- [PCWP 9 mmHg] sided filling pressures, elevated PA pressures at 38/25 [mean PA 31 mmHg], and normal assumed Dianna cardiac output 4.7 L/min and cardiac index of 2.6 L/min/mï¿½.  dyne ï¿½ sec/cm5.  2. Further management as per inpatient cardiology consult team.    ICD 10 Codes:  Other forms of dyspnea-R06.09    CPT Codes:  Right Heart Cath O2/Cardiac output without biopsy (RHC)-90098; Ultrasound guidance for vascular access-26277    [id number] Samra Syed MD  Performing Physician  Electronically signed by [id number] Samra Syed MD on 11/6/2024 at 12:50:43 PM          ** Final **      [facility] 8/26/24:  LEFT ANTERIOR DESCENDING: No Stenosis     DIAGONAL #1: No Stenosis   DIAGONAL #2: No Stenosis   DIAGONAL #3: No Stenosis     LEFT CIRCUMFLEX: No Stenosis     MARGINAL #1: No Stenosis   MARGINAL #2: No Stenosis     DOMINANT: NO  RIGHT CORONARY: No Stenosis     DOMINANT: YES  POSTERIOR DESCENDING: No Stenosis   POSTERIOR LATERAL: No Stenosis          Stress Test:  Nuclear Stress Test 8/12/2024  1. SPECT Perfusion Study: Abnormal.   2. No evidence of scarred myocardium.   3. There is mild (<10%) ischemia in the territory of the RCA.   4. Left ventricle is normal in size.   5. This is an intermediate risk scan.     Gated Stress FBP   LVEF % 60     Cardiac Imaging:  No  results found for this or any previous visit.      Past Medical History:  She has a past medical history of Aortic aneurysm, Chronic low back pain, CKD (chronic kidney disease) stage 3, GFR 30-59 ml/min (Multi), Esophagitis, Glaucoma, History of scarlet fever, Hypertension, Hypothyroidism, Morbid obesity (Multi), Nephritis, OA (osteoarthritis) of knee, Polymyalgia rheumatica (Multi), Psoriasis, and Sciatica.    Past Surgical History:  She has a past surgical history that includes CT angio neck (2014); Tonsillectomy; Adenoidectomy; Dilation and curettage of uterus; [place] tooth extraction; Cataract extraction (2020); Crown (2020); Cataract extraction (Right); Cataract extraction (Left, 2021); and Cardiac catheterization (N/A, 2024).      Social History:  She reports that she has been smoking cigarettes. She has a 40 pack-year smoking history. She has been exposed to tobacco smoke. She has never used smokeless tobacco. She reports that she does not currently use alcohol. She reports that she does not use drugs.    Family History:  Family History[1]     Allergies:  Patient has no known allergies.    ROS:  10 point review of systems including (Constitutional, Eyes, ENMT, Respiratory, Cardiac, Gastrointestinal, Neurological, Psychiatric, and Hematologic) was performed and is otherwise negative.    Objective Data:  Last Recorded Vitals:  Vitals:    08/10/25 1501 08/10/25 2125 25 0025 25 0516   BP: 142/64 131/76 157/77 150/76   BP Location:       Patient Position:  Lying Lying Lying   Pulse:  53     Resp: 18 17     Temp: 37 °C (98.6 °F) 36.4 °C (97.5 °F) 36.5 °C (97.7 °F) 36.4 °C (97.5 °F)   TempSrc: Temporal Temporal Temporal Temporal   SpO2: 95% 93% 96% 94%   Weight:         Medical Gas Therapy: Supplemental oxygen  Medical Gas Delivery Method: Nasal cannula  Weight  Av kg (149 lb 14.6 oz)  Min: 68 kg (149 lb 14.6 oz)  Max: 68 kg (149 lb 14.6 oz)    LABS:  CMP:  Results from last 7  "days   Lab Units 08/11/25  0545 08/10/25  0622 08/09/25  0523 08/08/25  0516 08/07/25  0828 08/06/25  2307   SODIUM mmol/L 136 139 137 135* 134* 135*   POTASSIUM mmol/L 3.4* 4.2 4.0 4.3 4.3 4.8   CHLORIDE mmol/L 102 103 96* 94* 93* 93*   CO2 mmol/L 30 28 25 33* 30 33*   ANION GAP mmol/L 7* 12 20 12 15 14   BUN mg/dL 16 19 17 16 17 19   CREATININE mg/dL 1.03 1.00 1.01 0.99 0.98 1.04   EGFR mL/min/1.73m*2 55* 57* 56* 57* 58* 54*   ALBUMIN g/dL  --   --   --   --   --  3.0*   ALT U/L  --   --   --   --   --  7   AST U/L  --   --   --   --   --  10   BILIRUBIN TOTAL mg/dL  --   --   --   --   --  0.3     CBC:  Results from last 7 days   Lab Units 08/11/25  0545 08/10/25  0622 08/09/25  0523 08/08/25  0516 08/07/25  0828 08/06/25  2307   WBC AUTO x10*3/uL 8.7 8.3 9.2 11.4* 11.2 11.7*   HEMOGLOBIN g/dL 7.8* 7.2* 7.8* 7.7* 8.1* 9.0*   HEMATOCRIT % 27.0* 26.4* 25.9* 25.8* 26.5* 30.4*   PLATELETS AUTO x10*3/uL 409 384 497* 529* 521* 599*   MCV fL 90 96 88 88 86 89     COAG:     ABO: No results found for: \"ABO\"  HEME/ENDO:     CARDIAC:       No lab exists for component: \"CK\", \"CKMBP\"          Last I/O:    Intake/Output Summary (Last 24 hours) at 8/11/2025 0759  Last data filed at 8/11/2025 0516  Gross per 24 hour   Intake 1435 ml   Output 0 ml   Net 1435 ml     Net IO Since Admission: 3,217.5 mL [08/11/25 0800]      Imaging Results:  XR chest 1 view  Result Date: 8/7/2025  Interpreted By:  Oziel Sunshine, STUDY: XR CHEST 1 VIEW; ;  8/7/2025 4:14 am   INDICATION: Signs/Symptoms:NG tube placement.   COMPARISON: None.   ACCESSION NUMBER(S): [id number]   ORDERING CLINICIAN: ZAY CHENEY   TECHNIQUE: A portable upright radiograph of the chest is performed.   FINDINGS: A nasogastric tube is identified. The distal tube projects beyond the gastroesophageal junction however the distal 4 cm of tube is directed cephalad in the lower esophagus. Repositioning of the tube is recommended.   The heart is mildly enlarged. There is " severe tortuosity and dilatation of the thoracic aorta which is similar to the previous study.   There are low lung volumes with linear areas of atelectasis greater on the left. Calcified granulomas identified in the right apex. There is no airspace consolidation, pleural effusion, or pneumothorax. The pulmonary vessels are within normal limits. Bulla formation may be present in the left apex which is unchanged.   The osseous structures are diffusely and severely demineralized.       A nasogastric tube is coiled distally with the tip projecting cranially in the lower esophagus. Repositioning is recommended.   Persistent dilatation and tortuosity of the thoracic aorta. This is described in greater detail on a recent CT examination of 07/31/2025. Please refer to this report.   Linear areas of atelectasis in the left lung. There is no airspace consolidation.     MACRO: None   Signed by: Oziel Sunshine 8/7/2025 7:28 AM Dictation workstation:   [id number]    CT abdomen pelvis w IV contrast  Result Date: 8/7/2025  Interpreted By:  Finkelstein, Evan, STUDY: CT ABDOMEN PELVIS W IV CONTRAST;  8/7/2025 12:05 am   INDICATION: Signs/Symptoms:Abdominal pain. vomiting..     COMPARISON: CT abdomen pelvis 11/2/2024   ACCESSION NUMBER(S): [id number]   ORDERING CLINICIAN: ZAY CHENEY   TECHNIQUE: Axial CT images of the abdomen and pelvis with coronal and sagittal reconstructed images obtained after intravenous administration of 75 mL Omnipaque 350   FINDINGS: LOWER CHEST: Cardiomegaly. Small right pleural effusion. Patchy bibasilar airspace opacities.   ABDOMEN:   LIVER: Normal attenuation and contour. BILE DUCTS: Normal caliber. GALLBLADDER: Increased density in the gallbladder favored to represent biliary sludge or stones. No wall thickening or pericholecystic fluid. PORTAL VEIN: Patent SPLEEN: Calcifications scattered throughout the spleen compatible with sequela of prior granulomatous disease. PANCREAS: Unremarkable.  ADRENALS: Unremarkable. KIDNEYS, URETERS and URINARY BLADDER: Symmetric renal enhancement. No hydronephrosis or perinephric fluid collection. 0.5 cm hypodensity in the right kidney measures simple fluid attenuation, most compatible with a simple cyst. Bladder is within normal limits REPRODUCTIVE ORGANS: No pelvic masses.   ABDOMINAL WALL: Left abdominal wall colostomy. PERITONEUM: No ascites, free air or fluid collection.   BOWEL: Postsurgical changes of partial colectomy. Left abdominal wall colostomy. Scattered colonic diverticula without definite pericolonic inflammatory stranding. Moderate colonic stool burden. Postsurgical changes at the level of the cecum, possibly related to appendectomy. There are dilated loops of small bowel throughout the abdomen and pelvis with decompressed loops of distal small bowel. Suspected transition point in the mid abdomen.   VESSELS: Aorto bi-iliac stent graft repair. A femoral to femoral bypass graft is visualized and appears patent. RETROPERITONEUM: No pathologically enlarged retroperitoneal lymph nodes.   BONES: No acute osseous abnormality. Degenerative changes throughout the spine.       Dilated loops of small bowel throughout the abdomen and pelvis with decompressed loops of distal small bowel and a suspected transition point in the mid abdomen compatible with small bowel obstruction.   Postsurgical changes of partial colectomy with a left abdominal wall colostomy, moderate colonic stool burden and scattered colonic diverticulosis. No CT evidence of acute diverticulitis.   Cholelithiasis without CT evidence of acute cholecystitis.   Cardiomegaly, small right pleural effusion and patchy bibasilar airspace opacities which may represent atelectasis with a developing infection not excluded in the appropriate clinical setting.     MACRO: None.   Signed by: Evan Finkelstein 8/7/2025 12:53 AM Dictation workstation:   [id number]      Inpatient Medications:  Scheduled  Medications[2]  PRN Medications[3]  Continuous Medications[4]    Outpatient Medications:  Current Outpatient Medications   Medication Instructions    acetaminophen (TYLENOL 8 HOUR) 650 mg, oral, Every 8 hours PRN    acetaminophen (TYLENOL) 650 mg, rectal, Every 8 hours PRN    alendronate (FOSAMAX) 70 mg, oral, Every 7 days, Take on an empty stomach. Do not lie down or eat for 1/2 hour after taking.    amiodarone (PACERONE) 200 mg, oral, Every morning    amLODIPine (NORVASC) 5 mg, Daily    apixaban (ELIQUIS) 2.5 mg, oral, 2 times daily    ascorbic acid (VITAMIN C) 500 mg, oral, Daily    aspirin 81 mg, oral, Daily    atorvastatin (LIPITOR) 10 mg, oral, Nightly    bisacodyl (DULCOLAX (BISACODYL)) 10 mg, rectal, Daily PRN    calcium carbonate 500 mg calcium (1,250 mg) chewable tablet 1 tablet, oral, Daily    cephalexin (KEFLEX) 500 mg, 2 times daily    cyanocobalamin (vitamin B-12) 5,000 mcg, oral, Daily    DAILY MULTI-VITAMIN ORAL 1 tablet, oral, Daily    dimethicone (Cavilon Durable Barrier) 1.3 % cream topical (top), Apply to lucio area topically every day and night shift for prevention. Apply to buttocks every shift as needed after each episode of incontinence.    docusate sodium (COLACE) 100 mg, oral, 2 times daily    escitalopram (LEXAPRO) 20 mg, oral, Daily    ferrous sulfate 325 mg, oral, Daily with breakfast    furosemide (LASIX) 40 mg, oral, Daily    levomefolate calcium (L-METHYLFOLATE ORAL) 1,000 mcg, Every morning    levothyroxine (SYNTHROID, LEVOXYL) 100 mcg, oral, Daily, Six days per week    lutein 6 mg capsule 1 capsule, oral, Daily    magnesium hydroxide (Milk of Magnesia) 400 mg/5 mL suspension 30 mL, oral, Daily PRN    menthol-zinc oxide (Calmoseptine) 0.44-20.6 % ointment 1 Application, 2 times daily    metoprolol succinate XL (TOPROL-XL) 25 mg, oral, Daily, Do not crush or chew.    mirtazapine (REMERON) 15 mg, oral, Nightly    ondansetron (Zofran) 4 mg tablet 1 tablet, Every 6 hours PRN    oxyBUTYnin  XL (DITROPAN-XL) 10 mg, oral, Daily    oxyCODONE (ROXICODONE) 5 mg, oral, Every 6 hours PRN    pantoprazole (PROTONIX) 40 mg, oral, Daily, Do not crush, chew, or split.    polyethylene glycol (GLYCOLAX, MIRALAX) 17 g, oral, 2 times daily PRN    sacubitriL-valsartan (Entresto) 24-26 mg tablet 1 tablet, oral, 2 times daily    sennosides (Senokot) 8.6 mg tablet 1 tablet, oral, 2 times daily    sodium phosphates (Fleets) 19-7 gram/118 mL enema enema 1 enema, rectal, Daily PRN    thyroid, pork, ([place] Thyroid) 30 mg tablet TAKE 1 TABLET (30 MG) BY MOUTH ONCE DAILY IN THE MORNING. TAKE BEFORE MEALS.    tocilizumab (Actemra) 200 mg/10 mL (20 mg/mL) solution Every 28 days       Physical Exam:  General:  Patient is awake, alert, and oriented.  Patient is in no acute distress.  HEENT:  Pupils equal and reactive.  Normocephalic.  Moist mucosa.  NG.   Neck:  No thyromegaly.  Normal Jugular Venous Pressure.  Cardiovascular:  Regular rate and rhythm.  Normal S1 and S2.  Pulmonary:  LAURA expiratory wheezing. CTA in RUL, bilateral lower lobes. On NC.   Abdomen:  Soft. Non-tender.   Non-distended.  Positive bowel sounds. +Ostomy  Lower Extremities:  2+ pedal pulses. No LE edema.  Neurologic:  Cranial nerves intact.  No focal deficit.   Skin: Skin warm and dry, normal skin turgor.   Psychiatric: Normal affect.     Assessment/Plan   Anny Tesfaye is a 80 y.o. female with PMHx significant for Afib on Amiodarone and Eliquis (dx 10/2024 post EVAR) s/p UMESH/DCCV 11/2024, HFrEF  HTN, HLD, heavy tobacco use, chronic hypoxic resp failure on 3L oxygen, severe PAD, AAA s/p EVAR 10/10/24 at [facility] and L-R fem-fem bypass c/b ischemic colitis s/p ex-lap, descending and proximal sigmoid colectomy with end colostomy and long marixa stump, SBO 1/2025, lumbar stenosis, hypothyroidism, CKD III, polymyalgia rheumatica, and OA  who presented to [facility] from [facility] ([facility]) with c/o abdominal pain, n/v and admitted with SBO. Cardiology  "consulted for \"pt takes amiodarone at home, admitted for bowel obstruciton, improving, however her qtc interval is elevated, please eval for resuming amiodarone.\"       Outpatient cardiac medications: amiodarone 200 mg daily, amlodipine 5 mg daily, eliquis 2.5 mg bid, asa 81 mg daily, atorvastatin 10 mg daily,       #Paroxysmal Afib  -Maintaining SB/SR at present HR upper 40-60s  -Started on amiodarone 10/2024 when she developed post op Afib post EVAR; Hx UMESH/DCCV 11/2024, initial plan was to stop amiodarone 1-2 months following UMESH/DCVV, however did not follow up with Cardiology  -[id number]-BXRk Score 6, anticoagulated on Eliquis prior to admit, currently on hold per primary pending surgery plan. Currently managing SBO conservatively with NG.   -Noted prolonged QT/Qtc on EKG 8/10: 548/572 msec; 8/11 548/514 msec    #Prolonged QT  -Noted prolonged QT/Qtc on EKG 8/10: 548/572 msec; 8/11 548/514 msec  -Currently on Zofran & Amiodarone which may prolong QT  -Hypokalemic, K 3.4      #HFrEF  -Noted reduced LVEF in setting of Afib/RVR; TTE 11/4/25: LVEF 30-35%, moderately reduced RV fx, moderately enlarged RV, LA severely dilated, mild pHTN RVSP 43 mmHg, mild AI  -Etiology previously suspected to be Afib/tachycardia induced, [facility] 8/2024 with no significant CAD; s/p DCCV/UMESH 11/7/25, no repeat TTE while maintaining SR.  -Was not on GDMT prior to admission; she was started on Metoprolol Succinate and Entresto following 11/2024 hospitalization at [facility], unclear why it was stopped      #HTN  -normotensive to mildly elevated    #PAD  -S/p EVAR 10/10/24 at [facility] and L-R fem-fem bypass  -on asa, statin       Plan/Recs:   -Hold Amiodarone given prolonged QT.   -Hold Zofran given prolonged QT.   -Avoid QT prolonging agents. Daily EKG for QT monitoring.   -  Monitor electrolytes, goal  K =4, Mg= 2. Will give 40 meq KCL PO today for hypokalemia, K 3.4. Will add on Mg level.   -Resume Eliquis 2.5 mg BID as able pending plan per General " Surgery for SBO.   -Limited TTE for reassessment of LVEF. Plan for initiation of GDMT pending findings.   -Follow up with Cardiology, Dr. Ramirez in the next 4-6 weeks as outpatient      Code Status:  Full Code            YVONNE Lackey-CNP     STAFF ADDENDUM:    Both the MARIAMA and I have had a face to face encounter with the patient today. I have examined the patient and edited the documented physical examination as necessary.  I personally reviewed the patient's vital signs, telemetry, recent labs, medications, orders, EKGs, and pertinent cardiac imaging/ echocardiography.  I have reviewed the MARIAMA's encounter note, approve the MARIAMA's documentation and have edited the note to reflect my diagnostic and therapeutic plan.      Prolonged QT on ECG here with QTc 572 MS.  Favor monitoring off antiarrhythmics at this time.  Resume Eliquis when okay from surgery standpoint.    Rashad Ramirez DO           [1]   Family History  Problem Relation Name Age of Onset    Breast cancer Mother      Osteoporosis Mother      Other (Abdominal Aortic Aneurysm) Mother      Heart failure Father      Hypertension Father      Macular degeneration Father      Heart disease Father      Cancer Sister          Sinus    Breast cancer Maternal Grandmother     [2]   Scheduled medications   Medication Dose Route Frequency    amiodarone  200 mg oral q AM    atorvastatin  10 mg oral Nightly    enoxaparin  40 mg subcutaneous q24h    escitalopram  20 mg oral Daily    levothyroxine  100 mcg oral Once per day on Monday Tuesday Wednesday Thursday Friday Saturday    metoprolol  5 mg intravenous q6h    pantoprazole  40 mg oral Daily before breakfast    Or    pantoprazole  40 mg intravenous Daily before breakfast   [3]   PRN medications   Medication    acetaminophen    Or    acetaminophen    Or    acetaminophen    hydrALAZINE    morphine    ondansetron    Or    ondansetron    phenoL   [4]   Continuous Medications   Medication Dose Last Rate

## 2025-08-11 NOTE — CARE PLAN
The patient's goals for the shift include remove NG tube.     The clinical goals for the shift include maintain comfort and maintain patient safety    Over the shift, the patient did make progress toward the following goals.       Problem: Pain - Adult  Goal: Verbalizes/displays adequate comfort level or baseline comfort level  Outcome: Progressing     Problem: Safety - Adult  Goal: Free from fall injury  Outcome: Progressing     Problem: Discharge Planning  Goal: Discharge to home or other facility with appropriate resources  Outcome: Progressing     Problem: Chronic Conditions and Co-morbidities  Goal: Patient's chronic conditions and co-morbidity symptoms are monitored and maintained or improved  Outcome: Progressing     Problem: Nutrition  Goal: Nutrient intake appropriate for maintaining nutritional needs  Outcome: Progressing     Problem: Skin  Goal: Decreased wound size/increased tissue granulation at next dressing change  Outcome: Progressing  Goal: Participates in plan/prevention/treatment measures  Outcome: Progressing  Goal: Prevent/manage excess moisture  Outcome: Progressing  Goal: Prevent/minimize sheer/friction injuries  Outcome: Progressing  Goal: Promote/optimize nutrition  Outcome: Progressing  Goal: Promote skin healing  Outcome: Progressing

## 2025-08-12 ENCOUNTER — APPOINTMENT (OUTPATIENT)
Dept: CARDIOLOGY | Facility: HOSPITAL | Age: 80
DRG: 389 | End: 2025-08-12
Payer: MEDICARE

## 2025-08-12 LAB
ANION GAP SERPL CALC-SCNC: 8 MMOL/L (ref 10–20)
BUN SERPL-MCNC: 13 MG/DL (ref 6–23)
CALCIUM SERPL-MCNC: 8.2 MG/DL (ref 8.6–10.3)
CHLORIDE SERPL-SCNC: 103 MMOL/L (ref 98–107)
CO2 SERPL-SCNC: 29 MMOL/L (ref 21–32)
CREAT SERPL-MCNC: 1 MG/DL (ref 0.5–1.05)
EGFRCR SERPLBLD CKD-EPI 2021: 57 ML/MIN/1.73M*2
ERYTHROCYTE [DISTWIDTH] IN BLOOD BY AUTOMATED COUNT: 17.2 % (ref 11.5–14.5)
GLUCOSE SERPL-MCNC: 93 MG/DL (ref 74–99)
HCT VFR BLD AUTO: 25.6 % (ref 36–46)
HGB BLD-MCNC: 7.5 G/DL (ref 12–16)
MCH RBC QN AUTO: 26.1 PG (ref 26–34)
MCHC RBC AUTO-ENTMCNC: 29.3 G/DL (ref 32–36)
MCV RBC AUTO: 89 FL (ref 80–100)
NRBC BLD-RTO: 0 /100 WBCS (ref 0–0)
PLATELET # BLD AUTO: 417 X10*3/UL (ref 150–450)
POTASSIUM SERPL-SCNC: 4 MMOL/L (ref 3.5–5.3)
RBC # BLD AUTO: 2.87 X10*6/UL (ref 4–5.2)
SODIUM SERPL-SCNC: 136 MMOL/L (ref 136–145)
WBC # BLD AUTO: 8.3 X10*3/UL (ref 4.4–11.3)

## 2025-08-12 PROCEDURE — 36415 COLL VENOUS BLD VENIPUNCTURE: CPT

## 2025-08-12 PROCEDURE — 93010 ELECTROCARDIOGRAM REPORT: CPT | Performed by: INTERNAL MEDICINE

## 2025-08-12 PROCEDURE — 99222 1ST HOSP IP/OBS MODERATE 55: CPT | Performed by: NURSE PRACTITIONER

## 2025-08-12 PROCEDURE — 2500000001 HC RX 250 WO HCPCS SELF ADMINISTERED DRUGS (ALT 637 FOR MEDICARE OP): Performed by: NURSE PRACTITIONER

## 2025-08-12 PROCEDURE — 93005 ELECTROCARDIOGRAM TRACING: CPT

## 2025-08-12 PROCEDURE — 1200000002 HC GENERAL ROOM WITH TELEMETRY DAILY

## 2025-08-12 PROCEDURE — 85027 COMPLETE CBC AUTOMATED: CPT

## 2025-08-12 PROCEDURE — 2500000004 HC RX 250 GENERAL PHARMACY W/ HCPCS (ALT 636 FOR OP/ED): Performed by: FAMILY MEDICINE

## 2025-08-12 PROCEDURE — 97530 THERAPEUTIC ACTIVITIES: CPT | Mod: GP

## 2025-08-12 PROCEDURE — 2500000002 HC RX 250 W HCPCS SELF ADMINISTERED DRUGS (ALT 637 FOR MEDICARE OP, ALT 636 FOR OP/ED): Performed by: FAMILY MEDICINE

## 2025-08-12 PROCEDURE — 97166 OT EVAL MOD COMPLEX 45 MIN: CPT | Mod: GO

## 2025-08-12 PROCEDURE — 80048 BASIC METABOLIC PNL TOTAL CA: CPT

## 2025-08-12 PROCEDURE — 99232 SBSQ HOSP IP/OBS MODERATE 35: CPT

## 2025-08-12 PROCEDURE — 2500000001 HC RX 250 WO HCPCS SELF ADMINISTERED DRUGS (ALT 637 FOR MEDICARE OP): Performed by: FAMILY MEDICINE

## 2025-08-12 RX ORDER — SACUBITRIL AND VALSARTAN 24; 26 MG/1; MG/1
1 TABLET ORAL 2 TIMES DAILY
Status: DISCONTINUED | OUTPATIENT
Start: 2025-08-12 | End: 2025-08-13

## 2025-08-12 RX ORDER — METOPROLOL SUCCINATE 25 MG/1
12.5 TABLET, EXTENDED RELEASE ORAL DAILY
Status: DISCONTINUED | OUTPATIENT
Start: 2025-08-12 | End: 2025-08-13 | Stop reason: HOSPADM

## 2025-08-12 RX ORDER — DOCUSATE SODIUM 100 MG/1
100 CAPSULE, LIQUID FILLED ORAL 2 TIMES DAILY
Status: DISCONTINUED | OUTPATIENT
Start: 2025-08-12 | End: 2025-08-13 | Stop reason: HOSPADM

## 2025-08-12 RX ORDER — METOPROLOL SUCCINATE 25 MG/1
25 TABLET, EXTENDED RELEASE ORAL DAILY
Status: DISCONTINUED | OUTPATIENT
Start: 2025-08-12 | End: 2025-08-12

## 2025-08-12 RX ORDER — LANOLIN ALCOHOL/MO/W.PET/CERES
5000 CREAM (GRAM) TOPICAL DAILY
Status: DISCONTINUED | OUTPATIENT
Start: 2025-08-12 | End: 2025-08-13 | Stop reason: HOSPADM

## 2025-08-12 RX ORDER — CALCIUM CARBONATE 200(500)MG
500 TABLET,CHEWABLE ORAL DAILY
Status: DISCONTINUED | OUTPATIENT
Start: 2025-08-12 | End: 2025-08-13 | Stop reason: HOSPADM

## 2025-08-12 RX ADMIN — METOPROLOL SUCCINATE 12.5 MG: 25 TABLET, EXTENDED RELEASE ORAL at 12:03

## 2025-08-12 RX ADMIN — METOPROLOL TARTRATE 5 MG: 5 INJECTION, SOLUTION INTRAVENOUS at 22:07

## 2025-08-12 RX ADMIN — PANTOPRAZOLE SODIUM 40 MG: 40 TABLET, DELAYED RELEASE ORAL at 05:00

## 2025-08-12 RX ADMIN — SACUBITRIL AND VALSARTAN 1 TABLET: 24; 26 TABLET, FILM COATED ORAL at 20:17

## 2025-08-12 RX ADMIN — APIXABAN 2.5 MG: 2.5 TABLET, FILM COATED ORAL at 20:17

## 2025-08-12 RX ADMIN — DOCUSATE SODIUM 100 MG: 100 CAPSULE, LIQUID FILLED ORAL at 20:17

## 2025-08-12 RX ADMIN — LEVOTHYROXINE SODIUM 100 MCG: 0.1 TABLET ORAL at 05:00

## 2025-08-12 RX ADMIN — SACUBITRIL AND VALSARTAN 1 TABLET: 24; 26 TABLET, FILM COATED ORAL at 12:03

## 2025-08-12 RX ADMIN — METOPROLOL TARTRATE 5 MG: 5 INJECTION, SOLUTION INTRAVENOUS at 16:11

## 2025-08-12 RX ADMIN — ATORVASTATIN CALCIUM 10 MG: 10 TABLET, FILM COATED ORAL at 20:17

## 2025-08-12 RX ADMIN — METOPROLOL TARTRATE 5 MG: 5 INJECTION, SOLUTION INTRAVENOUS at 01:47

## 2025-08-12 RX ADMIN — APIXABAN 2.5 MG: 2.5 TABLET, FILM COATED ORAL at 09:55

## 2025-08-12 RX ADMIN — CALCIUM CARBONATE (ANTACID) CHEW TAB 500 MG 1 TABLET: 500 CHEW TAB at 09:56

## 2025-08-12 RX ADMIN — CYANOCOBALAMIN TAB 1000 MCG 5000 MCG: 1000 TAB at 09:56

## 2025-08-12 RX ADMIN — DOCUSATE SODIUM 100 MG: 100 CAPSULE, LIQUID FILLED ORAL at 09:56

## 2025-08-12 RX ADMIN — METOPROLOL TARTRATE 5 MG: 5 INJECTION, SOLUTION INTRAVENOUS at 09:56

## 2025-08-12 RX ADMIN — ESCITALOPRAM 20 MG: 20 TABLET, FILM COATED ORAL at 09:55

## 2025-08-12 SDOH — HEALTH STABILITY: PHYSICAL HEALTH: ON AVERAGE, HOW MANY DAYS PER WEEK DO YOU ENGAGE IN MODERATE TO STRENUOUS EXERCISE (LIKE A BRISK WALK)?: 1 DAY

## 2025-08-12 SDOH — ECONOMIC STABILITY: HOUSING INSECURITY: IN THE LAST 12 MONTHS, WAS THERE A TIME WHEN YOU WERE NOT ABLE TO PAY THE MORTGAGE OR RENT ON TIME?: NO

## 2025-08-12 SDOH — ECONOMIC STABILITY: FOOD INSECURITY: WITHIN THE PAST 12 MONTHS, YOU WORRIED THAT YOUR FOOD WOULD RUN OUT BEFORE YOU GOT THE MONEY TO BUY MORE.: NEVER TRUE

## 2025-08-12 SDOH — HEALTH STABILITY: MENTAL HEALTH: HOW MANY DRINKS CONTAINING ALCOHOL DO YOU HAVE ON A TYPICAL DAY WHEN YOU ARE DRINKING?: PATIENT DOES NOT DRINK

## 2025-08-12 SDOH — ECONOMIC STABILITY: FOOD INSECURITY: WITHIN THE PAST 12 MONTHS, THE FOOD YOU BOUGHT JUST DIDN'T LAST AND YOU DIDN'T HAVE MONEY TO GET MORE.: NEVER TRUE

## 2025-08-12 SDOH — HEALTH STABILITY: MENTAL HEALTH: HOW OFTEN DO YOU HAVE SIX OR MORE DRINKS ON ONE OCCASION?: NEVER

## 2025-08-12 SDOH — SOCIAL STABILITY: SOCIAL INSECURITY: WITHIN THE LAST YEAR, HAVE YOU BEEN AFRAID OF YOUR PARTNER OR EX-PARTNER?: NO

## 2025-08-12 SDOH — HEALTH STABILITY: PHYSICAL HEALTH: ON AVERAGE, HOW MANY MINUTES DO YOU ENGAGE IN EXERCISE AT THIS LEVEL?: 10 MIN

## 2025-08-12 SDOH — ECONOMIC STABILITY: HOUSING INSECURITY: AT ANY TIME IN THE PAST 12 MONTHS, WERE YOU HOMELESS OR LIVING IN A SHELTER (INCLUDING NOW)?: NO

## 2025-08-12 SDOH — HEALTH STABILITY: MENTAL HEALTH: HOW OFTEN DO YOU HAVE A DRINK CONTAINING ALCOHOL?: NEVER

## 2025-08-12 SDOH — ECONOMIC STABILITY: INCOME INSECURITY: IN THE PAST 12 MONTHS HAS THE ELECTRIC, GAS, OIL, OR WATER COMPANY THREATENED TO SHUT OFF SERVICES IN YOUR HOME?: NO

## 2025-08-12 SDOH — SOCIAL STABILITY: SOCIAL INSECURITY: WITHIN THE LAST YEAR, HAVE YOU BEEN HUMILIATED OR EMOTIONALLY ABUSED IN OTHER WAYS BY YOUR PARTNER OR EX-PARTNER?: NO

## 2025-08-12 SDOH — ECONOMIC STABILITY: TRANSPORTATION INSECURITY: IN THE PAST 12 MONTHS, HAS LACK OF TRANSPORTATION KEPT YOU FROM MEDICAL APPOINTMENTS OR FROM GETTING MEDICATIONS?: NO

## 2025-08-12 SDOH — ECONOMIC STABILITY: HOUSING INSECURITY: IN THE PAST 12 MONTHS, HOW MANY TIMES HAVE YOU MOVED WHERE YOU WERE LIVING?: 0

## 2025-08-12 SDOH — ECONOMIC STABILITY: FOOD INSECURITY: HOW HARD IS IT FOR YOU TO PAY FOR THE VERY BASICS LIKE FOOD, HOUSING, MEDICAL CARE, AND HEATING?: NOT HARD AT ALL

## 2025-08-12 ASSESSMENT — COGNITIVE AND FUNCTIONAL STATUS - GENERAL
WALKING IN HOSPITAL ROOM: A LOT
MOVING FROM LYING ON BACK TO SITTING ON SIDE OF FLAT BED WITH BEDRAILS: A LOT
STANDING UP FROM CHAIR USING ARMS: A LOT
CLIMB 3 TO 5 STEPS WITH RAILING: TOTAL
HELP NEEDED FOR BATHING: TOTAL
MOBILITY SCORE: 11
TURNING FROM BACK TO SIDE WHILE IN FLAT BAD: A LOT
EATING MEALS: A LOT
DRESSING REGULAR UPPER BODY CLOTHING: A LITTLE
MOVING FROM LYING ON BACK TO SITTING ON SIDE OF FLAT BED WITH BEDRAILS: A LOT
WALKING IN HOSPITAL ROOM: A LOT
MOBILITY SCORE: 13
MOVING TO AND FROM BED TO CHAIR: TOTAL
DAILY ACTIVITIY SCORE: 15
CLIMB 3 TO 5 STEPS WITH RAILING: TOTAL
HELP NEEDED FOR BATHING: A LITTLE
DRESSING REGULAR UPPER BODY CLOTHING: A LOT
PATIENT BASELINE BEDBOUND: NO
TOILETING: TOTAL
WALKING IN HOSPITAL ROOM: TOTAL
STANDING UP FROM CHAIR USING ARMS: A LOT
MOVING TO AND FROM BED TO CHAIR: A LOT
PERSONAL GROOMING: A LOT
DAILY ACTIVITIY SCORE: 20
MOVING TO AND FROM BED TO CHAIR: A LOT
TURNING FROM BACK TO SIDE WHILE IN FLAT BAD: A LOT
CLIMB 3 TO 5 STEPS WITH RAILING: TOTAL
MOBILITY SCORE: 11
DRESSING REGULAR UPPER BODY CLOTHING: A LOT
TURNING FROM BACK TO SIDE WHILE IN FLAT BAD: A LOT
STANDING UP FROM CHAIR USING ARMS: A LOT
CLIMB 3 TO 5 STEPS WITH RAILING: TOTAL
DRESSING REGULAR LOWER BODY CLOTHING: A LOT
STANDING UP FROM CHAIR USING ARMS: TOTAL
MOVING FROM LYING ON BACK TO SITTING ON SIDE OF FLAT BED WITH BEDRAILS: A LITTLE
DAILY ACTIVITIY SCORE: 9
TURNING FROM BACK TO SIDE WHILE IN FLAT BAD: A LITTLE
HELP NEEDED FOR BATHING: A LOT
DRESSING REGULAR LOWER BODY CLOTHING: A LITTLE
DRESSING REGULAR LOWER BODY CLOTHING: TOTAL
MOBILITY SCORE: 8
TOILETING: A LITTLE
WALKING IN HOSPITAL ROOM: A LOT
PERSONAL GROOMING: A LITTLE
TOILETING: A LOT
MOVING FROM LYING ON BACK TO SITTING ON SIDE OF FLAT BED WITH BEDRAILS: A LOT
MOVING TO AND FROM BED TO CHAIR: A LOT

## 2025-08-12 ASSESSMENT — PAIN - FUNCTIONAL ASSESSMENT
PAIN_FUNCTIONAL_ASSESSMENT: 0-10

## 2025-08-12 ASSESSMENT — PAIN SCALES - GENERAL
PAINLEVEL_OUTOF10: 0 - NO PAIN
PAINLEVEL_OUTOF10: 3
PAINLEVEL_OUTOF10: 8
PAINLEVEL_OUTOF10: 0 - NO PAIN
PAINLEVEL_OUTOF10: 0 - NO PAIN

## 2025-08-12 ASSESSMENT — ACTIVITIES OF DAILY LIVING (ADL)
LACK_OF_TRANSPORTATION: NO
BATHING_ASSISTANCE: MAXIMAL
ADL_ASSISTANCE: NEEDS ASSISTANCE

## 2025-08-12 ASSESSMENT — LIFESTYLE VARIABLES
AUDIT-C TOTAL SCORE: 0
SKIP TO QUESTIONS 9-10: 1

## 2025-08-12 NOTE — PROGRESS NOTES
Occupational Therapy    Evaluation    Patient Name: Anny Tesfaye  MRN: [id number]  Department: [place] [id number]  Room: [address] [place] [address]  Today's Date: 8/12/2025  Time Calculation  Start Time: 1454  Stop Time: 1507  Time Calculation (min): 13 min    Assessment  IP OT Assessment  OT Assessment: Pt seen fro OT eval. Contacted LTC facility re pt baseline, pt requires assist for all ADLS, Mainly bed bound gets OOB for showers  Prognosis: Fair  Barriers to Discharge Home: No anticipated barriers  Evaluation/Treatment Tolerance: Patient limited by fatigue, Patient limited by pain  Medical Staff Made Aware: Yes  End of Session Communication: Bedside nurse  End of Session Patient Position: Bed, 3 rail up, Alarm off, caregiver present  Plan:  No Skilled OT: At baseline function (no skilled acute OT needs)  OT Frequency: OT eval only  OT Discharge Recommendations: No further acute OT, No OT needed after discharge  OT Recommended Transfer Status: Dependent, Assist of 2  OT - OK to Discharge: Yes (Per POC)    Subjective   Current Problem:  1. Intestinal obstruction (Multi)        2. Heart failure, systolic and diastolic, acute  Transthoracic Echo (TTE) Limited    Transthoracic Echo (TTE) Limited      3. Heart failure, unspecified  Transthoracic Echo (TTE) Limited        OT Visit Info:  OT Received On: 08/12/25  General Visit Info:  General  Reason for Referral: To ED with abdominal pain, nausea, and vomiting. Pt found to have an intestinal obstruction.  Referred By: Rajendra Rodriguez MD  Past Medical History Relevant to Rehab: Medical History[1]    Co-Treatment: PT  Co-Treatment Reason: To increase patient safety, transfer ability, and participation.  Prior to Session Communication: Bedside nurse  Patient Position Received: Bed, 3 rail up, Alarm on  General Comment: Pt agreeable to therapy with encouragement  Precautions:  Medical Precautions: Fall precautions    Pain:  Pain Assessment  Pain Assessment: 0-10  0-10 (Numeric) Pain Score: 8  Pain  Type: Acute pain  Pain Location: Back  Pain Interventions: Repositioned, Ambulation/increased activity, Distraction    Objective   Cognition:  Overall Cognitive Status: Within Functional Limits  Orientation Level: Oriented X4  Following Commands:  (follows commands)  Insight: Mild  Impulsive: Mildly           Home Living:  Type of Home: Long Term Care facility  Lives With: Alone  Home Adaptive Equipment: Wheelchair-manual, Walker rolling or standard  Home Layout: One level  Home Access: Level entry  Bathroom Shower/Tub: Walk-in shower  Bathroom Toilet: Handicapped height  Bathroom Equipment: Shower chair with back   Prior Function:  Level of [place]: Needs assistance with ADLs, Needs assistance with homemaking  Receives Help From: Personal care attendant  ADL Assistance: Needs assistance (needs assist with bathing and dressing)  Homemaking Assistance: Needs assistance (LTC staff assists)  Ambulatory Assistance:  (non ambulatory, mainly in bed. Per LTC facility pt gets OOB for shower and for weights)  Hand Dominance: Right  IADL History:  Homemaking Responsibilities: No  ADL:  Eating Assistance: Minimal  Grooming Assistance: Minimal  Bathing Assistance: Maximal  UE Dressing Assistance: Minimal  UE Dressing Deficit: Thread RUE, Thread LUE, Pull down in back, Pull around back, Pull over head  LE Dressing Assistance: Maximal  LE Dressing Deficit: Don/doff R sock, Don/doff L sock  Toileting Assistance with Device: Total  Activity Tolerance:  Endurance: Tolerates 10 - 20 min exercise with multiple rests  Bed Mobility/Transfers: Bed Mobility  Bed Mobility: Yes  Bed Mobility 1  Bed Mobility 1: Supine to sitting, Sitting to supine  Level of Assistance 1: Moderate assistance, +2, Moderate verbal cues, Moderate tactile cues  Bed Mobility 2  Bed Mobility  2: Scooting  Level of Assistance 2: Dependent, +2    Transfers  Transfer: Yes  Transfer 1  Transfer From 1: Bed to  Transfer to 1: Stand  Technique 1: Sit to stand, Stand  to sit  Transfer Level of Assistance 1: Dependent, +2  Trials/Comments 1: pt able to come to full standing    Sitting Balance:  Static Sitting Balance  Static Sitting-Balance Support: Feet supported  Static Sitting-Level of Assistance: Close supervision  Dynamic Sitting Balance  Dynamic Sitting-Balance Support: Feet supported  Dynamic Sitting-Level of Assistance: Contact guard    IADL's:   Homemaking Responsibilities: No  Vision: Vision - Basic Assessment  Current Vision: Wears glasses only for reading  Sensation:  Light Touch: No apparent deficits  Strength:  Strength Comments: B UE WFL    Coordination:  Movements are Fluid and Coordinated: Yes  Finger to Nose: Intact  Finger to Target: Intact   Hand Function:  Hand Function  Gross Grasp: Functional  Coordination: Functional  Extremities: RUE   RUE : Within Functional Limits and LUE   LUE: Within Functional Limits      Outcome Measures: [facility] Daily Activity  Putting on and taking off regular lower body clothing: Total  Bathing (including washing, rinsing, drying): Total  Putting on and taking off regular upper body clothing: A lot  Toileting, which includes using toilet, bedpan or urinal: Total  Taking care of personal grooming such as brushing teeth: A lot  Eating Meals: A lot  Daily Activity - Total Score: 9               [1]   Past Medical History:  Diagnosis Date    Aortic aneurysm     Chronic low back pain     CKD (chronic kidney disease) stage 3, GFR 30-59 ml/min (Multi)     Esophagitis     Glaucoma     History of scarlet fever     Hypertension     Hypothyroidism     Morbid obesity (Multi)     Nephritis     OA (osteoarthritis) of knee     Bilateral    Polymyalgia rheumatica (Multi)     Psoriasis     Sciatica

## 2025-08-12 NOTE — PROGRESS NOTES
08/12/25 1145   Discharge Planning   Who is requesting discharge planning? Provider   Home or Post Acute Services Post acute facilities (Rehab/SNF/etc)   Type of Post Acute Facility Services Long term care   Expected Discharge Disposition Inter   Does the patient need discharge transport arranged? Yes   [facility] coordination needed? Yes   Has discharge transport been arranged? No   Intensity of Service   Intensity of Service 0-30 min     8/12/25 1146  Per DEBI Brice NP, patient will be ready for discharge tomorrow.  Cardio adjusting medications today.  Aicha Dwyer updated.  Message left for HARLEY Tiwari as well.  Maria Elena Chang RN [facility]

## 2025-08-12 NOTE — CARE PLAN
The patient's goals for the shift include      The clinical goals for the shift include Patient will be safe and HDS throughout shift    Problem: Pain - Adult  Goal: Verbalizes/displays adequate comfort level or baseline comfort level  Outcome: Progressing     Problem: Safety - Adult  Goal: Free from fall injury  Outcome: Progressing     Problem: Discharge Planning  Goal: Discharge to home or other facility with appropriate resources  Outcome: Progressing     Problem: Chronic Conditions and Co-morbidities  Goal: Patient's chronic conditions and co-morbidity symptoms are monitored and maintained or improved  Outcome: Progressing     Problem: Nutrition  Goal: Nutrient intake appropriate for maintaining nutritional needs  Outcome: Progressing     Problem: Skin  Goal: Prevent/manage excess moisture  Outcome: Progressing  Flowsheets (Taken 8/12/2025 1859)  Prevent/manage excess moisture:   Cleanse incontinence/protect with barrier cream   Moisturize dry skin     Problem: Skin  Goal: Prevent/minimize sheer/friction injuries  Outcome: Progressing  Flowsheets (Taken 8/12/2025 1859)  Prevent/minimize sheer/friction injuries:   Use pull sheet   Turn/reposition every 2 hours/use positioning/transfer devices   HOB 30 degrees or less

## 2025-08-12 NOTE — PROGRESS NOTES
Pham Owens is a 80 y.o. female on day 5 of admission presenting with Intestinal obstruction (Multi).      Subjective     Pt resting in bed  Feels well  Tolerating reg diet so far           Objective     Last Recorded Vitals  /81   Pulse 67   Temp 36.1 °C (97 °F) (Temporal)   Resp 18   Wt 68 kg (149 lb 14.6 oz)   SpO2 96%   Intake/Output last 3 Shifts:    Intake/Output Summary (Last 24 hours) at 8/12/2025 1057  Last data filed at 8/12/2025 1000  Gross per 24 hour   Intake 360 ml   Output 1400 ml   Net -1040 ml       Admission Weight  Weight: 68 kg (149 lb 14.6 oz) (08/06/25 2235)    Daily Weight  08/06/25 : 68 kg (149 lb 14.6 oz)    Image Results  Transthoracic Echo (TTE) [facility], [address] [place] [address]               Tel [phone] and Fax [phone]    TRANSTHORACIC ECHOCARDIOGRAM REPORT       Patient Name:       PHAM OWENS   Reading Physician:    [id number] Flo Galvan MD  Study Date:         8/11/2025           Ordering Provider:    [id number] PHAM SHIELDS  MRN/PID:            [id number]            Fellow:  Accession#:         [id number]        Nurse:  Date of Birth/Age:  1945 / 80      Sonographer:          Souleymane galdamez RDCS  Gender assigned at  F                   Additional Staff:  Birth:  Height:             165.00 cm           Admit Date:  Weight:             68.00 kg            Admission Status:     Inpatient -                                                                Routine  BSA / BMI:          1.75 m2 / 24.98     Encounter#:           [id number]                      kg/m2  Blood Pressure:     138/68 mmHg         Department Location:  [facility]                                                                 Invasive    Study Type:    TRANSTHORACIC ECHO (TTE) LIMITED  Diagnosis/ICD: Heart failure, unspecified-I50.9  Indication:    HF  CPT Code:      Echo Limited-84718    Patient History:  Pertinent History: HTN. HF.    Study Detail: The following Echo studies were performed: 2D, M-Mode, Doppler and                color flow.       PHYSICIAN INTERPRETATION:  Left Ventricle: Left ventricular ejection fraction is moderately decreased by visual estimate at 35-40%. There is severe eccentric left ventricular hypertrophy. There is global hypokinesis of the left ventricle with minor regional variations. The left ventricular cavity size is severely dilated. There is mildly increased septal and mildly increased posterior left ventricular wall thickness. Left ventricular diastolic filling cannot be determined due to severe mitral annular calcification (MAC). LVEDV: 112ml/m2.  Left Atrium: The left atrial size is severely dilated.  Right Ventricle: The right ventricle is normal in size. There is normal right ventricular global systolic function.  Right Atrium: The right atrium is normal in size.  Aortic Valve: The aortic valve is trileaflet. The peak and mean gradients are 12 mmHg and 6 mmHg, respectively with a dimensionless index of 0.63. There is mild aortic valve regurgitation.  Mitral Valve: The mitral valve is mildly thickened. The doppler estimated peak and mean diastolic pressure gradients are 4.3 mmHg and 1 mmHg, respectively. There is severe mitral annular calcification. The mean gradient of the mitral valve is 1 mmHg. There is mild to moderate mitral valve regurgitation. The E Vmax is 0.70 m/s.  Tricuspid Valve: The tricuspid valve is structurally normal. There is mild tricuspid regurgitation.  Pulmonic Valve: The pulmonic valve is not well visualized. The pulmonic valve regurgitation was not well visualized.  Pericardium: There is no pericardial effusion noted.  Aorta: The aortic root is abnormal. There is mild  dilatation of the aortic root.  Systemic Veins: The inferior vena cava was not assessed, IVC inspiratory collapse was not assessed.  In comparison to the previous echocardiogram(s): Compared with study dated 11/7/2024, no significant change.       CONCLUSIONS:   1. Left ventricular ejection fraction is moderately decreased by visual estimate at 35-40%.   2. There is global hypokinesis of the left ventricle with minor regional variations.   3. Left ventricular cavity size is severely dilated.   4. There is severe eccentric left ventricular hypertrophy.   5. There is normal right ventricular global systolic function.   6. The left atrial size is severely dilated.   7. There is severe mitral annular calcification.   8. Mild to moderate mitral valve regurgitation.   9. Mild aortic valve regurgitation.    QUANTITATIVE DATA SUMMARY:     2D MEASUREMENTS:          Normal Ranges:  IVSd:            1.08 cm  (0.6-1.1cm)  LVPWd:           0.97 cm  (0.6-1.1cm)  LVIDd:           5.78 cm  (3.9-5.9cm)  LVIDs:           4.05 cm  LV Mass Index:   137 g/m2  LV % FS          30.0 %       LEFT ATRIUM:                  Normal Ranges:  LA Vol A4C:        60.3 ml    (22+/-6mL/m2)  LA Vol A2C:        123.5 ml  LA Vol BP:         97.9 ml  LA Vol Index A4C:  34.4ml/m2  LA Vol Index A2C:  70.6 ml/m2  LA Vol Index BP:   56.0 ml/m2  LA Area A4C:       19.2 cm2  LA Area A2C:       31.2 cm2  LA Major Axis A4C: 5.2 cm  LA Major Axis A2C: 6.7 cm  LA Volume Index:   56.0 ml/m2  LA Vol A4C:        53.3 ml  LA Vol A2C:        113.2 ml  LA Vol Index BSA:  47.6 ml/m2       M-MODE MEASUREMENTS:         Normal Ranges:  LAs:                 4.64 cm (2.7-4.0cm)       LV SYSTOLIC FUNCTION:                       Normal Ranges:  EF-A2C View:    40 %  EF-Visual:      38 %  LV EF Reported: 38 %       LV DIASTOLIC FUNCTION:             Normal Ranges:  MV Peak E:             0.70 m/s    (0.7-1.2 m/s)  MV Peak A:             0.99 m/s    (0.42-0.7 m/s)  E/A Ratio:              0.70        (1.0-2.2)  MV e'                  0.048 m/s   (>8.0)  MV lateral e'          0.05 m/s  MV medial e'           0.05 m/s  MV A Dur:              173.17 msec  E/e' Ratio:            14.48       (<8.0)       MITRAL VALVE:          Normal Ranges:  MV Vmax:      1.04 m/s (<=1.3m/s)  MV peak P.3 mmHg (<5mmHg)  MV mean P.1 mmHg (<2mmHg)  MV DT:        341 msec (150-240msec)       AORTIC VALVE:                      Normal Ranges:  AoV Vmax:                1.75 m/s  (<=1.7m/s)  AoV Peak P.3 mmHg (<20mmHg)  AoV Mean P.9 mmHg  (1.7-11.5mmHg)  LVOT Max Adis:            1.39 m/s  (<=1.1m/s)  AoV VTI:                 36.75 cm  (18-25cm)  LVOT VTI:                23.30 cm  AoV Dimensionless Index: 0.63       AORTIC INSUFFICIENCY:  AI Vmax:       4.89 m/s  AI Half-time:  494 msec  AI Decel Time: 1705 msec  AI Decel Rate: 287.85 cm/s2       RIGHT VENTRICLE:  TAPSE: 23.8 mm  RV s'  0.12 m/s       TRICUSPID VALVE/RVSP:          Normal Ranges:  Peak TR Velocity:     3.23 m/s       [id number] Flo Galvan MD  Electronically signed on 2025 at 9:20:46 PM       ** Final **  Electrocardiogram, 12-lead PRN ACS symptoms  Sinus bradycardia  Left axis deviation  Left ventricular hypertrophy with QRS widening  T wave abnormality, consider anterior ischemia  Abnormal ECG  Confirmed by Oziel Mosquera ([id number]) on 2025 12:20:57 PM    Medications Ordered Prior to Encounter[1]    Results for orders placed or performed during the hospital encounter of 25 (from the past 24 hours)   Electrocardiogram, 12-lead PRN ACS symptoms   Result Value Ref Range    Ventricular Rate 53 BPM    Atrial Rate 54 BPM    DC Interval 200 ms    QRS Duration 126 ms    QT Interval 548 ms    QTC Calculation(Bazett) 514 ms    P Axis -6 degrees    R Axis -32 degrees    T Axis 23 degrees    QRS Count 9 beats    Q Onset 215 ms    P Onset 115 ms    P Offset 183 ms    T Offset 489 ms    QTC Fredericia  526 ms   Transthoracic Echo (TTE) Limited   Result Value Ref Range    AV pk eyal 1.75 m/s    AV mn grad 6 mmHg    MV avg E/e' ratio 13.65     MV E/A ratio 0.70     LA vol index A/L 56.0 ml/m2    Tricuspid annular plane systolic excursion 2.4 cm    LV EF 38 %    RV free wall pk S' 11.56 cm/s    LVIDd 5.78 cm    RVSP 45 mmHg    AV pk grad 12 mmHg   Basic metabolic panel   Result Value Ref Range    Glucose 93 74 - 99 mg/dL    Sodium 136 136 - 145 mmol/L    Potassium 4.0 3.5 - 5.3 mmol/L    Chloride 103 98 - 107 mmol/L    Bicarbonate 29 21 - 32 mmol/L    Anion Gap 8 (L) 10 - 20 mmol/L    Urea Nitrogen 13 6 - 23 mg/dL    Creatinine 1.00 0.50 - 1.05 mg/dL    eGFR 57 (L) >60 mL/min/1.73m*2    Calcium 8.2 (L) 8.6 - 10.3 mg/dL   CBC   Result Value Ref Range    WBC 8.3 4.4 - 11.3 x10*3/uL    nRBC 0.0 0.0 - 0.0 /100 WBCs    RBC 2.87 (L) 4.00 - 5.20 x10*6/uL    Hemoglobin 7.5 (L) 12.0 - 16.0 g/dL    Hematocrit 25.6 (L) 36.0 - 46.0 %    MCV 89 80 - 100 fL    MCH 26.1 26.0 - 34.0 pg    MCHC 29.3 (L) 32.0 - 36.0 g/dL    RDW 17.2 (H) 11.5 - 14.5 %    Platelets 417 150 - 450 x10*3/uL   SST TOP   Result Value Ref Range    Extra Tube Hold for add-ons.        Physical Exam    Well developed well nurished  No distress  Ao 3   Neck no jvd no bruit  Chest clear  CVS regular  Ext no edema  Abdo soft nontender bs normal  no masses, colostomy does have output  Cns alert appropriate able to move ext, gait not tested  Skin intact  Psych normal affect  Relevant Results           Medications Ordered Prior to Encounter[2]  Results for orders placed or performed during the hospital encounter of 08/06/25 (from the past 24 hours)   Electrocardiogram, 12-lead PRN ACS symptoms   Result Value Ref Range    Ventricular Rate 53 BPM    Atrial Rate 54 BPM    OH Interval 200 ms    QRS Duration 126 ms    QT Interval 548 ms    QTC Calculation(Bazett) 514 ms    P Axis -6 degrees    R Axis -32 degrees    T Axis 23 degrees    QRS Count 9 beats    Q Onset 215 ms     P Onset 115 ms    P Offset 183 ms    T Offset 489 ms    QTC Fredericia 526 ms   Transthoracic Echo (TTE) Limited   Result Value Ref Range    AV pk eyal 1.75 m/s    AV mn grad 6 mmHg    MV avg E/e' ratio 13.65     MV E/A ratio 0.70     LA vol index A/L 56.0 ml/m2    Tricuspid annular plane systolic excursion 2.4 cm    LV EF 38 %    RV free wall pk S' 11.56 cm/s    LVIDd 5.78 cm    RVSP 45 mmHg    AV pk grad 12 mmHg   Basic metabolic panel   Result Value Ref Range    Glucose 93 74 - 99 mg/dL    Sodium 136 136 - 145 mmol/L    Potassium 4.0 3.5 - 5.3 mmol/L    Chloride 103 98 - 107 mmol/L    Bicarbonate 29 21 - 32 mmol/L    Anion Gap 8 (L) 10 - 20 mmol/L    Urea Nitrogen 13 6 - 23 mg/dL    Creatinine 1.00 0.50 - 1.05 mg/dL    eGFR 57 (L) >60 mL/min/1.73m*2    Calcium 8.2 (L) 8.6 - 10.3 mg/dL   CBC   Result Value Ref Range    WBC 8.3 4.4 - 11.3 x10*3/uL    nRBC 0.0 0.0 - 0.0 /100 WBCs    RBC 2.87 (L) 4.00 - 5.20 x10*6/uL    Hemoglobin 7.5 (L) 12.0 - 16.0 g/dL    Hematocrit 25.6 (L) 36.0 - 46.0 %    MCV 89 80 - 100 fL    MCH 26.1 26.0 - 34.0 pg    MCHC 29.3 (L) 32.0 - 36.0 g/dL    RDW 17.2 (H) 11.5 - 14.5 %    Platelets 417 150 - 450 x10*3/uL   SST TOP   Result Value Ref Range    Extra Tube Hold for add-ons.                       Assessment & Plan  Intestinal obstruction (Multi)    Hypothyroid    Adjustment reaction with anxiety and depression    Essential hypertension    Stage 3 chronic kidney disease (Multi)    Anemia    Anticoagulant long-term use    Peripheral vascular disease    Atrial fibrillation     Resume anticoagulation once ok w surgery   Consulted cardiology as qtc is elevated, pt is off of amiodarone due to input from cards noted  Cont to monitor qtc  Rate control per cards   D/w cards this am  Plan to adjust medications noted  Monitor for now  Re eval for DC in am     Surgery following  Input noted  Tolerating diet  Surgery signing off         -Continue current treatment as ordered. Will make adjustments as  necessary.    Consider DC tomorrow am pending cardiac input     Plan of care discussed with: Provider, RN, Patient        Patient case and plan of care discussed with Dr. GHISLAINE Rodriguez.    YVONNE Green - CNP  -In collaboration with Dr. GHISLAINE Rodriguez    [facility]  Office: [phone]  Fax: [phone]  I have reviewed the above note obtained and documented by the NP/PA and I personally participated in the key components. I have discussed the case and management of the patient's care. Changes made to the note, and all key components of history and physical/progress note done by me.  dw nursing  Noted input from cardiology   Check qtc tomorrow anticipate dc to snf tomorrow   Rajendra Rodriguez MD             [1]   No current facility-administered medications on file prior to encounter.     Current Outpatient Medications on File Prior to Encounter   Medication Sig Dispense Refill    cephalexin (Keflex) 500 mg capsule Take 1 capsule (500 mg) by mouth 2 times a day.      acetaminophen (Tylenol 8 HOUR) 650 mg ER tablet Take 1 tablet (650 mg) by mouth every 8 hours if needed.      acetaminophen (Tylenol) 650 mg suppository Insert 1 suppository (650 mg) into the rectum every 8 hours if needed for mild pain (1 - 3).      alendronate (Fosamax) 70 mg tablet Take 1 tablet (70 mg) by mouth every 7 days. Take on an empty stomach. Do not lie down or eat for 1/2 hour after taking. (Patient not taking: Reported on 7/31/2025) 12 tablet 3    amiodarone (Pacerone) 200 mg tablet Take 1 tablet (200 mg) by mouth once daily in the morning.      amLODIPine (Norvasc) 5 mg tablet Take 1 tablet (5 mg) by mouth once daily. In the morning      apixaban (Eliquis) 2.5 mg tablet Take 1 tablet (2.5 mg) by mouth 2 times a day.      ascorbic acid (Vitamin C) 500 mg tablet Take 1 tablet (500 mg) by mouth once daily.      aspirin 81 mg EC tablet Take 1 tablet (81 mg) by mouth once daily.      atorvastatin (Lipitor) 10  mg tablet Take 1 tablet (10 mg) by mouth once daily at bedtime.      bisacodyl (Dulcolax, bisacodyl,) 10 mg suppository Insert 1 suppository (10 mg) into the rectum once daily as needed for constipation.      calcium carbonate 500 mg calcium (1,250 mg) chewable tablet Chew 1 tablet (1,250 mg) once daily.      [] cephalexin (Keflex) 500 mg capsule Take 1 capsule (500 mg) by mouth 2 times a day for 5 days. 10 capsule 0    cyanocobalamin, vitamin B-12, 5,000 mcg capsule Take 5,000 mcg by mouth once daily.      DAILY MULTI-VITAMIN ORAL Take 1 tablet by mouth once daily.      dimethicone (Cavilon Durable Barrier) 1.3 % cream Apply topically. Apply to lucio area topically every day and night shift for prevention. Apply to buttocks every shift as needed after each episode of incontinence.      docusate sodium (Colace) 100 mg capsule Take 1 capsule (100 mg) by mouth 2 times a day.      escitalopram (Lexapro) 20 mg tablet Take 1 tablet (20 mg) by mouth once daily. 90 tablet 1    ferrous sulfate, 325 mg ferrous sulfate, tablet Take 1 tablet (325 mg) by mouth once daily with breakfast. (Patient not taking: Reported on 2025)      furosemide (Lasix) 40 mg tablet Take 1 tablet (40 mg) by mouth once daily. (Patient not taking: Reported on 2025)      levomefolate calcium (L-METHYLFOLATE ORAL) Take 1,000 mcg by mouth once daily in the morning. (Patient not taking: Reported on 2025)      levothyroxine (Synthroid, Levoxyl) 100 mcg tablet TAKE 1 TABLET (100 MCG) BY MOUTH ONCE DAILY. SIX DAYS PER WEEK 90 tablet 0    lutein 6 mg capsule Take 1 capsule by mouth once daily.      magnesium hydroxide (Milk of Magnesia) 400 mg/5 mL suspension Take 30 mL by mouth once daily as needed for constipation (if no BM in 72 hours).      menthol-zinc oxide (Calmoseptine) 0.44-20.6 % ointment Apply 1 Application topically 2 times a day. (Patient not taking: Reported on 2025)      metoprolol succinate XL (Toprol-XL) 25 mg 24 hr  tablet Take 1 tablet (25 mg) by mouth once daily. Do not crush or chew. (Patient not taking: Reported on 7/31/2025)      mirtazapine (Remeron) 15 mg tablet Take 1 tablet (15 mg) by mouth once daily at bedtime. (Patient not taking: Reported on 7/31/2025)      ondansetron (Zofran) 4 mg tablet Take 1 tablet (4 mg) by mouth every 6 hours if needed for nausea or vomiting.      oxybutynin XL (Ditropan-XL) 10 mg 24 hr tablet Take 1 tablet (10 mg) by mouth once daily.      oxyCODONE (Roxicodone) 5 mg immediate release tablet Take 1 tablet (5 mg) by mouth every 6 hours if needed for severe pain (7 - 10).      pantoprazole (ProtoNix) 20 mg EC tablet Take 2 tablets (40 mg) by mouth once daily. Do not crush, chew, or split.      polyethylene glycol (Glycolax, Miralax) 17 gram packet Take 17 g by mouth 2 times a day as needed (for firm or low ostomy output).      sacubitriL-valsartan (Entresto) 24-26 mg tablet Take 1 tablet by mouth 2 times a day. (Patient not taking: Reported on 7/31/2025)      sennosides (Senokot) 8.6 mg tablet Take 1 tablet (8.6 mg) by mouth 2 times a day.      sodium phosphates (Fleets) 19-7 gram/118 mL enema enema Insert 133 mL (1 enema) into the rectum once daily as needed for constipation.      thyroid, pork, ([place] Thyroid) 30 mg tablet TAKE 1 TABLET (30 MG) BY MOUTH ONCE DAILY IN THE MORNING. TAKE BEFORE MEALS. (Patient not taking: Reported on 8/8/2025) 30 tablet 0    tocilizumab (Actemra) 200 mg/10 mL (20 mg/mL) solution Infuse into a venous catheter every 28 (twenty-eight) days. as directed (Patient not taking: Reported on 7/31/2025)      [DISCONTINUED] nicotine (Nicoderm CQ) 21 mg/24 hr patch Place 1 patch on the skin once every 24 hours. (Patient not taking: Reported on 7/31/2025)     [2]   No current facility-administered medications on file prior to encounter.     Current Outpatient Medications on File Prior to Encounter   Medication Sig Dispense Refill    cephalexin (Keflex) 500 mg capsule Take  1 capsule (500 mg) by mouth 2 times a day.      acetaminophen (Tylenol 8 HOUR) 650 mg ER tablet Take 1 tablet (650 mg) by mouth every 8 hours if needed.      acetaminophen (Tylenol) 650 mg suppository Insert 1 suppository (650 mg) into the rectum every 8 hours if needed for mild pain (1 - 3).      alendronate (Fosamax) 70 mg tablet Take 1 tablet (70 mg) by mouth every 7 days. Take on an empty stomach. Do not lie down or eat for 1/2 hour after taking. (Patient not taking: Reported on 2025) 12 tablet 3    amiodarone (Pacerone) 200 mg tablet Take 1 tablet (200 mg) by mouth once daily in the morning.      amLODIPine (Norvasc) 5 mg tablet Take 1 tablet (5 mg) by mouth once daily. In the morning      apixaban (Eliquis) 2.5 mg tablet Take 1 tablet (2.5 mg) by mouth 2 times a day.      ascorbic acid (Vitamin C) 500 mg tablet Take 1 tablet (500 mg) by mouth once daily.      aspirin 81 mg EC tablet Take 1 tablet (81 mg) by mouth once daily.      atorvastatin (Lipitor) 10 mg tablet Take 1 tablet (10 mg) by mouth once daily at bedtime.      bisacodyl (Dulcolax, bisacodyl,) 10 mg suppository Insert 1 suppository (10 mg) into the rectum once daily as needed for constipation.      calcium carbonate 500 mg calcium (1,250 mg) chewable tablet Chew 1 tablet (1,250 mg) once daily.      [] cephalexin (Keflex) 500 mg capsule Take 1 capsule (500 mg) by mouth 2 times a day for 5 days. 10 capsule 0    cyanocobalamin, vitamin B-12, 5,000 mcg capsule Take 5,000 mcg by mouth once daily.      DAILY MULTI-VITAMIN ORAL Take 1 tablet by mouth once daily.      dimethicone (Cavilon Durable Barrier) 1.3 % cream Apply topically. Apply to lucio area topically every day and night shift for prevention. Apply to buttocks every shift as needed after each episode of incontinence.      docusate sodium (Colace) 100 mg capsule Take 1 capsule (100 mg) by mouth 2 times a day.      escitalopram (Lexapro) 20 mg tablet Take 1 tablet (20 mg) by mouth once  daily. 90 tablet 1    ferrous sulfate, 325 mg ferrous sulfate, tablet Take 1 tablet (325 mg) by mouth once daily with breakfast. (Patient not taking: Reported on 7/31/2025)      furosemide (Lasix) 40 mg tablet Take 1 tablet (40 mg) by mouth once daily. (Patient not taking: Reported on 7/31/2025)      levomefolate calcium (L-METHYLFOLATE ORAL) Take 1,000 mcg by mouth once daily in the morning. (Patient not taking: Reported on 7/31/2025)      levothyroxine (Synthroid, Levoxyl) 100 mcg tablet TAKE 1 TABLET (100 MCG) BY MOUTH ONCE DAILY. SIX DAYS PER WEEK 90 tablet 0    lutein 6 mg capsule Take 1 capsule by mouth once daily.      magnesium hydroxide (Milk of Magnesia) 400 mg/5 mL suspension Take 30 mL by mouth once daily as needed for constipation (if no BM in 72 hours).      menthol-zinc oxide (Calmoseptine) 0.44-20.6 % ointment Apply 1 Application topically 2 times a day. (Patient not taking: Reported on 7/31/2025)      metoprolol succinate XL (Toprol-XL) 25 mg 24 hr tablet Take 1 tablet (25 mg) by mouth once daily. Do not crush or chew. (Patient not taking: Reported on 7/31/2025)      mirtazapine (Remeron) 15 mg tablet Take 1 tablet (15 mg) by mouth once daily at bedtime. (Patient not taking: Reported on 7/31/2025)      ondansetron (Zofran) 4 mg tablet Take 1 tablet (4 mg) by mouth every 6 hours if needed for nausea or vomiting.      oxybutynin XL (Ditropan-XL) 10 mg 24 hr tablet Take 1 tablet (10 mg) by mouth once daily.      oxyCODONE (Roxicodone) 5 mg immediate release tablet Take 1 tablet (5 mg) by mouth every 6 hours if needed for severe pain (7 - 10).      pantoprazole (ProtoNix) 20 mg EC tablet Take 2 tablets (40 mg) by mouth once daily. Do not crush, chew, or split.      polyethylene glycol (Glycolax, Miralax) 17 gram packet Take 17 g by mouth 2 times a day as needed (for firm or low ostomy output).      sacubitriL-valsartan (Entresto) 24-26 mg tablet Take 1 tablet by mouth 2 times a day. (Patient not taking:  Reported on 7/31/2025)      sennosides (Senokot) 8.6 mg tablet Take 1 tablet (8.6 mg) by mouth 2 times a day.      sodium phosphates (Fleets) 19-7 gram/118 mL enema enema Insert 133 mL (1 enema) into the rectum once daily as needed for constipation.      thyroid, pork, ([place] Thyroid) 30 mg tablet TAKE 1 TABLET (30 MG) BY MOUTH ONCE DAILY IN THE MORNING. TAKE BEFORE MEALS. (Patient not taking: Reported on 8/8/2025) 30 tablet 0    tocilizumab (Actemra) 200 mg/10 mL (20 mg/mL) solution Infuse into a venous catheter every 28 (twenty-eight) days. as directed (Patient not taking: Reported on 7/31/2025)      [DISCONTINUED] nicotine (Nicoderm CQ) 21 mg/24 hr patch Place 1 patch on the skin once every 24 hours. (Patient not taking: Reported on 7/31/2025)

## 2025-08-12 NOTE — PROGRESS NOTES
Anny Tesfaye is a 80 y.o. female on day 5 of admission presenting with Intestinal obstruction (Multi).    Subjective   Pt presenting to ED on 8/6 w abd pain, constipation, and n/v. Prior hx of SBO in January.     Pt seen on am rounds. NAEO. Reports doing well. Pain well controlled on current regimen. Appetite good, tolerating diet, denies N/V. Reporting lots of gas and stool OP from ostomy.           Objective   PE:  Constitutional: A&Ox3, calm and cooperative, NAD  Eyes: EOMI, clear sclera   Cardiovascular: Normal rate and regular rhythm. No murmurs  Respiratory/Thorax: CTAB, on 3L O2  Gastrointestinal: abd soft, mildly distended, nontender; +BS x4; stoma beefy red, well-pouched, ostomy OP 450mL/24h soft brown stool and lots of gas  Genitourinary: Voiding independently   Musculoskeletal: ROM intact, no joint swelling, normal strength  Extremities: No peripheral edema, contusions, or wounds  Neurological: A&Ox3, No focal deficits   Psychological: Appropriate mood and behavior  Skin: Warm and dry     Last Recorded Vitals  Blood pressure 145/81, pulse 67, temperature 36.1 °C (97 °F), temperature source Temporal, resp. rate 18, weight 68 kg (149 lb 14.6 oz), SpO2 96%.  Intake/Output last 3 Shifts:  I/O last 3 completed shifts:  In: 600 (8.8 mL/kg) [P.O.:600]  Out: 1300 (19.1 mL/kg) [Urine:850 (0.3 mL/kg/hr); Stool:450]  Weight: 68 kg     Relevant Results  Scheduled medications  Scheduled Medications[1]  Continuous medications  Continuous Medications[2]  PRN medications  PRN Medications[3]    Results for orders placed or performed during the hospital encounter of 08/06/25 (from the past 24 hours)   Electrocardiogram, 12-lead PRN ACS symptoms   Result Value Ref Range    Ventricular Rate 53 BPM    Atrial Rate 54 BPM    SC Interval 200 ms    QRS Duration 126 ms    QT Interval 548 ms    QTC Calculation(Bazett) 514 ms    P Axis -6 degrees    R Axis -32 degrees    T Axis 23 degrees    QRS Count 9 beats    Q Onset 215 ms     P Onset 115 ms    P Offset 183 ms    T Offset 489 ms    QTC Fredericia 526 ms   Transthoracic Echo (TTE) Limited   Result Value Ref Range    AV pk eyal 1.75 m/s    AV mn grad 6 mmHg    MV avg E/e' ratio 13.65     MV E/A ratio 0.70     LA vol index A/L 56.0 ml/m2    Tricuspid annular plane systolic excursion 2.4 cm    LV EF 38 %    RV free wall pk S' 11.56 cm/s    LVIDd 5.78 cm    RVSP 45 mmHg    AV pk grad 12 mmHg   Basic metabolic panel   Result Value Ref Range    Glucose 93 74 - 99 mg/dL    Sodium 136 136 - 145 mmol/L    Potassium 4.0 3.5 - 5.3 mmol/L    Chloride 103 98 - 107 mmol/L    Bicarbonate 29 21 - 32 mmol/L    Anion Gap 8 (L) 10 - 20 mmol/L    Urea Nitrogen 13 6 - 23 mg/dL    Creatinine 1.00 0.50 - 1.05 mg/dL    eGFR 57 (L) >60 mL/min/1.73m*2    Calcium 8.2 (L) 8.6 - 10.3 mg/dL   CBC   Result Value Ref Range    WBC 8.3 4.4 - 11.3 x10*3/uL    nRBC 0.0 0.0 - 0.0 /100 WBCs    RBC 2.87 (L) 4.00 - 5.20 x10*6/uL    Hemoglobin 7.5 (L) 12.0 - 16.0 g/dL    Hematocrit 25.6 (L) 36.0 - 46.0 %    MCV 89 80 - 100 fL    MCH 26.1 26.0 - 34.0 pg    MCHC 29.3 (L) 32.0 - 36.0 g/dL    RDW 17.2 (H) 11.5 - 14.5 %    Platelets 417 150 - 450 x10*3/uL     Transthoracic Echo (TTE) Limited  Result Date: 8/11/2025   [facility], [address] [place] [address]              Tel [phone] and Fax [phone] TRANSTHORACIC ECHOCARDIOGRAM REPORT  Patient Name:       PHAM Otoole Physician:    [id number] Flo Galvan MD Study Date:         8/11/2025           Ordering Provider:    [id number] PHAM SHIELDS MRN/PID:            [id number]            Fellow: Accession#:         [id number]        Nurse: Date of Birth/Age:  1945 / 80      Sonographer:          Souleymane galdamez RDCS Gender assigned at  F                    Additional Staff: Birth: Height:             165.00 cm           Admit Date: Weight:             68.00 kg            Admission Status:     Inpatient -                                                               Routine BSA / BMI:          1.75 m2 / 24.98     Encounter#:           [id number]                     kg/m2 Blood Pressure:     138/68 mmHg         Department Location:  [facility]                                                               Invasive Study Type:    TRANSTHORACIC ECHO (TTE) LIMITED Diagnosis/ICD: Heart failure, unspecified-I50.9 Indication:    HF CPT Code:      Echo Limited-40874 Patient History: Pertinent History: HTN. HF. Study Detail: The following Echo studies were performed: 2D, M-Mode, Doppler and               color flow.  PHYSICIAN INTERPRETATION: Left Ventricle: Left ventricular ejection fraction is moderately decreased by visual estimate at 35-40%. There is severe eccentric left ventricular hypertrophy. There is global hypokinesis of the left ventricle with minor regional variations. The left ventricular cavity size is severely dilated. There is mildly increased septal and mildly increased posterior left ventricular wall thickness. Left ventricular diastolic filling cannot be determined due to severe mitral annular calcification (MAC). LVEDV: 112ml/m2. Left Atrium: The left atrial size is severely dilated. Right Ventricle: The right ventricle is normal in size. There is normal right ventricular global systolic function. Right Atrium: The right atrium is normal in size. Aortic Valve: The aortic valve is trileaflet. The peak and mean gradients are 12 mmHg and 6 mmHg, respectively with a dimensionless index of 0.63. There is mild aortic valve regurgitation. Mitral Valve: The mitral valve is mildly thickened. The doppler estimated peak and mean diastolic pressure gradients are 4.3 mmHg and 1 mmHg, respectively. There is severe mitral annular calcification. The mean gradient of  the mitral valve is 1 mmHg. There is mild to moderate mitral valve regurgitation. The E Vmax is 0.70 m/s. Tricuspid Valve: The tricuspid valve is structurally normal. There is mild tricuspid regurgitation. Pulmonic Valve: The pulmonic valve is not well visualized. The pulmonic valve regurgitation was not well visualized. Pericardium: There is no pericardial effusion noted. Aorta: The aortic root is abnormal. There is mild dilatation of the aortic root. Systemic Veins: The inferior vena cava was not assessed, IVC inspiratory collapse was not assessed. In comparison to the previous echocardiogram(s): Compared with study dated 11/7/2024, no significant change.  CONCLUSIONS:  1. Left ventricular ejection fraction is moderately decreased by visual estimate at 35-40%.  2. There is global hypokinesis of the left ventricle with minor regional variations.  3. Left ventricular cavity size is severely dilated.  4. There is severe eccentric left ventricular hypertrophy.  5. There is normal right ventricular global systolic function.  6. The left atrial size is severely dilated.  7. There is severe mitral annular calcification.  8. Mild to moderate mitral valve regurgitation.  9. Mild aortic valve regurgitation. QUANTITATIVE DATA SUMMARY:  2D MEASUREMENTS:          Normal Ranges: IVSd:            1.08 cm  (0.6-1.1cm) LVPWd:           0.97 cm  (0.6-1.1cm) LVIDd:           5.78 cm  (3.9-5.9cm) LVIDs:           4.05 cm LV Mass Index:   137 g/m2 LV % FS          30.0 %  LEFT ATRIUM:                  Normal Ranges: LA Vol A4C:        60.3 ml    (22+/-6mL/m2) LA Vol A2C:        123.5 ml LA Vol BP:         97.9 ml LA Vol Index A4C:  34.4ml/m2 LA Vol Index A2C:  70.6 ml/m2 LA Vol Index BP:   56.0 ml/m2 LA Area A4C:       19.2 cm2 LA Area A2C:       31.2 cm2 LA Major Axis A4C: 5.2 cm LA Major Axis A2C: 6.7 cm LA Volume Index:   56.0 ml/m2 LA Vol A4C:        53.3 ml LA Vol A2C:        113.2 ml LA Vol Index BSA:  47.6 ml/m2  M-MODE  MEASUREMENTS:         Normal Ranges: LAs:                 4.64 cm (2.7-4.0cm)  LV SYSTOLIC FUNCTION:                      Normal Ranges: EF-A2C View:    40 % EF-Visual:      38 % LV EF Reported: 38 %  LV DIASTOLIC FUNCTION:             Normal Ranges: MV Peak E:             0.70 m/s    (0.7-1.2 m/s) MV Peak A:             0.99 m/s    (0.42-0.7 m/s) E/A Ratio:             0.70        (1.0-2.2) MV e'                  0.048 m/s   (>8.0) MV lateral e'          0.05 m/s MV medial e'           0.05 m/s MV A Dur:              173.17 msec E/e' Ratio:            14.48       (<8.0)  MITRAL VALVE:          Normal Ranges: MV Vmax:      1.04 m/s (<=1.3m/s) MV peak P.3 mmHg (<5mmHg) MV mean P.1 mmHg (<2mmHg) MV DT:        341 msec (150-240msec)  AORTIC VALVE:                      Normal Ranges: AoV Vmax:                1.75 m/s  (<=1.7m/s) AoV Peak P.3 mmHg (<20mmHg) AoV Mean P.9 mmHg  (1.7-11.5mmHg) LVOT Max Adis:            1.39 m/s  (<=1.1m/s) AoV VTI:                 36.75 cm  (18-25cm) LVOT VTI:                23.30 cm AoV Dimensionless Index: 0.63  AORTIC INSUFFICIENCY: AI Vmax:       4.89 m/s AI Half-time:  494 msec AI Decel Time: 1705 msec AI Decel Rate: 287.85 cm/s2  RIGHT VENTRICLE: TAPSE: 23.8 mm RV s'  0.12 m/s  TRICUSPID VALVE/RVSP:          Normal Ranges: Peak TR Velocity:     3.23 m/s  [id number] Flo Galvan MD Electronically signed on 2025 at 9:20:46 PM  ** Final **     Electrocardiogram, 12-lead PRN ACS symptoms  Result Date: 2025  Sinus bradycardia Left axis deviation Left ventricular hypertrophy with QRS widening T wave abnormality, consider anterior ischemia Abnormal ECG Confirmed by Oziel Mosquera ([id number]) on 2025 12:20:57 PM    XR abdomen 2 views supine and erect or decub  Result Date: 8/10/2025  Interpreted By:  Reji Garcia, STUDY: XR ABDOMEN 2 VIEWS SUPINE AND ERECT OR DECUB;  8/10/2025 8:49 am   INDICATION: Signs/Symptoms:SBO.     COMPARISON:  CT abdomen and pelvis with contrast 6 August 2025; intervening KUB exams most recently 9 August 2025   ACCESSION NUMBER(S): [id number]   ORDERING CLINICIAN: GUSTAVO CHAN   TECHNIQUE: Supine and  uprightviews of the abdomen. 2 views   FINDINGS: Enteric tube reaches the gastroesophageal junction before kinking twice and flipping back cephalad with tip in lower thoracic esophagus   No small bowel air-fluid levels on the upright view   Gaseous small bowel dilation persists relatively unchanged       The enteric tube is kinked twice, about 1 cm apart, at the level of the gastroesophageal junction, with the distal tip flipped back up in the distal thoracic esophagus. Careful repositioning to be reconsidered   Unchanged gaseous small bowel dilation   No free intraperitoneal air   MACRO: None   Signed by: Reji Garcia 8/10/2025 9:10 AM Dictation workstation:   [id number]    XR abdomen 2 views supine and erect or decub  Result Date: 8/9/2025  Interpreted By:  Roscoe Willis, STUDY: XR ABDOMEN 2 VIEWS SUPINE AND ERECT OR DECUB;  8/9/2025 6:33 am   INDICATION: Signs/Symptoms:SBO.     COMPARISON: 08/08/2025   ACCESSION NUMBER(S): [id number]   ORDERING CLINICIAN: GUSTAVO CHAN   TECHNIQUE: Abdomen supine and upright views   FINDINGS: There is an NG tube in place with its tip appears to be coiled within the distal esophagus, appearance of which is unchanged. There are significantly distended small bowel loops, up to 4.4 cm, similar to the previous examination. Findings are compatible with small-bowel obstruction. Extensive vascular calcifications with aorto bi iliac stent graft in place.       NG tube tip remains coiled within the distal esophagus. Bowel gas pattern compatible with small-bowel obstruction.   MACRO: None   Signed by: Roscoe Willis 8/9/2025 8:30 AM Dictation workstation:   [id number]    XR abdomen 2 views supine and erect or decub  Result Date: 8/8/2025  Interpreted By:  Jermaine Mcadams, STUDY: XR ABDOMEN 2 VIEWS  SUPINE AND ERECT OR DECUB;  8/8/2025 2:17 pm   INDICATION: Signs/Symptoms:Modified GG Study- 4 hours.   COMPARISON: CT scan abdomen and pelvis dated 08/06/2025. KUB obtained 1 hour after Gastrografin contrast administration through the patient's NG tube earlier this morning.   ACCESSION NUMBER(S): [id number]   ORDERING CLINICIAN: GUSTAVO CHAN   TECHNIQUE: AP portable supine and upright views of the abdomen and pelvis were obtained at 4:00 a.m. after contrast administration through the patient's NG tube. Stable NG tube in place with distal tip in the proximal stomach.   FINDINGS: There is a greater degree of contrast in the gastric fundus than there was previously. There is mild contrast throughout the remainder of the stomach and a small faint amount of contrast into the proximal duodenum. There is moderate gaseous distention of multiple small bowel loops. Stable proximal left pelvic colostomy. There is mild-to-moderate stool and mild gas throughout the colon. Stable vascular stents in the mid to distal abdominal aorta down through the common iliac arteries. Stable DJD and scoliosis in the dorsal spine. The extreme lung bases remain clear.       NG tube remains in place, unchanged.   Contrast administered through the patient's NG tube into the stomach earlier this morning has only reached the 3rd portion of the duodenum.   Persistent findings of at least partial mid to distal small bowel obstruction.   MACRO: None   Signed by: Jermaine Mcadams 8/8/2025 2:38 PM Dictation workstation:   [id number]    XR abdomen 2 views supine and erect or decub  Result Date: 8/8/2025  Interpreted By:  Jermaine Mcadams, STUDY: XR ABDOMEN 2 VIEWS SUPINE AND ERECT OR DECUB;  8/8/2025 11:04 am   INDICATION: Signs/Symptoms:Modified GG Study- 1 hour.   COMPARISON: CT scan from 08/06/2025.   ACCESSION NUMBER(S): [id number]   ORDERING CLINICIAN: GUSTAVO CHAN   TECHNIQUE: 1 hour after the administration of Gastrografin contrast material  through the patient's NG tube, 2 portable  views of the abdomen and pelvis were obtained. 1 was supine the other was upright.   FINDINGS: There is Gastrografin contrast in the gastric fundus. There is an NG tube in place also in the gastric fundus. There is mild gaseous distention of multiple small bowel loops throughout the abdomen and pelvis. There is also mild-to-moderate stool and mild gas throughout the colon and rectum. No pneumoperitoneum on the upright view. There are small bowel air-fluid levels on that view. There is a colostomy overlying the lateral aspect of the left iliac wing.   Vascular stent material in the mid to distal abdominal aorta down through the common iliac arteries. There are atherosclerotic calcifications in the external and internal iliac arteries and the common femoral arteries.   Slight S-shaped dorsal spine scoliosis. Moderate multilevel lumbar spine disc space narrowing with endplate spurring and there is also vacuum disc phenomenon at several levels. Mild-to-moderate DJD in the distal thoracic spine. Moderate sclerotic arthritic changes in both SI joints. Mild DJD in both hips. No destructive bone lesion.   The extreme lung bases were grossly clear.       Vascular stents in place as described. NG tube is in the proximal stomach. Mid left pelvic wall colostomy.   Scoliosis and DJD in the dorsal spine as described. DJD in the pelvis as well.   Contrast administered through the patient's NG tube is only in the gastric fundus at 1 hour after administration.   Intestinal gas pattern consistent with at least partial distal small bowel obstruction.   MACRO: None   Signed by: Jermaine Mcadams 8/8/2025 11:18 AM Dictation workstation:   [id number]    XR chest 1 view  Result Date: 8/7/2025  Interpreted By:  Oziel Sunshine, STUDY: XR CHEST 1 VIEW; ;  8/7/2025 4:14 am   INDICATION: Signs/Symptoms:NG tube placement.   COMPARISON: None.   ACCESSION NUMBER(S): [id number]   ORDERING CLINICIAN:  ZAY HUDSONF   TECHNIQUE: A portable upright radiograph of the chest is performed.   FINDINGS: A nasogastric tube is identified. The distal tube projects beyond the gastroesophageal junction however the distal 4 cm of tube is directed cephalad in the lower esophagus. Repositioning of the tube is recommended.   The heart is mildly enlarged. There is severe tortuosity and dilatation of the thoracic aorta which is similar to the previous study.   There are low lung volumes with linear areas of atelectasis greater on the left. Calcified granulomas identified in the right apex. There is no airspace consolidation, pleural effusion, or pneumothorax. The pulmonary vessels are within normal limits. Bulla formation may be present in the left apex which is unchanged.   The osseous structures are diffusely and severely demineralized.       A nasogastric tube is coiled distally with the tip projecting cranially in the lower esophagus. Repositioning is recommended.   Persistent dilatation and tortuosity of the thoracic aorta. This is described in greater detail on a recent CT examination of 07/31/2025. Please refer to this report.   Linear areas of atelectasis in the left lung. There is no airspace consolidation.     MACRO: None   Signed by: Oziel Sunshine 8/7/2025 7:28 AM Dictation workstation:   [id number]    CT abdomen pelvis w IV contrast  Result Date: 8/7/2025  Interpreted By:  Finkelstein, Evan, STUDY: CT ABDOMEN PELVIS W IV CONTRAST;  8/7/2025 12:05 am   INDICATION: Signs/Symptoms:Abdominal pain. vomiting..     COMPARISON: CT abdomen pelvis 11/2/2024   ACCESSION NUMBER(S): [id number]   ORDERING CLINICIAN: ZAY CHENEY   TECHNIQUE: Axial CT images of the abdomen and pelvis with coronal and sagittal reconstructed images obtained after intravenous administration of 75 mL Omnipaque 350   FINDINGS: LOWER CHEST: Cardiomegaly. Small right pleural effusion. Patchy bibasilar airspace opacities.   ABDOMEN:   LIVER: Normal  attenuation and contour. BILE DUCTS: Normal caliber. GALLBLADDER: Increased density in the gallbladder favored to represent biliary sludge or stones. No wall thickening or pericholecystic fluid. PORTAL VEIN: Patent SPLEEN: Calcifications scattered throughout the spleen compatible with sequela of prior granulomatous disease. PANCREAS: Unremarkable. ADRENALS: Unremarkable. KIDNEYS, URETERS and URINARY BLADDER: Symmetric renal enhancement. No hydronephrosis or perinephric fluid collection. 0.5 cm hypodensity in the right kidney measures simple fluid attenuation, most compatible with a simple cyst. Bladder is within normal limits REPRODUCTIVE ORGANS: No pelvic masses.   ABDOMINAL WALL: Left abdominal wall colostomy. PERITONEUM: No ascites, free air or fluid collection.   BOWEL: Postsurgical changes of partial colectomy. Left abdominal wall colostomy. Scattered colonic diverticula without definite pericolonic inflammatory stranding. Moderate colonic stool burden. Postsurgical changes at the level of the cecum, possibly related to appendectomy. There are dilated loops of small bowel throughout the abdomen and pelvis with decompressed loops of distal small bowel. Suspected transition point in the mid abdomen.   VESSELS: Aorto bi-iliac stent graft repair. A femoral to femoral bypass graft is visualized and appears patent. RETROPERITONEUM: No pathologically enlarged retroperitoneal lymph nodes.   BONES: No acute osseous abnormality. Degenerative changes throughout the spine.       Dilated loops of small bowel throughout the abdomen and pelvis with decompressed loops of distal small bowel and a suspected transition point in the mid abdomen compatible with small bowel obstruction.   Postsurgical changes of partial colectomy with a left abdominal wall colostomy, moderate colonic stool burden and scattered colonic diverticulosis. No CT evidence of acute diverticulitis.   Cholelithiasis without CT evidence of acute cholecystitis.    Cardiomegaly, small right pleural effusion and patchy bibasilar airspace opacities which may represent atelectasis with a developing infection not excluded in the appropriate clinical setting.     MACRO: None.   Signed by: Evan Finkelstein 8/7/2025 12:53 AM Dictation workstation:   [id number]    ECG 12 lead  Result Date: 8/2/2025   Poor data quality, interpretation may be adversely affected Normal sinus rhythm Septal infarct , age undetermined Lateral infarct , age undetermined Abnormal ECG When compared with ECG of 07-NOV-2024 14:00, Incomplete left bundle branch block is no longer Present Septal infarct is now Present Lateral infarct is now Present See ED provider note for full interpretation and clinical correlation Confirmed by Concepción Meza ([id number]) on 8/2/2025 9:08:22 AM    CT angio chest for pulmonary embolism  Result Date: 7/31/2025  Interpreted By:  Jovanny Cifuentes, STUDY: CT ANGIO CHEST FOR PULMONARY EMBOLISM;  7/31/2025 3:22 pm   INDICATION: Signs/Symptoms:chest pain, worse with deep inspiration.     COMPARISON: September 8, 2022 chest CT, November 2, 2024 CTA chest abdomen and pelvis   ACCESSION NUMBER(S): [id number]   ORDERING CLINICIAN: JAMAR IBRAHIM   TECHNIQUE: Helical data acquisition of the chest was obtained after intravenous administration of 75 ML Omnipaque 350, as per PE protocol. Images were reformatted in coronal and sagittal planes. Axial and coronal maximum intensity projection (MIP) images were created and reviewed. In addition, dual energy reconstructions were also performed including low energy virtual mono-energetic images and iodine density maps.   FINDINGS: POTENTIAL LIMITATIONS OF THE STUDY: Likely technically adequate although image degradation related to cardiac pulsation, and streak artifact from radiopaque structures including concentrated radiographic dye.   HEART AND VESSELS: Allowing for presumed artifact There are no discrete filling defects within main pulmonary artery  and its branches to suggest acute pulmonary embolism. Main pulmonary artery is dilated measuring 4.2 cm (Series 304, Image 205).   Similar dilation and tortuosity of the thoracic aorta. The aortic root is estimated at 5.0 cm (Series 603, Image 79) compared with 5.1 cm (Series 603, Image 74) November 2, 2024.Extensive atherosclerosis involving the thoracic aorta and branch vessels.Findings include marked calcific plaque at the origin of the right subclavian artery favoring high-grade stenosis. Mild coronary artery calcifications are seen. Please note,the study is not optimized for evaluation of coronary arteries.   Similar appearance of the cardiac chambers.   Similar trace pericardial fluid.   MEDIASTINUM AND NIKIA, LOWER NECK AND AXILLA: The visualized thyroid gland is within normal limits. No evidence of thoracic lymphadenopathy by CT criteria. Esophagus appears within normal limits as seen.   LUNGS AND AIRWAYS: Compared with November 2, 2024 interval resolution of small left-sided pleural effusion and significant improvement of right-sided effusion with tiny remaining effusion inferiorly with residual adjacent dependent atelectasis. There is apical predominant emphysema and scattered subsegmental atelectasis or scar.     UPPER ABDOMEN: The visualized subdiaphragmatic structures demonstrate no acute significant findings. Similar scattered benign postinflammatory calcifications. Similar nodular thickening left adrenal gland. There is scattered colonic diverticulosis.       CHEST WALL AND OSSEOUS STRUCTURES: Chest wall is within normal limits. No acute osseous pathology.There are no suspicious osseous lesions.Scattered degenerative changes. There is pronounced right shoulder arthrosis with chronic effusion. Chronic body wall edema. The abdominal portion appears improved.       1. No evidence of acute pulmonary embolism. 2. Improved expansion and aeration with resolution of left-sided effusion with marked improvement  of right-sided effusion with residual adjacent atelectasis. 3. Similar dilation of the thoracic aorta. The aortic root is estimated at 5 cm. 4. Similar enlargement main pulmonary artery estimated 4.1 cm. Correlate for clinical evidence of pulmonary arterial hypertension. 5. Extensive atherosclerosis including similar marked calcific plaque at the origin of the right subclavian artery favoring high-grade subclavian artery stenosis although limited assessment. Correlate with symptomatology. 6. Chronic body wall edema. The abdominal portion appears improved. 7. Additional similar chronic findings as reported.   MACRO: None   Signed by: Jovanny Cifuentes 7/31/2025 4:16 PM Dictation workstation:   [id number]    XR chest 1 view  Result Date: 7/31/2025  Interpreted By:  Larry Desai, STUDY: XR CHEST 1 VIEW  7/31/2025 1:24 pm   INDICATION: Signs/Symptoms:chest pain   COMPARISON: 11/08/2024   ACCESSION NUMBER(S): [id number]   ORDERING CLINICIAN: JAMAR IBRAHIM   TECHNIQUE: A single AP portable radiograph of the chest was obtained.   FINDINGS: Multiple cardiac monitoring leads are seen over the chest.   No focal infiltrate, pleural effusion or pneumothorax is identified. The cardiac silhouette is within normal limits for size.       No focal infiltrate or pneumothorax is identified.   MACRO: None.   Signed by: Larry Desai 7/31/2025 2:34 PM Dictation workstation:   [id number]        Assessment & Plan  Intestinal obstruction (Multi)    Hypothyroid    Adjustment reaction with anxiety and depression    Essential hypertension    Stage 3 chronic kidney disease (Multi)    Anemia    Anticoagulant long-term use    Peripheral vascular disease    Pt presenting to ED on 8/6 w abd pain, constipation, and n/v. Prior hx of SBO in January. Current SBO resolving. Doing well.   - abd soft, mildly distended, nontender; +BS x4; stoma beefy red, well-pouched, ostomy OP 450mL/24h soft brown stool and lots of gas   - VSS, afebrile  - labs: GFR  57, Cr 1.00; WBC 8.3; H/H 7.8/27.0 8/11 --> 7.5/25.6 8/12    Plan:  - Diet: FLD  - Okay to advance to softs   - NG removed yesterday   - DVT prophylaxis: SCDs/lovenox   - Encourage IS, encourage ambulation  - Continue current pain control regimen, pain well controlled  - PRN antiemetic   - Continue stoma care, monitor ostomy OP   - Wean supplemental O2 as tolerated       Dispo: Discussed plan with Dr. Torres. Azul for DC today from surgery perspective if tolerating diet.     I spent 35 minutes in the professional and overall care of this patient.       Clary Barger PA-C           [1] [Held by provider] amiodarone, 200 mg, oral, q AM  apixaban, 2.5 mg, oral, BID  atorvastatin, 10 mg, oral, Nightly  calcium carbonate, 500 mg of calcium carbonate, oral, Daily  cyanocobalamin, 5,000 mcg, oral, Daily  docusate sodium, 100 mg, oral, BID  escitalopram, 20 mg, oral, Daily  levothyroxine, 100 mcg, oral, Once per day on Monday Tuesday Wednesday Thursday Friday Saturday  metoprolol, 5 mg, intravenous, q6h  pantoprazole, 40 mg, oral, Daily before breakfast   Or  pantoprazole, 40 mg, intravenous, Daily before breakfast     [2]    [3] PRN medications: acetaminophen **OR** acetaminophen **OR** acetaminophen, hydrALAZINE, morphine, [Held by provider] ondansetron **OR** [Held by provider] ondansetron, phenoL

## 2025-08-12 NOTE — CARE PLAN
The patient's goals for the shift include      The clinical goals for the shift include maintain comfort and maintain patient safety

## 2025-08-12 NOTE — PROGRESS NOTES
"Subjective Data:  No acute events  QT/QTC today 524/505  Feels better today   On her home O2 of 3LNC     Objective Data:  Last Recorded Vitals:  Vitals:    25 1900 25 0027 25 0300 25 0739   BP: 115/69 143/83 152/80 145/81   Patient Position:       Pulse: 64 64 70 67   Resp:  18 18 18   Temp: 37.1 °C (98.7 °F) 36.9 °C (98.5 °F) 36.8 °C (98.3 °F) 36.1 °C (97 °F)   TempSrc:    Temporal   SpO2: 99% 97% 97% 96%   Weight:         Medical Gas Therapy: Supplemental oxygen  Medical Gas Delivery Method: Nasal cannula  Weight  Av kg (149 lb 14.6 oz)  Min: 68 kg (149 lb 14.6 oz)  Max: 68 kg (149 lb 14.6 oz)    LABS:  CMP:  Results from last 7 days   Lab Units 25  0545 25  0545 08/10/25  0622 25  0523 25  0516 25  0828 25  2307   SODIUM mmol/L 136 136 139 137 135* 134* 135*   POTASSIUM mmol/L 4.0 3.4* 4.2 4.0 4.3 4.3 4.8   CHLORIDE mmol/L 103 102 103 96* 94* 93* 93*   CO2 mmol/L 29 30 28 25 33* 30 33*   ANION GAP mmol/L 8* 7* 12 20 12 15 14   BUN mg/dL 13 16 19 17 16 17 19   CREATININE mg/dL 1.00 1.03 1.00 1.01 0.99 0.98 1.04   EGFR mL/min/1.73m*2 57* 55* 57* 56* 57* 58* 54*   MAGNESIUM mg/dL  --  1.89  --   --   --   --   --    ALBUMIN g/dL  --   --   --   --   --   --  3.0*   ALT U/L  --   --   --   --   --   --  7   AST U/L  --   --   --   --   --   --  10   BILIRUBIN TOTAL mg/dL  --   --   --   --   --   --  0.3     CBC:  Results from last 7 days   Lab Units 25  0545 25  0545 08/10/25  0622 25  0523 25  0516 25  0828 25  2307   WBC AUTO x10*3/uL 8.3 8.7 8.3 9.2 11.4* 11.2 11.7*   HEMOGLOBIN g/dL 7.5* 7.8* 7.2* 7.8* 7.7* 8.1* 9.0*   HEMATOCRIT % 25.6* 27.0* 26.4* 25.9* 25.8* 26.5* 30.4*   PLATELETS AUTO x10*3/uL 417 409 384 497* 529* 521* 599*   MCV fL 89 90 96 88 88 86 89     COAG:     ABO: No results found for: \"ABO\"  HEME/ENDO:     CARDIAC:       No lab exists for component: \"CK\", \"CKMBP\"          Last I/O:    Intake/Output " Summary (Last 24 hours) at 8/12/2025 1026  Last data filed at 8/12/2025 0300  Gross per 24 hour   Intake 360 ml   Output 1300 ml   Net -940 ml     Net IO Since Admission: 2,517.5 mL [08/12/25 1026]      Imaging Results:  XR chest 1 view  Result Date: 8/7/2025  Interpreted By:  Oziel Sunshine, STUDY: XR CHEST 1 VIEW; ;  8/7/2025 4:14 am   INDICATION: Signs/Symptoms:NG tube placement.   COMPARISON: None.   ACCESSION NUMBER(S): [id number]   ORDERING CLINICIAN: ZAY CHENEY   TECHNIQUE: A portable upright radiograph of the chest is performed.   FINDINGS: A nasogastric tube is identified. The distal tube projects beyond the gastroesophageal junction however the distal 4 cm of tube is directed cephalad in the lower esophagus. Repositioning of the tube is recommended.   The heart is mildly enlarged. There is severe tortuosity and dilatation of the thoracic aorta which is similar to the previous study.   There are low lung volumes with linear areas of atelectasis greater on the left. Calcified granulomas identified in the right apex. There is no airspace consolidation, pleural effusion, or pneumothorax. The pulmonary vessels are within normal limits. Bulla formation may be present in the left apex which is unchanged.   The osseous structures are diffusely and severely demineralized.       A nasogastric tube is coiled distally with the tip projecting cranially in the lower esophagus. Repositioning is recommended.   Persistent dilatation and tortuosity of the thoracic aorta. This is described in greater detail on a recent CT examination of 07/31/2025. Please refer to this report.   Linear areas of atelectasis in the left lung. There is no airspace consolidation.     MACRO: None   Signed by: Oziel Sunshine 8/7/2025 7:28 AM Dictation workstation:   [id number]    CT abdomen pelvis w IV contrast  Result Date: 8/7/2025  Interpreted By:  Finkelstein, Evan, STUDY: CT ABDOMEN PELVIS W IV CONTRAST;  8/7/2025 12:05 am    INDICATION: Signs/Symptoms:Abdominal pain. vomiting..     COMPARISON: CT abdomen pelvis 11/2/2024   ACCESSION NUMBER(S): [id number]   ORDERING CLINICIAN: ZAY CHENEY   TECHNIQUE: Axial CT images of the abdomen and pelvis with coronal and sagittal reconstructed images obtained after intravenous administration of 75 mL Omnipaque 350   FINDINGS: LOWER CHEST: Cardiomegaly. Small right pleural effusion. Patchy bibasilar airspace opacities.   ABDOMEN:   LIVER: Normal attenuation and contour. BILE DUCTS: Normal caliber. GALLBLADDER: Increased density in the gallbladder favored to represent biliary sludge or stones. No wall thickening or pericholecystic fluid. PORTAL VEIN: Patent SPLEEN: Calcifications scattered throughout the spleen compatible with sequela of prior granulomatous disease. PANCREAS: Unremarkable. ADRENALS: Unremarkable. KIDNEYS, URETERS and URINARY BLADDER: Symmetric renal enhancement. No hydronephrosis or perinephric fluid collection. 0.5 cm hypodensity in the right kidney measures simple fluid attenuation, most compatible with a simple cyst. Bladder is within normal limits REPRODUCTIVE ORGANS: No pelvic masses.   ABDOMINAL WALL: Left abdominal wall colostomy. PERITONEUM: No ascites, free air or fluid collection.   BOWEL: Postsurgical changes of partial colectomy. Left abdominal wall colostomy. Scattered colonic diverticula without definite pericolonic inflammatory stranding. Moderate colonic stool burden. Postsurgical changes at the level of the cecum, possibly related to appendectomy. There are dilated loops of small bowel throughout the abdomen and pelvis with decompressed loops of distal small bowel. Suspected transition point in the mid abdomen.   VESSELS: Aorto bi-iliac stent graft repair. A femoral to femoral bypass graft is visualized and appears patent. RETROPERITONEUM: No pathologically enlarged retroperitoneal lymph nodes.   BONES: No acute osseous abnormality. Degenerative changes throughout  the spine.       Dilated loops of small bowel throughout the abdomen and pelvis with decompressed loops of distal small bowel and a suspected transition point in the mid abdomen compatible with small bowel obstruction.   Postsurgical changes of partial colectomy with a left abdominal wall colostomy, moderate colonic stool burden and scattered colonic diverticulosis. No CT evidence of acute diverticulitis.   Cholelithiasis without CT evidence of acute cholecystitis.   Cardiomegaly, small right pleural effusion and patchy bibasilar airspace opacities which may represent atelectasis with a developing infection not excluded in the appropriate clinical setting.     MACRO: None.   Signed by: Evan Finkelstein 8/7/2025 12:53 AM Dictation workstation:   [id number]          Past Cardiology Tests (Last 3 Years):  EKG:  Results for orders placed during the hospital encounter of 08/06/25    Electrocardiogram, 12-lead PRN ACS symptoms    Narrative  Sinus bradycardia  Left axis deviation  Left ventricular hypertrophy with QRS widening  T wave abnormality, consider anterior ischemia  Abnormal ECG  Confirmed by Oziel Mosquera ([id number]) on 8/11/2025 12:20:57 PM      Results for orders placed during the hospital encounter of 07/31/25    ECG 12 lead    Narrative   Poor data quality, interpretation may be adversely affected  Normal sinus rhythm  Septal infarct , age undetermined  Lateral infarct , age undetermined  Abnormal ECG  When compared with ECG of 07-NOV-2024 14:00,  Incomplete left bundle branch block is no longer Present  Septal infarct is now Present  Lateral infarct is now Present  See ED provider note for full interpretation and clinical correlation  Confirmed by Concepción Meza ([id number]) on 8/2/2025 9:08:22 AM      Results for orders placed during the hospital encounter of 11/02/24    Electrocardiogram, 12-lead PRN ACS symptoms    Narrative  Atrial fibrillation with rapid ventricular response  Cannot rule out Inferior  infarct , age undetermined  Cannot rule out Anterior infarct , age undetermined  Abnormal ECG  No previous ECGs available  See ED provider note for full interpretation and clinical correlation  Confirmed by Payton Dawn ([id number]) on 1/30/2025 8:15:58 PM      Electrocardiogram, 12-lead PRN ACS symptoms    Narrative  Sinus bradycardia with 1st degree AV block  Incomplete left bundle branch block  Nonspecific T wave abnormality  Abnormal ECG  When compared with ECG of 07-NOV-2024 13:36, (unconfirmed)  Sinus rhythm has replaced Ectopic atrial rhythm  Vent. rate has decreased BY  61 BPM  Nonspecific T wave abnormality now evident in Anterior leads  Nonspecific T wave abnormality, improved in Lateral leads  Confirmed by Rashad Ramirez ([id number]) on 11/14/2024 11:41:03 AM      Electrocardiogram, 12-lead PRN ACS symptoms    Narrative  Unusual P axis, possible ectopic atrial tachycardia with undetermined rhythm irregularity  Cannot rule out Anterior infarct (cited on or before 02-NOV-2024)  Abnormal ECG  When compared with ECG of 02-NOV-2024 07:38, (unconfirmed)  Ectopic atrial rhythm has replaced Atrial fibrillation  Minimal criteria for Inferior infarct are no longer Present  Confirmed by Rashad Ramirez ([id number]) on 11/14/2024 11:41:40 AM    Echo:  Results for orders placed during the hospital encounter of 08/06/25    Transthoracic Echo (TTE) Limited    [facility], [address] [place] [address]  Tel [phone] and Fax [phone]    TRANSTHORACIC ECHOCARDIOGRAM REPORT      Patient Name:       PHAM Otoole Physician:    [id number] Flo Galvan MD  Study Date:         8/11/2025           Ordering Provider:    [id number] PHAM SHIELDS  MRN/PID:            [id number]            Fellow:  Accession#:         [id number]        Nurse:  Date of Birth/Age:  1945 / 80      Sonographer:          Souleymane galdamez RDCS  Gender assigned at  F                    Additional Staff:  Birth:  Height:             165.00 cm           Admit Date:  Weight:             68.00 kg            Admission Status:     Inpatient -  Routine  BSA / BMI:          1.75 m2 / 24.98     Encounter#:           [id number]  kg/m2  Blood Pressure:     138/68 mmHg         Department Location:  [facility] Non  Invasive    Study Type:    TRANSTHORACIC ECHO (TTE) LIMITED  Diagnosis/ICD: Heart failure, unspecified-I50.9  Indication:    HF  CPT Code:      Echo Limited-74072    Patient History:  Pertinent History: HTN. HF.    Study Detail: The following Echo studies were performed: 2D, M-Mode, Doppler and  color flow.      PHYSICIAN INTERPRETATION:  Left Ventricle: Left ventricular ejection fraction is moderately decreased by visual estimate at 35-40%. There is severe eccentric left ventricular hypertrophy. There is global hypokinesis of the left ventricle with minor regional variations. The left ventricular cavity size is severely dilated. There is mildly increased septal and mildly increased posterior left ventricular wall thickness. Left ventricular diastolic filling cannot be determined due to severe mitral annular calcification (MAC). LVEDV: 112ml/m2.  Left Atrium: The left atrial size is severely dilated.  Right Ventricle: The right ventricle is normal in size. There is normal right ventricular global systolic function.  Right Atrium: The right atrium is normal in size.  Aortic Valve: The aortic valve is trileaflet. The peak and mean gradients are 12 mmHg and 6 mmHg, respectively with a dimensionless index of 0.63. There is mild aortic valve regurgitation.  Mitral Valve: The mitral valve is mildly thickened. The doppler estimated peak and mean diastolic pressure gradients are 4.3 mmHg and 1 mmHg, respectively. There is severe mitral annular calcification. The mean gradient of the mitral valve is 1 mmHg. There is mild to moderate mitral valve regurgitation. The E Vmax is 0.70  m/s.  Tricuspid Valve: The tricuspid valve is structurally normal. There is mild tricuspid regurgitation.  Pulmonic Valve: The pulmonic valve is not well visualized. The pulmonic valve regurgitation was not well visualized.  Pericardium: There is no pericardial effusion noted.  Aorta: The aortic root is abnormal. There is mild dilatation of the aortic root.  Systemic Veins: The inferior vena cava was not assessed, IVC inspiratory collapse was not assessed.  In comparison to the previous echocardiogram(s): Compared with study dated 11/7/2024, no significant change.      CONCLUSIONS:  1. Left ventricular ejection fraction is moderately decreased by visual estimate at 35-40%.  2. There is global hypokinesis of the left ventricle with minor regional variations.  3. Left ventricular cavity size is severely dilated.  4. There is severe eccentric left ventricular hypertrophy.  5. There is normal right ventricular global systolic function.  6. The left atrial size is severely dilated.  7. There is severe mitral annular calcification.  8. Mild to moderate mitral valve regurgitation.  9. Mild aortic valve regurgitation.    QUANTITATIVE DATA SUMMARY:    2D MEASUREMENTS:          Normal Ranges:  IVSd:            1.08 cm  (0.6-1.1cm)  LVPWd:           0.97 cm  (0.6-1.1cm)  LVIDd:           5.78 cm  (3.9-5.9cm)  LVIDs:           4.05 cm  LV Mass Index:   137 g/m2  LV % FS          30.0 %      LEFT ATRIUM:                  Normal Ranges:  LA Vol A4C:        60.3 ml    (22+/-6mL/m2)  LA Vol A2C:        123.5 ml  LA Vol BP:         97.9 ml  LA Vol Index A4C:  34.4ml/m2  LA Vol Index A2C:  70.6 ml/m2  LA Vol Index BP:   56.0 ml/m2  LA Area A4C:       19.2 cm2  LA Area A2C:       31.2 cm2  LA Major Axis A4C: 5.2 cm  LA Major Axis A2C: 6.7 cm  LA Volume Index:   56.0 ml/m2  LA Vol A4C:        53.3 ml  LA Vol A2C:        113.2 ml  LA Vol Index BSA:  47.6 ml/m2      M-MODE MEASUREMENTS:         Normal Ranges:  LAs:                 4.64  cm (2.7-4.0cm)      LV SYSTOLIC FUNCTION:  Normal Ranges:  EF-A2C View:    40 %  EF-Visual:      38 %  LV EF Reported: 38 %      LV DIASTOLIC FUNCTION:             Normal Ranges:  MV Peak E:             0.70 m/s    (0.7-1.2 m/s)  MV Peak A:             0.99 m/s    (0.42-0.7 m/s)  E/A Ratio:             0.70        (1.0-2.2)  MV e'                  0.048 m/s   (>8.0)  MV lateral e'          0.05 m/s  MV medial e'           0.05 m/s  MV A Dur:              173.17 msec  E/e' Ratio:            14.48       (<8.0)      MITRAL VALVE:          Normal Ranges:  MV Vmax:      1.04 m/s (<=1.3m/s)  MV peak P.3 mmHg (<5mmHg)  MV mean P.1 mmHg (<2mmHg)  MV DT:        341 msec (150-240msec)      AORTIC VALVE:                      Normal Ranges:  AoV Vmax:                1.75 m/s  (<=1.7m/s)  AoV Peak P.3 mmHg (<20mmHg)  AoV Mean P.9 mmHg  (1.7-11.5mmHg)  LVOT Max Adis:            1.39 m/s  (<=1.1m/s)  AoV VTI:                 36.75 cm  (18-25cm)  LVOT VTI:                23.30 cm  AoV Dimensionless Index: 0.63      AORTIC INSUFFICIENCY:  AI Vmax:       4.89 m/s  AI Half-time:  494 msec  AI Decel Time: 1705 msec  AI Decel Rate: 287.85 cm/s2      RIGHT VENTRICLE:  TAPSE: 23.8 mm  RV s'  0.12 m/s      TRICUSPID VALVE/RVSP:          Normal Ranges:  Peak TR Velocity:     3.23 m/s      [id number] Flo Galvan MD  Electronically signed on 2025 at 9:20:46 PM        ** Final **      Results for orders placed during the hospital encounter of 24    Transesophageal Echo (UMESH) w/ Possible Cardioversion    Interpretation Summary  [facility], [address] [place] [address]  Tel [phone] and Fax [phone]    TRANSESOPHAGEAL ECHOCARDIOGRAM REPORT    WITH DIRECT CURRENT CARDIOVERSION    Patient Name:       PHAM Otoole Physician:   [id number] Rashad Ramirez DO  Study Date:         2024           Ordering Provider:   [id number] STEPHEN RUSSELL  MRN/PID:             [id number]            Fellow:  Accession#:         [id number]        Nurse:               Neil Fowler RN  Date of Birth/Age:  1945 / 79      Sonographer:         Souleymane De Leon RDCS  years  Gender assigned at  F                   Additional Staff:    Jordan WEATHERS  Birth:  Height:             165.10 cm           Admit Date:          11/2/2024  Weight:             73.94 kg            Admission Status:    Inpatient - Routine  BSA / BMI:          1.81 m2 / 27.12     Encounter#:          [id number]  kg/m2  Blood Pressure:     106/67 mmHg         Department Location: [facility] Cath Lab    Study Type:    TRANSESOPHAGEAL ECHO (UMESH) W/ POSSIBLE CARDIOVERSION  Diagnosis/ICD: Unspecified atrial fibrillation-I48.91; Shortness of  breath-R06.02; Other persistent AFib-I48.19  Indication:    Atrial Fibrillation, SOB  CPT Code:      UMESH Complete-68411; Doppler Limited-53816; Color Doppler-38914    Patient History:  Drug Allergies:    None  Smoker:            Current.  Diabetes:          No  Pertinent History: HTN, Hyperlipidemia, CHF and Murmur. 79 y.o. female presents  for UMESH/CVN for persistent A-fib.    Study Detail: The following Echo studies were performed: 2D, Doppler and 3D.  Patient's heart rhythm is atrial fibrillation. A bubble study was  not performed. The patient was sedated.      PHYSICIAN INTERPRETATION:  UMESH Details: The UMESH probe used was 5177. Technically adequate omniplane transesophageal echocardiogram performed. Color flow Doppler echo was performed to assess for the presence of a patent foramen ovale.  UMESH Medication: The pharynx was anesthetized with Cetacaine spray and viscous lidocaine. The patient was sedated by Anesthesia; please refer to anesthesia flow sheet for medications used.  UMESH Procedure: The probe was passed without difficulty. Complications encountered during procedure: Patient tolerated the procedure well without any apparent complications.  UMESH Cardioversion  Procedure:  The patient was placed in a supine position and anterior-posterior  defibrillation patches were applied. A biphasic Zoll defibrillator was attached  to the patient and set to synchronous mode.  One synchronized biphasic external shock was delivered at 200 Joules in attempt  to cardiovert the patient from atrial fibrillation. The procedure was  successful, resulting in sinus bradycardia.  The patient recovered uneventfully from the effects on conscious and deep  sedation. The procedure was well tolerated. There were no complications. The  patient was discharged from the Cardiac Cath Lab hemodynamically stable and with  no neurological deficits.    Left Ventricle: Left ventricular ejection fraction is moderately decreased, by visual estimate at 30-35%. There are multiple left ventricular wall motion abnormalities. The left ventricular cavity size was not assessed. Left ventricular diastolic filling was not assessed.  Left Atrium: The left atrium is enlarged. A bubble study using agitated saline was not performed. The left atrial appendage Doppler velocities are normal and there is no thrombus visualized in the left atrial appendage.  Right Ventricle: The right ventricle was not well visualized. Unable to determine right ventricular systolic function.  Right Atrium: The right atrium was not well visualized.  Aortic Valve: The aortic valve is structurally normal. There is trace aortic valve regurgitation.  Mitral Valve: The mitral valve is normal in structure. There is trace mitral valve regurgitation.  Tricuspid Valve: The tricuspid valve is structurally normal. No evidence of tricuspid regurgitation.  Pulmonic Valve: The pulmonic valve is structurally normal. There is no indication of pulmonic valve regurgitation.  Pericardium: Pericardial effusion was not assessed.  Aorta: The aortic root is abnormal. Aortic root enlarged at 4.5 cm. Ascending aorta 4.3 cm.      CONCLUSIONS:  1. Left ventricular ejection  fraction is moderately decreased, by visual estimate at 30-35%.  2. There are multiple left ventricular wall motion abnormalities.  3. Successful cardioversion for atrial fibrillation resulting in sinus bradycardia.  4. Unable to determine right ventricular systolic function.  5. The left atrium is enlarged.  6. Aortic root enlarged at 4.5 cm. Ascending aorta 4.3 cm.  7. No LA or ANGEL thrombus.    QUANTITATIVE DATA SUMMARY:    LV SYSTOLIC FUNCTION BY 2D PLANIMETRY (MOD):  Normal Ranges:  EF-Visual:      33 %  LV EF Reported: 33 %      [id number] Rashad Ramirez DO  Electronically signed on 11/7/2024 at 3:56:32 PM        ** Final **      Transthoracic Echo (TTE) Complete    Narrative  [facility], [address] [place] [address]  Tel [phone] and Fax [phone]    TRANSTHORACIC ECHOCARDIOGRAM REPORT      Patient Name:       PHAM Otoole Physician:    [id number] Rashad Ramirez DO  Study Date:         11/4/2024           Ordering Provider:    [id number] KYRA PEREZ  MRN/PID:            [id number]            Fellow:  Accession#:         [id number]        Nurse:  Date of Birth/Age:  1945 / 79      Sonographer:          Reina Bernal RDCS  years  Gender assigned at  F                   Additional Staff:  Birth:  Height:             165.10 cm           Admit Date:           11/2/2024  Weight:             73.94 kg            Admission Status:     Inpatient -  Routine  BSA / BMI:          1.81 m2 / 27.12     Encounter#:           [id number]  kg/m2  Blood Pressure:     97/58 mmHg          Department Location:  [facility] Non  Invasive    Study Type:    TRANSTHORACIC ECHO (TTE) COMPLETE  Diagnosis/ICD: Heart failure, unspecified-I50.9  Indication:    Heart Failure  CPT Code:      Echo Complete w Full Doppler-67071    Patient History:  Pertinent History: HTN, Hyperlipidemia, LE Edema, Murmur and A-Fib.    Study Detail: The following Echo studies were performed: 2D, M-Mode, Doppler and  color flow.  Definity used as a contrast agent for endocardial  border definition. Total contrast used for this procedure was 1 mL  via IV push.      PHYSICIAN INTERPRETATION:  Left Ventricle: Left ventricular ejection fraction is moderately decreased, by visual estimate at 30-35%. The patient is in atrial fibrillation which may influence the estimate of left ventricular function and transvalvular flows. There is mildly increased concentric left ventricular hypertrophy. There is global hypokinesis of the left ventricle with minor regional variations. The left ventricular cavity size is normal. There is mildly increased septal and mildly increased posterior left ventricular wall thickness. Left ventricular diastolic filling cannot be determined, due to atrial fibrillation/flutter.  Left Atrium: The left atrium is severely dilated.  Right Ventricle: The right ventricle is moderately enlarged. There is moderately reduced right ventricular systolic function.  Right Atrium: The right atrium is normal in size.  Aortic Valve: The aortic valve is trileaflet. There is mild aortic valve cusp calcification. The aortic valve dimensionless index is 0.71. There is mild aortic valve regurgitation. The peak instantaneous gradient of the aortic valve is 9 mmHg. The mean gradient of the mitral valve is 5 mmHg.  Mitral Valve: The mitral valve is normal in structure. There is moderate mitral annular calcification. There is trace to mild mitral valve regurgitation.  Tricuspid Valve: The tricuspid valve is structurally normal. There is trace tricuspid regurgitation. The Doppler estimated RVSP is mildly elevated at 43.0 mmHg.  Pulmonic Valve: The pulmonic valve is structurally normal. There is trace pulmonic valve regurgitation.  Pericardium: Trivial pericardial effusion.  Aorta: The aortic root is abnormal. Aortic root moderatly enlarged at 4.8 cm. Ascending aorta 4.4 cm.  Systemic Veins: The inferior vena cava appears dilated, with IVC inspiratory  collapse less than 50%.  In comparison to the previous echocardiogram(s): There are no prior studies on this patient for comparison purposes.      CONCLUSIONS:  1. Left ventricular ejection fraction is moderately decreased, by visual estimate at 30-35%.  2. There is global hypokinesis of the left ventricle with minor regional variations.  3. Left ventricular diastolic filling cannot be determined, due to atrial fibrillation/flutter.  4. There is moderately reduced right ventricular systolic function.  5. Moderately enlarged right ventricle.  6. The left atrium is severely dilated.  7. There is moderate mitral annular calcification.  8. Mildly elevated right ventricular systolic pressure.  9. Mild aortic valve regurgitation.  10. Aortic root moderatly enlarged at 4.8 cm. Ascending aorta 4.4 cm.  11. The patient is in atrial fibrillation which may influence the estimate of left ventricular function and transvalvular flows.    QUANTITATIVE DATA SUMMARY:    2D MEASUREMENTS:          Normal Ranges:  LAs:             4.45 cm  (2.7-4.0cm)  RVIDd:           4.83 cm  (0.9-3.6cm)  IVSd:            1.02 cm  (0.6-1.1cm)  LVPWd:           1.06 cm  (0.6-1.1cm)  LVIDd:           4.93 cm  (3.9-5.9cm)  LVIDs:           4.48 cm  LV Mass Index:   103 g/m2  LVEDV Index:     91 ml/m2  LV % FS          9.0 %      LA VOLUME:                    Normal Ranges:  LA Vol A4C:        89.1 ml    (22+/-6mL/m2)  LA Vol A2C:        85.1 ml  LA Vol BP:         87.1 ml  LA Vol Index A4C:  49.1ml/m2  LA Vol Index A2C:  46.9 ml/m2  LA Vol Index BP:   48.0 ml/m2  LA Area A4C:       26.5 cm2  LA Area A2C:       25.9 cm2  LA Major Axis A4C: 6.7 cm  LA Major Axis A2C: 6.7 cm  LA Volume Index:   48.3 ml/m2  LA Vol A4C:        83.5 ml  LA Vol A2C:        83.5 ml  LA Vol Index BSA:  46.0 ml/m2      RA VOLUME BY A/L METHOD:            Normal Ranges:  RA Vol A4C:              55.2 ml    (8.3-19.5ml)  RA Vol Index A4C:        30.4 ml/m2  RA Area A4C:              18.2 cm2  RA Major Axis A4C:       5.1 cm      AORTA MEASUREMENTS:         Normal Ranges:  Ao Sinus, d:        4.80 cm (2.1-3.5cm)  Ao STJ, d:          4.30 cm (1.7-3.4cm)  Asc Ao, d:          4.40 cm (2.1-3.4cm)      LV SYSTOLIC FUNCTION BY 2D PLANIMETRY (MOD):  Normal Ranges:  EF-A4C View:    45 % (>=55%)  EF-A2C View:    34 %  EF-Biplane:     39 %  EF-Visual:      33 %  LV EF Reported: 33 %      LV DIASTOLIC FUNCTION:           Normal Ranges:  MV Peak E:             0.95 m/s  (0.7-1.2 m/s)  MV e'                  0.080 m/s (>8.0)  MV lateral e'          0.10 m/s  MV medial e'           0.06 m/s  E/e' Ratio:            11.82     (<8.0)      AORTIC VALVE:                     Normal Ranges:  AoV Vmax:                1.47 m/s (<=1.7m/s)  AoV Peak P.6 mmHg (<20mmHg)  AoV Mean P.9 mmHg (1.7-11.5mmHg)  LVOT Max Adis:            1.07 m/s (<=1.1m/s)  AoV VTI:                 22.67 cm (18-25cm)  LVOT VTI:                16.02 cm  LVOT Diameter:           2.18 cm  (1.8-2.4cm)  AoV Area, VTI:           2.63 cm2 (2.5-5.5cm2)  AoV Area,Vmax:           2.70 cm2 (2.5-4.5cm2)  AoV Dimensionless Index: 0.71      AORTIC INSUFFICIENCY:  AI Vmax:       3.75 m/s  AI Half-time:  523 msec  AI Decel Time: 1803 msec  AI Decel Rate: 209.94 cm/s2      RIGHT VENTRICLE:  RV Basal 4.80 cm  RV Mid   3.70 cm  RV Major 7.8 cm  TAPSE:   15.0 mm  RV s'    0.11 m/s      TRICUSPID VALVE/RVSP:          Normal Ranges:  Peak TR Velocity:     2.78 m/s  Est. RA Pressure:     12 mmHg  RV Syst Pressure:     43 mmHg  (< 30mmHg)  IVC Diam:             2.45 cm      PULMONIC VALVE:          Normal Ranges:  RVOT Vmax:      0.46 m/s (0.6-0.9m/s)  PV Max Adis:     0.8 m/s  (0.6-0.9m/s)  PV Max P.4 mmHg      AORTA:  Asc Ao Diam 4.37 cm      [id number] Rashad Ramirez DO  Electronically signed on 2024 at 4:56:51 PM        ** Final **    Ejection Fractions:  LV EF   Date/Time Value Ref Range Status   2025 03:53 PM 38 %     11/07/2024 02:29 PM 33 %    11/04/2024 12:05 PM 33 %      Cath:  Results for orders placed during the hospital encounter of 11/02/24    Cardiac Catheterization Procedure    Narrative  [facility], Cath Lab, [address] [place] [address]    Cardiovascular Catheterization Report    Patient Name:     PHAM OWENS   Performing Physician:  [id number] Syed MD  Study Date:       11/6/2024           Verifying Physician:   Roma Syed MD  MRN/PID:          [id number]            Cardiologist/Co-Scrub:  Accession#:       [id number]        Ordering Provider:     [id number] STEPHEN RUSSELL  Date of           1945 / [birth year]      Cardiologist:  Birth/Age:        years  Gender:           F                   Fellow:  Encounter#:       [id number]          Surgeon:      Study: Right Heart Cath      Indications:  Heart failure.    Procedure Description:  After infiltration of local anesthetic, the left femoral vein was identified with two-dimensional ultrasound. Under direct ultrasound visualization, the left femoral vein was cannulated with a micropuncture technique. A 5 Icelandic sheath was placed in the vein. Cardiac output was calculated via the Dianna method. Post-procedure, the venous sheath was pulled and pressure was applied to the site.    Right Heart Catheterization:  Cardiac output was calculated via the Dianna method. RHC: Elevated right_ [RA 8, RVEDP is 8 mmHg] and normal left- [PCWP 9 mmHg] sided filling pressures, elevated PA pressures at 38/25 [mean PA 31 mmHg], and normal assumed Dianna cardiac output 4.7 L/min and cardiac index of 2.6 L/min/mï¿½.    Complications:  No in-lab complications observed.    Cardiac Cath Post Procedure Notes:  Post Procedure Diagnosis: See beow.  Blood Loss:               Estimated blood loss during the procedure was 5 mls.  Specimens Removed:        Number of specimen(s) removed:  none.    ____________________________________________________________________________________  CONCLUSIONS:  1. RHC: Elevated right_ [RA 8, RVEDP is 8 mmHg] and normal left- [PCWP 9 mmHg] sided filling pressures, elevated PA pressures at 38/25 [mean PA 31 mmHg], and normal assumed Dianna cardiac output 4.7 L/min and cardiac index of 2.6 L/min/mï¿½.  dyne ï¿½ sec/cm5.  2. Further management as per inpatient cardiology consult team.    ICD 10 Codes:  Other forms of dyspnea-R06.09    CPT Codes:  Right Heart Cath O2/Cardiac output without biopsy (RHC)-37298; Ultrasound guidance for vascular access-04546    [id number] Samra Syed MD  Performing Physician  Electronically signed by [id number] Samar Syed MD on 11/6/2024 at 12:50:43 PM          ** Final **    Stress Test:  No results found for this or any previous visit.    Cardiac Imaging:  No results found for this or any previous visit.      Inpatient Medications:  Scheduled Medications[1]  PRN Medications[2]  Continuous Medications[3]    Physical Exam:  General:  Patient is awake, alert, and oriented.  Patient is in no acute distress.  HEENT:  Pupils equal and reactive.  Normocephalic.  Moist mucosa.    Neck:  No thyromegaly.  Normal Jugular Venous Pressure.  Cardiovascular:  Regular rate and rhythm.  Normal S1 and S2.  Pulmonary:  Clear to auscultation bilaterally.  Abdomen:  Soft. Non-tender.   Non-distended.  Positive bowel sounds.  Lower Extremities:  2+ pedal pulses. No LE edema.  Neurologic:  Cranial nerves intact.  No focal deficit.   Skin: Skin warm and dry, normal skin turgor.   Psychiatric: Normal affect.     Assessment/Plan   Anny Tesfaye is a 80 y.o. female with PMHx significant for Afib on Amiodarone and Eliquis (dx 10/2024 post EVAR) s/p UMESH/DCCV 11/2024, HFrEF  HTN, HLD, heavy tobacco use, chronic hypoxic resp failure on 3L oxygen, severe PAD, AAA s/p EVAR 10/10/24 at [facility] and L-R fem-fem bypass c/b ischemic colitis s/p ex-lap, descending and proximal  "sigmoid colectomy with end colostomy and long marixa stump, SBO 1/2025, lumbar stenosis, hypothyroidism, CKD III, polymyalgia rheumatica, and OA  who presented to [facility] 8/6 from [facility] ([facility]) with c/o abdominal pain, n/v and admitted with SBO. Cardiology consulted for \"pt takes amiodarone at home, admitted for bowel obstruciton, improving, however her qtc interval is elevated, please eval for resuming amiodarone.\"     TTE8/11/2025   CONCLUSIONS:   1. Left ventricular ejection fraction is moderately decreased by visual estimate at 35-40%.   2. There is global hypokinesis of the left ventricle with minor regional variations.   3. Left ventricular cavity size is severely dilated.   4. There is severe eccentric left ventricular hypertrophy.   5. There is normal right ventricular global systolic function.   6. The left atrial size is severely dilated.   7. There is severe mitral annular calcification.   8. Mild to moderate mitral valve regurgitation.   9. Mild aortic valve regurgitation.     Outpatient cardiac medications: amiodarone 200 mg daily, amlodipine 5 mg daily, eliquis 2.5 mg bid, asa 81 mg daily, atorvastatin 10 mg daily,      #Paroxysmal Afib  -Maintaining SB/SR at present HR upper 40-60s  -Started on amiodarone 10/2024 when she developed post op Afib post EVAR; Hx UMESH/DCCV 11/2024, initial plan was to stop amiodarone 1-2 months following UMESH/DCVV, however did not follow up with Cardiology  -[id number] Score 6, anticoagulated on Eliquis prior to admit, currently on hold per primary pending surgery plan. Currently managing SBO conservatively with NG.   -Noted prolonged QT/Qtc on EKG 8/10: 548/572 msec; 8/11 548/514 msec     #Prolonged QT  -Noted prolonged QT/Qtc on EKG 8/10: 548/572 msec; 8/11 548/514 msec  -Currently on Zofran & Amiodarone which may prolong QT  -Hypokalemic, K 3.4      #HFrEF  -Noted reduced LVEF in setting of Afib/RVR; TTE 11/4/25: LVEF 30-35%, moderately reduced RV fx, " moderately enlarged RV, LA severely dilated, mild pHTN RVSP 43 mmHg, mild AI  -Repeat TTE here 35-40%  -Etiology previously suspected to be Afib/tachycardia induced, [facility] 8/2024 with no significant CAD; s/p DCCV/UMESH 11/7/25, no repeat TTE while maintaining SR.  -Was not on GDMT prior to admission; she was started on Metoprolol Succinate and Entresto following 11/2024 hospitalization at [facility]  - 8/12 restart Entresto 24/26mg BID and metoprolol succinate 25mg daily      #HTN  -normotensive to mildly elevated     #PAD  -S/p EVAR 10/10/24 at [facility] and L-R fem-fem bypass  -on asa, statin         Plan/Recs:   -Hold Amiodarone given prolonged QT.   -Hold Zofran given prolonged QT.   -Avoid QT prolonging agents. Daily EKG for QT monitoring.   -Will add Entresto 24/26mg BID and metoprolol succinate 25mg daily  -Monitor electrolytes, goal  K =4, Mg= 2. Will give 40 meq KCL PO today for hypokalemia, K 3.4. Will add on Mg level.   -Resume Eliquis 2.5 mg BID as able pending plan per General Surgery for SBO.    Plan for initiation of GDMT pending findings.   -Follow up with Cardiology, Dr. Ramirez in the next 4-6 weeks as outpatient        Code Status:  Full Code     I spent 40 minutes in the professional and overall care of this patient.        YVONNE Pang-CNP         [1]   Scheduled medications   Medication Dose Route Frequency    [Held by provider] amiodarone  200 mg oral q AM    apixaban  2.5 mg oral BID    atorvastatin  10 mg oral Nightly    calcium carbonate  500 mg of calcium carbonate oral Daily    cyanocobalamin  5,000 mcg oral Daily    docusate sodium  100 mg oral BID    escitalopram  20 mg oral Daily    levothyroxine  100 mcg oral Once per day on Monday Tuesday Wednesday Thursday Friday Saturday    metoprolol  5 mg intravenous q6h    pantoprazole  40 mg oral Daily before breakfast   [2]   PRN medications   Medication    acetaminophen    Or    acetaminophen    Or    acetaminophen    hydrALAZINE    morphine     [Held by provider] ondansetron    Or    [Held by provider] ondansetron    phenoL   [3]   Continuous Medications   Medication Dose Last Rate

## 2025-08-12 NOTE — PROGRESS NOTES
Physical Therapy    Physical Therapy Treatment    Patient Name: Anny Tesfaye  MRN: [id number]  Department: [place] [address]  Room: [address] [place] [address]  Today's Date: 8/12/2025  Time Calculation  Start Time: 1453  Stop Time: 1506  Time Calculation (min): 13 min         Assessment/Plan   PT Assessment  PT Assessment Results: Decreased strength, Decreased range of motion, Decreased endurance, Impaired balance, Decreased mobility, Pain  Barriers to Discharge Home: No anticipated barriers  End of Session Communication: Bedside nurse  Assessment Comment: PT treatment completed. Per OT, pt's facility reports she is a x2 assist for mobility at baseline, primarily bedbound except for showers and to get weights taken. Pt does appear to be at her baseline functional mobility. PT orders will be discharged at this time.  End of Session Patient Position: Bed, 3 rail up, Alarm on (CTA present)  PT Plan  Inpatient/Swing Bed or Outpatient: Inpatient  PT Plan  Treatment/Interventions: Bed mobility, Transfer training, Balance training, Strengthening, Endurance training, Therapeutic exercise, Range of motion, Home exercise program, Therapeutic activity, Positioning  PT Plan: Other needs (Comment) (No further acute PT needs)  PT Frequency: Other (Comment) (No further acute PT)  PT Discharge Recommendations: No further acute PT  PT Recommended Transfer Status: Total assist  PT - OK to Discharge: Yes (PT POC established.)    PT Visit Info:  PT Received On: 08/12/25     General Visit Information:   General  Reason for Referral: To ED with abdominal pain, nausea, and vomiting. Pt found to have an intestinal obstruction.  Referred By: Rajendra Rodriguez MD  Past Medical History Relevant to Rehab: Medical History[1]    Co-Treatment: OT  Co-Treatment Reason: To increase patient safety, transfer ability, and participation.  Prior to Session Communication: Bedside nurse  Patient Position Received: Bed, 3 rail up, Alarm on  General Comment: Pt agreeable to PT  session with max encouragement.    Subjective   Precautions:  Precautions  Medical Precautions: Fall precautions     Date/Time Vitals Session Patient Position Pulse Resp SpO2 BP MAP (mmHg)    08/12/25 1509 --  --  70  18  97 %  128/86  --            Objective   Pain:  Pain Assessment  Pain Assessment: 0-10  0-10 (Numeric) Pain Score: 3  Pain Location: Back  Cognition:  Cognition  Overall Cognitive Status: Within Functional Limits    Postural Control:  Postural Control  Postural Control: Impaired  Static Sitting Balance  Static Sitting-Balance Support: Feet supported, Bilateral upper extremity supported  Static Sitting-Level of Assistance: Close supervision  Dynamic Sitting Balance  Dynamic Sitting-Balance Support: Feet supported, Bilateral upper extremity supported  Dynamic Sitting-Level of Assistance: Close supervision    Activity Tolerance:  Activity Tolerance  Endurance: Tolerates less than 10 min exercise, no significant change in vital signs  Activity Tolerance Comments: Pt self limiting throughout  Treatments:  Bed Mobility  Bed Mobility: Yes  Bed Mobility 1  Bed Mobility 1: Rolling left  Level of Assistance 1: Moderate assistance  Bed Mobility Comments 1: Cues for hand placement and sequencing.  Bed Mobility 2  Bed Mobility  2: Supine to sitting, Sitting to supine  Level of Assistance 2: Moderate assistance, +2  Bed Mobility Comments 2: Cues for hand placement and seuqnecing. Assist to maneuver trunk and LEs.       Transfers  Transfer:  (Pt attempted transfer with bed height elevated. Achieves partial stand with max Ax2 however questionable patient effort)         Outcome Measures:  [facility] Basic Mobility  Turning from your back to your side while in a flat bed without using bedrails: A lot  Moving from lying on your back to sitting on the side of a flat bed without using bedrails: A lot  Moving to and from bed to chair (including a wheelchair): Total  Standing up from a chair using your arms (e.g. wheelchair  or bedside chair): Total  To walk in hospital room: Total  Climbing 3-5 steps with railing: Total  Basic Mobility - Total Score: 8    Education Documentation  Body Mechanics, taught by Yumiko Aranda, PT at 8/12/2025  3:21 PM.  Learner: Patient  Readiness: Acceptance  Method: Explanation  Response: Needs Reinforcement    Mobility Training, taught by Yumiko Aranda, PT at 8/12/2025  3:21 PM.  Learner: Patient  Readiness: Acceptance  Method: Explanation  Response: Needs Reinforcement    Education Comments  No comments found.        OP EDUCATION:       Encounter Problems       Encounter Problems (Active)       Mobility       STG - Patient will ambulate 10 ft with mod A and a RW (Not Progressing)       Start:  08/11/25    Expected End:  08/25/25               PT Transfers       STG - Patient will perform bed mobility with min A (Progressing)       Start:  08/11/25    Expected End:  08/25/25            STG - Patient will transfer sit to and from stand with mod A and a RW (Not Progressing)       Start:  08/11/25    Expected End:  08/25/25                  Encounter Problems (Resolved)       Balance       STG - Maintains dynamic sitting balance without upper extremity support x10 mins with CGA or less and no LOB.  (Met)       Start:  08/11/25    Expected End:  08/25/25    Resolved:  08/12/25                     [1]   Past Medical History:  Diagnosis Date    Aortic aneurysm     Chronic low back pain     CKD (chronic kidney disease) stage 3, GFR 30-59 ml/min (Multi)     Esophagitis     Glaucoma     History of scarlet fever     Hypertension     Hypothyroidism     Morbid obesity (Multi)     Nephritis     OA (osteoarthritis) of knee     Bilateral    Polymyalgia rheumatica (Multi)     Psoriasis     Sciatica

## 2025-08-13 ENCOUNTER — APPOINTMENT (OUTPATIENT)
Dept: CARDIOLOGY | Facility: HOSPITAL | Age: 80
DRG: 389 | End: 2025-08-13
Payer: MEDICARE

## 2025-08-13 VITALS
OXYGEN SATURATION: 98 % | BODY MASS INDEX: 24.95 KG/M2 | HEART RATE: 72 BPM | WEIGHT: 149.91 LBS | RESPIRATION RATE: 16 BRPM | DIASTOLIC BLOOD PRESSURE: 57 MMHG | SYSTOLIC BLOOD PRESSURE: 119 MMHG | TEMPERATURE: 98.6 F

## 2025-08-13 LAB
ANION GAP SERPL CALC-SCNC: 13 MMOL/L (ref 10–20)
BUN SERPL-MCNC: 13 MG/DL (ref 6–23)
CALCIUM SERPL-MCNC: 8.1 MG/DL (ref 8.6–10.3)
CHLORIDE SERPL-SCNC: 101 MMOL/L (ref 98–107)
CO2 SERPL-SCNC: 24 MMOL/L (ref 21–32)
CREAT SERPL-MCNC: 0.69 MG/DL (ref 0.5–1.05)
EGFRCR SERPLBLD CKD-EPI 2021: 87 ML/MIN/1.73M*2
ERYTHROCYTE [DISTWIDTH] IN BLOOD BY AUTOMATED COUNT: 17.5 % (ref 11.5–14.5)
GLUCOSE SERPL-MCNC: 101 MG/DL (ref 74–99)
HCT VFR BLD AUTO: 27.9 % (ref 36–46)
HGB BLD-MCNC: 8.3 G/DL (ref 12–16)
HOLD SPECIMEN: NORMAL
MCH RBC QN AUTO: 26.5 PG (ref 26–34)
MCHC RBC AUTO-ENTMCNC: 29.7 G/DL (ref 32–36)
MCV RBC AUTO: 89 FL (ref 80–100)
NRBC BLD-RTO: 0 /100 WBCS (ref 0–0)
PLATELET # BLD AUTO: 444 X10*3/UL (ref 150–450)
POTASSIUM SERPL-SCNC: 4.1 MMOL/L (ref 3.5–5.3)
RBC # BLD AUTO: 3.13 X10*6/UL (ref 4–5.2)
SODIUM SERPL-SCNC: 134 MMOL/L (ref 136–145)
WBC # BLD AUTO: 8.7 X10*3/UL (ref 4.4–11.3)

## 2025-08-13 PROCEDURE — 85027 COMPLETE CBC AUTOMATED: CPT

## 2025-08-13 PROCEDURE — 2500000001 HC RX 250 WO HCPCS SELF ADMINISTERED DRUGS (ALT 637 FOR MEDICARE OP): Performed by: FAMILY MEDICINE

## 2025-08-13 PROCEDURE — 2500000004 HC RX 250 GENERAL PHARMACY W/ HCPCS (ALT 636 FOR OP/ED): Performed by: FAMILY MEDICINE

## 2025-08-13 PROCEDURE — 2500000001 HC RX 250 WO HCPCS SELF ADMINISTERED DRUGS (ALT 637 FOR MEDICARE OP): Performed by: NURSE PRACTITIONER

## 2025-08-13 PROCEDURE — 82374 ASSAY BLOOD CARBON DIOXIDE: CPT

## 2025-08-13 PROCEDURE — 36415 COLL VENOUS BLD VENIPUNCTURE: CPT

## 2025-08-13 PROCEDURE — 2500000002 HC RX 250 W HCPCS SELF ADMINISTERED DRUGS (ALT 637 FOR MEDICARE OP, ALT 636 FOR OP/ED): Performed by: FAMILY MEDICINE

## 2025-08-13 PROCEDURE — 99232 SBSQ HOSP IP/OBS MODERATE 35: CPT | Performed by: NURSE PRACTITIONER

## 2025-08-13 PROCEDURE — 93005 ELECTROCARDIOGRAM TRACING: CPT

## 2025-08-13 RX ORDER — SACUBITRIL AND VALSARTAN 24; 26 MG/1; MG/1
1 TABLET ORAL 2 TIMES DAILY
Status: DISCONTINUED | OUTPATIENT
Start: 2025-08-13 | End: 2025-08-13 | Stop reason: HOSPADM

## 2025-08-13 RX ORDER — SPIRONOLACTONE 25 MG/1
25 TABLET ORAL DAILY
Start: 2025-08-15

## 2025-08-13 RX ORDER — SPIRONOLACTONE 25 MG/1
25 TABLET ORAL DAILY
Status: DISCONTINUED | OUTPATIENT
Start: 2025-08-13 | End: 2025-08-13 | Stop reason: HOSPADM

## 2025-08-13 RX ORDER — METOPROLOL SUCCINATE 25 MG/1
12.5 TABLET, EXTENDED RELEASE ORAL DAILY
Start: 2025-08-13

## 2025-08-13 RX ORDER — SACUBITRIL AND VALSARTAN 49; 51 MG/1; MG/1
1 TABLET ORAL 2 TIMES DAILY
Status: DISCONTINUED | OUTPATIENT
Start: 2025-08-13 | End: 2025-08-13

## 2025-08-13 RX ADMIN — ESCITALOPRAM 20 MG: 20 TABLET, FILM COATED ORAL at 08:17

## 2025-08-13 RX ADMIN — ACETAMINOPHEN 650 MG: 325 TABLET ORAL at 08:18

## 2025-08-13 RX ADMIN — SACUBITRIL AND VALSARTAN 1 TABLET: 49; 51 TABLET, FILM COATED ORAL at 08:17

## 2025-08-13 RX ADMIN — PANTOPRAZOLE SODIUM 40 MG: 40 TABLET, DELAYED RELEASE ORAL at 06:00

## 2025-08-13 RX ADMIN — DOCUSATE SODIUM 100 MG: 100 CAPSULE, LIQUID FILLED ORAL at 08:18

## 2025-08-13 RX ADMIN — METOPROLOL TARTRATE 5 MG: 5 INJECTION, SOLUTION INTRAVENOUS at 04:19

## 2025-08-13 RX ADMIN — APIXABAN 2.5 MG: 2.5 TABLET, FILM COATED ORAL at 08:17

## 2025-08-13 RX ADMIN — CYANOCOBALAMIN TAB 1000 MCG 5000 MCG: 1000 TAB at 08:17

## 2025-08-13 RX ADMIN — LEVOTHYROXINE SODIUM 100 MCG: 0.1 TABLET ORAL at 06:00

## 2025-08-13 RX ADMIN — CALCIUM CARBONATE (ANTACID) CHEW TAB 500 MG 1 TABLET: 500 CHEW TAB at 08:17

## 2025-08-13 RX ADMIN — METOPROLOL SUCCINATE 12.5 MG: 25 TABLET, EXTENDED RELEASE ORAL at 08:18

## 2025-08-13 ASSESSMENT — PAIN SCALES - GENERAL
PAINLEVEL_OUTOF10: 3
PAINLEVEL_OUTOF10: 0 - NO PAIN

## 2025-08-13 ASSESSMENT — COGNITIVE AND FUNCTIONAL STATUS - GENERAL
DRESSING REGULAR LOWER BODY CLOTHING: A LOT
DRESSING REGULAR UPPER BODY CLOTHING: A LOT
PERSONAL GROOMING: A LITTLE
TURNING FROM BACK TO SIDE WHILE IN FLAT BAD: A LOT
CLIMB 3 TO 5 STEPS WITH RAILING: TOTAL
MOBILITY SCORE: 11
HELP NEEDED FOR BATHING: A LOT
MOVING TO AND FROM BED TO CHAIR: A LOT
DAILY ACTIVITIY SCORE: 15
MOVING FROM LYING ON BACK TO SITTING ON SIDE OF FLAT BED WITH BEDRAILS: A LOT
TOILETING: A LOT
STANDING UP FROM CHAIR USING ARMS: A LOT
WALKING IN HOSPITAL ROOM: A LOT

## 2025-08-13 ASSESSMENT — PAIN - FUNCTIONAL ASSESSMENT
PAIN_FUNCTIONAL_ASSESSMENT: 0-10
PAIN_FUNCTIONAL_ASSESSMENT: 0-10

## 2025-08-13 ASSESSMENT — PAIN DESCRIPTION - DESCRIPTORS: DESCRIPTORS: ACHING;DISCOMFORT

## 2025-08-13 NOTE — DISCHARGE SUMMARY
Discharge Diagnosis  Intestinal obstruction (Multi)           Issues Requiring Follow-Up  Pcp and [facility]  hospital follow up, general health maintenance and medication refills.      Discharge Meds     Medication List      START taking these medications     spironolactone 25 mg tablet; Commonly known as: Aldactone; Take 1 tablet   (25 mg) by mouth once daily. Do not fill before August 15, 2025.; Start   taking on: August 15, 2025     CHANGE how you take these medications     acetaminophen 650 mg ER tablet; Commonly known as: Tylenol 8 HOUR; What   changed: Another medication with the same name was removed. Continue   taking this medication, and follow the directions you see here.   metoprolol succinate XL 25 mg 24 hr tablet; Commonly known as:   Toprol-XL; Take 0.5 tablets (12.5 mg) by mouth once daily. Do not crush or   chew.; What changed: how much to take     CONTINUE taking these medications     atorvastatin 10 mg tablet; Commonly known as: Lipitor   calcium carbonate 500 mg calcium (1,250 mg) chewable tablet   cyanocobalamin (vitamin B-12) 5,000 mcg capsule   docusate sodium 100 mg capsule; Commonly known as: Colace   Eliquis 2.5 mg tablet; Generic drug: apixaban   escitalopram 20 mg tablet; Commonly known as: Lexapro; Take 1 tablet (20   mg) by mouth once daily.   levothyroxine 100 mcg tablet; Commonly known as: Synthroid, Levoxyl;   TAKE 1 TABLET (100 MCG) BY MOUTH ONCE DAILY. SIX DAYS PER WEEK   pantoprazole 20 mg EC tablet; Commonly known as: ProtoNix   sacubitriL-valsartan 24-26 mg tablet; Commonly known as: Entresto; Take   1 tablet by mouth 2 times a day.     STOP taking these medications     Actemra 200 mg/10 mL (20 mg/mL) solution injection; Generic drug:   tocilizumab   alendronate 70 mg tablet; Commonly known as: Fosamax   amiodarone 200 mg tablet; Commonly known as: Pacerone   amLODIPine 5 mg tablet; Commonly known as: Norvasc   amoxicillin-clavulanate 875-125 mg tablet; Commonly known as: Augmentin    Paola Thyroid 30 mg tablet; Generic drug: thyroid (pork)   aspirin 81 mg EC tablet   Calmoseptine 0.44-20.6 % ointment; Generic drug: menthol-zinc oxide   Cavilon Durable Barrier 1.3 % cream; Generic drug: dimethicone   cephalexin 500 mg capsule; Commonly known as: Keflex   DAILY MULTI-VITAMIN ORAL   Dulcolax (bisacodyl) 10 mg suppository; Generic drug: bisacodyl   ferrous sulfate 325 mg (65 mg elemental) tablet   furosemide 40 mg tablet; Commonly known as: Lasix   L-METHYLFOLATE ORAL   lutein 6 mg capsule   magnesium hydroxide 400 mg/5 mL suspension; Commonly known as: Milk of   Magnesia   mirtazapine 15 mg tablet; Commonly known as: Remeron   ondansetron 4 mg tablet; Commonly known as: Zofran   oxyBUTYnin XL 10 mg 24 hr tablet; Commonly known as: Ditropan-XL   oxyCODONE 5 mg immediate release tablet; Commonly known as: Roxicodone   polyethylene glycol 17 gram packet; Commonly known as: Glycolax, Miralax   sennosides 8.6 mg tablet; Commonly known as: Senokot   sodium phosphates 19-7 gram/118 mL enema enema; Commonly known as:   Fleets   Vitamin C 500 mg tablet; Generic drug: ascorbic acid       Test Results Pending At Discharge  Pending Labs       No current pending labs.              History Of Present Illness  Anny Tesfaye is a 80 y.o. female presenting with nausea vomiting and abdominal pain.  And the symptoms were similar to her prior symptoms of bowel obstruction.  Patient does have a history of colitis and has had a history of colostomy.  She also has a history of atrial fibrillation she is on anticoagulation.  She has been living in a long-term care facility since November.  At baseline per patient she is not able to walk and is mostly bedbound.  She is otherwise alert oriented x 3.           Currently she denies any chest pain denies any shortness of breath denies any abdominal pain however she has not had much output in her colostomy   .      Hospital Course        Assessment & Plan  Intestinal  obstruction (Multi)     Hypothyroid     Adjustment reaction with anxiety and depression     Essential hypertension     Stage 3 chronic kidney disease (Multi)     Anemia     Anticoagulant long-term use     Peripheral vascular disease     Atrial fibrillation      Anticoagulation has been resumed  And cardiology saw for prolonged qtc   Will cont to hold amiodarone on discharge        Input noted from cards  Stable for dc on regimen as out lined by cards          Pertinent Physical Exam At Time of Discharge  Physical Exam    Outpatient Follow-Up  Future Appointments   Date Time Provider Department Center   9/26/2025 11:20 AM Rashad Ramirez DO [id number] [facility]         YVONNE Stanley-CNP    Time > 30 min in discharge planning  agree [facility] discharge summary , reviewd and dw NP  Rajendra Rodriguez MD

## 2025-08-13 NOTE — PROGRESS NOTES
08/13/25 1229   Discharge Planning   Who is requesting discharge planning? Provider   Home or Post Acute Services Post acute facilities (Rehab/SNF/etc)   Type of Post Acute Facility Services Long term care   Expected Discharge Disposition Inter   Does the patient need discharge transport arranged? Yes   [facility] coordination needed? Yes   Has discharge transport been arranged? Yes   What day is the transport expected? 08/13/25   What time is the transport expected? 1500   Intensity of Service   Intensity of Service 0-30 min     8/13/25 1230  Patient cleared for discharge back to [facility].   time 1500. Report number.  Bedside nurse charge nurse and  notified.  SW to notify family.  Maria Elena Chang RN TCC

## 2025-08-13 NOTE — PROGRESS NOTES
Subjective Data:  On home 3LNC   BP 140s  Now after 49/51 entresto low 90s  But feels good wants to go home  /504 today      Objective Data:  Last Recorded Vitals:  Vitals:    25 2208 25 2349 25 0423 25 0817   BP: 147/69 131/69 138/71 141/71   BP Location:  Left arm Left arm Left arm   Patient Position:  Lying Lying Lying   Pulse:  59  72   Resp:  18 16 16   Temp:  36.2 °C (97.2 °F) 36.6 °C (97.9 °F) 36.7 °C (98 °F)   TempSrc:  Temporal Oral Oral   SpO2:  98% 98% 98%   Weight:         Medical Gas Therapy: Supplemental oxygen  Medical Gas Delivery Method: Nasal cannula  Weight  Av kg (149 lb 14.6 oz)  Min: 68 kg (149 lb 14.6 oz)  Max: 68 kg (149 lb 14.6 oz)    LABS:  CMP:  Results from last 7 days   Lab Units 25  0614 25  0545 25  0545 08/10/25  0622 25  0523 25  0516 25  0828 25  2307   SODIUM mmol/L 134* 136 136 139 137 135* 134* 135*   POTASSIUM mmol/L 4.1 4.0 3.4* 4.2 4.0 4.3 4.3 4.8   CHLORIDE mmol/L 101 103 102 103 96* 94* 93* 93*   CO2 mmol/L 24 29 30 28 25 33* 30 33*   ANION GAP mmol/L 13 8* 7* 12 20 12 15 14   BUN mg/dL 13 13 16 19 17 16 17 19   CREATININE mg/dL 0.69 1.00 1.03 1.00 1.01 0.99 0.98 1.04   EGFR mL/min/1.73m*2 87 57* 55* 57* 56* 57* 58* 54*   MAGNESIUM mg/dL  --   --  1.89  --   --   --   --   --    ALBUMIN g/dL  --   --   --   --   --   --   --  3.0*   ALT U/L  --   --   --   --   --   --   --  7   AST U/L  --   --   --   --   --   --   --  10   BILIRUBIN TOTAL mg/dL  --   --   --   --   --   --   --  0.3     CBC:  Results from last 7 days   Lab Units 25  0614 25  0545 25  0545 08/10/25  0622 25  0523 25  0516 25  0828 25  2307   WBC AUTO x10*3/uL 8.7 8.3 8.7 8.3 9.2 11.4* 11.2 11.7*   HEMOGLOBIN g/dL 8.3* 7.5* 7.8* 7.2* 7.8* 7.7* 8.1* 9.0*   HEMATOCRIT % 27.9* 25.6* 27.0* 26.4* 25.9* 25.8* 26.5* 30.4*   PLATELETS AUTO x10*3/uL 444 417 409 384 497* 529* 521* 599*   MCV fL 89 89  "90 96 88 88 86 89     COAG:     ABO: No results found for: \"ABO\"  HEME/ENDO:     CARDIAC:       No lab exists for component: \"CK\", \"CKMBP\"          Last I/O:    Intake/Output Summary (Last 24 hours) at 8/13/2025 0908  Last data filed at 8/13/2025 0818  Gross per 24 hour   Intake 300 ml   Output 750 ml   Net -450 ml     Net IO Since Admission: 2,067.5 mL [08/13/25 0908]      Imaging Results:  XR chest 1 view  Result Date: 8/7/2025  Interpreted By:  Oziel Sunshine, STUDY: XR CHEST 1 VIEW; ;  8/7/2025 4:14 am   INDICATION: Signs/Symptoms:NG tube placement.   COMPARISON: None.   ACCESSION NUMBER(S): [id number]   ORDERING CLINICIAN: ZAY CHENEY   TECHNIQUE: A portable upright radiograph of the chest is performed.   FINDINGS: A nasogastric tube is identified. The distal tube projects beyond the gastroesophageal junction however the distal 4 cm of tube is directed cephalad in the lower esophagus. Repositioning of the tube is recommended.   The heart is mildly enlarged. There is severe tortuosity and dilatation of the thoracic aorta which is similar to the previous study.   There are low lung volumes with linear areas of atelectasis greater on the left. Calcified granulomas identified in the right apex. There is no airspace consolidation, pleural effusion, or pneumothorax. The pulmonary vessels are within normal limits. Bulla formation may be present in the left apex which is unchanged.   The osseous structures are diffusely and severely demineralized.       A nasogastric tube is coiled distally with the tip projecting cranially in the lower esophagus. Repositioning is recommended.   Persistent dilatation and tortuosity of the thoracic aorta. This is described in greater detail on a recent CT examination of 07/31/2025. Please refer to this report.   Linear areas of atelectasis in the left lung. There is no airspace consolidation.     MACRO: None   Signed by: Oziel Sunshine 8/7/2025 7:28 AM Dictation " workstation:   [id number]    CT abdomen pelvis w IV contrast  Result Date: 8/7/2025  Interpreted By:  Finkelstein, Evan, STUDY: CT ABDOMEN PELVIS W IV CONTRAST;  8/7/2025 12:05 am   INDICATION: Signs/Symptoms:Abdominal pain. vomiting..     COMPARISON: CT abdomen pelvis 11/2/2024   ACCESSION NUMBER(S): [id number]   ORDERING CLINICIAN: ZAY CHENEY   TECHNIQUE: Axial CT images of the abdomen and pelvis with coronal and sagittal reconstructed images obtained after intravenous administration of 75 mL Omnipaque 350   FINDINGS: LOWER CHEST: Cardiomegaly. Small right pleural effusion. Patchy bibasilar airspace opacities.   ABDOMEN:   LIVER: Normal attenuation and contour. BILE DUCTS: Normal caliber. GALLBLADDER: Increased density in the gallbladder favored to represent biliary sludge or stones. No wall thickening or pericholecystic fluid. PORTAL VEIN: Patent SPLEEN: Calcifications scattered throughout the spleen compatible with sequela of prior granulomatous disease. PANCREAS: Unremarkable. ADRENALS: Unremarkable. KIDNEYS, URETERS and URINARY BLADDER: Symmetric renal enhancement. No hydronephrosis or perinephric fluid collection. 0.5 cm hypodensity in the right kidney measures simple fluid attenuation, most compatible with a simple cyst. Bladder is within normal limits REPRODUCTIVE ORGANS: No pelvic masses.   ABDOMINAL WALL: Left abdominal wall colostomy. PERITONEUM: No ascites, free air or fluid collection.   BOWEL: Postsurgical changes of partial colectomy. Left abdominal wall colostomy. Scattered colonic diverticula without definite pericolonic inflammatory stranding. Moderate colonic stool burden. Postsurgical changes at the level of the cecum, possibly related to appendectomy. There are dilated loops of small bowel throughout the abdomen and pelvis with decompressed loops of distal small bowel. Suspected transition point in the mid abdomen.   VESSELS: Aorto bi-iliac stent graft repair. A femoral to femoral bypass  graft is visualized and appears patent. RETROPERITONEUM: No pathologically enlarged retroperitoneal lymph nodes.   BONES: No acute osseous abnormality. Degenerative changes throughout the spine.       Dilated loops of small bowel throughout the abdomen and pelvis with decompressed loops of distal small bowel and a suspected transition point in the mid abdomen compatible with small bowel obstruction.   Postsurgical changes of partial colectomy with a left abdominal wall colostomy, moderate colonic stool burden and scattered colonic diverticulosis. No CT evidence of acute diverticulitis.   Cholelithiasis without CT evidence of acute cholecystitis.   Cardiomegaly, small right pleural effusion and patchy bibasilar airspace opacities which may represent atelectasis with a developing infection not excluded in the appropriate clinical setting.     MACRO: None.   Signed by: Evan Finkelstein 8/7/2025 12:53 AM Dictation workstation:   [id number]          Past Cardiology Tests (Last 3 Years):  EKG:  Results for orders placed during the hospital encounter of 08/06/25    Electrocardiogram, 12-lead PRN ACS symptoms    Narrative  Sinus bradycardia  Left axis deviation  Left ventricular hypertrophy with QRS widening  T wave abnormality, consider anterior ischemia  Abnormal ECG  Confirmed by Oziel Mosquera ([id number]) on 8/11/2025 12:20:57 PM      Results for orders placed during the hospital encounter of 07/31/25    ECG 12 lead    Narrative   Poor data quality, interpretation may be adversely affected  Normal sinus rhythm  Septal infarct , age undetermined  Lateral infarct , age undetermined  Abnormal ECG  When compared with ECG of 07-NOV-2024 14:00,  Incomplete left bundle branch block is no longer Present  Septal infarct is now Present  Lateral infarct is now Present  See ED provider note for full interpretation and clinical correlation  Confirmed by Concepción Meza ([id number]) on 8/2/2025 9:08:22 AM      Results for orders placed  during the hospital encounter of 11/02/24    Electrocardiogram, 12-lead PRN ACS symptoms    Narrative  Atrial fibrillation with rapid ventricular response  Cannot rule out Inferior infarct , age undetermined  Cannot rule out Anterior infarct , age undetermined  Abnormal ECG  No previous ECGs available  See ED provider note for full interpretation and clinical correlation  Confirmed by Payton Dawn ([id number]) on 1/30/2025 8:15:58 PM      Electrocardiogram, 12-lead PRN ACS symptoms    Narrative  Sinus bradycardia with 1st degree AV block  Incomplete left bundle branch block  Nonspecific T wave abnormality  Abnormal ECG  When compared with ECG of 07-NOV-2024 13:36, (unconfirmed)  Sinus rhythm has replaced Ectopic atrial rhythm  Vent. rate has decreased BY  61 BPM  Nonspecific T wave abnormality now evident in Anterior leads  Nonspecific T wave abnormality, improved in Lateral leads  Confirmed by Rashad Ramirez ([id number]) on 11/14/2024 11:41:03 AM      Electrocardiogram, 12-lead PRN ACS symptoms    Narrative  Unusual P axis, possible ectopic atrial tachycardia with undetermined rhythm irregularity  Cannot rule out Anterior infarct (cited on or before 02-NOV-2024)  Abnormal ECG  When compared with ECG of 02-NOV-2024 07:38, (unconfirmed)  Ectopic atrial rhythm has replaced Atrial fibrillation  Minimal criteria for Inferior infarct are no longer Present  Confirmed by Rashad Ramirez ([id number]) on 11/14/2024 11:41:40 AM    Echo:  Results for orders placed during the hospital encounter of 08/06/25    Transthoracic Echo (TTE) Limited    [facility], [address] [place] [address]  Tel [phone] and Fax [phone]    TRANSTHORACIC ECHOCARDIOGRAM REPORT      Patient Name:       PHAM Otoole Physician:    [id number] Flo Galvan MD  Study Date:         8/11/2025           Ordering Provider:    [id number] PHAM SHIELDS  MRN/PID:            [id number]            Fellow:  Accession#:          [id number]        Nurse:  Date of Birth/Age:  1945 / 80      Sonographer:          Souleymaneranda Readsparkle galdamez                                     JOURDAN  Gender assigned at  F                   Additional Staff:  Birth:  Height:             165.00 cm           Admit Date:  Weight:             68.00 kg            Admission Status:     Inpatient -  Routine  BSA / BMI:          1.75 m2 / 24.98     Encounter#:           [id number]  kg/m2  Blood Pressure:     138/68 mmHg         Department Location:  [facility] Non  Invasive    Study Type:    TRANSTHORACIC ECHO (TTE) LIMITED  Diagnosis/ICD: Heart failure, unspecified-I50.9  Indication:    HF  CPT Code:      Echo Limited-80017    Patient History:  Pertinent History: HTN. HF.    Study Detail: The following Echo studies were performed: 2D, M-Mode, Doppler and  color flow.      PHYSICIAN INTERPRETATION:  Left Ventricle: Left ventricular ejection fraction is moderately decreased by visual estimate at 35-40%. There is severe eccentric left ventricular hypertrophy. There is global hypokinesis of the left ventricle with minor regional variations. The left ventricular cavity size is severely dilated. There is mildly increased septal and mildly increased posterior left ventricular wall thickness. Left ventricular diastolic filling cannot be determined due to severe mitral annular calcification (MAC). LVEDV: 112ml/m2.  Left Atrium: The left atrial size is severely dilated.  Right Ventricle: The right ventricle is normal in size. There is normal right ventricular global systolic function.  Right Atrium: The right atrium is normal in size.  Aortic Valve: The aortic valve is trileaflet. The peak and mean gradients are 12 mmHg and 6 mmHg, respectively with a dimensionless index of 0.63. There is mild aortic valve regurgitation.  Mitral Valve: The mitral valve is mildly thickened. The doppler estimated peak and mean diastolic pressure gradients are 4.3 mmHg and 1 mmHg, respectively.  There is severe mitral annular calcification. The mean gradient of the mitral valve is 1 mmHg. There is mild to moderate mitral valve regurgitation. The E Vmax is 0.70 m/s.  Tricuspid Valve: The tricuspid valve is structurally normal. There is mild tricuspid regurgitation.  Pulmonic Valve: The pulmonic valve is not well visualized. The pulmonic valve regurgitation was not well visualized.  Pericardium: There is no pericardial effusion noted.  Aorta: The aortic root is abnormal. There is mild dilatation of the aortic root.  Systemic Veins: The inferior vena cava was not assessed, IVC inspiratory collapse was not assessed.  In comparison to the previous echocardiogram(s): Compared with study dated 11/7/2024, no significant change.      CONCLUSIONS:  1. Left ventricular ejection fraction is moderately decreased by visual estimate at 35-40%.  2. There is global hypokinesis of the left ventricle with minor regional variations.  3. Left ventricular cavity size is severely dilated.  4. There is severe eccentric left ventricular hypertrophy.  5. There is normal right ventricular global systolic function.  6. The left atrial size is severely dilated.  7. There is severe mitral annular calcification.  8. Mild to moderate mitral valve regurgitation.  9. Mild aortic valve regurgitation.    QUANTITATIVE DATA SUMMARY:    2D MEASUREMENTS:          Normal Ranges:  IVSd:            1.08 cm  (0.6-1.1cm)  LVPWd:           0.97 cm  (0.6-1.1cm)  LVIDd:           5.78 cm  (3.9-5.9cm)  LVIDs:           4.05 cm  LV Mass Index:   137 g/m2  LV % FS          30.0 %      LEFT ATRIUM:                  Normal Ranges:  LA Vol A4C:        60.3 ml    (22+/-6mL/m2)  LA Vol A2C:        123.5 ml  LA Vol BP:         97.9 ml  LA Vol Index A4C:  34.4ml/m2  LA Vol Index A2C:  70.6 ml/m2  LA Vol Index BP:   56.0 ml/m2  LA Area A4C:       19.2 cm2  LA Area A2C:       31.2 cm2  LA Major Axis A4C: 5.2 cm  LA Major Axis A2C: 6.7 cm  LA Volume Index:   56.0  ml/m2  LA Vol A4C:        53.3 ml  LA Vol A2C:        113.2 ml  LA Vol Index BSA:  47.6 ml/m2      M-MODE MEASUREMENTS:         Normal Ranges:  LAs:                 4.64 cm (2.7-4.0cm)      LV SYSTOLIC FUNCTION:  Normal Ranges:  EF-A2C View:    40 %  EF-Visual:      38 %  LV EF Reported: 38 %      LV DIASTOLIC FUNCTION:             Normal Ranges:  MV Peak E:             0.70 m/s    (0.7-1.2 m/s)  MV Peak A:             0.99 m/s    (0.42-0.7 m/s)  E/A Ratio:             0.70        (1.0-2.2)  MV e'                  0.048 m/s   (>8.0)  MV lateral e'          0.05 m/s  MV medial e'           0.05 m/s  MV A Dur:              173.17 msec  E/e' Ratio:            14.48       (<8.0)      MITRAL VALVE:          Normal Ranges:  MV Vmax:      1.04 m/s (<=1.3m/s)  MV peak P.3 mmHg (<5mmHg)  MV mean P.1 mmHg (<2mmHg)  MV DT:        341 msec (150-240msec)      AORTIC VALVE:                      Normal Ranges:  AoV Vmax:                1.75 m/s  (<=1.7m/s)  AoV Peak P.3 mmHg (<20mmHg)  AoV Mean P.9 mmHg  (1.7-11.5mmHg)  LVOT Max Adis:            1.39 m/s  (<=1.1m/s)  AoV VTI:                 36.75 cm  (18-25cm)  LVOT VTI:                23.30 cm  AoV Dimensionless Index: 0.63      AORTIC INSUFFICIENCY:  AI Vmax:       4.89 m/s  AI Half-time:  494 msec  AI Decel Time: 1705 msec  AI Decel Rate: 287.85 cm/s2      RIGHT VENTRICLE:  TAPSE: 23.8 mm  RV s'  0.12 m/s      TRICUSPID VALVE/RVSP:          Normal Ranges:  Peak TR Velocity:     3.23 m/s      [id number] Flo Galvan MD  Electronically signed on 2025 at 9:20:46 PM        ** Final **      Results for orders placed during the hospital encounter of 24    Transesophageal Echo (UMESH) w/ Possible Cardioversion    Interpretation Summary  [facility], [address] [place] [address]  Tel [phone] and Fax [phone]    TRANSESOPHAGEAL ECHOCARDIOGRAM REPORT    WITH DIRECT CURRENT CARDIOVERSION    Patient Name:        PHAM Otoole Physician:   [id number] Rashad James    Study Date:         11/7/2024           Ordering Provider:   [id number] STEPHEN MONROE  YVONNE  MRN/PID:            [id number]            Fellow:  Accession#:         [id number]        Nurse:               Neil [place] RN  Date of Birth/Age:  1945 / 79      Sonographer:         Souleymane De Leon RDCS  years  Gender assigned at  F                   Additional Staff:    Jordan WEATHERS  Birth:  Height:             165.10 cm           Admit Date:          11/2/2024  Weight:             73.94 kg            Admission Status:    Inpatient - Routine  BSA / BMI:          1.81 m2 / 27.12     Encounter#:          [id number]  kg/m2  Blood Pressure:     106/67 mmHg         Department Location: [facility] Cath Lab    Study Type:    TRANSESOPHAGEAL ECHO (UMESH) W/ POSSIBLE CARDIOVERSION  Diagnosis/ICD: Unspecified atrial fibrillation-I48.91; Shortness of  breath-R06.02; Other persistent AFib-I48.19  Indication:    Atrial Fibrillation, SOB  CPT Code:      UMESH Complete-30376; Doppler Limited-84989; Color Doppler-71940    Patient History:  Drug Allergies:    None  Smoker:            Current.  Diabetes:          No  Pertinent History: HTN, Hyperlipidemia, CHF and Murmur. 79 y.o. female presents  for UMESH/CVN for persistent A-fib.    Study Detail: The following Echo studies were performed: 2D, Doppler and 3D.  Patient's heart rhythm is atrial fibrillation. A bubble study was  not performed. The patient was sedated.      PHYSICIAN INTERPRETATION:  UMESH Details: The UMESH probe used was 5177. Technically adequate omniplane transesophageal echocardiogram performed. Color flow Doppler echo was performed to assess for the presence of a patent foramen ovale.  UMESH Medication: The pharynx was anesthetized with Cetacaine spray and viscous lidocaine. The patient was sedated by Anesthesia; please refer to anesthesia flow sheet for medications used.  UMESH Procedure: The probe was  passed without difficulty. Complications encountered during procedure: Patient tolerated the procedure well without any apparent complications.  UMESH Cardioversion Procedure:  The patient was placed in a supine position and anterior-posterior  defibrillation patches were applied. A biphasic Zoll defibrillator was attached  to the patient and set to synchronous mode.  One synchronized biphasic external shock was delivered at 200 Joules in attempt  to cardiovert the patient from atrial fibrillation. The procedure was  successful, resulting in sinus bradycardia.  The patient recovered uneventfully from the effects on conscious and deep  sedation. The procedure was well tolerated. There were no complications. The  patient was discharged from the Cardiac Cath Lab hemodynamically stable and with  no neurological deficits.    Left Ventricle: Left ventricular ejection fraction is moderately decreased, by visual estimate at 30-35%. There are multiple left ventricular wall motion abnormalities. The left ventricular cavity size was not assessed. Left ventricular diastolic filling was not assessed.  Left Atrium: The left atrium is enlarged. A bubble study using agitated saline was not performed. The left atrial appendage Doppler velocities are normal and there is no thrombus visualized in the left atrial appendage.  Right Ventricle: The right ventricle was not well visualized. Unable to determine right ventricular systolic function.  Right Atrium: The right atrium was not well visualized.  Aortic Valve: The aortic valve is structurally normal. There is trace aortic valve regurgitation.  Mitral Valve: The mitral valve is normal in structure. There is trace mitral valve regurgitation.  Tricuspid Valve: The tricuspid valve is structurally normal. No evidence of tricuspid regurgitation.  Pulmonic Valve: The pulmonic valve is structurally normal. There is no indication of pulmonic valve regurgitation.  Pericardium: Pericardial  effusion was not assessed.  Aorta: The aortic root is abnormal. Aortic root enlarged at 4.5 cm. Ascending aorta 4.3 cm.      CONCLUSIONS:  1. Left ventricular ejection fraction is moderately decreased, by visual estimate at 30-35%.  2. There are multiple left ventricular wall motion abnormalities.  3. Successful cardioversion for atrial fibrillation resulting in sinus bradycardia.  4. Unable to determine right ventricular systolic function.  5. The left atrium is enlarged.  6. Aortic root enlarged at 4.5 cm. Ascending aorta 4.3 cm.  7. No LA or ANGEL thrombus.    QUANTITATIVE DATA SUMMARY:    LV SYSTOLIC FUNCTION BY 2D PLANIMETRY (MOD):  Normal Ranges:  EF-Visual:      33 %  LV EF Reported: 33 %      [id number] Rashad Ramirez DO  Electronically signed on 11/7/2024 at 3:56:32 PM        ** Final **      Transthoracic Echo (TTE) Complete    Narrative  [facility], [address] [place] [address]  Tel [phone] and Fax [phone]    TRANSTHORACIC ECHOCARDIOGRAM REPORT      Patient Name:       PHAM Otoole Physician:    [id number] Rashad Ramirez DO  Study Date:         11/4/2024           Ordering Provider:    [id number] KYRA PEREZ  MRN/PID:            [id number]            Fellow:  Accession#:         [id number]        Nurse:  Date of Birth/Age:  1945 / 79      Sonographer:          Reina Bernal RDCS  years  Gender assigned at  F                   Additional Staff:  Birth:  Height:             165.10 cm           Admit Date:           11/2/2024  Weight:             73.94 kg            Admission Status:     Inpatient -  Routine  BSA / BMI:          1.81 m2 / 27.12     Encounter#:           [id number]  kg/m2  Blood Pressure:     97/58 mmHg          Department Location:  [facility] Non  Invasive    Study Type:    TRANSTHORACIC ECHO (TTE) COMPLETE  Diagnosis/ICD: Heart failure, unspecified-I50.9  Indication:    Heart Failure  CPT Code:      Echo Complete w Full Doppler-48842    Patient  History:  Pertinent History: HTN, Hyperlipidemia, LE Edema, Murmur and A-Fib.    Study Detail: The following Echo studies were performed: 2D, M-Mode, Doppler and  color flow. Definity used as a contrast agent for endocardial  border definition. Total contrast used for this procedure was 1 mL  via IV push.      PHYSICIAN INTERPRETATION:  Left Ventricle: Left ventricular ejection fraction is moderately decreased, by visual estimate at 30-35%. The patient is in atrial fibrillation which may influence the estimate of left ventricular function and transvalvular flows. There is mildly increased concentric left ventricular hypertrophy. There is global hypokinesis of the left ventricle with minor regional variations. The left ventricular cavity size is normal. There is mildly increased septal and mildly increased posterior left ventricular wall thickness. Left ventricular diastolic filling cannot be determined, due to atrial fibrillation/flutter.  Left Atrium: The left atrium is severely dilated.  Right Ventricle: The right ventricle is moderately enlarged. There is moderately reduced right ventricular systolic function.  Right Atrium: The right atrium is normal in size.  Aortic Valve: The aortic valve is trileaflet. There is mild aortic valve cusp calcification. The aortic valve dimensionless index is 0.71. There is mild aortic valve regurgitation. The peak instantaneous gradient of the aortic valve is 9 mmHg. The mean gradient of the mitral valve is 5 mmHg.  Mitral Valve: The mitral valve is normal in structure. There is moderate mitral annular calcification. There is trace to mild mitral valve regurgitation.  Tricuspid Valve: The tricuspid valve is structurally normal. There is trace tricuspid regurgitation. The Doppler estimated RVSP is mildly elevated at 43.0 mmHg.  Pulmonic Valve: The pulmonic valve is structurally normal. There is trace pulmonic valve regurgitation.  Pericardium: Trivial pericardial effusion.  Aorta:  The aortic root is abnormal. Aortic root moderatly enlarged at 4.8 cm. Ascending aorta 4.4 cm.  Systemic Veins: The inferior vena cava appears dilated, with IVC inspiratory collapse less than 50%.  In comparison to the previous echocardiogram(s): There are no prior studies on this patient for comparison purposes.      CONCLUSIONS:  1. Left ventricular ejection fraction is moderately decreased, by visual estimate at 30-35%.  2. There is global hypokinesis of the left ventricle with minor regional variations.  3. Left ventricular diastolic filling cannot be determined, due to atrial fibrillation/flutter.  4. There is moderately reduced right ventricular systolic function.  5. Moderately enlarged right ventricle.  6. The left atrium is severely dilated.  7. There is moderate mitral annular calcification.  8. Mildly elevated right ventricular systolic pressure.  9. Mild aortic valve regurgitation.  10. Aortic root moderatly enlarged at 4.8 cm. Ascending aorta 4.4 cm.  11. The patient is in atrial fibrillation which may influence the estimate of left ventricular function and transvalvular flows.    QUANTITATIVE DATA SUMMARY:    2D MEASUREMENTS:          Normal Ranges:  LAs:             4.45 cm  (2.7-4.0cm)  RVIDd:           4.83 cm  (0.9-3.6cm)  IVSd:            1.02 cm  (0.6-1.1cm)  LVPWd:           1.06 cm  (0.6-1.1cm)  LVIDd:           4.93 cm  (3.9-5.9cm)  LVIDs:           4.48 cm  LV Mass Index:   103 g/m2  LVEDV Index:     91 ml/m2  LV % FS          9.0 %      LA VOLUME:                    Normal Ranges:  LA Vol A4C:        89.1 ml    (22+/-6mL/m2)  LA Vol A2C:        85.1 ml  LA Vol BP:         87.1 ml  LA Vol Index A4C:  49.1ml/m2  LA Vol Index A2C:  46.9 ml/m2  LA Vol Index BP:   48.0 ml/m2  LA Area A4C:       26.5 cm2  LA Area A2C:       25.9 cm2  LA Major Axis A4C: 6.7 cm  LA Major Axis A2C: 6.7 cm  LA Volume Index:   48.3 ml/m2  LA Vol A4C:        83.5 ml  LA Vol A2C:        83.5 ml  LA Vol Index BSA:  46.0  ml/m2      RA VOLUME BY A/L METHOD:            Normal Ranges:  RA Vol A4C:              55.2 ml    (8.3-19.5ml)  RA Vol Index A4C:        30.4 ml/m2  RA Area A4C:             18.2 cm2  RA Major Axis A4C:       5.1 cm      AORTA MEASUREMENTS:         Normal Ranges:  Ao Sinus, d:        4.80 cm (2.1-3.5cm)  Ao STJ, d:          4.30 cm (1.7-3.4cm)  Asc Ao, d:          4.40 cm (2.1-3.4cm)      LV SYSTOLIC FUNCTION BY 2D PLANIMETRY (MOD):  Normal Ranges:  EF-A4C View:    45 % (>=55%)  EF-A2C View:    34 %  EF-Biplane:     39 %  EF-Visual:      33 %  LV EF Reported: 33 %      LV DIASTOLIC FUNCTION:           Normal Ranges:  MV Peak E:             0.95 m/s  (0.7-1.2 m/s)  MV e'                  0.080 m/s (>8.0)  MV lateral e'          0.10 m/s  MV medial e'           0.06 m/s  E/e' Ratio:            11.82     (<8.0)      AORTIC VALVE:                     Normal Ranges:  AoV Vmax:                1.47 m/s (<=1.7m/s)  AoV Peak P.6 mmHg (<20mmHg)  AoV Mean P.9 mmHg (1.7-11.5mmHg)  LVOT Max Adis:            1.07 m/s (<=1.1m/s)  AoV VTI:                 22.67 cm (18-25cm)  LVOT VTI:                16.02 cm  LVOT Diameter:           2.18 cm  (1.8-2.4cm)  AoV Area, VTI:           2.63 cm2 (2.5-5.5cm2)  AoV Area,Vmax:           2.70 cm2 (2.5-4.5cm2)  AoV Dimensionless Index: 0.71      AORTIC INSUFFICIENCY:  AI Vmax:       3.75 m/s  AI Half-time:  523 msec  AI Decel Time: 1803 msec  AI Decel Rate: 209.94 cm/s2      RIGHT VENTRICLE:  RV Basal 4.80 cm  RV Mid   3.70 cm  RV Major 7.8 cm  TAPSE:   15.0 mm  RV s'    0.11 m/s      TRICUSPID VALVE/RVSP:          Normal Ranges:  Peak TR Velocity:     2.78 m/s  Est. RA Pressure:     12 mmHg  RV Syst Pressure:     43 mmHg  (< 30mmHg)  IVC Diam:             2.45 cm      PULMONIC VALVE:          Normal Ranges:  RVOT Vmax:      0.46 m/s (0.6-0.9m/s)  PV Max Adis:     0.8 m/s  (0.6-0.9m/s)  PV Max P.4 mmHg      AORTA:  Asc Ao Diam 4.37 cm      [id number] Rashad  James   Electronically signed on 11/4/2024 at 4:56:51 PM        ** Final **    Ejection Fractions:  LV EF   Date/Time Value Ref Range Status   08/11/2025 03:53 PM 38 %    11/07/2024 02:29 PM 33 %    11/04/2024 12:05 PM 33 %      Cath:  Results for orders placed during the hospital encounter of 11/02/24    Cardiac Catheterization Procedure    Narrative  [facility], Cath Lab, [address] [place] [address]    Cardiovascular Catheterization Report    Patient Name:     PHAM STEWARTDANIELITOLETICIA   Performing Physician:  [id number] Syed MD  Study Date:       11/6/2024           Verifying Physician:   [id number] Syed MD  MRN/PID:          [id number]            Cardiologist/Co-Scrub:  Accession#:       [id number]        Ordering Provider:     [id number] STEPHEN RUSSELL  Date of           1945 / [birth year]      Cardiologist:  Birth/Age:        years  Gender:           F                   Fellow:  Encounter#:       [id number]          Surgeon:      Study: Right Heart Cath      Indications:  Heart failure.    Procedure Description:  After infiltration of local anesthetic, the left femoral vein was identified with two-dimensional ultrasound. Under direct ultrasound visualization, the left femoral vein was cannulated with a micropuncture technique. A 5 Estonian sheath was placed in the vein. Cardiac output was calculated via the Dianna method. Post-procedure, the venous sheath was pulled and pressure was applied to the site.    Right Heart Catheterization:  Cardiac output was calculated via the Dianna method. RHC: Elevated right_ [RA 8, RVEDP is 8 mmHg] and normal left- [PCWP 9 mmHg] sided filling pressures, elevated PA pressures at 38/25 [mean PA 31 mmHg], and normal assumed Dianna cardiac output 4.7 L/min and cardiac index of 2.6 L/min/mï¿½.    Complications:  No in-lab complications observed.    Cardiac Cath Post Procedure Notes:  Post Procedure Diagnosis: See beow.  Blood Loss:               Estimated blood loss  during the procedure was 5 mls.  Specimens Removed:        Number of specimen(s) removed: none.    ____________________________________________________________________________________  CONCLUSIONS:  1. RHC: Elevated right_ [RA 8, RVEDP is 8 mmHg] and normal left- [PCWP 9 mmHg] sided filling pressures, elevated PA pressures at 38/25 [mean PA 31 mmHg], and normal assumed Dianna cardiac output 4.7 L/min and cardiac index of 2.6 L/min/mï¿½.  dyne ï¿½ sec/cm5.  2. Further management as per inpatient cardiology consult team.    ICD 10 Codes:  Other forms of dyspnea-R06.09    CPT Codes:  Right Heart Cath O2/Cardiac output without biopsy (RHC)-34836; Ultrasound guidance for vascular access-92796    [id number] Samra Syed MD  Performing Physician  Electronically signed by [id number] Samra Syed MD on 11/6/2024 at 12:50:43 PM          ** Final **    Stress Test:  No results found for this or any previous visit.    Cardiac Imaging:  No results found for this or any previous visit.      Inpatient Medications:  Scheduled Medications[1]  PRN Medications[2]  Continuous Medications[3]    Physical Exam:  General:  Patient is awake, alert, and oriented.  Patient is in no acute distress.  HEENT:  Pupils equal and reactive.  Normocephalic.  Moist mucosa.    Neck:  No thyromegaly.  Normal Jugular Venous Pressure.  Cardiovascular:  Regular rate and rhythm.  Normal S1 and S2.  Pulmonary:  Clear to auscultation bilaterally.  Abdomen:  Soft. Non-tender.   Non-distended.  Positive bowel sounds.  Lower Extremities:  2+ pedal pulses. No LE edema.  Neurologic:  Cranial nerves intact.  No focal deficit.   Skin: Skin warm and dry, normal skin turgor.   Psychiatric: Normal affect.     Assessment/Plan   Anny Tesfaye is a 80 y.o. female with PMHx significant for Afib on Amiodarone and Eliquis (dx 10/2024 post EVAR) s/p UMESH/DCCV 11/2024, HFrEF  HTN, HLD, heavy tobacco use, chronic hypoxic resp failure on 3L oxygen, severe PAD, AAA s/p EVAR 10/10/24  "at [facility] and L-R fem-fem bypass c/b ischemic colitis s/p ex-lap, descending and proximal sigmoid colectomy with end colostomy and long marixa stump, SBO 1/2025, lumbar stenosis, hypothyroidism, CKD III, polymyalgia rheumatica, and OA  who presented to [facility] 8/6 from [facility] ([facility]) with c/o abdominal pain, n/v and admitted with SBO. Cardiology consulted for \"pt takes amiodarone at home, admitted for bowel obstruciton, improving, however her qtc interval is elevated, please eval for resuming amiodarone.\"     TTE8/11/2025   CONCLUSIONS:   1. Left ventricular ejection fraction is moderately decreased by visual estimate at 35-40%.   2. There is global hypokinesis of the left ventricle with minor regional variations.   3. Left ventricular cavity size is severely dilated.   4. There is severe eccentric left ventricular hypertrophy.   5. There is normal right ventricular global systolic function.   6. The left atrial size is severely dilated.   7. There is severe mitral annular calcification.   8. Mild to moderate mitral valve regurgitation.   9. Mild aortic valve regurgitation.     Outpatient cardiac medications: amiodarone 200 mg daily, amlodipine 5 mg daily, eliquis 2.5 mg bid, asa 81 mg daily, atorvastatin 10 mg daily,      #Paroxysmal Afib  -Maintaining SB/SR at present HR upper 40-60s  -Started on amiodarone 10/2024 when she developed post op Afib post EVAR; Hx UMESH/DCCV 11/2024, initial plan was to stop amiodarone 1-2 months following UMESH/DCVV, however did not follow up with Cardiology  -[id number] Score 6, anticoagulated on Eliquis prior to admit, currently on hold per primary pending surgery plan. Currently managing SBO conservatively with NG.   -Noted prolonged QT/Qtc on EKG 8/10: 548/572 msec; 8/11 548/514 msec     #Prolonged QT  -Noted prolonged QT/Qtc on EKG 8/10: 548/572 msec; 8/11 548/514 msec  -Currently on Zofran & Amiodarone which may prolong QT  -Hypokalemic, K 3.4      #HFrEF  -Noted reduced " LVEF in setting of Afib/RVR; TTE 11/4/25: LVEF 30-35%, moderately reduced RV fx, moderately enlarged RV, LA severely dilated, mild pHTN RVSP 43 mmHg, mild AI  -Repeat TTE here 35-40%  -Etiology previously suspected to be Afib/tachycardia induced, [facility] 8/2024 with no significant CAD; s/p DCCV/UMESH 11/7/25, no repeat TTE while maintaining SR.  -Was not on GDMT prior to admission; she was started on Metoprolol Succinate and Entresto following 11/2024 hospitalization at [facility]  - 8/12 restart Entresto 24/26mg BID and metoprolol succinate 25mg daily  - Add Aldactone 25mg daily       #HTN  -normotensive to mildly elevated     #PAD  -S/p EVAR 10/10/24 at [facility] and L-R fem-fem bypass  -on asa, statin         Plan/Recs:   -Hold Amiodarone given prolonged QT.   -Hold Zofran given prolonged QT.   -Avoid QT prolonging agents. Daily EKG for QT monitoring.   -c/w Entresto at 24/26mg BID and metoprolol succinate 12.5mg daily  -Add Aldactone 25mg daily to start tomorrow   -Monitor electrolytes, goal  K =4, Mg= 2.   -no cardiac barriers to discharge  -Resume Eliquis 2.5 mg BID as able pending plan per General Surgery for SBO.   -Follow up with Cardiology, Dr. Ramirez in the next 4-6 weeks as outpatient        Code Status:  Full Code     I spent 40 minutes in the professional and overall care of this patient.        YVONNE Pang-CNP           [1]   Scheduled medications   Medication Dose Route Frequency    [Held by provider] amiodarone  200 mg oral q AM    apixaban  2.5 mg oral BID    atorvastatin  10 mg oral Nightly    calcium carbonate  500 mg of calcium carbonate oral Daily    cyanocobalamin  5,000 mcg oral Daily    docusate sodium  100 mg oral BID    escitalopram  20 mg oral Daily    levothyroxine  100 mcg oral Once per day on Monday Tuesday Wednesday Thursday Friday Saturday    metoprolol succinate XL  12.5 mg oral Daily    metoprolol  5 mg intravenous q6h    pantoprazole  40 mg oral Daily before breakfast     sacubitriL-valsartan  1 tablet oral BID   [2]   PRN medications   Medication    acetaminophen    Or    acetaminophen    Or    acetaminophen    hydrALAZINE    morphine    [Held by provider] ondansetron    Or    [Held by provider] ondansetron    phenoL   [3]   Continuous Medications   Medication Dose Last Rate

## 2025-08-13 NOTE — CARE PLAN
The patient's goals for the shift include feel better and possible discharge back to [facility].    The clinical goals for the shift include maintain comfort and remain free from injury    Over the shift, the patient did make progress toward the following goals.       Problem: Pain - Adult  Goal: Verbalizes/displays adequate comfort level or baseline comfort level  Outcome: Progressing     Problem: Safety - Adult  Goal: Free from fall injury  Outcome: Progressing     Problem: Discharge Planning  Goal: Discharge to home or other facility with appropriate resources  Outcome: Progressing     Problem: Chronic Conditions and Co-morbidities  Goal: Patient's chronic conditions and co-morbidity symptoms are monitored and maintained or improved  Outcome: Progressing     Problem: Nutrition  Goal: Nutrient intake appropriate for maintaining nutritional needs  Outcome: Progressing     Problem: Skin  Goal: Decreased wound size/increased tissue granulation at next dressing change  Outcome: Progressing  Flowsheets (Taken 8/13/2025 0935)  Decreased wound size/increased tissue granulation at next dressing change:   Promote sleep for wound healing   Protective dressings over bony prominences  Goal: Participates in plan/prevention/treatment measures  Outcome: Progressing  Flowsheets (Taken 8/13/2025 0935)  Participates in plan/prevention/treatment measures:   Discuss with provider PT/OT consult   Elevate heels  Goal: Prevent/manage excess moisture  Outcome: Progressing  Flowsheets (Taken 8/13/2025 0935)  Prevent/manage excess moisture: Cleanse incontinence/protect with barrier cream  Goal: Prevent/minimize sheer/friction injuries  Outcome: Progressing  Flowsheets (Taken 8/13/2025 0935)  Prevent/minimize sheer/friction injuries:   Turn/reposition every 2 hours/use positioning/transfer devices   Use pull sheet  Goal: Promote/optimize nutrition  Outcome: Progressing  Flowsheets (Taken 8/13/2025 0935)  Promote/optimize  nutrition:   Monitor/record intake including meals   Offer water/supplements/favorite foods  Goal: Promote skin healing  Outcome: Progressing  Flowsheets (Taken 8/13/2025 0591)  Promote skin healing:   Assess skin/pad under line(s)/device(s)   Rotate device position/do not position patient on device   Turn/reposition every 2 hours/use positioning/transfer devices     Problem: Pain  Goal: Takes deep breaths with improved pain control throughout the shift  Outcome: Progressing  Goal: Turns in bed with improved pain control throughout the shift  Outcome: Progressing  Goal: Walks with improved pain control throughout the shift  Outcome: Progressing  Goal: Performs ADL's with improved pain control throughout shift  Outcome: Progressing  Goal: Participates in PT with improved pain control throughout the shift  Outcome: Progressing  Goal: Free from opioid side effects throughout the shift  Outcome: Progressing  Goal: Free from acute confusion related to pain meds throughout the shift  Outcome: Progressing

## 2025-08-13 NOTE — PROGRESS NOTES
Anny Tesfaye is a 80 y.o. female on day 6 of admission presenting with Intestinal obstruction (Multi).      Subjective       Pt resting in bed  Appears comfortable  Nad  No ac needs        Objective     Last Recorded Vitals  /57 (BP Location: Left arm, Patient Position: Lying)   Pulse 72   Temp 37 °C (98.6 °F) (Temporal)   Resp 16   Wt 68 kg (149 lb 14.6 oz)   SpO2 98%   Intake/Output last 3 Shifts:    Intake/Output Summary (Last 24 hours) at 8/13/2025 1203  Last data filed at 8/13/2025 1125  Gross per 24 hour   Intake 300 ml   Output 950 ml   Net -650 ml       Admission Weight  Weight: 68 kg (149 lb 14.6 oz) (08/06/25 2235)    Daily Weight  08/06/25 : 68 kg (149 lb 14.6 oz)    Image Results  ECG 12 Lead  Sinus bradycardia with 1st degree AV block  Left axis deviation  Moderate voltage criteria for LVH, may be normal variant  Septal infarct , age undetermined  Abnormal ECG  When compared with ECG of 11-AUG-2025 11:18,  Previous ECG has undetermined rhythm, needs review  Septal infarct is now Present    Medications Ordered Prior to Encounter[1]    Results for orders placed or performed during the hospital encounter of 08/06/25 (from the past 24 hours)   Basic metabolic panel   Result Value Ref Range    Glucose 101 (H) 74 - 99 mg/dL    Sodium 134 (L) 136 - 145 mmol/L    Potassium 4.1 3.5 - 5.3 mmol/L    Chloride 101 98 - 107 mmol/L    Bicarbonate 24 21 - 32 mmol/L    Anion Gap 13 10 - 20 mmol/L    Urea Nitrogen 13 6 - 23 mg/dL    Creatinine 0.69 0.50 - 1.05 mg/dL    eGFR 87 >60 mL/min/1.73m*2    Calcium 8.1 (L) 8.6 - 10.3 mg/dL   CBC   Result Value Ref Range    WBC 8.7 4.4 - 11.3 x10*3/uL    nRBC 0.0 0.0 - 0.0 /100 WBCs    RBC 3.13 (L) 4.00 - 5.20 x10*6/uL    Hemoglobin 8.3 (L) 12.0 - 16.0 g/dL    Hematocrit 27.9 (L) 36.0 - 46.0 %    MCV 89 80 - 100 fL    MCH 26.5 26.0 - 34.0 pg    MCHC 29.7 (L) 32.0 - 36.0 g/dL    RDW 17.5 (H) 11.5 - 14.5 %    Platelets 444 150 - 450 x10*3/uL        Physical Exam      Well developed well nurished  No distress  Ao 3   Neck no jvd no bruit  Chest clear  CVS regular  Ext no edema  Abdo soft nontender bs normal  no masses, colostomy does have output  Cns alert appropriate able to move ext, gait not tested  Skin intact  Psych normal affect  Relevant Results           Medications Ordered Prior to Encounter[2]  Results for orders placed or performed during the hospital encounter of 08/06/25 (from the past 24 hours)   Basic metabolic panel   Result Value Ref Range    Glucose 101 (H) 74 - 99 mg/dL    Sodium 134 (L) 136 - 145 mmol/L    Potassium 4.1 3.5 - 5.3 mmol/L    Chloride 101 98 - 107 mmol/L    Bicarbonate 24 21 - 32 mmol/L    Anion Gap 13 10 - 20 mmol/L    Urea Nitrogen 13 6 - 23 mg/dL    Creatinine 0.69 0.50 - 1.05 mg/dL    eGFR 87 >60 mL/min/1.73m*2    Calcium 8.1 (L) 8.6 - 10.3 mg/dL   CBC   Result Value Ref Range    WBC 8.7 4.4 - 11.3 x10*3/uL    nRBC 0.0 0.0 - 0.0 /100 WBCs    RBC 3.13 (L) 4.00 - 5.20 x10*6/uL    Hemoglobin 8.3 (L) 12.0 - 16.0 g/dL    Hematocrit 27.9 (L) 36.0 - 46.0 %    MCV 89 80 - 100 fL    MCH 26.5 26.0 - 34.0 pg    MCHC 29.7 (L) 32.0 - 36.0 g/dL    RDW 17.5 (H) 11.5 - 14.5 %    Platelets 444 150 - 450 x10*3/uL                       Assessment & Plan  Intestinal obstruction (Multi)    Hypothyroid    Adjustment reaction with anxiety and depression    Essential hypertension    Stage 3 chronic kidney disease (Multi)    Anemia    Anticoagulant long-term use    Peripheral vascular disease    Atrial fibrillation     Resume anticoagulation once ok w surgery   Consulted cardiology as qtc is elevated, pt is off of amiodarone due to input from cards noted  Cont to monitor qtc  Rate control per cards   D/w cards this am  Plan to adjust medications noted  Monitor for now       Input noted from cards  Stable for dc on regimen as out lined by cards             -Continue current treatment as ordered. Will make adjustments as necessary.       Plan  of care discussed with: Provider, RN, Patient.         Patient case and plan of care discussed with Dr. GHISLAINE Rodriguez.    YVONNE Green - CNP  -In collaboration with Dr. GHISLAINE Rodriguez    [facility]  Office: [phone]  Fax: [phone]  I have reviewed the above note obtained and documented by the NP/PA and I personally participated in the key components. I have discussed the case and management of the patient's care. Changes made to the note, and all key components of history and physical/progress note done by me.  dw nursing  Dc planning for ltc  Time > 30 min  Rajendra Rodriguez MD           [1]   No current facility-administered medications on file prior to encounter.     Current Outpatient Medications on File Prior to Encounter   Medication Sig Dispense Refill    cephalexin (Keflex) 500 mg capsule Take 1 capsule (500 mg) by mouth 2 times a day.      acetaminophen (Tylenol 8 HOUR) 650 mg ER tablet Take 1 tablet (650 mg) by mouth every 8 hours if needed.      acetaminophen (Tylenol) 650 mg suppository Insert 1 suppository (650 mg) into the rectum every 8 hours if needed for mild pain (1 - 3).      alendronate (Fosamax) 70 mg tablet Take 1 tablet (70 mg) by mouth every 7 days. Take on an empty stomach. Do not lie down or eat for 1/2 hour after taking. (Patient not taking: Reported on 7/31/2025) 12 tablet 3    amiodarone (Pacerone) 200 mg tablet Take 1 tablet (200 mg) by mouth once daily in the morning.      amLODIPine (Norvasc) 5 mg tablet Take 1 tablet (5 mg) by mouth once daily. In the morning      apixaban (Eliquis) 2.5 mg tablet Take 1 tablet (2.5 mg) by mouth 2 times a day.      ascorbic acid (Vitamin C) 500 mg tablet Take 1 tablet (500 mg) by mouth once daily.      aspirin 81 mg EC tablet Take 1 tablet (81 mg) by mouth once daily.      atorvastatin (Lipitor) 10 mg tablet Take 1 tablet (10 mg) by mouth once daily at bedtime.      bisacodyl (Dulcolax, bisacodyl,) 10 mg  suppository Insert 1 suppository (10 mg) into the rectum once daily as needed for constipation.      calcium carbonate 500 mg calcium (1,250 mg) chewable tablet Chew 1 tablet (1,250 mg) once daily.      cyanocobalamin, vitamin B-12, 5,000 mcg capsule Take 5,000 mcg by mouth once daily.      DAILY MULTI-VITAMIN ORAL Take 1 tablet by mouth once daily.      dimethicone (Cavilon Durable Barrier) 1.3 % cream Apply topically. Apply to lucio area topically every day and night shift for prevention. Apply to buttocks every shift as needed after each episode of incontinence.      docusate sodium (Colace) 100 mg capsule Take 1 capsule (100 mg) by mouth 2 times a day.      escitalopram (Lexapro) 20 mg tablet Take 1 tablet (20 mg) by mouth once daily. 90 tablet 1    ferrous sulfate, 325 mg ferrous sulfate, tablet Take 1 tablet (325 mg) by mouth once daily with breakfast. (Patient not taking: Reported on 7/31/2025)      furosemide (Lasix) 40 mg tablet Take 1 tablet (40 mg) by mouth once daily. (Patient not taking: Reported on 7/31/2025)      levomefolate calcium (L-METHYLFOLATE ORAL) Take 1,000 mcg by mouth once daily in the morning. (Patient not taking: Reported on 7/31/2025)      levothyroxine (Synthroid, Levoxyl) 100 mcg tablet TAKE 1 TABLET (100 MCG) BY MOUTH ONCE DAILY. SIX DAYS PER WEEK 90 tablet 0    lutein 6 mg capsule Take 1 capsule by mouth once daily.      magnesium hydroxide (Milk of Magnesia) 400 mg/5 mL suspension Take 30 mL by mouth once daily as needed for constipation (if no BM in 72 hours).      menthol-zinc oxide (Calmoseptine) 0.44-20.6 % ointment Apply 1 Application topically 2 times a day. (Patient not taking: Reported on 7/31/2025)      metoprolol succinate XL (Toprol-XL) 25 mg 24 hr tablet Take 1 tablet (25 mg) by mouth once daily. Do not crush or chew. (Patient not taking: Reported on 7/31/2025)      mirtazapine (Remeron) 15 mg tablet Take 1 tablet (15 mg) by mouth once daily at bedtime. (Patient not  taking: Reported on 7/31/2025)      ondansetron (Zofran) 4 mg tablet Take 1 tablet (4 mg) by mouth every 6 hours if needed for nausea or vomiting.      oxybutynin XL (Ditropan-XL) 10 mg 24 hr tablet Take 1 tablet (10 mg) by mouth once daily.      oxyCODONE (Roxicodone) 5 mg immediate release tablet Take 1 tablet (5 mg) by mouth every 6 hours if needed for severe pain (7 - 10).      pantoprazole (ProtoNix) 20 mg EC tablet Take 2 tablets (40 mg) by mouth once daily. Do not crush, chew, or split.      polyethylene glycol (Glycolax, Miralax) 17 gram packet Take 17 g by mouth 2 times a day as needed (for firm or low ostomy output).      sacubitriL-valsartan (Entresto) 24-26 mg tablet Take 1 tablet by mouth 2 times a day. (Patient not taking: Reported on 7/31/2025)      sennosides (Senokot) 8.6 mg tablet Take 1 tablet (8.6 mg) by mouth 2 times a day.      sodium phosphates (Fleets) 19-7 gram/118 mL enema enema Insert 133 mL (1 enema) into the rectum once daily as needed for constipation.      thyroid, pork, ([place] Thyroid) 30 mg tablet TAKE 1 TABLET (30 MG) BY MOUTH ONCE DAILY IN THE MORNING. TAKE BEFORE MEALS. (Patient not taking: Reported on 8/8/2025) 30 tablet 0    tocilizumab (Actemra) 200 mg/10 mL (20 mg/mL) solution Infuse into a venous catheter every 28 (twenty-eight) days. as directed (Patient not taking: Reported on 7/31/2025)      [DISCONTINUED] nicotine (Nicoderm CQ) 21 mg/24 hr patch Place 1 patch on the skin once every 24 hours. (Patient not taking: Reported on 7/31/2025)     [2]   No current facility-administered medications on file prior to encounter.     Current Outpatient Medications on File Prior to Encounter   Medication Sig Dispense Refill    cephalexin (Keflex) 500 mg capsule Take 1 capsule (500 mg) by mouth 2 times a day.      acetaminophen (Tylenol 8 HOUR) 650 mg ER tablet Take 1 tablet (650 mg) by mouth every 8 hours if needed.      acetaminophen (Tylenol) 650 mg suppository Insert 1 suppository  (650 mg) into the rectum every 8 hours if needed for mild pain (1 - 3).      alendronate (Fosamax) 70 mg tablet Take 1 tablet (70 mg) by mouth every 7 days. Take on an empty stomach. Do not lie down or eat for 1/2 hour after taking. (Patient not taking: Reported on 7/31/2025) 12 tablet 3    amiodarone (Pacerone) 200 mg tablet Take 1 tablet (200 mg) by mouth once daily in the morning.      amLODIPine (Norvasc) 5 mg tablet Take 1 tablet (5 mg) by mouth once daily. In the morning      apixaban (Eliquis) 2.5 mg tablet Take 1 tablet (2.5 mg) by mouth 2 times a day.      ascorbic acid (Vitamin C) 500 mg tablet Take 1 tablet (500 mg) by mouth once daily.      aspirin 81 mg EC tablet Take 1 tablet (81 mg) by mouth once daily.      atorvastatin (Lipitor) 10 mg tablet Take 1 tablet (10 mg) by mouth once daily at bedtime.      bisacodyl (Dulcolax, bisacodyl,) 10 mg suppository Insert 1 suppository (10 mg) into the rectum once daily as needed for constipation.      calcium carbonate 500 mg calcium (1,250 mg) chewable tablet Chew 1 tablet (1,250 mg) once daily.      cyanocobalamin, vitamin B-12, 5,000 mcg capsule Take 5,000 mcg by mouth once daily.      DAILY MULTI-VITAMIN ORAL Take 1 tablet by mouth once daily.      dimethicone (Cavilon Durable Barrier) 1.3 % cream Apply topically. Apply to lucio area topically every day and night shift for prevention. Apply to buttocks every shift as needed after each episode of incontinence.      docusate sodium (Colace) 100 mg capsule Take 1 capsule (100 mg) by mouth 2 times a day.      escitalopram (Lexapro) 20 mg tablet Take 1 tablet (20 mg) by mouth once daily. 90 tablet 1    ferrous sulfate, 325 mg ferrous sulfate, tablet Take 1 tablet (325 mg) by mouth once daily with breakfast. (Patient not taking: Reported on 7/31/2025)      furosemide (Lasix) 40 mg tablet Take 1 tablet (40 mg) by mouth once daily. (Patient not taking: Reported on 7/31/2025)      levomefolate calcium (L-METHYLFOLATE  ORAL) Take 1,000 mcg by mouth once daily in the morning. (Patient not taking: Reported on 7/31/2025)      levothyroxine (Synthroid, Levoxyl) 100 mcg tablet TAKE 1 TABLET (100 MCG) BY MOUTH ONCE DAILY. SIX DAYS PER WEEK 90 tablet 0    lutein 6 mg capsule Take 1 capsule by mouth once daily.      magnesium hydroxide (Milk of Magnesia) 400 mg/5 mL suspension Take 30 mL by mouth once daily as needed for constipation (if no BM in 72 hours).      menthol-zinc oxide (Calmoseptine) 0.44-20.6 % ointment Apply 1 Application topically 2 times a day. (Patient not taking: Reported on 7/31/2025)      metoprolol succinate XL (Toprol-XL) 25 mg 24 hr tablet Take 1 tablet (25 mg) by mouth once daily. Do not crush or chew. (Patient not taking: Reported on 7/31/2025)      mirtazapine (Remeron) 15 mg tablet Take 1 tablet (15 mg) by mouth once daily at bedtime. (Patient not taking: Reported on 7/31/2025)      ondansetron (Zofran) 4 mg tablet Take 1 tablet (4 mg) by mouth every 6 hours if needed for nausea or vomiting.      oxybutynin XL (Ditropan-XL) 10 mg 24 hr tablet Take 1 tablet (10 mg) by mouth once daily.      oxyCODONE (Roxicodone) 5 mg immediate release tablet Take 1 tablet (5 mg) by mouth every 6 hours if needed for severe pain (7 - 10).      pantoprazole (ProtoNix) 20 mg EC tablet Take 2 tablets (40 mg) by mouth once daily. Do not crush, chew, or split.      polyethylene glycol (Glycolax, Miralax) 17 gram packet Take 17 g by mouth 2 times a day as needed (for firm or low ostomy output).      sacubitriL-valsartan (Entresto) 24-26 mg tablet Take 1 tablet by mouth 2 times a day. (Patient not taking: Reported on 7/31/2025)      sennosides (Senokot) 8.6 mg tablet Take 1 tablet (8.6 mg) by mouth 2 times a day.      sodium phosphates (Fleets) 19-7 gram/118 mL enema enema Insert 133 mL (1 enema) into the rectum once daily as needed for constipation.      thyroid, pork, ([place] Thyroid) 30 mg tablet TAKE 1 TABLET (30 MG) BY MOUTH ONCE  DAILY IN THE MORNING. TAKE BEFORE MEALS. (Patient not taking: Reported on 8/8/2025) 30 tablet 0    tocilizumab (Actemra) 200 mg/10 mL (20 mg/mL) solution Infuse into a venous catheter every 28 (twenty-eight) days. as directed (Patient not taking: Reported on 7/31/2025)      [DISCONTINUED] nicotine (Nicoderm CQ) 21 mg/24 hr patch Place 1 patch on the skin once every 24 hours. (Patient not taking: Reported on 7/31/2025)

## 2025-08-13 NOTE — PROGRESS NOTES
08/13/25 1235   Discharge Planning   Expected Discharge Disposition Inter  ([facility])   Does the patient need discharge transport arranged? No   [facility] coordination needed? No   Has discharge transport been arranged? Yes   What day is the transport expected? 08/13/25   What time is the transport expected? 1500       12:35PM  MARIELENA contacted patient's POA via phone and informed her of patient's discharge to [facility] at 3:00pm today.

## 2025-08-13 NOTE — CARE PLAN
The patient's goals for the shift include      The clinical goals for the shift include Patient will be safe and HDS throughout shift    Problem: Pain - Adult  Goal: Verbalizes/displays adequate comfort level or baseline comfort level  Outcome: Progressing     Problem: Safety - Adult  Goal: Free from fall injury  Outcome: Progressing     Problem: Discharge Planning  Goal: Discharge to home or other facility with appropriate resources  Outcome: Progressing     Problem: Chronic Conditions and Co-morbidities  Goal: Patient's chronic conditions and co-morbidity symptoms are monitored and maintained or improved  Outcome: Progressing     Problem: Nutrition  Goal: Nutrient intake appropriate for maintaining nutritional needs  Outcome: Progressing     Problem: Skin  Goal: Decreased wound size/increased tissue granulation at next dressing change  Outcome: Progressing  Goal: Participates in plan/prevention/treatment measures  Outcome: Progressing  Goal: Prevent/manage excess moisture  Outcome: Progressing  Goal: Prevent/minimize sheer/friction injuries  Outcome: Progressing  Goal: Promote/optimize nutrition  Outcome: Progressing  Goal: Promote skin healing  Outcome: Progressing     Problem: Pain  Goal: Takes deep breaths with improved pain control throughout the shift  Outcome: Progressing  Goal: Turns in bed with improved pain control throughout the shift  Outcome: Progressing  Goal: Walks with improved pain control throughout the shift  Outcome: Progressing  Goal: Performs ADL's with improved pain control throughout shift  Outcome: Progressing  Goal: Participates in PT with improved pain control throughout the shift  Outcome: Progressing  Goal: Free from opioid side effects throughout the shift  Outcome: Progressing  Goal: Free from acute confusion related to pain meds throughout the shift  Outcome: Progressing

## 2025-08-14 LAB
ATRIAL RATE: 56 BPM
ATRIAL RATE: 71 BPM
P AXIS: -8 DEGREES
P AXIS: 21 DEGREES
P OFFSET: 186 MS
P OFFSET: 193 MS
P ONSET: 109 MS
P ONSET: 143 MS
PR INTERVAL: 150 MS
PR INTERVAL: 218 MS
Q ONSET: 218 MS
Q ONSET: 218 MS
QRS COUNT: 12 BEATS
QRS COUNT: 9 BEATS
QRS DURATION: 104 MS
QRS DURATION: 112 MS
QT INTERVAL: 464 MS
QT INTERVAL: 524 MS
QTC CALCULATION(BAZETT): 504 MS
QTC CALCULATION(BAZETT): 505 MS
QTC FREDERICIA: 491 MS
QTC FREDERICIA: 512 MS
R AXIS: -19 DEGREES
R AXIS: -30 DEGREES
T AXIS: 38 DEGREES
T AXIS: 73 DEGREES
T OFFSET: 450 MS
T OFFSET: 480 MS
VENTRICULAR RATE: 56 BPM
VENTRICULAR RATE: 71 BPM

## (undated) DEVICE — CATHETER, WEDGE PRESSURE, BALLOON, DOUBLE LUMEN, 5 FR, 110 CM

## (undated) DEVICE — SHEATH, PINNACLE, 10 CM,  5FR INTRODUCER, 5FR DIA, 2.5 CM DIALATOR

## (undated) DEVICE — ACCESS KIT, S-MAK MINI, 4FR 10CM 0.018IN 40CM, NT/PT, ECHO ENHANCE NEEDLE

## (undated) DEVICE — GUIDEWIRE, STRAIGHT, HI-TORQUE BALANCE MIDDLEWEIGHT, 0.014 IN X 300 CM, HYDROCOAT

## (undated) DEVICE — INTRODUCER, GLIDESHEATH SLENDER A-KIT, 5FR 10CM